# Patient Record
Sex: FEMALE | Race: WHITE | NOT HISPANIC OR LATINO | Employment: FULL TIME | URBAN - METROPOLITAN AREA
[De-identification: names, ages, dates, MRNs, and addresses within clinical notes are randomized per-mention and may not be internally consistent; named-entity substitution may affect disease eponyms.]

---

## 2017-01-04 DIAGNOSIS — Z79.899 ENCOUNTER FOR LONG-TERM CURRENT USE OF HIGH RISK MEDICATION: ICD-10-CM

## 2017-01-04 DIAGNOSIS — D50.9 IRON DEFICIENCY ANEMIA, UNSPECIFIED IRON DEFICIENCY ANEMIA TYPE: Primary | ICD-10-CM

## 2017-01-04 RX ORDER — DIPHENHYDRAMINE HCL 25 MG
25 CAPSULE ORAL ONCE
Status: CANCELLED | OUTPATIENT
Start: 2017-01-13

## 2017-01-04 RX ORDER — FAMOTIDINE 10 MG/ML
20 INJECTION, SOLUTION INTRAVENOUS ONCE
Status: CANCELLED | OUTPATIENT
Start: 2017-01-06

## 2017-01-04 RX ORDER — SODIUM CHLORIDE 9 MG/ML
250 INJECTION, SOLUTION INTRAVENOUS ONCE
Status: CANCELLED | OUTPATIENT
Start: 2017-01-06

## 2017-01-04 RX ORDER — DIPHENHYDRAMINE HCL 25 MG
25 CAPSULE ORAL ONCE
Status: CANCELLED | OUTPATIENT
Start: 2017-01-06

## 2017-01-04 RX ORDER — SODIUM CHLORIDE 9 MG/ML
250 INJECTION, SOLUTION INTRAVENOUS ONCE
Status: CANCELLED | OUTPATIENT
Start: 2017-01-13

## 2017-01-06 ENCOUNTER — INFUSION (OUTPATIENT)
Dept: ONCOLOGY | Facility: HOSPITAL | Age: 32
End: 2017-01-06

## 2017-01-06 VITALS
DIASTOLIC BLOOD PRESSURE: 82 MMHG | SYSTOLIC BLOOD PRESSURE: 110 MMHG | WEIGHT: 186.8 LBS | BODY MASS INDEX: 28.4 KG/M2 | TEMPERATURE: 98.2 F | HEART RATE: 58 BPM

## 2017-01-06 DIAGNOSIS — D50.9 IRON DEFICIENCY ANEMIA, UNSPECIFIED IRON DEFICIENCY ANEMIA TYPE: ICD-10-CM

## 2017-01-06 DIAGNOSIS — Z79.899 ENCOUNTER FOR LONG-TERM CURRENT USE OF HIGH RISK MEDICATION: Primary | ICD-10-CM

## 2017-01-06 PROCEDURE — 63710000001 DIPHENHYDRAMINE PER 50 MG: Performed by: INTERNAL MEDICINE

## 2017-01-06 PROCEDURE — 96374 THER/PROPH/DIAG INJ IV PUSH: CPT | Performed by: INTERNAL MEDICINE

## 2017-01-06 PROCEDURE — 25010000002 FERUMOXYTOL 510 MG/17ML SOLUTION 510 MG VIAL: Performed by: INTERNAL MEDICINE

## 2017-01-06 PROCEDURE — 96375 TX/PRO/DX INJ NEW DRUG ADDON: CPT | Performed by: INTERNAL MEDICINE

## 2017-01-06 RX ORDER — FAMOTIDINE 10 MG/ML
20 INJECTION, SOLUTION INTRAVENOUS ONCE
Status: COMPLETED | OUTPATIENT
Start: 2017-01-06 | End: 2017-01-06

## 2017-01-06 RX ORDER — DIPHENHYDRAMINE HCL 25 MG
25 CAPSULE ORAL ONCE
Status: COMPLETED | OUTPATIENT
Start: 2017-01-06 | End: 2017-01-06

## 2017-01-06 RX ORDER — SODIUM CHLORIDE 9 MG/ML
250 INJECTION, SOLUTION INTRAVENOUS ONCE
Status: COMPLETED | OUTPATIENT
Start: 2017-01-06 | End: 2017-01-06

## 2017-01-06 RX ADMIN — SODIUM CHLORIDE 250 ML: 900 INJECTION, SOLUTION INTRAVENOUS at 13:44

## 2017-01-06 RX ADMIN — FERUMOXYTOL 510 MG: 510 INJECTION INTRAVENOUS at 14:05

## 2017-01-06 RX ADMIN — DIPHENHYDRAMINE HYDROCHLORIDE 25 MG: 25 CAPSULE ORAL at 13:43

## 2017-01-06 RX ADMIN — FAMOTIDINE 20 MG: 10 INJECTION, SOLUTION INTRAVENOUS at 13:44

## 2017-01-09 ENCOUNTER — TELEPHONE (OUTPATIENT)
Dept: PAIN MEDICINE | Facility: CLINIC | Age: 32
End: 2017-01-09

## 2017-01-13 ENCOUNTER — APPOINTMENT (OUTPATIENT)
Dept: ONCOLOGY | Facility: HOSPITAL | Age: 32
End: 2017-01-13

## 2017-01-13 ENCOUNTER — TELEPHONE (OUTPATIENT)
Dept: ONCOLOGY | Facility: HOSPITAL | Age: 32
End: 2017-01-13

## 2017-01-13 NOTE — TELEPHONE ENCOUNTER
----- Message from Alysha Ovalle sent at 1/13/2017  9:15 AM EST -----   Pt cannot come in today for her Iron and needs to re-schedule maybe for this weekend at [facility]        [phone]

## 2017-01-17 ENCOUNTER — INFUSION (OUTPATIENT)
Dept: ONCOLOGY | Facility: HOSPITAL | Age: 32
End: 2017-01-17

## 2017-01-17 VITALS
HEART RATE: 67 BPM | WEIGHT: 186.4 LBS | DIASTOLIC BLOOD PRESSURE: 72 MMHG | TEMPERATURE: 98.2 F | SYSTOLIC BLOOD PRESSURE: 114 MMHG | BODY MASS INDEX: 28.34 KG/M2

## 2017-01-17 DIAGNOSIS — Z79.899 ENCOUNTER FOR LONG-TERM CURRENT USE OF HIGH RISK MEDICATION: Primary | ICD-10-CM

## 2017-01-17 DIAGNOSIS — D50.9 IRON DEFICIENCY ANEMIA, UNSPECIFIED IRON DEFICIENCY ANEMIA TYPE: ICD-10-CM

## 2017-01-17 PROCEDURE — 96374 THER/PROPH/DIAG INJ IV PUSH: CPT | Performed by: INTERNAL MEDICINE

## 2017-01-17 PROCEDURE — 25010000002 FERUMOXYTOL 510 MG/17ML SOLUTION 510 MG VIAL: Performed by: INTERNAL MEDICINE

## 2017-01-17 PROCEDURE — 63710000001 DIPHENHYDRAMINE PER 50 MG: Performed by: INTERNAL MEDICINE

## 2017-01-17 RX ORDER — SODIUM CHLORIDE 9 MG/ML
250 INJECTION, SOLUTION INTRAVENOUS ONCE
Status: COMPLETED | OUTPATIENT
Start: 2017-01-17 | End: 2017-01-17

## 2017-01-17 RX ORDER — DIPHENHYDRAMINE HCL 25 MG
25 CAPSULE ORAL ONCE
Status: COMPLETED | OUTPATIENT
Start: 2017-01-17 | End: 2017-01-17

## 2017-01-17 RX ADMIN — DIPHENHYDRAMINE HYDROCHLORIDE 25 MG: 25 CAPSULE ORAL at 11:06

## 2017-01-17 RX ADMIN — SODIUM CHLORIDE 250 ML: 900 INJECTION, SOLUTION INTRAVENOUS at 11:06

## 2017-01-17 RX ADMIN — FERUMOXYTOL 510 MG: 510 INJECTION INTRAVENOUS at 11:37

## 2017-02-01 ENCOUNTER — OFFICE VISIT (OUTPATIENT)
Dept: PAIN MEDICINE | Facility: CLINIC | Age: 32
End: 2017-02-01

## 2017-02-01 VITALS
HEIGHT: 68 IN | SYSTOLIC BLOOD PRESSURE: 106 MMHG | TEMPERATURE: 97.7 F | OXYGEN SATURATION: 100 % | DIASTOLIC BLOOD PRESSURE: 66 MMHG | BODY MASS INDEX: 28.92 KG/M2 | WEIGHT: 190.8 LBS | HEART RATE: 64 BPM | RESPIRATION RATE: 18 BRPM

## 2017-02-01 DIAGNOSIS — G62.0 CHEMOTHERAPY-INDUCED PERIPHERAL NEUROPATHY (HCC): ICD-10-CM

## 2017-02-01 DIAGNOSIS — Z79.899 ENCOUNTER FOR LONG-TERM CURRENT USE OF HIGH RISK MEDICATION: ICD-10-CM

## 2017-02-01 DIAGNOSIS — G89.29 OTHER CHRONIC PAIN: Primary | ICD-10-CM

## 2017-02-01 DIAGNOSIS — T45.1X5A CHEMOTHERAPY-INDUCED PERIPHERAL NEUROPATHY (HCC): ICD-10-CM

## 2017-02-01 DIAGNOSIS — G43.019 INTRACTABLE MIGRAINE WITHOUT AURA AND WITHOUT STATUS MIGRAINOSUS: ICD-10-CM

## 2017-02-01 PROCEDURE — 99213 OFFICE O/P EST LOW 20 MIN: CPT | Performed by: NURSE PRACTITIONER

## 2017-02-01 RX ORDER — HYDROCODONE BITARTRATE AND ACETAMINOPHEN 5; 325 MG/1; MG/1
1 TABLET ORAL DAILY PRN
Qty: 30 TABLET | Refills: 0 | Status: SHIPPED | OUTPATIENT
Start: 2017-02-01 | End: 2017-03-09 | Stop reason: SDUPTHER

## 2017-02-01 RX ORDER — GABAPENTIN 600 MG/1
600 TABLET ORAL 4 TIMES DAILY
Qty: 120 TABLET | Refills: 2 | Status: SHIPPED | OUTPATIENT
Start: 2017-02-01 | End: 2018-02-01 | Stop reason: HOSPADM

## 2017-02-01 NOTE — PROGRESS NOTES
CHIEF COMPLAINT  Follow-up for headaches and extremity pain.    Subjective   Hu Houston is a 31 y.o. female  who presents to the office for follow-up.She has a history of chronic migraine headaches as well as chemotherapy induced neuropathy.    She is reporting having issues with Topamax. Having trouble at work since starting the medication. She feels like she is moving slow and groggy.  She does not go back to Neurology until the 23rd of this month.  She reports that she was previously doing well when taking only 25 mg BID, headaches were stable and she was not experiencing side effects at this dose.  She has experienced the side effects since increasing the dose to 50 mg BID.  Recommend that she could try reducing the morning dose by 25 mg but ultimately she should follow up with Neurology.      She continues to report significant pain reduction with current regimen of Hydrocodone 5/325 0-1/day PRN as well as Neurontin 600 mg QID.  She denies side effects. ADL's by self. She works full time as a .      Headache    This is a chronic problem. The current episode started more than 1 year ago. The problem occurs daily. The problem has been unchanged. The pain is located in the occipital (entire head) region. The pain radiates to the face (behind both eyes). The pain quality is similar to prior headaches. The quality of the pain is described as aching and throbbing. The pain is at a severity of 0/10. Pertinent negatives include no back pain, coughing, dizziness, fever, hearing loss, nausea, neck pain, numbness, phonophobia, photophobia, vomiting or weakness. Nothing aggravates the symptoms. She has tried oral narcotics, triptans and acetaminophen ( Norco 5/325 1/day PRN, Gabapentin ) for the symptoms. The treatment provided moderate relief. Her past medical history is significant for cancer and migraine headaches.   Peripheral Neuropathy   This is a chronic problem. The current episode started more  "than 1 year ago. The problem occurs intermittently. The problem has been waxing and waning. Associated symptoms include fatigue. Pertinent negatives include no arthralgias, chest pain, chills, congestion, coughing, fever, headaches, joint swelling, myalgias, nausea, neck pain, numbness, vomiting or weakness. Exacerbated by: work - standing, use of arms. She has tried rest, oral narcotics and position changes (gabapentin) for the symptoms. The treatment provided moderate relief.      PEG Assessment   What number best describes your pain on average in the past week?4  What number best describes how, during the past week, pain has interfered with your enjoyment of life?4  What number best describes how, during the past week, pain has interfered with your general activity?  4    The following portions of the patient's history were reviewed and updated as appropriate: allergies, current medications, past family history, past medical history, past social history, past surgical history and problem list.    Review of Systems   Constitutional: Positive for fatigue. Negative for chills and fever.   HENT: Negative for congestion and hearing loss.    Eyes: Negative for photophobia and visual disturbance.   Respiratory: Negative for cough, shortness of breath and wheezing.    Cardiovascular: Negative.  Negative for chest pain.   Gastrointestinal: Negative for constipation, diarrhea, nausea and vomiting.   Genitourinary: Negative for difficulty urinating.   Musculoskeletal: Negative for arthralgias, back pain, joint swelling, myalgias and neck pain.   Neurological: Negative for dizziness, weakness, numbness and headaches.   Psychiatric/Behavioral: Negative for sleep disturbance and suicidal ideas. The patient is not nervous/anxious.        Vitals:    02/01/17 1149   BP: 106/66   Pulse: 64   Resp: 18   Temp: 97.7 °F (36.5 °C)   SpO2: 100%   Weight: 190 lb 12.8 oz (86.5 kg)   Height: 68\" (172.7 cm)   PainSc: 0-No pain "       Objective   Physical Exam   Constitutional: She is oriented to person, place, and time. She appears well-developed and well-nourished. She is cooperative.   HENT:   Head: Normocephalic and atraumatic.   Nose: Nose normal.   Eyes: Conjunctivae and lids are normal.   Neck: Trachea normal and normal range of motion. Neck supple.   Cardiovascular: Normal rate, regular rhythm and normal heart sounds.    Pulmonary/Chest: Effort normal and breath sounds normal.   Abdominal: Normal appearance.   Musculoskeletal:        Lumbar back: She exhibits no tenderness.   Neurological: She is alert and oriented to person, place, and time. She has normal strength. Gait normal.   Reflex Scores:       Patellar reflexes are 2+ on the right side and 2+ on the left side.  Skin: Skin is warm, dry and intact.   Psychiatric: She has a normal mood and affect. Her speech is normal and behavior is normal. Judgment and thought content normal. Cognition and memory are normal.   Nursing note and vitals reviewed.      Assessment/Plan   Hu was seen today for headache and extremity pain.    Diagnoses and all orders for this visit:    Other chronic pain    Chemotherapy-induced peripheral neuropathy    Intractable migraine without aura and without status migrainosus    Encounter for long-term current use of high risk medication    Other orders  -     HYDROcodone-acetaminophen (NORCO) 5-325 MG per tablet; Take 1 tablet by mouth Daily As Needed for severe pain (7-10).  -     gabapentin (NEURONTIN) 600 MG tablet; Take 1 tablet by mouth 4 (Four) Times a Day.       --- Refill Hydrocodone.  Patient appears stable with current regimen. No adverse effects. Regarding continuation of opioids, there is no evidence of aberrant behavior or any red flags.  The patient continues with appropriate response to opioid therapy. ADL's remain intact by self.   --- The urine drug screen confirmation from 8- has been reviewed and the result is appropriate based  on patient history and TERESITA report  --- Follow-up 2 months          TERESITA REPORT    As part of the patient's treatment plan, I am prescribing controlled substances. The patient has been made aware of appropriate use of such medications, including potential risk of somnolence, limited ability to drive and/or work safely, and the potential for dependence or overdose. It has also bee made clear that these medications are for use by this patient only, without concomitant use of alcohol or other substances unless prescribed.     Patient has completed prescribing agreement detailing terms of continued prescribing of controlled substances, including monitoring TERESITA reports, urine drug screening, and pill counts if necessary. The patient is aware that inappropriate use will results in cessation of prescribing such medications.    TERESITA report has been reviewed and scanned into the patient's chart.    Date of last TERESITA : 1-    History and physical exam exhibit continued safe and appropriate use of controlled substances.    EMR Dragon/Transcription disclaimer:   Much of this encounter note is an electronic transcription/translation of spoken language to printed text. The electronic translation of spoken language may permit erroneous, or at times, nonsensical words or phrases to be inadvertently transcribed; Although I have reviewed the note for such errors, some may still exist.

## 2017-03-09 RX ORDER — HYDROCODONE BITARTRATE AND ACETAMINOPHEN 5; 325 MG/1; MG/1
1 TABLET ORAL DAILY PRN
Qty: 30 TABLET | Refills: 0 | Status: SHIPPED | OUTPATIENT
Start: 2017-03-09 | End: 2017-08-03

## 2017-03-09 NOTE — TELEPHONE ENCOUNTER
Medication Refill Request    Date of phone call: 3/9/17    Medication being requested: Hydro-apap 5 si daily  Qty: 30    Date of last visit: 17    Date of last refill: 17    TERESITA up to date?: 17    Next Follow up?: 17    Any new pertinent information? (i.e, new medication allergies, new use of medications, change in patient's health or condition, non-compliance or inconsistency with prescribing agreement?): n/a

## 2017-03-14 ENCOUNTER — LAB (OUTPATIENT)
Dept: LAB | Facility: HOSPITAL | Age: 32
End: 2017-03-14

## 2017-03-14 DIAGNOSIS — C92.10 CML (CHRONIC MYELOID LEUKEMIA) (HCC): Primary | ICD-10-CM

## 2017-03-14 LAB
ALBUMIN SERPL-MCNC: 4.4 G/DL (ref 3.5–5.2)
ALBUMIN/GLOB SERPL: 1.4 G/DL (ref 1.1–2.4)
ALP SERPL-CCNC: 84 U/L (ref 38–116)
ALT SERPL W P-5'-P-CCNC: 14 U/L (ref 0–33)
ANION GAP SERPL CALCULATED.3IONS-SCNC: 12.3 MMOL/L
AST SERPL-CCNC: 16 U/L (ref 0–32)
BASOPHILS # BLD AUTO: 0.04 10*3/MM3 (ref 0–0.1)
BASOPHILS NFR BLD AUTO: 0.6 % (ref 0–1.1)
BILIRUB SERPL-MCNC: 0.2 MG/DL (ref 0.1–1.2)
BUN BLD-MCNC: 12 MG/DL (ref 6–20)
BUN/CREAT SERPL: 15.4 (ref 7.3–30)
CALCIUM SPEC-SCNC: 9.5 MG/DL (ref 8.5–10.2)
CHLORIDE SERPL-SCNC: 106 MMOL/L (ref 98–107)
CO2 SERPL-SCNC: 23.7 MMOL/L (ref 22–29)
CREAT BLD-MCNC: 0.78 MG/DL (ref 0.6–1.1)
DEPRECATED RDW RBC AUTO: 46.5 FL (ref 37–49)
EOSINOPHIL # BLD AUTO: 0.08 10*3/MM3 (ref 0–0.36)
EOSINOPHIL NFR BLD AUTO: 1.1 % (ref 1–5)
ERYTHROCYTE [DISTWIDTH] IN BLOOD BY AUTOMATED COUNT: 13.6 % (ref 11.7–14.5)
FERRITIN SERPL-MCNC: 103.7 NG/ML (ref 11–207)
GFR SERPL CREATININE-BSD FRML MDRD: 85 ML/MIN/1.73
GLOBULIN UR ELPH-MCNC: 3.2 GM/DL (ref 1.8–3.5)
GLUCOSE BLD-MCNC: 88 MG/DL (ref 74–124)
HCT VFR BLD AUTO: 40.7 % (ref 34–45)
HGB BLD-MCNC: 13.5 G/DL (ref 11.5–14.9)
HOLD SPECIMEN: NORMAL
IMM GRANULOCYTES # BLD: 0.05 10*3/MM3 (ref 0–0.03)
IMM GRANULOCYTES NFR BLD: 0.7 % (ref 0–0.5)
IRON 24H UR-MRATE: 79 MCG/DL (ref 37–145)
IRON SATN MFR SERPL: 24 % (ref 14–48)
LYMPHOCYTES # BLD AUTO: 1.76 10*3/MM3 (ref 1–3.5)
LYMPHOCYTES NFR BLD AUTO: 24.9 % (ref 20–49)
MCH RBC QN AUTO: 30.6 PG (ref 27–33)
MCHC RBC AUTO-ENTMCNC: 33.2 G/DL (ref 32–35)
MCV RBC AUTO: 92.3 FL (ref 83–97)
MONOCYTES # BLD AUTO: 0.37 10*3/MM3 (ref 0.25–0.8)
MONOCYTES NFR BLD AUTO: 5.2 % (ref 4–12)
NEUTROPHILS # BLD AUTO: 4.78 10*3/MM3 (ref 1.5–7)
NEUTROPHILS NFR BLD AUTO: 67.5 % (ref 39–75)
NRBC BLD MANUAL-RTO: 0 /100 WBC (ref 0–0)
PLATELET # BLD AUTO: 199 10*3/MM3 (ref 150–375)
PMV BLD AUTO: 10.1 FL (ref 8.9–12.1)
POTASSIUM BLD-SCNC: 4.2 MMOL/L (ref 3.5–4.7)
PROT SERPL-MCNC: 7.6 G/DL (ref 6.3–8)
RBC # BLD AUTO: 4.41 10*6/MM3 (ref 3.9–5)
SODIUM BLD-SCNC: 142 MMOL/L (ref 134–145)
TIBC SERPL-MCNC: 336 MCG/DL (ref 249–505)
TRANSFERRIN SERPL-MCNC: 240 MG/DL (ref 200–360)
WBC NRBC COR # BLD: 7.08 10*3/MM3 (ref 4–10)

## 2017-03-14 PROCEDURE — 80053 COMPREHEN METABOLIC PANEL: CPT | Performed by: INTERNAL MEDICINE

## 2017-03-14 PROCEDURE — 81206 BCR/ABL1 GENE MAJOR BP: CPT | Performed by: INTERNAL MEDICINE

## 2017-03-14 PROCEDURE — 84466 ASSAY OF TRANSFERRIN: CPT | Performed by: INTERNAL MEDICINE

## 2017-03-14 PROCEDURE — 85025 COMPLETE CBC W/AUTO DIFF WBC: CPT | Performed by: INTERNAL MEDICINE

## 2017-03-14 PROCEDURE — 83540 ASSAY OF IRON: CPT | Performed by: INTERNAL MEDICINE

## 2017-03-14 PROCEDURE — 82728 ASSAY OF FERRITIN: CPT | Performed by: INTERNAL MEDICINE

## 2017-03-14 PROCEDURE — 36415 COLL VENOUS BLD VENIPUNCTURE: CPT | Performed by: INTERNAL MEDICINE

## 2017-03-24 LAB — REF LAB TEST METHOD: NORMAL

## 2017-03-28 ENCOUNTER — APPOINTMENT (OUTPATIENT)
Dept: LAB | Facility: HOSPITAL | Age: 32
End: 2017-03-28

## 2017-03-28 ENCOUNTER — APPOINTMENT (OUTPATIENT)
Dept: ONCOLOGY | Facility: CLINIC | Age: 32
End: 2017-03-28

## 2017-03-29 ENCOUNTER — OFFICE VISIT (OUTPATIENT)
Dept: ONCOLOGY | Facility: CLINIC | Age: 32
End: 2017-03-29

## 2017-03-29 ENCOUNTER — APPOINTMENT (OUTPATIENT)
Dept: LAB | Facility: HOSPITAL | Age: 32
End: 2017-03-29

## 2017-03-29 VITALS
SYSTOLIC BLOOD PRESSURE: 110 MMHG | TEMPERATURE: 98 F | RESPIRATION RATE: 16 BRPM | HEART RATE: 72 BPM | HEIGHT: 68 IN | WEIGHT: 199.6 LBS | DIASTOLIC BLOOD PRESSURE: 80 MMHG | BODY MASS INDEX: 30.25 KG/M2

## 2017-03-29 DIAGNOSIS — C92.10 CML (CHRONIC MYELOID LEUKEMIA) (HCC): Primary | ICD-10-CM

## 2017-03-29 DIAGNOSIS — D50.9 IRON DEFICIENCY ANEMIA, UNSPECIFIED IRON DEFICIENCY ANEMIA TYPE: ICD-10-CM

## 2017-03-29 PROCEDURE — 99214 OFFICE O/P EST MOD 30 MIN: CPT | Performed by: INTERNAL MEDICINE

## 2017-03-29 PROCEDURE — G0463 HOSPITAL OUTPT CLINIC VISIT: HCPCS | Performed by: INTERNAL MEDICINE

## 2017-03-29 NOTE — PROGRESS NOTES
REASONS FOR FOLLOWUP:    1. Chronic myelogenous leukemia with splenomegaly, chronic phase, positive [place] chromosome, no mutation at kinase domain.    2. Patient was started on hydroxyurea temporarily on 10/23/2013 with significant drop of WBC counts.    3. Patient started on Sprycel on 12/02/2013. Peripheral blood tested positive with 3.4% of cells positive for BCR-ABL translocation, tested 02/17/2014.    4. The patient had CCyR 6 months into treatment as tested on 05/15/2014.    5. Complete molecular response as tested 08/08/14 with negative product of BCR/ABL.    6. There was interruption of her treatment due to insurance coverage and misunderstanding from patient's part, she had a relapse of disease with BCR/ABL product at 12.626% in March 2016, and she restarted back on Sprycel, with good response as tested on 08/31/2016 with BCR/ABL at 0.294%.  7. Recurrent iron deficiency anemia not responding to oral iron supplementation.  She also had a significant constipation associated with oral iron. Patient was given Feraheme treatment 2 doses in September 2016 and repeated in January 2017.       HISTORY OF PRESENT ILLNESS: The patient is a 31-year-old  female presenting today for 3-month followup.  Patient is accompanied by her friend.      She reports that she is having upper respiratory infection,as a matter of fact, she went to urgent care because of fever and a cough, on March 25, 2017, and test negative for flu. She is taking oral antibiotics, and is getting better with her cough.     Otherwise patient has no specific complaints.  Since last visit, she was given IV Feraheme treatment again in early January 2017, because of recurrent iron deficiency.     Cytogenetic study on March 14, 2017 reported a BCR-ABL products at 0.098%, showed further improved residual disease.        PAST MEDICAL HISTORY:    1. Asthma. Patient denies other chronic disease. No previous surgery.    2. Pyelonephritis, with  Klebsiella pneumoniae infection discovered on 11/02/2013, treated with antibiotics and resolved.    3.Patient seen for triage visit on 05/11/2015 following ophthalmology evaluation, and ER visit. The patient is initiated for treatment of shingles.    4. Left otitis externa with perichondritis and cellulitis in November 2015 required hospitalization.    5. Depression.       OB/GYN HISTORY: Menarche age 9. G5, P4, 1 miscarriage of the third pregnancy. First pregnancy at age 17.        HEMATOLOGIC/ONCOLOGIC HISTORY: History from previous dates can be found in the separate document.        Laboratory results on 09/23/2015 showed normalization of hemoglobin 12.2, but still has macrocytosis, MCV 73.3. She has normal platelets and WBC at 9400. Serum ferritin < 5 ng/ml, iron sats 6.3%, iron 29, TIBC 455 mcg/ml. Still has severe iron deficiency, needs to continue oral iron therapy. The patient restarted taking oral iron supplementation which was probably stopped in the end of November 2015.        Laboratory study on 03/22/2016 reported normal WBC 8450, neutrophils 6100, lymphs is 1400 and monocytes 550. Serum iron was 26, TIBC 469 on saturation 6% and ferritin less than 5 NG/ML. Chemistry lab reported normal renal function with a creatinine 0.69, normal liver function panel, total protein 7.5 and albumin 4.2, normal electrolytes. Patient was restarted back on oral on sedimentation twice a day with good tolerance.      Patient continues take Lortab as needed for left leg cramping. Urine drug test on 08/05/2016 was completely negative, and repeated study on August 26 was positive for opiate, negative for other recreational drugs.      Repeat laboratory study on 08/31/2016 reported iron 21, ferritin less than 5, iron saturation 5%, TIBC 462. Hemoglobin was 11.5 MCV 78.1 MCHC 30.4. Platelets 163,000. Total WBC 8870 including neutrophils 6400 and lymphocytes 1500. BCR/ABL was 0.294% by RT-PCR method.       Patient was given IV  Feraheme treatment in September 2016, with 2 doses. Repeated laboratory study on 10/06/2016 reported significantly improved and normalized ferritin 269, iron 80, TIBC 365 and iron saturation 22%. Her hemoglobin was 12.2, and MCV 80.8.      Patient reported she was seen by Dr. Fam in 10/2016 and was started on half tablets of Topamax. I reviewed Dr. Fam’s clinic note and telephone conversation record. The patient reports she has no recurrence of migraine headache. However, she is very tearful today, reporting that she has thoughts of not taking any medications. She did assure me that she has been taking the medication up to this point. 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 The patient denies suicide ideation. When she was initially diagnosed of CML back in 2014, she had depression and I started the patient on Prozac. The patient reported initially it helped her, however, she no longer feels the effect of Prozac. She feels depressed. The patient reports she has no personal hobby. She goes to work and goes home, taking care of her kids. She does not enjoy life as she used to.      Laboratory study on December 29, 2016 reported at ferritin 19.9, iron 33, TIBC 347 iron saturation 10%.  Hemoglobin was 13, normal WBC and platelets.  Unremarkable CMP.  Patient was given 2 doses of Feraheme treatment in early January 2017.     MEDICATIONS: The current medication list was reviewed with the patient and updated in the EMR this date per the medical assistant. Medication dosages and frequencies were confirmed to be accurate.        ALLERGIES: SULFA.        SOCIAL HISTORY: . Studying for her Master's degree. She smoked cigarettes previously, quit in January 2006 with 8 pack year history. Social drinker, maybe once a month. No illegal drug use. No risk for HIV except tattoos.        FAMILY HISTORY:  "Maternal grandmother had esophageal/stomach adenocarcinoma diagnosed at age of 72 and  of disease progress at age of 73. The patient' s mother has hypertension but otherwise no family history of malignancy, especially no leukemia.        REVIEW OF SYSTEMS:    PAIN: See VITAL SIGNS below.    GENERAL: No change in appetite or weight; no fevers, chills, sweats.  See history of present illness.    SKIN: No rashes or nonhealing lesions.    HEME/LYMPH: See HEMATOLOGIC-ONCOLOGIC HISTORY.    EYES: No vision changes or diplopia.    ENT: No tinnitus, hearing loss, gum bleeding, epistaxis, hoarseness or dysphagia.    RESPIRATORY: No cough, shortness of breath, hemoptysis or wheezing.    CVS: No chest pain, palpitations, orthopnea, dyspnea on exertion or PND.    GI: No abdominal pain, nausea, vomiting, constipation, diarrhea, melena or hematochezia.    : No dysuria or hematuria, abnormal vaginal bleeding or discharge.    MUSCULOSKELETAL: See HPI.    NEUROLOGICAL: See history of present illness.     PSYCHIATRIC: See history of present illness       VITAL SIGNS:   Vitals:    17 1428   BP: 110/80   Pulse: 72   Resp: 16   Temp: 98 °F (36.7 °C)   Weight: 199 lb 9.6 oz (90.5 kg)   Height: 68\" (172.7 cm)   PainSc: 0-No pain   ECOG 1           PHYSICAL EXAMINATION:    GENERAL: Well-developed, well-nourished  female in no acute distress.    SKIN: Warm, dry without rashes, purpura or petechiae.    HEAD: Normocephalic.    EYES: Pupils equal, round. EOMs intact. Conjunctivae normal.    EARS: Hearing intact.    NOSE: No excoriations or nasal discharge.    MOUTH: Tongue is well papillated; no stomatitis or ulcers. Lips normal.    THROAT: Oropharynx without lesions or exudates.    NECK: Supple with good range of motion; no thyromegaly or masses.     LYMPHATICS: No cervical, supraclavicular, axillary adenopathy.    CHEST: Lungs clear to auscultation.    CARDIAC: Regular rate and rhythm without murmurs, rubs or gallops. "    ABDOMEN: Soft, nontender with no organomegaly or masses. Bowel sounds present.    EXTREMITIES: No edema no cyanosis.    NEUROLOGICAL: No focal deficits.        LABORATORY DATA:        Lab Results   Component Value Date    WBC 7.08 03/14/2017    HGB 13.5 03/14/2017    HCT 40.7 03/14/2017    MCV 92.3 03/14/2017     03/14/2017     Lab Results   Component Value Date    NEUTROABS 4.78 03/14/2017     Lab Results   Component Value Date    GLUCOSE 88 03/14/2017    BUN 12 03/14/2017    CREATININE 0.78 03/14/2017    EGFRIFNONA 85 03/14/2017    BCR 15.4 03/14/2017    K 4.2 03/14/2017    CO2 23.7 03/14/2017    CALCIUM 9.5 03/14/2017    ALBUMIN 4.40 03/14/2017    LABIL2 1.4 03/14/2017    AST 16 03/14/2017    ALT 14 03/14/2017     Sodium   Date Value Ref Range Status   03/14/2017 142 134 - 145 mmol/L Final     Potassium   Date Value Ref Range Status   03/14/2017 4.2 3.5 - 4.7 mmol/L Final     Total Bilirubin   Date Value Ref Range Status   03/14/2017 0.2 0.1 - 1.2 mg/dL Final     Alkaline Phosphatase   Date Value Ref Range Status   03/14/2017 84 38 - 116 U/L Final   ]    BCR-ABL 0.098% by RT-PCR on 03/14/2017        ASSESSMENT:    1. Chronic myelogenous leukemia, chronic phase with excellent response to Sprycel and complete molecular response in August 2014. However she had interuption of treatment in early part of 2016, because of insurance issues and miscommunication, she stopped the medicine without telling us, and laboratory test on 03/22/2016 reported disease relapse with increased BCR/ABL product at 12.626%. subsequently she was restarted back on Sprycel, and has been doing well. Laboratory study on 08/31/2016 showed great response, with BCR/ABL at 0.294%.  Repeated test on March 14, 2017 showed residual disease with BCR/ABL product 0.098%.      She has been tolerating therapy, and we'll continue treatment for now.  Plan to repeat RT-PCR study every 3 months.     2. Recurrent Iron deficiency anemia.  She was given  IV Feraheme treatment again in January 2017.  Repeat labs weeks ago showed proper ferritin level and iron saturation.  Discussed with patient, I will check her iron panel every 3 months for monitoring.  Expecting this will coming down since she still has menses and was not able to tolerate oral iron supplementation.       3. Depression. Patient has been on Prozac for almost 2 years.      4.  Migraine headache. followed by neurologist Dr. López and associates.  She was started on gabapentin in early February 2017.   she also has been taking Topamax.             PLAN:      1. Continue Sprycel 100 mg daily, monitor labs cbc and CMP and BCR-ABL by RT-PCR every 3 months.    2. She will return in 3-month for NP visit and 6 month MD visit.               25 minutes, over half of that time was used for counseling.           BAO SLADE M.D., Ph.D.   03/29/2017          cc:  BACILIO BRODY M.D.     ISHAAN REGAN M.D.    TREVIN WELSH M.D.

## 2017-04-12 ENCOUNTER — TELEPHONE (OUTPATIENT)
Dept: ONCOLOGY | Facility: CLINIC | Age: 32
End: 2017-04-12

## 2017-06-20 ENCOUNTER — LAB (OUTPATIENT)
Dept: LAB | Facility: HOSPITAL | Age: 32
End: 2017-06-20

## 2017-06-20 ENCOUNTER — OFFICE VISIT (OUTPATIENT)
Dept: ONCOLOGY | Facility: CLINIC | Age: 32
End: 2017-06-20

## 2017-06-20 ENCOUNTER — TELEPHONE (OUTPATIENT)
Dept: ONCOLOGY | Facility: HOSPITAL | Age: 32
End: 2017-06-20

## 2017-06-20 VITALS
TEMPERATURE: 98.2 F | HEART RATE: 72 BPM | SYSTOLIC BLOOD PRESSURE: 110 MMHG | HEIGHT: 68 IN | BODY MASS INDEX: 30.71 KG/M2 | WEIGHT: 202.6 LBS | RESPIRATION RATE: 16 BRPM | DIASTOLIC BLOOD PRESSURE: 70 MMHG

## 2017-06-20 DIAGNOSIS — D50.9 IRON DEFICIENCY ANEMIA, UNSPECIFIED IRON DEFICIENCY ANEMIA TYPE: Primary | ICD-10-CM

## 2017-06-20 DIAGNOSIS — C92.10 CML (CHRONIC MYELOID LEUKEMIA) (HCC): ICD-10-CM

## 2017-06-20 DIAGNOSIS — K90.9 MALABSORPTION OF IRON: Primary | ICD-10-CM

## 2017-06-20 LAB
ALBUMIN SERPL-MCNC: 4.3 G/DL (ref 3.5–5.2)
ALBUMIN/GLOB SERPL: 1.5 G/DL (ref 1.1–2.4)
ALP SERPL-CCNC: 83 U/L (ref 38–116)
ALT SERPL W P-5'-P-CCNC: 15 U/L (ref 0–33)
ANION GAP SERPL CALCULATED.3IONS-SCNC: 12.3 MMOL/L
AST SERPL-CCNC: 14 U/L (ref 0–32)
BASOPHILS # BLD AUTO: 0.04 10*3/MM3 (ref 0–0.1)
BASOPHILS NFR BLD AUTO: 0.4 % (ref 0–1.1)
BILIRUB SERPL-MCNC: <0.2 MG/DL (ref 0.1–1.2)
BUN BLD-MCNC: 14 MG/DL (ref 6–20)
BUN/CREAT SERPL: 16.9 (ref 7.3–30)
CALCIUM SPEC-SCNC: 9.5 MG/DL (ref 8.5–10.2)
CHLORIDE SERPL-SCNC: 106 MMOL/L (ref 98–107)
CO2 SERPL-SCNC: 22.7 MMOL/L (ref 22–29)
CREAT BLD-MCNC: 0.83 MG/DL (ref 0.6–1.1)
DEPRECATED RDW RBC AUTO: 42.5 FL (ref 37–49)
EOSINOPHIL # BLD AUTO: 0.2 10*3/MM3 (ref 0–0.36)
EOSINOPHIL NFR BLD AUTO: 2.1 % (ref 1–5)
ERYTHROCYTE [DISTWIDTH] IN BLOOD BY AUTOMATED COUNT: 13 % (ref 11.7–14.5)
FERRITIN SERPL-MCNC: 45.2 NG/ML (ref 11–207)
GFR SERPL CREATININE-BSD FRML MDRD: 79 ML/MIN/1.73
GLOBULIN UR ELPH-MCNC: 2.8 GM/DL (ref 1.8–3.5)
GLUCOSE BLD-MCNC: 85 MG/DL (ref 74–124)
HCT VFR BLD AUTO: 40.6 % (ref 34–45)
HGB BLD-MCNC: 13.6 G/DL (ref 11.5–14.9)
IMM GRANULOCYTES # BLD: 0.04 10*3/MM3 (ref 0–0.03)
IMM GRANULOCYTES NFR BLD: 0.4 % (ref 0–0.5)
IRON 24H UR-MRATE: 35 MCG/DL (ref 37–145)
IRON SATN MFR SERPL: 11 % (ref 14–48)
LYMPHOCYTES # BLD AUTO: 2.16 10*3/MM3 (ref 1–3.5)
LYMPHOCYTES NFR BLD AUTO: 23 % (ref 20–49)
MCH RBC QN AUTO: 30 PG (ref 27–33)
MCHC RBC AUTO-ENTMCNC: 33.5 G/DL (ref 32–35)
MCV RBC AUTO: 89.6 FL (ref 83–97)
MONOCYTES # BLD AUTO: 0.72 10*3/MM3 (ref 0.25–0.8)
MONOCYTES NFR BLD AUTO: 7.7 % (ref 4–12)
NEUTROPHILS # BLD AUTO: 6.22 10*3/MM3 (ref 1.5–7)
NEUTROPHILS NFR BLD AUTO: 66.4 % (ref 39–75)
NRBC BLD MANUAL-RTO: 0 /100 WBC (ref 0–0)
PLATELET # BLD AUTO: 188 10*3/MM3 (ref 150–375)
PMV BLD AUTO: 10.1 FL (ref 8.9–12.1)
POTASSIUM BLD-SCNC: 4.6 MMOL/L (ref 3.5–4.7)
PROT SERPL-MCNC: 7.1 G/DL (ref 6.3–8)
RBC # BLD AUTO: 4.53 10*6/MM3 (ref 3.9–5)
SODIUM BLD-SCNC: 141 MMOL/L (ref 134–145)
TIBC SERPL-MCNC: 333 MCG/DL (ref 249–505)
TRANSFERRIN SERPL-MCNC: 238 MG/DL (ref 200–360)
WBC NRBC COR # BLD: 9.38 10*3/MM3 (ref 4–10)

## 2017-06-20 PROCEDURE — 84466 ASSAY OF TRANSFERRIN: CPT | Performed by: NURSE PRACTITIONER

## 2017-06-20 PROCEDURE — 36415 COLL VENOUS BLD VENIPUNCTURE: CPT | Performed by: NURSE PRACTITIONER

## 2017-06-20 PROCEDURE — 80053 COMPREHEN METABOLIC PANEL: CPT | Performed by: NURSE PRACTITIONER

## 2017-06-20 PROCEDURE — 99214 OFFICE O/P EST MOD 30 MIN: CPT | Performed by: NURSE PRACTITIONER

## 2017-06-20 PROCEDURE — 83540 ASSAY OF IRON: CPT | Performed by: NURSE PRACTITIONER

## 2017-06-20 PROCEDURE — 81206 BCR/ABL1 GENE MAJOR BP: CPT | Performed by: NURSE PRACTITIONER

## 2017-06-20 PROCEDURE — 82728 ASSAY OF FERRITIN: CPT | Performed by: NURSE PRACTITIONER

## 2017-06-20 PROCEDURE — 85025 COMPLETE CBC W/AUTO DIFF WBC: CPT | Performed by: NURSE PRACTITIONER

## 2017-06-20 PROCEDURE — 81207 BCR/ABL1 GENE MINOR BP: CPT | Performed by: NURSE PRACTITIONER

## 2017-06-20 NOTE — TELEPHONE ENCOUNTER
Spoke with patient.  V/U.     ----- Message from FELIPA Barnett sent at 6/20/2017  3:21 PM EDT -----  Regarding: iron results  Please call this patient and let her know her iron level is good right now.  This was reviewed with Dr. Castle today.  I attempted to call her twice but she has no VM.    Thanks!    ----- Message -----     From: Lab, Background User     Sent: 6/20/2017  11:47 AM       To: FELIPA Barnett

## 2017-06-20 NOTE — PROGRESS NOTES
REASONS FOR FOLLOWUP:    1. Chronic myelogenous leukemia with splenomegaly, chronic phase, positive [place] chromosome, no mutation at kinase domain.    2. Patient was started on hydroxyurea temporarily on 10/23/2013 with significant drop of WBC counts.    3. Patient started on Sprycel on 12/02/2013. Peripheral blood tested positive with 3.4% of cells positive for BCR-ABL translocation, tested 02/17/2014.    4. The patient had CCyR 6 months into treatment as tested on 05/15/2014.    5. Complete molecular response as tested 08/08/14 with negative product of BCR/ABL.    6. There was interruption of her treatment due to insurance coverage and misunderstanding from patient's part, she had a relapse of disease with BCR/ABL product at 12.626% in March 2016, and she restarted back on Sprycel, with good response as tested on 08/31/2016 with BCR/ABL at 0.294%.  7. Recurrent iron deficiency anemia not responding to oral iron supplementation.  She also had a significant constipation associated with oral iron. Patient was given Feraheme treatment 2 doses in September 2016 and repeated in January 2017.       HISTORY OF PRESENT ILLNESS: The patient is a 31-year-old  female presenting today for 3-month followup.  Patient is accompanied by her friend.      She reports that she is having upper respiratory infection,as a matter of fact, she went to urgent care because of fever and a cough, on March 25, 2017, and test negative for flu. She is taking oral antibiotics, and is getting better with her cough.     Otherwise patient has no specific complaints.  Since last visit, she was given IV Feraheme treatment again in early January 2017, because of recurrent iron deficiency.     Cytogenetic study on March 14, 2017 reported a BCR-ABL products at 0.098%, showed further improved residual disease.        PAST MEDICAL HISTORY:    1. Asthma. Patient denies other chronic disease. No previous surgery.    2. Pyelonephritis, with  Klebsiella pneumoniae infection discovered on 11/02/2013, treated with antibiotics and resolved.    3.Patient seen for triage visit on 05/11/2015 following ophthalmology evaluation, and ER visit. The patient is initiated for treatment of shingles.    4. Left otitis externa with perichondritis and cellulitis in November 2015 required hospitalization.    5. Depression.       OB/GYN HISTORY: Menarche age 9. G5, P4, 1 miscarriage of the third pregnancy. First pregnancy at age 17.        HEMATOLOGIC/ONCOLOGIC HISTORY: History from previous dates can be found in the separate document.        Laboratory results on 09/23/2015 showed normalization of hemoglobin 12.2, but still has macrocytosis, MCV 73.3. She has normal platelets and WBC at 9400. Serum ferritin < 5 ng/ml, iron sats 6.3%, iron 29, TIBC 455 mcg/ml. Still has severe iron deficiency, needs to continue oral iron therapy. The patient restarted taking oral iron supplementation which was probably stopped in the end of November 2015.        Laboratory study on 03/22/2016 reported normal WBC 8450, neutrophils 6100, lymphs is 1400 and monocytes 550. Serum iron was 26, TIBC 469 on saturation 6% and ferritin less than 5 NG/ML. Chemistry lab reported normal renal function with a creatinine 0.69, normal liver function panel, total protein 7.5 and albumin 4.2, normal electrolytes. Patient was restarted back on oral on sedimentation twice a day with good tolerance.      Patient continues take Lortab as needed for left leg cramping. Urine drug test on 08/05/2016 was completely negative, and repeated study on August 26 was positive for opiate, negative for other recreational drugs.      Repeat laboratory study on 08/31/2016 reported iron 21, ferritin less than 5, iron saturation 5%, TIBC 462. Hemoglobin was 11.5 MCV 78.1 MCHC 30.4. Platelets 163,000. Total WBC 8870 including neutrophils 6400 and lymphocytes 1500. BCR/ABL was 0.294% by RT-PCR method.       Patient was given IV  Feraheme treatment in September 2016, with 2 doses. Repeated laboratory study on 10/06/2016 reported significantly improved and normalized ferritin 269, iron 80, TIBC 365 and iron saturation 22%. Her hemoglobin was 12.2, and MCV 80.8.      Patient reported she was seen by Dr. Fam in 10/2016 and was started on half tablets of Topamax. I reviewed Dr. Fam’s clinic note and telephone conversation record. The patient reports she has no recurrence of migraine headache. However, she is very tearful today, reporting that she has thoughts of not taking any medications. She did assure me that she has been taking the medication up to this point. 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 The patient denies suicide ideation. When she was initially diagnosed of CML back in 2014, she had depression and I started the patient on Prozac. The patient reported initially it helped her, however, she no longer feels the effect of Prozac. She feels depressed. The patient reports she has no personal hobby. She goes to work and goes home, taking care of her kids. She does not enjoy life as she used to.      Laboratory study on December 29, 2016 reported at ferritin 19.9, iron 33, TIBC 347 iron saturation 10%.  Hemoglobin was 13, normal WBC and platelets.  Unremarkable CMP.  Patient was given 2 doses of Feraheme treatment in early January 2017.     MEDICATIONS: The current medication list was reviewed with the patient and updated in the EMR this date per the medical assistant. Medication dosages and frequencies were confirmed to be accurate.        ALLERGIES: SULFA.        SOCIAL HISTORY: . Studying for her Master's degree. She smoked cigarettes previously, quit in January 2006 with 8 pack year history. Social drinker, maybe once a month. No illegal drug use. No risk for HIV except tattoos.        FAMILY HISTORY:  "Maternal grandmother had esophageal/stomach adenocarcinoma diagnosed at age of 72 and  of disease progress at age of 73. The patient' s mother has hypertension but otherwise no family history of malignancy, especially no leukemia.        REVIEW OF SYSTEMS:    PAIN: See VITAL SIGNS below.    GENERAL: No change in appetite or weight; no fevers, chills, sweats.  See history of present illness.    SKIN: No rashes or nonhealing lesions.    HEME/LYMPH: See HEMATOLOGIC-ONCOLOGIC HISTORY.    EYES: No vision changes or diplopia.    ENT: No tinnitus, hearing loss, gum bleeding, epistaxis, hoarseness or dysphagia.    RESPIRATORY: No cough, shortness of breath, hemoptysis or wheezing.  CVS: No chest pain, palpitations, orthopnea, dyspnea on exertion or PND.    GI: No abdominal pain, nausea, vomiting, constipation, diarrhea, melena or hematochezia.    : No dysuria or hematuria, abnormal vaginal bleeding or discharge.    MUSCULOSKELETAL: See HPI.    NEUROLOGICAL: See history of present illness.     PSYCHIATRIC: See history of present illness       VITAL SIGNS:   Vitals:    17 1148   BP: 110/70   Pulse: 72   Resp: 16   Temp: 98.2 °F (36.8 °C)   Weight: 202 lb 9.6 oz (91.9 kg)   Height: 68\" (172.7 cm)   PainSc: 5  Comment: hips and legs   ECOG 1           PHYSICAL EXAMINATION:    GENERAL: Well-developed, well-nourished  female in no acute distress.    SKIN: Warm, dry without rashes, purpura or petechiae.    HEAD: Normocephalic.    EYES: Pupils equal, round. EOMs intact. Conjunctivae normal.    EARS: Hearing intact.    NOSE: No excoriations or nasal discharge.    MOUTH: Tongue is well papillated; no stomatitis or ulcers. Lips normal.    THROAT: Oropharynx without lesions or exudates.    NECK: Supple with good range of motion; no thyromegaly or masses.     LYMPHATICS: No cervical, supraclavicular, axillary adenopathy.    CHEST: Lungs clear to auscultation.    CARDIAC: Regular rate and rhythm without murmurs, rubs " or gallops.    ABDOMEN: Soft, nontender with no organomegaly or masses. Bowel sounds present.    EXTREMITIES: No edema no cyanosis.    NEUROLOGICAL: No focal deficits.        LABORATORY DATA:        Lab Results   Component Value Date    WBC 9.38 06/20/2017    HGB 13.6 06/20/2017    HCT 40.6 06/20/2017    MCV 89.6 06/20/2017     06/20/2017     Lab Results   Component Value Date    NEUTROABS 6.22 06/20/2017     Lab Results   Component Value Date    GLUCOSE 85 06/20/2017    BUN 14 06/20/2017    CREATININE 0.83 06/20/2017    EGFRIFNONA 79 06/20/2017    BCR 16.9 06/20/2017    K 4.6 06/20/2017    CO2 22.7 06/20/2017    CALCIUM 9.5 06/20/2017    ALBUMIN 4.30 06/20/2017    LABIL2 1.5 06/20/2017    AST 14 06/20/2017    ALT 15 06/20/2017     Sodium   Date Value Ref Range Status   06/20/2017 141 134 - 145 mmol/L Final     Potassium   Date Value Ref Range Status   06/20/2017 4.6 3.5 - 4.7 mmol/L Final     Total Bilirubin   Date Value Ref Range Status   06/20/2017 <0.2 0.1 - 1.2 mg/dL Final     Alkaline Phosphatase   Date Value Ref Range Status   06/20/2017 83 38 - 116 U/L Final   ]    BCR-ABL 0.098% by RT-PCR on 03/14/2017        ASSESSMENT:    1. Chronic myelogenous leukemia, chronic phase with excellent response to Sprycel and complete molecular response in August 2014. However she had interuption of treatment in early part of 2016, because of insurance issues and miscommunication, she stopped the medicine without telling us, and laboratory test on 03/22/2016 reported disease relapse with increased BCR/ABL product at 12.626%. subsequently she was restarted back on Sprycel, and has been doing well. Laboratory study on 08/31/2016 showed great response, with BCR/ABL at 0.294%.  Repeated test on March 14, 2017 showed residual disease with BCR/ABL product 0.098%.      She has been tolerating therapy, and we'll continue treatment for now.  Plan to repeat RT-PCR study every 3 months, pending from today.    2. Recurrent Iron  deficiency anemia.  She was given IV Feraheme treatment again in January 2017.  Repeat labs weeks ago showed proper ferritin level and iron saturation.  Plans made to check iron panel every 3 months for monitoring.  Expecting this will coming down since she still has menses and was not able to tolerate oral iron supplementation.  Ferritin satisfactory today, reviewed with Dr. Castle along with iron sat which was low.  Patient asymptomatic.  Will recheck in 3 months.      3. Depression. Patient has been on Prozac for almost 2 years.      4.  Migraine headache. followed by neurologist Dr. López and associates.  She was started on gabapentin in early February 2017.   she also has been taking Topamax.      PLAN:      1. Continue Sprycel 100 mg daily, monitor labs cbc and CMP and BCR-ABL by RT-PCR every 3 months.    2. She will return in 3-month MD visit.

## 2017-06-27 LAB — REF LAB TEST METHOD: NORMAL

## 2017-06-29 ENCOUNTER — TELEPHONE (OUTPATIENT)
Dept: ONCOLOGY | Facility: CLINIC | Age: 32
End: 2017-06-29

## 2017-06-29 NOTE — TELEPHONE ENCOUNTER
Patient calling to find out if there are any PPI's or acid reducers that can be taken with Sprycel.  Reviewed with Sophie MIRAMONTES, the only meds that are safe to take are fast acting meds such as tums or maalox and those must be taken at least 2 hours apart from sprycel.  Cannot take pepcid or zantac.  Patient v/u.

## 2017-06-30 RX ORDER — SODIUM CHLORIDE 9 MG/ML
250 INJECTION, SOLUTION INTRAVENOUS ONCE
Status: CANCELLED | OUTPATIENT
Start: 2017-07-07

## 2017-06-30 RX ORDER — DIPHENHYDRAMINE HCL 25 MG
25 CAPSULE ORAL ONCE
Status: CANCELLED | OUTPATIENT
Start: 2017-07-14

## 2017-06-30 RX ORDER — DIPHENHYDRAMINE HCL 25 MG
25 CAPSULE ORAL ONCE
Status: CANCELLED | OUTPATIENT
Start: 2017-07-07

## 2017-06-30 RX ORDER — FAMOTIDINE 10 MG/ML
20 INJECTION, SOLUTION INTRAVENOUS ONCE
Status: CANCELLED | OUTPATIENT
Start: 2017-07-07

## 2017-06-30 RX ORDER — SODIUM CHLORIDE 9 MG/ML
250 INJECTION, SOLUTION INTRAVENOUS ONCE
Status: CANCELLED | OUTPATIENT
Start: 2017-07-14

## 2017-07-25 ENCOUNTER — OFFICE VISIT (OUTPATIENT)
Dept: GASTROENTEROLOGY | Facility: CLINIC | Age: 32
End: 2017-07-25

## 2017-07-25 VITALS
DIASTOLIC BLOOD PRESSURE: 68 MMHG | SYSTOLIC BLOOD PRESSURE: 112 MMHG | WEIGHT: 202.8 LBS | HEIGHT: 68 IN | BODY MASS INDEX: 30.74 KG/M2

## 2017-07-25 DIAGNOSIS — R10.13 EPIGASTRIC PAIN: ICD-10-CM

## 2017-07-25 DIAGNOSIS — R11.0 NAUSEA: Primary | ICD-10-CM

## 2017-07-25 PROBLEM — R10.10 PAIN OF UPPER ABDOMEN: Status: ACTIVE | Noted: 2017-07-25

## 2017-07-25 PROCEDURE — 99203 OFFICE O/P NEW LOW 30 MIN: CPT | Performed by: INTERNAL MEDICINE

## 2017-07-25 NOTE — PROGRESS NOTES
"    PATIENT INFORMATION  Hu Houston       - 1985    CHIEF COMPLAINT  Chief Complaint   Patient presents with   • Abdominal Pain   • Nausea       HISTORY OF PRESENT ILLNESS  Abdominal Pain   Associated symptoms include nausea.   Nausea   Associated symptoms include abdominal pain, chest pain and nausea.     33 yo with 2 month history of severe, \"stabbing pain\" in the epigastric area and radiates up to the chest and shoulders. She  Was started on carafate about 1.5 months ago and this helps. If she does not take this medication, it will cause pain. She is on ibuprofen 800mg bid, for the past 2 months.   She has CML and is on chemo and was on a pain management but got dismissed. She was re established here recently but has not received any pain meds yet.    She cannot take ppi due to interaction with Sprycel-carmel chemo.  No melena or hematochezia.   Eating does make pain worse usually within 20 minutes.   Abd US done 2 weeks ago at Dr. Ugalde's office and reportedly normal gb. LFTS last month was normal.  REVIEW OF SYSTEMS  Review of Systems   Respiratory: Positive for shortness of breath.    Cardiovascular: Positive for chest pain.   Gastrointestinal: Positive for abdominal pain and nausea.   All other systems reviewed and are negative.        ACTIVE PROBLEMS  Patient Active Problem List    Diagnosis   • Depression [F32.9]   • Encounter for long-term current use of high risk medication [Z79.899]   • Chiari malformation type I [G93.5]   • Syncope [R55]   • Intractable migraine without aura and without status migrainosus [G43.019]   • Abnormal finding on MRI of brain [R90.89]   • Concussion with prolonged loss of consciousness and return to pre-existing conscious level [S06.0X9A]   • Malabsorption of iron [K90.9]   • Chemotherapy-induced peripheral neuropathy [G62.0, T45.1X5A]   • Chronic pain [G89.29]   • CML (chronic myeloid leukemia) [C92.10]   • Iron deficiency anemia [D50.9]         PAST MEDICAL " HISTORY  Past Medical History:   Diagnosis Date   • Anemia in neoplastic disease    • Arm pain    • Asthma    • Chiari I malformation    • Chronic myelogenous leukemia    • Cystitis    • Flank pain    • GERD (gastroesophageal reflux disease)    • H/O Iron deficiency anemia    • H/O Lower extremity pain     Resolved.   • History of ongoing treatment with high-risk medication    • History of pyelonephritis    • Migraine    • Myalgia    • Neuropathy of forearm    • Pulmonary hypertension    • Splenomegaly    • Vitamin D deficiency          SURGICAL HISTORY  Past Surgical History:   Procedure Laterality Date   • BONE MARROW BIOPSY  2013         FAMILY HISTORY  Family History   Problem Relation Age of Onset   • Hyperlipidemia Mother    • Hypertension Mother    • Stomach cancer Maternal Grandmother 72     Adenocarcinoma esophagus and stomach   • Stroke Paternal Grandmother          SOCIAL HISTORY  Social History     Occupational History   •  [employer]     Social History Main Topics   • Smoking status: Former Smoker     Packs/day: 1.00     Years: 8.00     Types: Cigarettes   • Smokeless tobacco: Former User   • Alcohol use Yes      Comment: Social, maybe once a month   • Drug use: No   • Sexual activity: Not on file         CURRENT MEDICATIONS    Current Outpatient Prescriptions:   •  cefdinir (OMNICEF) 300 MG capsule, Take 1 capsule by mouth 2 (Two) Times a Day., Disp: 20 capsule, Rfl: 0  •  dasatinib (SPRYCEL) 100 MG chemo tablet, Take 1 tablet by mouth daily., Disp: 30 tablet, Rfl: 6  •  dicyclomine (BENTYL) 20 MG tablet, Take 1 tablet by mouth every 6 (six) hours as needed., Disp: , Rfl:   •  Ferrous Sulfate  (45 FE) MG tablet controlled-release, Take 3 tablets by mouth daily., Disp: , Rfl:   •  FLUoxetine (PROzac) 20 MG capsule, TAKE ONE CAPSULE BY MOUTH DAILY, Disp: 30 capsule, Rfl: 0  •  gabapentin (NEURONTIN) 600 MG tablet, Take 1 tablet by mouth 4 (Four) Times a Day., Disp: 120  "tablet, Rfl: 2  •  HYDROcodone-acetaminophen (NORCO) 5-325 MG per tablet, Take 1 tablet by mouth Daily As Needed for severe pain (7-10)., Disp: 30 tablet, Rfl: 0  •  hydrocortisone 1 % ointment, , Disp: , Rfl:   •  ibuprofen (ADVIL,MOTRIN) 800 MG tablet, Take 800 mg by mouth As Needed for mild pain (1-3)., Disp: , Rfl:   •  sucralfate (CARAFATE) 1 G tablet, , Disp: , Rfl:   •  topiramate (TOPAMAX) 50 MG tablet, Take 50 mg by mouth 2 (Two) Times a Day., Disp: , Rfl:     ALLERGIES  Sulfa antibiotics    VITALS  Vitals:    07/25/17 1435   BP: 112/68   Weight: 202 lb 12.8 oz (92 kg)   Height: 68\" (172.7 cm)       LAST RESULTS   Lab on 06/20/2017   Component Date Value Ref Range Status   • Glucose 06/20/2017 85  74 - 124 mg/dL Final   • BUN 06/20/2017 14  6 - 20 mg/dL Final   • Creatinine 06/20/2017 0.83  0.60 - 1.10 mg/dL Final   • Sodium 06/20/2017 141  134 - 145 mmol/L Final   • Potassium 06/20/2017 4.6  3.5 - 4.7 mmol/L Final   • Chloride 06/20/2017 106  98 - 107 mmol/L Final   • CO2 06/20/2017 22.7  22.0 - 29.0 mmol/L Final   • Calcium 06/20/2017 9.5  8.5 - 10.2 mg/dL Final   • Total Protein 06/20/2017 7.1  6.3 - 8.0 g/dL Final   • Albumin 06/20/2017 4.30  3.50 - 5.20 g/dL Final   • ALT (SGPT) 06/20/2017 15  0 - 33 U/L Final   • AST (SGOT) 06/20/2017 14  0 - 32 U/L Final   • Alkaline Phosphatase 06/20/2017 83  38 - 116 U/L Final   • Total Bilirubin 06/20/2017 <0.2  0.1 - 1.2 mg/dL Final   • eGFR Non  Amer 06/20/2017 79  >60 mL/min/1.73 Final   • Globulin 06/20/2017 2.8  1.8 - 3.5 gm/dL Final   • A/G Ratio 06/20/2017 1.5  1.1 - 2.4 g/dL Final   • BUN/Creatinine Ratio 06/20/2017 16.9  7.3 - 30.0 Final   • Anion Gap 06/20/2017 12.3  mmol/L Final   • Ferritin 06/20/2017 45.20  11.00 - 207.00 ng/mL Final   • Iron 06/20/2017 35* 37 - 145 mcg/dL Final   • Iron Saturation 06/20/2017 11* 14 - 48 % Final   • Transferrin 06/20/2017 238  200 - 360 mg/dL Final   • TIBC 06/20/2017 333  249 - 505 mcg/dL Final   • Reference " Lab Report 06/20/2017 BCR-ABL1,CML/ALL,PCR   Final   • WBC 06/20/2017 9.38  4.00 - 10.00 10*3/mm3 Final   • RBC 06/20/2017 4.53  3.90 - 5.00 10*6/mm3 Final   • Hemoglobin 06/20/2017 13.6  11.5 - 14.9 g/dL Final   • Hematocrit 06/20/2017 40.6  34.0 - 45.0 % Final   • MCV 06/20/2017 89.6  83.0 - 97.0 fL Final   • MCH 06/20/2017 30.0  27.0 - 33.0 pg Final   • MCHC 06/20/2017 33.5  32.0 - 35.0 g/dL Final   • RDW 06/20/2017 13.0  11.7 - 14.5 % Final   • RDW-SD 06/20/2017 42.5  37.0 - 49.0 fl Final   • MPV 06/20/2017 10.1  8.9 - 12.1 fL Final   • Platelets 06/20/2017 188  150 - 375 10*3/mm3 Final   • Neutrophil % 06/20/2017 66.4  39.0 - 75.0 % Final   • Lymphocyte % 06/20/2017 23.0  20.0 - 49.0 % Final   • Monocyte % 06/20/2017 7.7  4.0 - 12.0 % Final   • Eosinophil % 06/20/2017 2.1  1.0 - 5.0 % Final   • Basophil % 06/20/2017 0.4  0.0 - 1.1 % Final   • Immature Grans % 06/20/2017 0.4  0.0 - 0.5 % Final   • Neutrophils, Absolute 06/20/2017 6.22  1.50 - 7.00 10*3/mm3 Final   • Lymphocytes, Absolute 06/20/2017 2.16  1.00 - 3.50 10*3/mm3 Final   • Monocytes, Absolute 06/20/2017 0.72  0.25 - 0.80 10*3/mm3 Final   • Eosinophils, Absolute 06/20/2017 0.20  0.00 - 0.36 10*3/mm3 Final   • Basophils, Absolute 06/20/2017 0.04  0.00 - 0.10 10*3/mm3 Final   • Immature Grans, Absolute 06/20/2017 0.04* 0.00 - 0.03 10*3/mm3 Final   • nRBC 06/20/2017 0.0  0.0 - 0.0 /100 WBC Final     No results found.    PHYSICAL EXAM  Physical Exam   Constitutional: She is oriented to person, place, and time. She appears well-developed and well-nourished. No distress.   HENT:   Head: Normocephalic and atraumatic.   Mouth/Throat: Oropharynx is clear and moist.   Eyes: EOM are normal. Pupils are equal, round, and reactive to light.   Neck: Normal range of motion. No tracheal deviation present.   Cardiovascular: Normal rate, regular rhythm, normal heart sounds and intact distal pulses.  Exam reveals no gallop and no friction rub.    No murmur  heard.  Pulmonary/Chest: Effort normal and breath sounds normal. No stridor. No respiratory distress. She has no wheezes. She has no rales. She exhibits no tenderness.   Abdominal: Soft. Bowel sounds are normal. She exhibits no distension. There is tenderness. There is no rebound and no guarding.   Epigastric and ruq pain   Musculoskeletal: She exhibits no edema.   Lymphadenopathy:     She has no cervical adenopathy.   Neurological: She is alert and oriented to person, place, and time.   Skin: Skin is warm. She is not diaphoretic.   Psychiatric: She has a normal mood and affect. Her behavior is normal. Judgment and thought content normal.   Nursing note and vitals reviewed.      ASSESSMENT  Diagnoses and all orders for this visit:    Nausea  -     Case Request; Standing  -     Case Request    Epigastric pain    Other orders  -     Implement Anesthesia orders day of procedure.; Standing  -     Obtain informed consent; Standing          PLAN  No Follow-up on file.    Risks, benefits and alternatives discussed including but not limited to the complications of bleeding, perforation and sedation related problems.    GET US results.

## 2017-08-03 ENCOUNTER — ANESTHESIA EVENT (OUTPATIENT)
Dept: PERIOP | Facility: HOSPITAL | Age: 32
End: 2017-08-03

## 2017-08-04 ENCOUNTER — DOCUMENTATION (OUTPATIENT)
Dept: ONCOLOGY | Facility: CLINIC | Age: 32
End: 2017-08-04

## 2017-08-04 ENCOUNTER — ANESTHESIA (OUTPATIENT)
Dept: PERIOP | Facility: HOSPITAL | Age: 32
End: 2017-08-04

## 2017-08-04 ENCOUNTER — HOSPITAL ENCOUNTER (OUTPATIENT)
Facility: HOSPITAL | Age: 32
Setting detail: HOSPITAL OUTPATIENT SURGERY
Discharge: HOME | End: 2017-08-04
Attending: INTERNAL MEDICINE | Admitting: INTERNAL MEDICINE

## 2017-08-04 VITALS
BODY MASS INDEX: 30.37 KG/M2 | TEMPERATURE: 98 F | DIASTOLIC BLOOD PRESSURE: 75 MMHG | WEIGHT: 200.38 LBS | RESPIRATION RATE: 15 BRPM | HEIGHT: 68 IN | HEART RATE: 56 BPM | SYSTOLIC BLOOD PRESSURE: 111 MMHG | OXYGEN SATURATION: 96 %

## 2017-08-04 DIAGNOSIS — R11.0 NAUSEA: ICD-10-CM

## 2017-08-04 DIAGNOSIS — R10.10 PAIN OF UPPER ABDOMEN: ICD-10-CM

## 2017-08-04 PROCEDURE — 43239 EGD BIOPSY SINGLE/MULTIPLE: CPT | Performed by: INTERNAL MEDICINE

## 2017-08-04 PROCEDURE — 93005 ELECTROCARDIOGRAM TRACING: CPT | Performed by: NURSE ANESTHETIST, CERTIFIED REGISTERED

## 2017-08-04 PROCEDURE — 25010000002 PROPOFOL 10 MG/ML EMULSION: Performed by: NURSE ANESTHETIST, CERTIFIED REGISTERED

## 2017-08-04 PROCEDURE — 93010 ELECTROCARDIOGRAM REPORT: CPT | Performed by: INTERNAL MEDICINE

## 2017-08-04 RX ORDER — PROPOFOL 10 MG/ML
VIAL (ML) INTRAVENOUS CONTINUOUS PRN
Status: DISCONTINUED | OUTPATIENT
Start: 2017-08-04 | End: 2017-08-04 | Stop reason: SURG

## 2017-08-04 RX ORDER — LIDOCAINE HYDROCHLORIDE 20 MG/ML
INJECTION, SOLUTION INFILTRATION; PERINEURAL AS NEEDED
Status: DISCONTINUED | OUTPATIENT
Start: 2017-08-04 | End: 2017-08-04 | Stop reason: SURG

## 2017-08-04 RX ORDER — SODIUM CHLORIDE, SODIUM LACTATE, POTASSIUM CHLORIDE, CALCIUM CHLORIDE 600; 310; 30; 20 MG/100ML; MG/100ML; MG/100ML; MG/100ML
9 INJECTION, SOLUTION INTRAVENOUS CONTINUOUS
Status: DISCONTINUED | OUTPATIENT
Start: 2017-08-04 | End: 2017-08-04 | Stop reason: HOSPADM

## 2017-08-04 RX ORDER — SODIUM CHLORIDE 0.9 % (FLUSH) 0.9 %
1-10 SYRINGE (ML) INJECTION AS NEEDED
Status: DISCONTINUED | OUTPATIENT
Start: 2017-08-04 | End: 2017-08-04 | Stop reason: HOSPADM

## 2017-08-04 RX ORDER — GLYCOPYRROLATE 0.2 MG/ML
INJECTION INTRAMUSCULAR; INTRAVENOUS AS NEEDED
Status: DISCONTINUED | OUTPATIENT
Start: 2017-08-04 | End: 2017-08-04 | Stop reason: SURG

## 2017-08-04 RX ORDER — LIDOCAINE HYDROCHLORIDE 10 MG/ML
0.5 INJECTION, SOLUTION EPIDURAL; INFILTRATION; INTRACAUDAL; PERINEURAL ONCE AS NEEDED
Status: COMPLETED | OUTPATIENT
Start: 2017-08-04 | End: 2017-08-04

## 2017-08-04 RX ORDER — LIDOCAINE HYDROCHLORIDE 10 MG/ML
INJECTION, SOLUTION EPIDURAL; INFILTRATION; INTRACAUDAL; PERINEURAL
Status: COMPLETED
Start: 2017-08-04 | End: 2017-08-04

## 2017-08-04 RX ORDER — PROPOFOL 10 MG/ML
VIAL (ML) INTRAVENOUS AS NEEDED
Status: DISCONTINUED | OUTPATIENT
Start: 2017-08-04 | End: 2017-08-04 | Stop reason: SURG

## 2017-08-04 RX ORDER — MAGNESIUM HYDROXIDE 1200 MG/15ML
LIQUID ORAL AS NEEDED
Status: DISCONTINUED | OUTPATIENT
Start: 2017-08-04 | End: 2017-08-04 | Stop reason: HOSPADM

## 2017-08-04 RX ADMIN — PROPOFOL 50 MG: 10 INJECTION, EMULSION INTRAVENOUS at 15:20

## 2017-08-04 RX ADMIN — GLYCOPYRROLATE 0.1 MG: 0.2 INJECTION INTRAMUSCULAR; INTRAVENOUS at 15:15

## 2017-08-04 RX ADMIN — LIDOCAINE HYDROCHLORIDE 0.5 ML: 10 INJECTION, SOLUTION EPIDURAL; INFILTRATION; INTRACAUDAL; PERINEURAL at 13:50

## 2017-08-04 RX ADMIN — PROPOFOL 50 MG: 10 INJECTION, EMULSION INTRAVENOUS at 15:25

## 2017-08-04 RX ADMIN — PROPOFOL 150 MCG/KG/MIN: 10 INJECTION, EMULSION INTRAVENOUS at 15:19

## 2017-08-04 RX ADMIN — PROPOFOL 50 MG: 10 INJECTION, EMULSION INTRAVENOUS at 15:22

## 2017-08-04 RX ADMIN — SODIUM CHLORIDE, POTASSIUM CHLORIDE, SODIUM LACTATE AND CALCIUM CHLORIDE 9 ML/HR: 600; 310; 30; 20 INJECTION, SOLUTION INTRAVENOUS at 13:50

## 2017-08-04 RX ADMIN — PROPOFOL 50 MG: 10 INJECTION, EMULSION INTRAVENOUS at 15:28

## 2017-08-04 RX ADMIN — PROPOFOL 50 MG: 10 INJECTION, EMULSION INTRAVENOUS at 15:19

## 2017-08-04 RX ADMIN — LIDOCAINE HYDROCHLORIDE 100 MG: 20 INJECTION, SOLUTION INFILTRATION; PERINEURAL at 15:19

## 2017-08-04 NOTE — H&P
" - 1985     CHIEF COMPLAINT      Chief Complaint   Patient presents with   • Abdominal Pain   • Nausea         HISTORY OF PRESENT ILLNESS  Abdominal Pain   Associated symptoms include nausea.   Nausea   Associated symptoms include abdominal pain, chest pain and nausea.      33 yo with 2 month history of severe, \"stabbing pain\" in the epigastric area and radiates up to the chest and shoulders. She  Was started on carafate about 1.5 months ago and this helps. If she does not take this medication, it will cause pain. She is on ibuprofen 800mg bid, for the past 2 months.   She has CML and is on chemo and was on a pain management but got dismissed. She was re established here recently but has not received any pain meds yet.    She cannot take ppi due to interaction with Sprycel-carmel chemo.  No melena or hematochezia.   Eating does make pain worse usually within 20 minutes.   Abd US done 2 weeks ago at Dr. Ugalde's office and reportedly normal gb. LFTS last month was normal.  REVIEW OF SYSTEMS  Review of Systems   Respiratory: Positive for shortness of breath.    Cardiovascular: Positive for chest pain.   Gastrointestinal: Positive for abdominal pain and nausea.   All other systems reviewed and are negative.           ACTIVE PROBLEMS      Patient Active Problem List     Diagnosis   • Depression [F32.9]   • Encounter for long-term current use of high risk medication [Z79.899]   • Chiari malformation type I [G93.5]   • Syncope [R55]   • Intractable migraine without aura and without status migrainosus [G43.019]   • Abnormal finding on MRI of brain [R90.89]   • Concussion with prolonged loss of consciousness and return to pre-existing conscious level [S06.0X9A]   • Malabsorption of iron [K90.9]   • Chemotherapy-induced peripheral neuropathy [G62.0, T45.1X5A]   • Chronic pain [G89.29]   • CML (chronic myeloid leukemia) [C92.10]   • Iron deficiency anemia [D50.9]            PAST MEDICAL HISTORY   Medical History       "   Past Medical History:   Diagnosis Date   • Anemia in neoplastic disease     • Arm pain     • Asthma     • Chiari I malformation     • Chronic myelogenous leukemia     • Cystitis     • Flank pain     • GERD (gastroesophageal reflux disease)     • H/O Iron deficiency anemia     • H/O Lower extremity pain       Resolved.   • History of ongoing treatment with high-risk medication     • History of pyelonephritis     • Migraine     • Myalgia     • Neuropathy of forearm     • Pulmonary hypertension     • Splenomegaly     • Vitamin D deficiency                 SURGICAL HISTORY   Surgical History          Past Surgical History:   Procedure Laterality Date   • BONE MARROW BIOPSY   2013               FAMILY HISTORY  Family History   Problem Relation Age of Onset   • Hyperlipidemia Mother     • Hypertension Mother     • Stomach cancer Maternal Grandmother 72       Adenocarcinoma esophagus and stomach   • Stroke Paternal Grandmother              SOCIAL HISTORY  Social History           Occupational History   •  [employer]              Social History Main Topics   • Smoking status: Former Smoker       Packs/day: 1.00       Years: 8.00       Types: Cigarettes   • Smokeless tobacco: Former User   • Alcohol use Yes          Comment: Social, maybe once a month   • Drug use: No   • Sexual activity: Not on file            CURRENT MEDICATIONS     Current Outpatient Prescriptions:   •  cefdinir (OMNICEF) 300 MG capsule, Take 1 capsule by mouth 2 (Two) Times a Day., Disp: 20 capsule, Rfl: 0  •  dasatinib (SPRYCEL) 100 MG chemo tablet, Take 1 tablet by mouth daily., Disp: 30 tablet, Rfl: 6  •  dicyclomine (BENTYL) 20 MG tablet, Take 1 tablet by mouth every 6 (six) hours as needed., Disp: , Rfl:   •  Ferrous Sulfate  (45 FE) MG tablet controlled-release, Take 3 tablets by mouth daily., Disp: , Rfl:   •  FLUoxetine (PROzac) 20 MG capsule, TAKE ONE CAPSULE BY MOUTH DAILY, Disp: 30 capsule, Rfl: 0  •  gabapentin  "(NEURONTIN) 600 MG tablet, Take 1 tablet by mouth 4 (Four) Times a Day., Disp: 120 tablet, Rfl: 2  •  HYDROcodone-acetaminophen (NORCO) 5-325 MG per tablet, Take 1 tablet by mouth Daily As Needed for severe pain (7-10)., Disp: 30 tablet, Rfl: 0  •  hydrocortisone 1 % ointment, , Disp: , Rfl:   •  ibuprofen (ADVIL,MOTRIN) 800 MG tablet, Take 800 mg by mouth As Needed for mild pain (1-3)., Disp: , Rfl:   •  sucralfate (CARAFATE) 1 G tablet, , Disp: , Rfl:   •  topiramate (TOPAMAX) 50 MG tablet, Take 50 mg by mouth 2 (Two) Times a Day., Disp: , Rfl:      ALLERGIES  Sulfa antibiotics     VITALS   Vitals        Vitals:     07/25/17 1435   BP: 112/68   Weight: 202 lb 12.8 oz (92 kg)   Height: 68\" (172.7 cm)            LAST RESULTS                             Lab on 06/20/2017   Component Date Value Ref Range Status   • Glucose 06/20/2017 85  74 - 124 mg/dL Final   • BUN 06/20/2017 14  6 - 20 mg/dL Final   • Creatinine 06/20/2017 0.83  0.60 - 1.10 mg/dL Final   • Sodium 06/20/2017 141  134 - 145 mmol/L Final   • Potassium 06/20/2017 4.6  3.5 - 4.7 mmol/L Final   • Chloride 06/20/2017 106  98 - 107 mmol/L Final   • CO2 06/20/2017 22.7  22.0 - 29.0 mmol/L Final   • Calcium 06/20/2017 9.5  8.5 - 10.2 mg/dL Final   • Total Protein 06/20/2017 7.1  6.3 - 8.0 g/dL Final   • Albumin 06/20/2017 4.30  3.50 - 5.20 g/dL Final   • ALT (SGPT) 06/20/2017 15  0 - 33 U/L Final   • AST (SGOT) 06/20/2017 14  0 - 32 U/L Final   • Alkaline Phosphatase 06/20/2017 83  38 - 116 U/L Final   • Total Bilirubin 06/20/2017 <0.2  0.1 - 1.2 mg/dL Final   • eGFR Non  Amer 06/20/2017 79  >60 mL/min/1.73 Final   • Globulin 06/20/2017 2.8  1.8 - 3.5 gm/dL Final   • A/G Ratio 06/20/2017 1.5  1.1 - 2.4 g/dL Final   • BUN/Creatinine Ratio 06/20/2017 16.9  7.3 - 30.0 Final   • Anion Gap 06/20/2017 12.3  mmol/L Final   • Ferritin 06/20/2017 45.20  11.00 - 207.00 ng/mL Final   • Iron 06/20/2017 35* 37 - 145 mcg/dL Final   • Iron Saturation 06/20/2017 11* " 14 - 48 % Final   • Transferrin 06/20/2017 238  200 - 360 mg/dL Final   • TIBC 06/20/2017 333  249 - 505 mcg/dL Final   • Reference Lab Report 06/20/2017 BCR-ABL1,CML/ALL,PCR    Final   • WBC 06/20/2017 9.38  4.00 - 10.00 10*3/mm3 Final   • RBC 06/20/2017 4.53  3.90 - 5.00 10*6/mm3 Final   • Hemoglobin 06/20/2017 13.6  11.5 - 14.9 g/dL Final   • Hematocrit 06/20/2017 40.6  34.0 - 45.0 % Final   • MCV 06/20/2017 89.6  83.0 - 97.0 fL Final   • MCH 06/20/2017 30.0  27.0 - 33.0 pg Final   • MCHC 06/20/2017 33.5  32.0 - 35.0 g/dL Final   • RDW 06/20/2017 13.0  11.7 - 14.5 % Final   • RDW-SD 06/20/2017 42.5  37.0 - 49.0 fl Final   • MPV 06/20/2017 10.1  8.9 - 12.1 fL Final   • Platelets 06/20/2017 188  150 - 375 10*3/mm3 Final   • Neutrophil % 06/20/2017 66.4  39.0 - 75.0 % Final   • Lymphocyte % 06/20/2017 23.0  20.0 - 49.0 % Final   • Monocyte % 06/20/2017 7.7  4.0 - 12.0 % Final   • Eosinophil % 06/20/2017 2.1  1.0 - 5.0 % Final   • Basophil % 06/20/2017 0.4  0.0 - 1.1 % Final   • Immature Grans % 06/20/2017 0.4  0.0 - 0.5 % Final   • Neutrophils, Absolute 06/20/2017 6.22  1.50 - 7.00 10*3/mm3 Final   • Lymphocytes, Absolute 06/20/2017 2.16  1.00 - 3.50 10*3/mm3 Final   • Monocytes, Absolute 06/20/2017 0.72  0.25 - 0.80 10*3/mm3 Final   • Eosinophils, Absolute 06/20/2017 0.20  0.00 - 0.36 10*3/mm3 Final   • Basophils, Absolute 06/20/2017 0.04  0.00 - 0.10 10*3/mm3 Final   • Immature Grans, Absolute 06/20/2017 0.04* 0.00 - 0.03 10*3/mm3 Final   • nRBC 06/20/2017 0.0  0.0 - 0.0 /100 WBC Final      No results found.     PHYSICAL EXAM  Physical Exam   Constitutional: She is oriented to person, place, and time. She appears well-developed and well-nourished. No distress.   HENT:   Head: Normocephalic and atraumatic.   Mouth/Throat: Oropharynx is clear and moist.   Eyes: EOM are normal. Pupils are equal, round, and reactive to light.   Neck: Normal range of motion. No tracheal deviation present.   Cardiovascular: Normal rate,  regular rhythm, normal heart sounds and intact distal pulses.  Exam reveals no gallop and no friction rub.    No murmur heard.  Pulmonary/Chest: Effort normal and breath sounds normal. No stridor. No respiratory distress. She has no wheezes. She has no rales. She exhibits no tenderness.   Abdominal: Soft. Bowel sounds are normal. She exhibits no distension. There is tenderness. There is no rebound and no guarding.   Epigastric and ruq pain   Musculoskeletal: She exhibits no edema.   Lymphadenopathy:     She has no cervical adenopathy.   Neurological: She is alert and oriented to person, place, and time.   Skin: Skin is warm. She is not diaphoretic.   Psychiatric: She has a normal mood and affect. Her behavior is normal. Judgment and thought content normal.   Nursing note and vitals reviewed.        ASSESSMENT  Diagnoses and all orders for this visit:     Nausea  -     Case Request; Standing  -     Case Request     Epigastric pain     Other orders  -     Implement Anesthesia orders day of procedure.; Standing  -     Obtain informed consent; Standing              PLAN  No Follow-up on file.     Risks, benefits and alternatives discussed including but not limited to the complications of bleeding, perforation and sedation related problems.     GET US results.

## 2017-08-04 NOTE — PLAN OF CARE
Problem: Patient Care Overview (Adult)  Goal: Plan of Care Review  Outcome: Outcome(s) achieved Date Met:  08/04/17 08/04/17 1411 08/04/17 1554   Coping/Psychosocial Response Interventions   Plan Of Care Reviewed With patient;friend --    Patient Care Overview   Progress --  improving   Outcome Evaluation   Outcome Summary/Follow up Plan --  vss, waiting for procedure

## 2017-08-04 NOTE — PLAN OF CARE
Problem: GI Endoscopy (Adult)  Goal: Signs and Symptoms of Listed Potential Problems Will be Absent or Manageable (GI Endoscopy)  Outcome: Outcome(s) achieved Date Met:  08/04/17 08/04/17 1558   GI Endoscopy   Problems Assessed (GI Endoscopy) all   Problems Present (GI Endoscopy) none

## 2017-08-04 NOTE — OP NOTE
EGD Procedure Note         Indication:  Abdominal pain    Consent: Procedure of EGD was explained to the patient in detail including but not limited to the complications of bleeding perforation and possible reactions to sedation.  She understood all this and was willing to proceed.    Anesthesia: Sedation was provided by anesthesia.    Procedure:  After excellent sedation a flexible endoscope was passed into the oropharynx into the distal esophagus.  Z line was irregular biopsies were obtained.  Mild esophagitis noted here.  Scope was easily traversed into the stomach although into the antrum.  The antrum had extensive amount of erosive gastritis and a single clean-based gastric ulcer noted.  This is about 3 mm in diameter.  Multiple biopsies of the area obtained.  The scope was retroflexed here straightened and passed into the duodenal bulb no ulcers are noted here the scope was easily traversed the second portion of the duodenum with ease.  Small bowel biopsies are obtained.  The scope was slowly withdrawn out of the patient with no immediate complications and she tolerated the procedure well.        Impression/Plan:  Esophagitis  Erosive gastritis  Clean-based gastric ulcer  We will await biopsy results.  She is limited in regards to PPI therapy due to her current chemotherapy medication.  She'll continue on Carafate and avoid all NSAIDs.

## 2017-08-04 NOTE — ANESTHESIA PREPROCEDURE EVALUATION
Anesthesia Evaluation     Nursing notes reviewed   no history of anesthetic complications:  NPO Solid Status: > 8 hours  NPO Liquid Status: > 8 hours     Airway   Mallampati: II  TM distance: >3 FB  Neck ROM: full  no difficulty expected  Dental - normal exam     Pulmonary - normal exam    breath sounds clear to auscultation  (+) a smoker (quit 2006) Former,   (-) asthma  Cardiovascular   Exercise tolerance: good (4-7 METS)    ECG reviewed  Rhythm: regular  Rate: normal    (+) hypertension well controlled,     ROS comment: Pulmonary hypertension, pt unaware of diagnosis and does not follow up with anyone regarding it.    Neuro/Psych  (+) seizures (last at age 9) well controlled, headaches, numbness (Chemotherapy-induced peripheral neuropathy), psychiatric history Depression,    (-) syncope    ROS Comment: Chiari malformation type I  GI/Hepatic/Renal/Endo    (+) obesity,  GERD poorly controlled,     Musculoskeletal     (+) myalgias,   Abdominal   (+) obese,    Substance History   (+) alcohol use, drug use (cocaine)     OB/GYN negative ob/gyn ROS         Other      history of cancer (Chronic myelogenous leukemia) active      Other Comment: CML (chronic myeloid leukemia)    Iron deficiency anemia                                 Anesthesia Plan    ASA 2     MAC     Anesthetic plan and risks discussed with patient.  Use of blood products discussed with patient  Consented to blood products.

## 2017-08-04 NOTE — PROGRESS NOTES
Pt is out of her refills on Sprycel. Per last office note from Hortencia ZAMORANO, NP-Pt will continue Sprycel 100 mg daily. I have escribed a new rx to [facility].

## 2017-08-04 NOTE — PLAN OF CARE
Problem: Patient Care Overview (Adult)  Goal: Adult Individualization and Mutuality  Outcome: Outcome(s) achieved Date Met:  08/04/17 08/04/17 1411   Individualization   Patient Specific Preferences goes by buster

## 2017-08-04 NOTE — ANESTHESIA POSTPROCEDURE EVALUATION
Patient: Hu Houston    Procedure Summary     Date Anesthesia Start Anesthesia Stop Room / Location    08/04/17 1513 1534 [facility] / [facility]       Procedure Diagnosis Surgeon Provider    ESOPHAGOGASTRODUODENOSCOPY with biopsies (N/A Esophagus) Nausea; Pain of upper abdomen  (Nausea [R11.0]; Pain of upper abdomen [R10.10]) MD Lucila Napier CRNA          Anesthesia Type: MAC  Last vitals  BP        Temp        Pulse       Resp        SpO2          Post Anesthesia Care and Evaluation    Patient location during evaluation: PHASE II  Patient participation: complete - patient participated  Level of consciousness: awake and alert  Pain management: adequate  Airway patency: patent  Anesthetic complications: No anesthetic complications  PONV Status: none  Cardiovascular status: acceptable and hemodynamically stable  Respiratory status: acceptable and room air  Hydration status: acceptable    Comments:

## 2017-08-09 ENCOUNTER — DOCUMENTATION (OUTPATIENT)
Dept: ONCOLOGY | Facility: CLINIC | Age: 32
End: 2017-08-09

## 2017-08-09 LAB
LAB AP CASE REPORT: NORMAL
Lab: NORMAL
PATH REPORT.FINAL DX SPEC: NORMAL

## 2017-08-09 NOTE — PROGRESS NOTES
Rec fax stating [facility] has started a PA for pts Sprycel through [email or web address].  I attempted to compete the request and rec a message stating [facility] does not manage the PA for pt.  I contacted [employer] [phone] and followed the prompts for a PA. I spoke to April who was able to do the PA with me verbally. Clinical questions answered and submitted for review. Our office will rec a response within 24 business hours.

## 2017-08-18 PROBLEM — K21.00 GASTROESOPHAGEAL REFLUX DISEASE WITH ESOPHAGITIS: Status: ACTIVE | Noted: 2017-08-18

## 2017-08-21 ENCOUNTER — DOCUMENTATION (OUTPATIENT)
Dept: ONCOLOGY | Facility: CLINIC | Age: 32
End: 2017-08-21

## 2017-09-07 ENCOUNTER — PROCEDURE VISIT (OUTPATIENT)
Dept: OBSTETRICS AND GYNECOLOGY | Facility: CLINIC | Age: 32
End: 2017-09-07

## 2017-09-07 VITALS
BODY MASS INDEX: 30.81 KG/M2 | DIASTOLIC BLOOD PRESSURE: 76 MMHG | HEIGHT: 68 IN | WEIGHT: 203.3 LBS | SYSTOLIC BLOOD PRESSURE: 112 MMHG

## 2017-09-07 DIAGNOSIS — Z13.9 SCREENING: Primary | ICD-10-CM

## 2017-09-07 DIAGNOSIS — Z97.5 ATTEMPTED IUD REMOVAL, UNSUCCESSFUL: ICD-10-CM

## 2017-09-07 DIAGNOSIS — Z01.419 WELL WOMAN EXAM WITH ROUTINE GYNECOLOGICAL EXAM: ICD-10-CM

## 2017-09-07 DIAGNOSIS — Z53.8 ATTEMPTED IUD REMOVAL, UNSUCCESSFUL: ICD-10-CM

## 2017-09-07 DIAGNOSIS — Z30.9 ENCOUNTER FOR CONTRACEPTIVE MANAGEMENT, UNSPECIFIED CONTRACEPTIVE ENCOUNTER TYPE: ICD-10-CM

## 2017-09-07 LAB
B-HCG UR QL: NEGATIVE
BILIRUB BLD-MCNC: NEGATIVE MG/DL
CLARITY, POC: CLEAR
COLOR UR: YELLOW
GLUCOSE UR STRIP-MCNC: NEGATIVE MG/DL
INTERNAL NEGATIVE CONTROL: NEGATIVE
INTERNAL POSITIVE CONTROL: POSITIVE
KETONES UR QL: NEGATIVE
LEUKOCYTE EST, POC: NEGATIVE
Lab: NORMAL
NITRITE UR-MCNC: NEGATIVE MG/ML
PH UR: 5 [PH] (ref 5–8)
PROT UR STRIP-MCNC: NEGATIVE MG/DL
RBC # UR STRIP: NEGATIVE /UL
SP GR UR: 1 (ref 1–1.03)
UROBILINOGEN UR QL: NORMAL

## 2017-09-07 PROCEDURE — 81002 URINALYSIS NONAUTO W/O SCOPE: CPT | Performed by: NURSE PRACTITIONER

## 2017-09-07 PROCEDURE — 99395 PREV VISIT EST AGE 18-39: CPT | Performed by: NURSE PRACTITIONER

## 2017-09-07 PROCEDURE — 58301 REMOVE INTRAUTERINE DEVICE: CPT | Performed by: NURSE PRACTITIONER

## 2017-09-07 PROCEDURE — 81025 URINE PREGNANCY TEST: CPT | Performed by: NURSE PRACTITIONER

## 2017-09-07 NOTE — PROGRESS NOTES
IUD Removal Procedure Note    Type of IUD:  Mirena  Date of insertion:  Unknown, approx 5 years ago  Reason for removal:  Device expiration  Other relevant history/information:  none    Procedure Time Out Documentation      Procedure Details  IUD strings visible:  yes  Local anesthesia:  None  Tenaculum used:  None  Removal:  The IUD was not removed.     All appropriate instructions regarding removal were reviewed.    Tolerated well. Pt very uncomfortable with her IUD removal attempt     Post procedure diagnosis :Failed IUD removal     Plans for contraception:  no method, pt is not sexually active     Other follow-up needed:  none    The patient was advised to call for any fever or for prolonged or severe pain or bleeding. She was advised to use NSAID as needed for mild to moderate pain.   Pt scheduled for IUD removal in the OR tomorrow with Dr. Chantal Hayward, FELIPA  9/7/2017  5:02 PM

## 2017-09-07 NOTE — PROGRESS NOTES
GYN Annual Exam     No chief complaint on file.      Hu Houston is a 32 y.o. female who presents for annual well woman exam. Periods are irregular, lasting varies d/t Mirena.  days. Dysmenorrhea:none. Cyclic symptoms include none. No intermenstrual bleeding, spotting, or discharge. Diagnosed with leukemia in October 2013. Is anemic. Followed by Dr. Castle every 3 months.  Is not sexually active. Has had the Mirena for 5 years, requesting removal today.      HPI    OB History     No data available          Current contraception: IUD  History of abnormal Pap smear: no  Family history of uterine, colon or ovarian cancer: no  History of abnormal mammogram: no  Family history of breast cancer: no  Last Pap : Approx 4 years ago   Gardasil Vaccine: uncertain     Past Medical History:   Diagnosis Date   • Anemia in neoplastic disease    • Arm pain    • Asthma     childhood   • Chiari I malformation    • Chronic myelogenous leukemia    • CML (chronic myelocytic leukemia)    • Cystitis    • Depression    • Flank pain    • GERD (gastroesophageal reflux disease)    • H/O Iron deficiency anemia    • H/O Lower extremity pain     Resolved.   • History of ongoing treatment with high-risk medication    • History of pyelonephritis    • Migraine    • Myalgia    • Neuropathy of forearm    • Pulmonary hypertension    • Seizures     as child   • Splenomegaly    • Vitamin D deficiency        Past Surgical History:   Procedure Laterality Date   • BONE MARROW BIOPSY  2013   • ENDOSCOPY N/A 8/4/2017    Procedure: ESOPHAGOGASTRODUODENOSCOPY with biopsies;  Surgeon: Sophie Trejo MD;  Location: [facility];  Service:          Current Outpatient Prescriptions:   •  dasatinib (SPRYCEL) 100 MG chemo tablet, Take 1 tablet by mouth Daily., Disp: 30 tablet, Rfl: 6  •  gabapentin (NEURONTIN) 600 MG tablet, Take 1 tablet by mouth 4 (Four) Times a Day., Disp: 120 tablet, Rfl: 2  •  ibuprofen (ADVIL,MOTRIN) 800 MG tablet, Take 800 mg by mouth As  "Needed for mild pain (1-3)., Disp: , Rfl:   •  iron dextran complex in sodium chloride 0.9 % 250 mL IVPB, Infuse  into a venous catheter Take As Directed., Disp: , Rfl:   •  sucralfate (CARAFATE) 1 G tablet, Take 1 g by mouth 4 (Four) Times a Day., Disp: , Rfl:   •  topiramate (TOPAMAX) 50 MG tablet, Take 50 mg by mouth 2 (Two) Times a Day., Disp: , Rfl:     Allergies   Allergen Reactions   • Sulfa Antibiotics        Social History   Substance Use Topics   • Smoking status: Former Smoker     Packs/day: 1.00     Years: 8.00     Types: Cigarettes     Quit date:    • Smokeless tobacco: Never Used      Comment: 2006   • Alcohol use Yes      Comment: Social, maybe once every 6 months       Family History   Problem Relation Age of Onset   • Hyperlipidemia Mother    • Hypertension Mother    • Stomach cancer Maternal Grandmother 72     Adenocarcinoma esophagus and stomach   • Stroke Paternal Grandmother        Review of Systems   Constitutional: Negative.    Respiratory: Negative.    Cardiovascular: Negative.    Gastrointestinal: Negative.    Endocrine: Negative.    Genitourinary: Positive for menstrual problem (feels like her cycles are irregular, has Mirena IUD. Needs removed, is  ).   Musculoskeletal: Negative.    Skin: Negative.    Neurological: Negative.    Psychiatric/Behavioral: Negative.        /76  Ht 68\" (172.7 cm)  Wt 203 lb 4.8 oz (92.2 kg)  BMI 30.91 kg/m2    Physical Exam   Constitutional: She is oriented to person, place, and time. She appears well-developed and well-nourished.   Neck: Normal range of motion. Neck supple. No thyromegaly present.   Cardiovascular: Normal rate and regular rhythm.    Pulmonary/Chest: Effort normal and breath sounds normal. Right breast exhibits no inverted nipple, no mass, no nipple discharge, no skin change and no tenderness. Left breast exhibits no inverted nipple, no mass, no nipple discharge, no skin change and no tenderness.   Abdominal: Soft. Bowel " sounds are normal.   Genitourinary: Rectum normal. Pelvic exam was performed with patient supine. There is no rash, tenderness, lesion or injury on the right labia. There is no rash, tenderness, lesion or injury on the left labia. Uterus is not deviated, not enlarged, not fixed and not tender. Cervix exhibits no motion tenderness, no discharge and no friability. Right adnexum displays no mass, no tenderness and no fullness. Left adnexum displays no mass, no tenderness and no fullness. No erythema, tenderness or bleeding in the vagina. No foreign body in the vagina. No signs of injury around the vagina. No vaginal discharge found.   Neurological: She is alert and oriented to person, place, and time.   Skin: Skin is warm and dry.   Psychiatric: She has a normal mood and affect. Her behavior is normal.   Vitals reviewed.           Assessment     1) GYN annual well woman exam.   2)  Unsuccessful attempt to remove IUD  3) Contraception counseling   4) Leukemia- in remission currently      Plan     1) Breast Health - Clinical breast exam yearly, Self breast awareness monthly. Instructed on SBE.   2) Pap - and HPV collected today  3) Unsuccessful attempt to remove IUD- IUD noted in the cervical os. Unable to completely remove IUD. Pt intolerable to continued removal attempts. Will schedule removal in the OR.   4) Contraception- Declines   5) STD- Enc condom use. Decline STD panel.   6) Smoking status- non smoker  7) Follow up prn and one year.     Humera Hayward, APRN  9/7/2017  4:45 PM

## 2017-09-08 ENCOUNTER — ANESTHESIA (OUTPATIENT)
Dept: PERIOP | Facility: HOSPITAL | Age: 32
End: 2017-09-08

## 2017-09-08 ENCOUNTER — HOSPITAL ENCOUNTER (OUTPATIENT)
Facility: HOSPITAL | Age: 32
Setting detail: HOSPITAL OUTPATIENT SURGERY
Discharge: HOME | End: 2017-09-08
Attending: OBSTETRICS & GYNECOLOGY | Admitting: OBSTETRICS & GYNECOLOGY

## 2017-09-08 ENCOUNTER — PREP FOR SURGERY (OUTPATIENT)
Dept: OTHER | Facility: HOSPITAL | Age: 32
End: 2017-09-08

## 2017-09-08 ENCOUNTER — ANESTHESIA EVENT (OUTPATIENT)
Dept: PERIOP | Facility: HOSPITAL | Age: 32
End: 2017-09-08

## 2017-09-08 VITALS
OXYGEN SATURATION: 99 % | SYSTOLIC BLOOD PRESSURE: 110 MMHG | DIASTOLIC BLOOD PRESSURE: 67 MMHG | TEMPERATURE: 97.7 F | HEART RATE: 66 BPM | BODY MASS INDEX: 30.1 KG/M2 | HEIGHT: 69 IN | WEIGHT: 203.2 LBS | RESPIRATION RATE: 16 BRPM

## 2017-09-08 DIAGNOSIS — T83.9XXD COMPLICATION OF INTRAUTERINE DEVICE (IUD), UNSPECIFIED COMPLICATION, SUBSEQUENT ENCOUNTER: ICD-10-CM

## 2017-09-08 DIAGNOSIS — T83.9XXD COMPLICATION OF INTRAUTERINE DEVICE (IUD), UNSPECIFIED COMPLICATION, SUBSEQUENT ENCOUNTER: Primary | ICD-10-CM

## 2017-09-08 PROBLEM — T83.9XXA COMPLICATION OF INTRAUTERINE DEVICE (IUD): Status: ACTIVE | Noted: 2017-09-08

## 2017-09-08 LAB — HCG SERPL QL: NEGATIVE

## 2017-09-08 PROCEDURE — 25010000002 ONDANSETRON PER 1 MG: Performed by: NURSE ANESTHETIST, CERTIFIED REGISTERED

## 2017-09-08 PROCEDURE — 25010000002 HYDROMORPHONE PER 4 MG: Performed by: NURSE ANESTHETIST, CERTIFIED REGISTERED

## 2017-09-08 PROCEDURE — 58301 REMOVE INTRAUTERINE DEVICE: CPT | Performed by: OBSTETRICS & GYNECOLOGY

## 2017-09-08 PROCEDURE — 25010000002 MIDAZOLAM PER 1 MG: Performed by: NURSE ANESTHETIST, CERTIFIED REGISTERED

## 2017-09-08 PROCEDURE — 84703 CHORIONIC GONADOTROPIN ASSAY: CPT | Performed by: OBSTETRICS & GYNECOLOGY

## 2017-09-08 PROCEDURE — 25010000002 PROPOFOL 10 MG/ML EMULSION: Performed by: NURSE ANESTHETIST, CERTIFIED REGISTERED

## 2017-09-08 RX ORDER — GLYCOPYRROLATE 0.2 MG/ML
0.2 INJECTION INTRAMUSCULAR; INTRAVENOUS
Status: DISCONTINUED | OUTPATIENT
Start: 2017-09-08 | End: 2017-09-08 | Stop reason: HOSPADM

## 2017-09-08 RX ORDER — MEPERIDINE HYDROCHLORIDE 25 MG/ML
12.5 INJECTION INTRAMUSCULAR; INTRAVENOUS; SUBCUTANEOUS
Status: DISCONTINUED | OUTPATIENT
Start: 2017-09-08 | End: 2017-09-08 | Stop reason: HOSPADM

## 2017-09-08 RX ORDER — SODIUM CHLORIDE, SODIUM LACTATE, POTASSIUM CHLORIDE, CALCIUM CHLORIDE 600; 310; 30; 20 MG/100ML; MG/100ML; MG/100ML; MG/100ML
9 INJECTION, SOLUTION INTRAVENOUS CONTINUOUS PRN
Status: DISCONTINUED | OUTPATIENT
Start: 2017-09-08 | End: 2017-09-08 | Stop reason: HOSPADM

## 2017-09-08 RX ORDER — HYDROMORPHONE HCL 110MG/55ML
0.25 PATIENT CONTROLLED ANALGESIA SYRINGE INTRAVENOUS
Status: DISCONTINUED | OUTPATIENT
Start: 2017-09-08 | End: 2017-09-08 | Stop reason: HOSPADM

## 2017-09-08 RX ORDER — MIDAZOLAM HYDROCHLORIDE 1 MG/ML
2 INJECTION INTRAMUSCULAR; INTRAVENOUS
Status: DISCONTINUED | OUTPATIENT
Start: 2017-09-08 | End: 2017-09-08 | Stop reason: HOSPADM

## 2017-09-08 RX ORDER — ONDANSETRON 2 MG/ML
4 INJECTION INTRAMUSCULAR; INTRAVENOUS ONCE AS NEEDED
Status: DISCONTINUED | OUTPATIENT
Start: 2017-09-08 | End: 2017-09-08 | Stop reason: HOSPADM

## 2017-09-08 RX ORDER — LIDOCAINE HYDROCHLORIDE 20 MG/ML
INJECTION, SOLUTION INFILTRATION; PERINEURAL AS NEEDED
Status: DISCONTINUED | OUTPATIENT
Start: 2017-09-08 | End: 2017-09-08 | Stop reason: SURG

## 2017-09-08 RX ORDER — SODIUM CHLORIDE 0.9 % (FLUSH) 0.9 %
1-10 SYRINGE (ML) INJECTION AS NEEDED
Status: DISCONTINUED | OUTPATIENT
Start: 2017-09-08 | End: 2017-09-08 | Stop reason: HOSPADM

## 2017-09-08 RX ORDER — MIDAZOLAM HYDROCHLORIDE 1 MG/ML
1 INJECTION INTRAMUSCULAR; INTRAVENOUS
Status: DISCONTINUED | OUTPATIENT
Start: 2017-09-08 | End: 2017-09-08 | Stop reason: HOSPADM

## 2017-09-08 RX ORDER — OXYCODONE HYDROCHLORIDE AND ACETAMINOPHEN 5; 325 MG/1; MG/1
1 TABLET ORAL ONCE AS NEEDED
Status: DISCONTINUED | OUTPATIENT
Start: 2017-09-08 | End: 2017-09-08 | Stop reason: HOSPADM

## 2017-09-08 RX ORDER — SODIUM CHLORIDE 0.9 % (FLUSH) 0.9 %
1-10 SYRINGE (ML) INJECTION AS NEEDED
Status: CANCELLED | OUTPATIENT
Start: 2017-09-08

## 2017-09-08 RX ORDER — ONDANSETRON 2 MG/ML
4 INJECTION INTRAMUSCULAR; INTRAVENOUS ONCE AS NEEDED
Status: COMPLETED | OUTPATIENT
Start: 2017-09-08 | End: 2017-09-08

## 2017-09-08 RX ORDER — PROPOFOL 10 MG/ML
VIAL (ML) INTRAVENOUS AS NEEDED
Status: DISCONTINUED | OUTPATIENT
Start: 2017-09-08 | End: 2017-09-08 | Stop reason: SURG

## 2017-09-08 RX ORDER — FAMOTIDINE 10 MG/ML
20 INJECTION, SOLUTION INTRAVENOUS
Status: DISCONTINUED | OUTPATIENT
Start: 2017-09-08 | End: 2017-09-08 | Stop reason: HOSPADM

## 2017-09-08 RX ORDER — LIDOCAINE HYDROCHLORIDE 10 MG/ML
0.5 INJECTION, SOLUTION EPIDURAL; INFILTRATION; INTRACAUDAL; PERINEURAL ONCE AS NEEDED
Status: DISCONTINUED | OUTPATIENT
Start: 2017-09-08 | End: 2017-09-08 | Stop reason: HOSPADM

## 2017-09-08 RX ADMIN — PROPOFOL 30 MG: 10 INJECTION, EMULSION INTRAVENOUS at 12:00

## 2017-09-08 RX ADMIN — GLYCOPYRROLATE 0.2 MG: 0.2 INJECTION INTRAMUSCULAR; INTRAVENOUS at 11:52

## 2017-09-08 RX ADMIN — SODIUM CHLORIDE, POTASSIUM CHLORIDE, SODIUM LACTATE AND CALCIUM CHLORIDE: 600; 310; 30; 20 INJECTION, SOLUTION INTRAVENOUS at 11:58

## 2017-09-08 RX ADMIN — MIDAZOLAM HYDROCHLORIDE 2 MG: 1 INJECTION, SOLUTION INTRAMUSCULAR; INTRAVENOUS at 11:53

## 2017-09-08 RX ADMIN — PROPOFOL 120 MG: 10 INJECTION, EMULSION INTRAVENOUS at 12:07

## 2017-09-08 RX ADMIN — SODIUM CHLORIDE, POTASSIUM CHLORIDE, SODIUM LACTATE AND CALCIUM CHLORIDE: 600; 310; 30; 20 INJECTION, SOLUTION INTRAVENOUS at 11:04

## 2017-09-08 RX ADMIN — HYDROMORPHONE HYDROCHLORIDE 0.25 MG: 2 INJECTION, SOLUTION INTRAMUSCULAR; INTRAVENOUS; SUBCUTANEOUS at 13:06

## 2017-09-08 RX ADMIN — LIDOCAINE HYDROCHLORIDE 100 MG: 20 INJECTION, SOLUTION INFILTRATION; PERINEURAL at 12:05

## 2017-09-08 RX ADMIN — ONDANSETRON 4 MG: 2 INJECTION, SOLUTION INTRAMUSCULAR; INTRAVENOUS at 11:52

## 2017-09-08 RX ADMIN — FAMOTIDINE 20 MG: 10 INJECTION, SOLUTION INTRAVENOUS at 11:52

## 2017-09-08 NOTE — PLAN OF CARE
Problem: Patient Care Overview (Adult)  Goal: Adult Individualization and Mutuality  Outcome: Outcome(s) achieved Date Met:  09/08/17 09/08/17 1103   Individualization   Patient Specific Preferences goes by buster

## 2017-09-08 NOTE — PLAN OF CARE
Problem: Perioperative Period (Adult)  Goal: Signs and Symptoms of Listed Potential Problems Will be Absent or Manageable (Perioperative Period)  Outcome: Outcome(s) achieved Date Met:  09/08/17 09/08/17 1232   Perioperative Period   Problems Assessed (Perioperative Period) pain;all   Problems Present (Perioperative Period) none

## 2017-09-08 NOTE — ANESTHESIA PREPROCEDURE EVALUATION
Anesthesia Evaluation     Patient summary reviewed and Nursing notes reviewed   no history of anesthetic complications:  NPO Solid Status: > 8 hours  NPO Liquid Status: > 8 hours     Airway   Mallampati: II  TM distance: >3 FB  Neck ROM: full  no difficulty expected  Dental - normal exam     Pulmonary - normal exam    breath sounds clear to auscultation  (+) a smoker (quit 11 yrs ago) Current, asthma,   Cardiovascular - normal exam  Exercise tolerance: excellent (>7 METS)    ECG reviewed  Rhythm: regular  Rate: normal      ROS comment: Pulm HTN, well controlled    Neuro/Psych  (+) seizures (as child), headaches, syncope, numbness (  Chemotherapy-induced peripheral neuropathy ), psychiatric history Depression,      ROS Comment:   Chiari malformation type I   GI/Hepatic/Renal/Endo    (+) obesity,  GERD well controlled,     Musculoskeletal     (+) chronic pain,   Abdominal  - normal exam   Substance History - negative use     OB/GYN          Other   (+) blood dyscrasia   history of cancer      Other Comment:   CML (chronic myeloid leukemia   Iron deficiency anemia                                  Anesthesia Plan    ASA 3     general and MAC     intravenous induction   Anesthetic plan and risks discussed with patient.  Use of blood products discussed with patient  Consented to blood products.

## 2017-09-08 NOTE — PLAN OF CARE
Problem: Perioperative Period (Adult)  Goal: Signs and Symptoms of Listed Potential Problems Will be Absent or Manageable (Perioperative Period)  Outcome: Ongoing (interventions implemented as appropriate)    09/08/17 1103   Perioperative Period   Problems Assessed (Perioperative Period) all   Problems Present (Perioperative Period) none

## 2017-09-08 NOTE — ANESTHESIA POSTPROCEDURE EVALUATION
Patient: Hu Houston    Procedure Summary     Date Anesthesia Start Anesthesia Stop Room / Location    09/08/17 1158 1222 [facility] / [facility]       Procedure Diagnosis Surgeon Provider    INTRAUTERINE DEVICE REMOVAL (N/A ) Complication of intrauterine device (IUD), unspecified complication, subsequent encounter  (Complication of intrauterine device (IUD), unspecified complication, subsequent encounter [T83.9XXD]) MD José Antonio Armas CRNA          Anesthesia Type: general, MAC  Last vitals  BP   110/67 (09/08/17 1338)    Temp   97.7 °F (36.5 °C) (09/08/17 1338)    Pulse   66 (09/08/17 1338)   Resp   16 (09/08/17 1338)    SpO2   99 % (09/08/17 1338)      Post Anesthesia Care and Evaluation    Patient location during evaluation: bedside  Patient participation: complete - patient participated  Level of consciousness: awake and alert  Pain score: 0  Pain management: adequate  Airway patency: patent  Anesthetic complications: No anesthetic complications  PONV Status: none  Cardiovascular status: acceptable  Respiratory status: acceptable  Hydration status: acceptable

## 2017-09-08 NOTE — INTERVAL H&P NOTE
H&P reviewed. The patient was examined and there are no changes to the H&P. Plan removal, declines birth control.

## 2017-09-08 NOTE — PLAN OF CARE
Problem: Patient Care Overview (Adult)  Goal: Plan of Care Review  Outcome: Outcome(s) achieved Date Met:  09/08/17 09/08/17 1232 09/08/17 1356   Coping/Psychosocial Response Interventions   Plan Of Care Reviewed With patient --    Patient Care Overview   Progress no change --    Outcome Evaluation   Outcome Summary/Follow up Plan --  vss, waiting to go home

## 2017-09-08 NOTE — PLAN OF CARE
Problem: Patient Care Overview (Adult)  Goal: Plan of Care Review  Outcome: Ongoing (interventions implemented as appropriate)    09/08/17 1232   Coping/Psychosocial Response Interventions   Plan Of Care Reviewed With patient   Patient Care Overview   Progress no change   Outcome Evaluation   Outcome Summary/Follow up Plan vss, resting without complaint       Goal: Adult Individualization and Mutuality  Outcome: Ongoing (interventions implemented as appropriate)    Problem: Perioperative Period (Adult)  Goal: Signs and Symptoms of Listed Potential Problems Will be Absent or Manageable (Perioperative Period)  Outcome: Ongoing (interventions implemented as appropriate)

## 2017-09-08 NOTE — PLAN OF CARE
Problem: Patient Care Overview (Adult)  Goal: Plan of Care Review  Outcome: Ongoing (interventions implemented as appropriate)    09/08/17 1103   Coping/Psychosocial Response Interventions   Plan Of Care Reviewed With patient   Patient Care Overview   Progress improving   Outcome Evaluation   Outcome Summary/Follow up Plan vss, ready for procedure

## 2017-09-08 NOTE — PLAN OF CARE
Problem: Patient Care Overview (Adult)  Goal: Adult Individualization and Mutuality  Outcome: Ongoing (interventions implemented as appropriate)    09/08/17 1105   Individualization   Patient Specific Preferences goes by buster

## 2017-09-08 NOTE — OP NOTE
Operative Note    Date of Service:  09/08/17  Time of Service:  12:17 PM    Surgical Staff: Surgeon(s) and Role:     * Beck Cornejo MD - Primary   Additional Staff: none   Pre-operative diagnosis(es): Pre-Op Diagnosis Codes:     * Complication of intrauterine device (IUD), unspecified complication, subsequent encounter [T83.9XXD]     Post-operative diagnosis(es): Post-Op Diagnosis Codes:     * Complication of intrauterine device (IUD), unspecified complication, subsequent encounter [T83.9XXD]   Procedure(s): Procedure(s):  INTRAUTERINE DEVICE REMOVAL     Antibiotics: None ordered on call to OR     Anesthesia: Type: General  ASA:  III         Operative findings: The IUD strings and the lower one fourth portion of the shaft of the IUD were seen protruding from the cervix.  With traction the IUD would not move.  The right lateral portion of the T was freed and then finally with twisting the whole IUD was removed intact   Specimens removed: * No specimens in log *   Fluid Intake: 100 mL   Output: Documented Output  Est. Blood Loss 20 mL  Urine Output 0 mL  NG/OG Output 0 mL    I/O this shift:  In: 100 [I.V.:100]  Out: -      Blood products used: No   Drains:        Implant Information: Nothing was implanted during the procedure   Complications: none   Intraoperative consult(s):    Condition: stable   Disposition: to PACU and then admit to Home          Assessment/Plan     This is a 32-year-old female who needs her Mirena IUD removed.  It has been over 5 years.  She is having regular cycles and she is on her cycle now.  Attempted removal in the office yesterday it was lodged and painful.  She is taken the operating room and placed in [facility].  Monitored anesthesia care and conscious sedation was then administered.  Grasping the IUD I was unable to put enough traction on it to remove it.  I did get the IUD extractor and freed up the right lateral portion of the T.  Then with twisting the left  lateral portion freed and the IUD was removed entirely.  It was all intact even with a small amount of straining.  The vagina was swabbed and there is good hemostasis.  Most of the blood loss was menstrual loss.  There is no active bleeding upon removal of the IUD.  Patient tolerated procedure well sponge and lap counts are correct.    Beck Cornejo MD  9/8/2017  12:20 PM

## 2017-09-15 LAB
CYTOLOGIST CVX/VAG CYTO: NORMAL
CYTOLOGY CVX/VAG DOC THIN PREP: NORMAL
DX ICD CODE: NORMAL
HIV 1 & 2 AB SER-IMP: NORMAL
HPV I/H RISK 1 DNA CVX QL PROBE+SIG AMP: NEGATIVE
Lab: NORMAL
OTHER STN SPEC: NORMAL
PATH REPORT.FINAL DX SPEC: NORMAL
STAT OF ADQ CVX/VAG CYTO-IMP: NORMAL

## 2017-09-20 ENCOUNTER — OFFICE VISIT (OUTPATIENT)
Dept: ONCOLOGY | Facility: CLINIC | Age: 32
End: 2017-09-20

## 2017-09-20 ENCOUNTER — LAB (OUTPATIENT)
Dept: LAB | Facility: HOSPITAL | Age: 32
End: 2017-09-20

## 2017-09-20 VITALS
DIASTOLIC BLOOD PRESSURE: 78 MMHG | TEMPERATURE: 97.9 F | OXYGEN SATURATION: 100 % | RESPIRATION RATE: 16 BRPM | HEIGHT: 68 IN | WEIGHT: 203.6 LBS | BODY MASS INDEX: 30.86 KG/M2 | HEART RATE: 65 BPM | SYSTOLIC BLOOD PRESSURE: 118 MMHG

## 2017-09-20 DIAGNOSIS — C92.10 CML (CHRONIC MYELOID LEUKEMIA) (HCC): Primary | ICD-10-CM

## 2017-09-20 DIAGNOSIS — D50.8 OTHER IRON DEFICIENCY ANEMIA: Primary | ICD-10-CM

## 2017-09-20 DIAGNOSIS — D50.9 IRON DEFICIENCY ANEMIA, UNSPECIFIED IRON DEFICIENCY ANEMIA TYPE: ICD-10-CM

## 2017-09-20 LAB
ALBUMIN SERPL-MCNC: 4.4 G/DL (ref 3.5–5.2)
ALBUMIN/GLOB SERPL: 1.5 G/DL (ref 1.1–2.4)
ALP SERPL-CCNC: 82 U/L (ref 38–116)
ALT SERPL W P-5'-P-CCNC: 16 U/L (ref 0–33)
ANION GAP SERPL CALCULATED.3IONS-SCNC: 11.3 MMOL/L
AST SERPL-CCNC: 18 U/L (ref 0–32)
BASOPHILS # BLD AUTO: 0.03 10*3/MM3 (ref 0–0.1)
BASOPHILS NFR BLD AUTO: 0.4 % (ref 0–1.1)
BILIRUB SERPL-MCNC: 0.2 MG/DL (ref 0.1–1.2)
BUN BLD-MCNC: 9 MG/DL (ref 6–20)
BUN/CREAT SERPL: 11.8 (ref 7.3–30)
CALCIUM SPEC-SCNC: 9.5 MG/DL (ref 8.5–10.2)
CHLORIDE SERPL-SCNC: 106 MMOL/L (ref 98–107)
CO2 SERPL-SCNC: 23.7 MMOL/L (ref 22–29)
CREAT BLD-MCNC: 0.76 MG/DL (ref 0.6–1.1)
DEPRECATED RDW RBC AUTO: 42.3 FL (ref 37–49)
EOSINOPHIL # BLD AUTO: 0.11 10*3/MM3 (ref 0–0.36)
EOSINOPHIL NFR BLD AUTO: 1.6 % (ref 1–5)
ERYTHROCYTE [DISTWIDTH] IN BLOOD BY AUTOMATED COUNT: 13.4 % (ref 11.7–14.5)
FERRITIN SERPL-MCNC: 9.8 NG/ML (ref 11–207)
GFR SERPL CREATININE-BSD FRML MDRD: 88 ML/MIN/1.73
GLOBULIN UR ELPH-MCNC: 2.9 GM/DL (ref 1.8–3.5)
GLUCOSE BLD-MCNC: 84 MG/DL (ref 74–124)
HCT VFR BLD AUTO: 39.8 % (ref 34–45)
HGB BLD-MCNC: 12.9 G/DL (ref 11.5–14.9)
IMM GRANULOCYTES # BLD: 0.02 10*3/MM3 (ref 0–0.03)
IMM GRANULOCYTES NFR BLD: 0.3 % (ref 0–0.5)
IRON 24H UR-MRATE: 39 MCG/DL (ref 37–145)
IRON SATN MFR SERPL: 11 % (ref 14–48)
LYMPHOCYTES # BLD AUTO: 1.9 10*3/MM3 (ref 1–3.5)
LYMPHOCYTES NFR BLD AUTO: 27.7 % (ref 20–49)
MCH RBC QN AUTO: 28.4 PG (ref 27–33)
MCHC RBC AUTO-ENTMCNC: 32.4 G/DL (ref 32–35)
MCV RBC AUTO: 87.5 FL (ref 83–97)
MONOCYTES # BLD AUTO: 0.39 10*3/MM3 (ref 0.25–0.8)
MONOCYTES NFR BLD AUTO: 5.7 % (ref 4–12)
NEUTROPHILS # BLD AUTO: 4.42 10*3/MM3 (ref 1.5–7)
NEUTROPHILS NFR BLD AUTO: 64.3 % (ref 39–75)
NRBC BLD MANUAL-RTO: 0 /100 WBC (ref 0–0)
PLATELET # BLD AUTO: 204 10*3/MM3 (ref 150–375)
PMV BLD AUTO: 9.8 FL (ref 8.9–12.1)
POTASSIUM BLD-SCNC: 4.1 MMOL/L (ref 3.5–4.7)
PROT SERPL-MCNC: 7.3 G/DL (ref 6.3–8)
RBC # BLD AUTO: 4.55 10*6/MM3 (ref 3.9–5)
SODIUM BLD-SCNC: 141 MMOL/L (ref 134–145)
TIBC SERPL-MCNC: 371 MCG/DL (ref 249–505)
TRANSFERRIN SERPL-MCNC: 265 MG/DL (ref 200–360)
WBC NRBC COR # BLD: 6.87 10*3/MM3 (ref 4–10)

## 2017-09-20 PROCEDURE — 80053 COMPREHEN METABOLIC PANEL: CPT | Performed by: INTERNAL MEDICINE

## 2017-09-20 PROCEDURE — 85025 COMPLETE CBC W/AUTO DIFF WBC: CPT | Performed by: INTERNAL MEDICINE

## 2017-09-20 PROCEDURE — 36415 COLL VENOUS BLD VENIPUNCTURE: CPT | Performed by: INTERNAL MEDICINE

## 2017-09-20 PROCEDURE — 82728 ASSAY OF FERRITIN: CPT | Performed by: INTERNAL MEDICINE

## 2017-09-20 PROCEDURE — 84466 ASSAY OF TRANSFERRIN: CPT | Performed by: INTERNAL MEDICINE

## 2017-09-20 PROCEDURE — 83540 ASSAY OF IRON: CPT | Performed by: INTERNAL MEDICINE

## 2017-09-20 PROCEDURE — 99214 OFFICE O/P EST MOD 30 MIN: CPT | Performed by: INTERNAL MEDICINE

## 2017-09-20 NOTE — PROGRESS NOTES
REASONS FOR FOLLOWUP:    1. Chronic myelogenous leukemia with splenomegaly, chronic phase, positive [place] chromosome, no mutation at kinase domain.    2. Patient was started on hydroxyurea temporarily on 10/23/2013 with significant drop of WBC counts.    3. Patient started on Sprycel on 12/02/2013. Peripheral blood tested positive with 3.4% of cells positive for BCR-ABL translocation, tested 02/17/2014.    4. The patient had CCyR 6 months into treatment as tested on 05/15/2014.    5. Complete molecular response as tested 08/08/14 with negative product of BCR/ABL.    6. There was interruption of her treatment due to insurance coverage and misunderstanding from patient's part, she had a relapse of disease with BCR/ABL product at 12.626% in March 2016, and she restarted back on Sprycel, with good response as tested on 08/31/2016 with BCR/ABL at 0.294%.  7. Recurrent iron deficiency anemia not responding to oral iron supplementation.  She also had significant constipation associated with oral iron. Patient was given Feraheme treatment 2 doses in September 2016 and repeated in January 2017.       HISTORY OF PRESENT ILLNESS: The patient is a 32-year-old  female presenting today for 3-month followup.  Patient is accompanied by her friend.      Patient had an EGD examination by Dr. Nhi Sage on August 4, 2017.  I reviewed the procedure note, and it did describe gastritis and small gastric ulcer 3 mm in diameter.  Biopsy was taken.  I also reviewed pathology report from [facility] which reported mild chronic gastritis, no intestinal metaplasia no H. pylori.  Esophagus biopsy reported a chronic inflammation of the gastric mucosa, no interstitial metaplasia, there was mild esophagitis consistent with reflex.  The small bowel biopsy was benign.  Patient was started on Carafate.  Patient reports she feels improved acid reflex.    Patient also had a surgical procedure to remove her IUD on September 8,  2017.  She had IUD for about 5 years.    Patient there is compliant with medication.  Her most recent study was negative for BCR-ABL product by RT-PCR method in June 2017.  She has mild fatigue.  She denies melena hematochezia.  No recurrence of menses.        PAST MEDICAL HISTORY:    1. Asthma. Patient denies other chronic disease. No previous surgery.    2. Pyelonephritis, with Klebsiella pneumoniae infection discovered on 11/02/2013, treated with antibiotics and resolved.    3.Patient seen for triage visit on 05/11/2015 following ophthalmology evaluation, and ER visit. The patient is initiated for treatment of shingles.    4. Left otitis externa with perichondritis and cellulitis in November 2015 required hospitalization.    5. Depression.       OB/GYN HISTORY: Menarche age 9. G5, P4, 1 miscarriage of the third pregnancy. First pregnancy at age 17.        HEMATOLOGIC/ONCOLOGIC HISTORY: History from previous dates can be found in the separate document.        Laboratory results on 09/23/2015 showed normalization of hemoglobin 12.2, but still has macrocytosis, MCV 73.3. She has normal platelets and WBC at 9400. Serum ferritin < 5 ng/ml, iron sats 6.3%, iron 29, TIBC 455 mcg/ml. Still has severe iron deficiency, needs to continue oral iron therapy. The patient restarted taking oral iron supplementation which was probably stopped in the end of November 2015.        Laboratory study on 03/22/2016 reported normal WBC 8450, neutrophils 6100, lymphs is 1400 and monocytes 550. Serum iron was 26, TIBC 469 on saturation 6% and ferritin less than 5 NG/ML. Chemistry lab reported normal renal function with a creatinine 0.69, normal liver function panel, total protein 7.5 and albumin 4.2, normal electrolytes. Patient was restarted back on oral on sedimentation twice a day with good tolerance.      Patient continues take Lortab as needed for left leg cramping. Urine drug test on 08/05/2016 was completely negative, and repeated  study on August 26 was positive for opiate, negative for other recreational drugs.      Repeat laboratory study on 08/31/2016 reported iron 21, ferritin less than 5, iron saturation 5%, TIBC 462. Hemoglobin was 11.5 MCV 78.1 MCHC 30.4. Platelets 163,000. Total WBC 8870 including neutrophils 6400 and lymphocytes 1500. BCR/ABL was 0.294% by RT-PCR method.       Patient was given IV Feraheme treatment in September 2016, with 2 doses. Repeated laboratory study on 10/06/2016 reported significantly improved and normalized ferritin 269, iron 80, TIBC 365 and iron saturation 22%. Her hemoglobin was 12.2, and MCV 80.8.      Patient reported she was seen by Dr. Fam in 10/2016 and was started on half tablets of Topamax. I reviewed Dr. Fam’s clinic note and telephone conversation record. The patient reports she has no recurrence of migraine headache. However, she is very tearful today, reporting that she has thoughts of not taking any medications. She did assure me that she has been taking the medication up to this point. 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 The patient denies suicide ideation. When she was initially diagnosed of CML back in 2014, she had depression and I started the patient on Prozac. The patient reported initially it helped her, however, she no longer feels the effect of Prozac. She feels depressed. The patient reports she has no personal hobby. She goes to work and goes home, taking care of her kids. She does not enjoy life as she used to.      Laboratory study on December 29, 2016 reported at ferritin 19.9, iron 33, TIBC 347 iron saturation 10%.  Hemoglobin was 13, normal WBC and platelets.  Unremarkable CMP.  Patient was given 2 doses of Feraheme treatment in early January 2017.      Cytogenetic study on March 14, 2017 reported a BCR-ABL products at 0.098%, showed further improved  residual disease.  There is also reported a ferritin 103.7, iron 79, TIBC 336 and iron saturation 24%.  Hemoglobin was 13.5, MCV 92.3, platelets 199,000 WBC 7000.  She had a completely normal CMP.       Repeated laboratory study on 2017 reported at nondetectable BCR-ABL product.  Ferritin was 45, iron 35 TIBC 333 and iron saturation 11%.  Had a normal CBC and CMP.      MEDICATIONS: The current medication list was reviewed with the patient and updated in the EMR this date per the medical assistant. Medication dosages and frequencies were confirmed to be accurate.        ALLERGIES: SULFA.        SOCIAL HISTORY: . She smoked cigarettes previously, quit in 2006 with 8 pack year history. Social drinker, maybe once a month. No illegal drug use. No risk for HIV except tattoos.  Hairstylist.       FAMILY HISTORY: Maternal grandmother had esophageal/stomach adenocarcinoma diagnosed at age of 72 and  of disease progress at age of 73. The patient' s mother has hypertension but otherwise no family history of malignancy, especially no leukemia.        REVIEW OF SYSTEMS:    PAIN: See VITAL SIGNS below.    GENERAL: No change in appetite or weight; no fevers, chills, sweats.  See history of present illness.    SKIN: No rashes or nonhealing lesions.    HEME/LYMPH: See HEMATOLOGIC-ONCOLOGIC HISTORY.    EYES: No vision changes or diplopia.    ENT: No tinnitus, hearing loss, gum bleeding, epistaxis, hoarseness or dysphagia.    RESPIRATORY: No cough, shortness of breath, hemoptysis or wheezing.    CVS: No chest pain, palpitations, orthopnea, dyspnea on exertion or PND.    GI: No abdominal pain, nausea, vomiting, constipation, diarrhea, melena or hematochezia.    : No dysuria or hematuria, abnormal vaginal bleeding or discharge.    MUSCULOSKELETAL: See HPI.    NEUROLOGICAL: See history of present illness.     PSYCHIATRIC: See history of present illness       VITAL SIGNS:   Vitals:    17 1138   BP:  "118/78   Pulse: 65   Resp: 16   Temp: 97.9 °F (36.6 °C)   SpO2: 100%   Weight: 203 lb 9.6 oz (92.4 kg)   Height: 68\" (172.7 cm)   PainSc: 0-No pain   ECOG 1           PHYSICAL EXAMINATION:    GENERAL: Well-developed, well-nourished  female in no acute distress.    SKIN: Warm, dry without rashes, purpura or petechiae.    HEAD: Normocephalic.    EYES: Pupils equal, round. EOMs intact. Conjunctivae normal.    EARS: Hearing intact.    NOSE: No nasal discharge.    MOUTH: Tongue is well papillated; no stomatitis or ulcers. Lips normal.    THROAT: Oropharynx without lesions or exudates.    NECK: Supple with good range of motion; no thyromegaly or masses.     LYMPHATICS: No cervical, supraclavicular, axillary adenopathy.    CHEST: Lungs clear to auscultation.    CARDIAC: Regular rate and rhythm without murmurs, rubs or gallops.    ABDOMEN: Soft, nontender with no organomegaly or masses. Bowel sounds present.    EXTREMITIES: No edema no cyanosis.    NEUROLOGICAL: No focal deficits.        LABORATORY DATA:        Lab Results   Component Value Date    WBC 6.87 09/20/2017    HGB 12.9 09/20/2017    HCT 39.8 09/20/2017    MCV 87.5 09/20/2017     09/20/2017     Lab Results   Component Value Date    NEUTROABS 4.42 09/20/2017     Lab Results   Component Value Date    GLUCOSE 84 09/20/2017    BUN 9 09/20/2017    CREATININE 0.76 09/20/2017    EGFRIFNONA 88 09/20/2017    BCR 11.8 09/20/2017    K 4.1 09/20/2017    CO2 23.7 09/20/2017    CALCIUM 9.5 09/20/2017    ALBUMIN 4.40 09/20/2017    LABIL2 1.5 09/20/2017    AST 18 09/20/2017    ALT 16 09/20/2017     Sodium   Date Value Ref Range Status   09/20/2017 141 134 - 145 mmol/L Final     Potassium   Date Value Ref Range Status   09/20/2017 4.1 3.5 - 4.7 mmol/L Final     Total Bilirubin   Date Value Ref Range Status   09/20/2017 0.2 0.1 - 1.2 mg/dL Final     Alkaline Phosphatase   Date Value Ref Range Status   09/20/2017 82 38 - 116 U/L Final     BCR-ABL 0% by RT-PCR on " 6/20/2017        ASSESSMENT:    1. Chronic myelogenous leukemia, chronic phase with excellent response to Sprycel and complete molecular response in August 2014. However she had interuption of treatment in early part of 2016, because of insurance issues and miscommunication, she stopped the medicine without telling us, and laboratory test on 03/22/2016 reported disease relapse with increased BCR/ABL product at 12.626%. subsequently she was restarted back on Sprycel, and has been doing well. Laboratory study on 08/31/2016 showed great response, with BCR/ABL at 0.294%.  Repeated test on March 14, 2017 showed residual disease with BCR/ABL product 0.098%.  Most recent labs on 6/20/2017 showed a nondetectable BCR/ABL product.  She tolerates therapy very well, with completed normal CBC and CMP.    We'll continue her treatment for now.  Plan to repeat RT-PCR study every 3 months.     2. Recurrent Iron deficiency anemia.  She was given IV Feraheme treatment again in January 2017.  Repeat labs showed that gradually worsening iron saturation and ferritin level.  Today's lab is still pending.  However she has slightly decreasing hemoglobin and the patient reports more fatigue.  We'll call her when the results come back.     3. Depression. Patient has been on Prozac for 2 years.      4.  Migraine headache. followed by neurologist Dr. López and associates.  She was started on gabapentin in early February 2017.   she also has been taking Topamax.             PLAN:      1. Continue Sprycel 100 mg daily, monitor labs cbc and CMP and BCR-ABL by RT-PCR every 3 months.    2.  Waiting for iron labs today.  3. She will return in 3-month for NP visit and 6 month MD visit.             BAO SLADE M.D., Ph.D.   9/20/2017       Addendum:      Lab Results   Component Value Date    IRON 39 09/20/2017    TIBC 371 09/20/2017    FERRITIN 9.80 (L) 09/20/2017   Iron saturation 11%     Laboratory results returned after patient already left  the clinic.  I called and spoke to her today, she has recurrent iron deficiency again.  We'll arrange her to receive Feraheme treatment weekly for 2 doses.        More than 25 minutes, over half of that time was used for counseling.       BAO SLADE M.D., Ph.D.   9/20/2017  At 16:40 PM           cc:  BACILIO BRODY M.D.     ISHAAN REGAN M.D.    TREVIN WELSH M.D.

## 2017-09-21 ENCOUNTER — TELEPHONE (OUTPATIENT)
Dept: ONCOLOGY | Facility: CLINIC | Age: 32
End: 2017-09-21

## 2017-09-21 RX ORDER — DIPHENHYDRAMINE HCL 25 MG
25 CAPSULE ORAL ONCE
Status: CANCELLED | OUTPATIENT
Start: 2017-10-06

## 2017-09-21 RX ORDER — FAMOTIDINE 10 MG/ML
20 INJECTION, SOLUTION INTRAVENOUS ONCE
Status: CANCELLED | OUTPATIENT
Start: 2017-09-25

## 2017-09-21 RX ORDER — DIPHENHYDRAMINE HCL 25 MG
25 CAPSULE ORAL ONCE
Status: CANCELLED | OUTPATIENT
Start: 2017-09-25

## 2017-09-21 RX ORDER — SODIUM CHLORIDE 9 MG/ML
250 INJECTION, SOLUTION INTRAVENOUS ONCE
Status: CANCELLED | OUTPATIENT
Start: 2017-09-25

## 2017-09-21 RX ORDER — SODIUM CHLORIDE 9 MG/ML
250 INJECTION, SOLUTION INTRAVENOUS ONCE
Status: CANCELLED | OUTPATIENT
Start: 2017-10-06

## 2017-09-21 NOTE — TELEPHONE ENCOUNTER
Pt. States she just got off the phone with the appt desk and she told them she can come for her iron on 9/25/17 for the first iron, but can't get the second one until 10/4/17 due to her work sched.  States there is no way around it.  Will send the appt desk a message to call pt and change the second iron date.  V/u.

## 2017-09-21 NOTE — TELEPHONE ENCOUNTER
----- Message from Diana Hanna RN sent at 2017 11:40 AM EDT -----  Regarding: change in appt  Contact: [phone]  -84--pt. Can keep the 17 appt for iron, but the second one needs to be sched. For 10/4/17 due to her sched. At work.  Please resched. Her for 10/4/17. Thanks, diana

## 2017-09-25 ENCOUNTER — INFUSION (OUTPATIENT)
Dept: ONCOLOGY | Facility: HOSPITAL | Age: 32
End: 2017-09-25

## 2017-09-25 VITALS
DIASTOLIC BLOOD PRESSURE: 83 MMHG | BODY MASS INDEX: 30.41 KG/M2 | TEMPERATURE: 98.3 F | WEIGHT: 200 LBS | SYSTOLIC BLOOD PRESSURE: 130 MMHG | HEART RATE: 80 BPM

## 2017-09-25 DIAGNOSIS — D50.9 IRON DEFICIENCY ANEMIA, UNSPECIFIED IRON DEFICIENCY ANEMIA TYPE: ICD-10-CM

## 2017-09-25 DIAGNOSIS — K90.9 MALABSORPTION OF IRON: Primary | ICD-10-CM

## 2017-09-25 RX ORDER — DIPHENHYDRAMINE HCL 25 MG
25 CAPSULE ORAL ONCE
Status: CANCELLED | OUTPATIENT
Start: 2017-10-02

## 2017-09-25 RX ORDER — SODIUM CHLORIDE 9 MG/ML
250 INJECTION, SOLUTION INTRAVENOUS ONCE
Status: DISCONTINUED | OUTPATIENT
Start: 2017-09-25 | End: 2017-09-25 | Stop reason: HOSPADM

## 2017-09-25 RX ORDER — FAMOTIDINE 10 MG/ML
20 INJECTION, SOLUTION INTRAVENOUS ONCE
Status: CANCELLED | OUTPATIENT
Start: 2017-10-02

## 2017-09-25 RX ORDER — FAMOTIDINE 10 MG/ML
20 INJECTION, SOLUTION INTRAVENOUS ONCE
Status: DISCONTINUED | OUTPATIENT
Start: 2017-09-25 | End: 2017-09-25 | Stop reason: HOSPADM

## 2017-09-25 RX ORDER — DIPHENHYDRAMINE HCL 25 MG
25 CAPSULE ORAL ONCE
Status: DISCONTINUED | OUTPATIENT
Start: 2017-09-25 | End: 2017-09-25 | Stop reason: HOSPADM

## 2017-09-25 RX ORDER — SODIUM CHLORIDE 9 MG/ML
250 INJECTION, SOLUTION INTRAVENOUS ONCE
Status: CANCELLED | OUTPATIENT
Start: 2017-10-02

## 2017-09-25 NOTE — PROGRESS NOTES
Pt at office for Ellen.  Pt unable to stay for appt due to needing to  kids from school.  Pt has appt already sched for next Wed 10\4   Pt is going to keep that appt and then add another appt to the week after 10/11  Pt stated she would call tomorrow to resched her second dose  Apologized to pt for the wait today.  Understanding noted

## 2017-09-26 LAB — REF LAB TEST METHOD: NORMAL

## 2017-10-02 ENCOUNTER — APPOINTMENT (OUTPATIENT)
Dept: ONCOLOGY | Facility: HOSPITAL | Age: 32
End: 2017-10-02

## 2017-10-02 ENCOUNTER — INFUSION (OUTPATIENT)
Dept: ONCOLOGY | Facility: HOSPITAL | Age: 32
End: 2017-10-02

## 2017-10-02 VITALS
TEMPERATURE: 98.4 F | WEIGHT: 199.6 LBS | HEART RATE: 67 BPM | RESPIRATION RATE: 18 BRPM | BODY MASS INDEX: 30.35 KG/M2 | SYSTOLIC BLOOD PRESSURE: 99 MMHG | DIASTOLIC BLOOD PRESSURE: 68 MMHG

## 2017-10-02 DIAGNOSIS — K90.9 MALABSORPTION OF IRON: Primary | ICD-10-CM

## 2017-10-02 DIAGNOSIS — D50.9 IRON DEFICIENCY ANEMIA, UNSPECIFIED IRON DEFICIENCY ANEMIA TYPE: ICD-10-CM

## 2017-10-02 PROCEDURE — 25010000002 FERUMOXYTOL 510 MG/17ML SOLUTION 510 MG VIAL: Performed by: INTERNAL MEDICINE

## 2017-10-02 PROCEDURE — 96374 THER/PROPH/DIAG INJ IV PUSH: CPT

## 2017-10-02 PROCEDURE — 96375 TX/PRO/DX INJ NEW DRUG ADDON: CPT

## 2017-10-02 PROCEDURE — 63710000001 DIPHENHYDRAMINE PER 50 MG: Performed by: INTERNAL MEDICINE

## 2017-10-02 RX ORDER — FAMOTIDINE 10 MG/ML
20 INJECTION, SOLUTION INTRAVENOUS ONCE
Status: COMPLETED | OUTPATIENT
Start: 2017-10-02 | End: 2017-10-02

## 2017-10-02 RX ORDER — SODIUM CHLORIDE 9 MG/ML
250 INJECTION, SOLUTION INTRAVENOUS ONCE
Status: COMPLETED | OUTPATIENT
Start: 2017-10-02 | End: 2017-10-02

## 2017-10-02 RX ORDER — DIPHENHYDRAMINE HCL 25 MG
25 CAPSULE ORAL ONCE
Status: COMPLETED | OUTPATIENT
Start: 2017-10-02 | End: 2017-10-02

## 2017-10-02 RX ADMIN — FAMOTIDINE 20 MG: 10 INJECTION, SOLUTION INTRAVENOUS at 15:11

## 2017-10-02 RX ADMIN — DIPHENHYDRAMINE HYDROCHLORIDE 25 MG: 25 CAPSULE ORAL at 15:08

## 2017-10-02 RX ADMIN — FERUMOXYTOL 510 MG: 510 INJECTION INTRAVENOUS at 15:35

## 2017-10-02 RX ADMIN — SODIUM CHLORIDE 250 ML: 900 INJECTION, SOLUTION INTRAVENOUS at 15:08

## 2017-10-04 ENCOUNTER — APPOINTMENT (OUTPATIENT)
Dept: ONCOLOGY | Facility: HOSPITAL | Age: 32
End: 2017-10-04

## 2017-10-06 ENCOUNTER — INFUSION (OUTPATIENT)
Dept: ONCOLOGY | Facility: HOSPITAL | Age: 32
End: 2017-10-06

## 2017-10-06 VITALS
TEMPERATURE: 98 F | DIASTOLIC BLOOD PRESSURE: 63 MMHG | WEIGHT: 200 LBS | BODY MASS INDEX: 30.41 KG/M2 | SYSTOLIC BLOOD PRESSURE: 99 MMHG | HEART RATE: 78 BPM

## 2017-10-06 DIAGNOSIS — K90.9 MALABSORPTION OF IRON: Primary | ICD-10-CM

## 2017-10-06 DIAGNOSIS — D50.9 IRON DEFICIENCY ANEMIA, UNSPECIFIED IRON DEFICIENCY ANEMIA TYPE: ICD-10-CM

## 2017-10-06 PROCEDURE — 96374 THER/PROPH/DIAG INJ IV PUSH: CPT

## 2017-10-06 PROCEDURE — 25010000002 FERUMOXYTOL 510 MG/17ML SOLUTION 510 MG VIAL: Performed by: INTERNAL MEDICINE

## 2017-10-06 PROCEDURE — 63710000001 DIPHENHYDRAMINE PER 50 MG: Performed by: INTERNAL MEDICINE

## 2017-10-06 RX ORDER — DIPHENHYDRAMINE HCL 25 MG
25 CAPSULE ORAL ONCE
Status: COMPLETED | OUTPATIENT
Start: 2017-10-06 | End: 2017-10-06

## 2017-10-06 RX ORDER — SODIUM CHLORIDE 9 MG/ML
250 INJECTION, SOLUTION INTRAVENOUS ONCE
Status: COMPLETED | OUTPATIENT
Start: 2017-10-06 | End: 2017-10-06

## 2017-10-06 RX ADMIN — SODIUM CHLORIDE 250 ML: 900 INJECTION, SOLUTION INTRAVENOUS at 15:21

## 2017-10-06 RX ADMIN — FERUMOXYTOL 510 MG: 510 INJECTION INTRAVENOUS at 15:45

## 2017-10-06 RX ADMIN — DIPHENHYDRAMINE HYDROCHLORIDE 25 MG: 25 CAPSULE ORAL at 15:21

## 2017-12-13 ENCOUNTER — OFFICE VISIT (OUTPATIENT)
Dept: ONCOLOGY | Facility: CLINIC | Age: 32
End: 2017-12-13

## 2017-12-13 ENCOUNTER — LAB (OUTPATIENT)
Dept: LAB | Facility: HOSPITAL | Age: 32
End: 2017-12-13

## 2017-12-13 VITALS
SYSTOLIC BLOOD PRESSURE: 110 MMHG | HEART RATE: 76 BPM | BODY MASS INDEX: 30.71 KG/M2 | RESPIRATION RATE: 16 BRPM | DIASTOLIC BLOOD PRESSURE: 60 MMHG | TEMPERATURE: 98.4 F | WEIGHT: 202.6 LBS | HEIGHT: 68 IN

## 2017-12-13 DIAGNOSIS — C92.10 CML (CHRONIC MYELOID LEUKEMIA) (HCC): ICD-10-CM

## 2017-12-13 DIAGNOSIS — D50.9 IRON DEFICIENCY ANEMIA, UNSPECIFIED IRON DEFICIENCY ANEMIA TYPE: ICD-10-CM

## 2017-12-13 DIAGNOSIS — C92.10 CML (CHRONIC MYELOID LEUKEMIA) (HCC): Primary | ICD-10-CM

## 2017-12-13 LAB
ALBUMIN SERPL-MCNC: 4.4 G/DL (ref 3.5–5.2)
ALBUMIN/GLOB SERPL: 1.6 G/DL (ref 1.1–2.4)
ALP SERPL-CCNC: 76 U/L (ref 38–116)
ALT SERPL W P-5'-P-CCNC: 13 U/L (ref 0–33)
ANION GAP SERPL CALCULATED.3IONS-SCNC: 11.6 MMOL/L
AST SERPL-CCNC: 13 U/L (ref 0–32)
BASOPHILS # BLD AUTO: 0.04 10*3/MM3 (ref 0–0.1)
BASOPHILS NFR BLD AUTO: 0.6 % (ref 0–1.1)
BILIRUB SERPL-MCNC: 0.2 MG/DL (ref 0.1–1.2)
BUN BLD-MCNC: 17 MG/DL (ref 6–20)
BUN/CREAT SERPL: 23.9 (ref 7.3–30)
CALCIUM SPEC-SCNC: 9.6 MG/DL (ref 8.5–10.2)
CHLORIDE SERPL-SCNC: 103 MMOL/L (ref 98–107)
CO2 SERPL-SCNC: 25.4 MMOL/L (ref 22–29)
CREAT BLD-MCNC: 0.71 MG/DL (ref 0.6–1.1)
DEPRECATED RDW RBC AUTO: 49 FL (ref 37–49)
EOSINOPHIL # BLD AUTO: 0.11 10*3/MM3 (ref 0–0.36)
EOSINOPHIL NFR BLD AUTO: 1.8 % (ref 1–5)
ERYTHROCYTE [DISTWIDTH] IN BLOOD BY AUTOMATED COUNT: 14.6 % (ref 11.7–14.5)
FERRITIN SERPL-MCNC: 56.2 NG/ML (ref 11–207)
GFR SERPL CREATININE-BSD FRML MDRD: 95 ML/MIN/1.73
GLOBULIN UR ELPH-MCNC: 2.7 GM/DL (ref 1.8–3.5)
GLUCOSE BLD-MCNC: 75 MG/DL (ref 74–124)
HCT VFR BLD AUTO: 37.6 % (ref 34–45)
HGB BLD-MCNC: 12.6 G/DL (ref 11.5–14.9)
IMM GRANULOCYTES # BLD: 0.05 10*3/MM3 (ref 0–0.03)
IMM GRANULOCYTES NFR BLD: 0.8 % (ref 0–0.5)
IRON 24H UR-MRATE: 46 MCG/DL (ref 37–145)
IRON SATN MFR SERPL: 16 % (ref 14–48)
LYMPHOCYTES # BLD AUTO: 1.23 10*3/MM3 (ref 1–3.5)
LYMPHOCYTES NFR BLD AUTO: 19.9 % (ref 20–49)
MCH RBC QN AUTO: 30.5 PG (ref 27–33)
MCHC RBC AUTO-ENTMCNC: 33.5 G/DL (ref 32–35)
MCV RBC AUTO: 91 FL (ref 83–97)
MONOCYTES # BLD AUTO: 0.6 10*3/MM3 (ref 0.25–0.8)
MONOCYTES NFR BLD AUTO: 9.7 % (ref 4–12)
NEUTROPHILS # BLD AUTO: 4.14 10*3/MM3 (ref 1.5–7)
NEUTROPHILS NFR BLD AUTO: 67.2 % (ref 39–75)
NRBC BLD MANUAL-RTO: 0 /100 WBC (ref 0–0)
PLATELET # BLD AUTO: 181 10*3/MM3 (ref 150–375)
PMV BLD AUTO: 10 FL (ref 8.9–12.1)
POTASSIUM BLD-SCNC: 3.8 MMOL/L (ref 3.5–4.7)
PROT SERPL-MCNC: 7.1 G/DL (ref 6.3–8)
RBC # BLD AUTO: 4.13 10*6/MM3 (ref 3.9–5)
SODIUM BLD-SCNC: 140 MMOL/L (ref 134–145)
TIBC SERPL-MCNC: 288 MCG/DL (ref 249–505)
TRANSFERRIN SERPL-MCNC: 206 MG/DL (ref 200–360)
WBC NRBC COR # BLD: 6.17 10*3/MM3 (ref 4–10)

## 2017-12-13 PROCEDURE — 80053 COMPREHEN METABOLIC PANEL: CPT | Performed by: INTERNAL MEDICINE

## 2017-12-13 PROCEDURE — 99213 OFFICE O/P EST LOW 20 MIN: CPT | Performed by: NURSE PRACTITIONER

## 2017-12-13 PROCEDURE — 84466 ASSAY OF TRANSFERRIN: CPT | Performed by: INTERNAL MEDICINE

## 2017-12-13 PROCEDURE — 85025 COMPLETE CBC W/AUTO DIFF WBC: CPT | Performed by: INTERNAL MEDICINE

## 2017-12-13 PROCEDURE — 82728 ASSAY OF FERRITIN: CPT | Performed by: INTERNAL MEDICINE

## 2017-12-13 PROCEDURE — 36415 COLL VENOUS BLD VENIPUNCTURE: CPT | Performed by: INTERNAL MEDICINE

## 2017-12-13 PROCEDURE — 83540 ASSAY OF IRON: CPT | Performed by: INTERNAL MEDICINE

## 2017-12-13 RX ORDER — ONDANSETRON 4 MG/1
4 TABLET, ORALLY DISINTEGRATING ORAL EVERY 8 HOURS PRN
Qty: 30 TABLET | Refills: 1 | Status: SHIPPED | OUTPATIENT
Start: 2017-12-13 | End: 2018-02-27

## 2017-12-13 NOTE — PROGRESS NOTES
REASONS FOR FOLLOWUP:     1. 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     2. Patient was started on hydroxyurea temporarily on 10/23/2013 with significant drop of WBC counts.     3. Patient started on Sprycel on 12/02/2013. Peripheral blood tested positive with 3.4% of cells positive for BCR-ABL translocation, tested 02/17/2014.     4. The patient had CCyR 6 months into treatment as tested on 05/15/2014.     5. Complete molecular response as tested 08/08/14 with negative product of BCR/ABL.     6. There was interruption of her treatment due to insurance coverage and misunderstanding from patient's part, she had a relapse of disease with BCR/ABL product at 12.626% in March 2016, and she restarted back on Sprycel, with good response as tested on 08/31/2016 with BCR/ABL at 0.294%.   7. Recurrent iron deficiency anemia not responding to oral iron supplementation.  She also had significant constipation associated with oral iron. Patient was given Feraheme treatment 2 doses in September 2016 and repeated in January 2017.       HISTORY OF PRESENT ILLNESS: The patient is a 32 y.o.   female presenting today for 3-month follow-up and lab review,  patient is accompanied by her friend.  She continues on Sprycel 100 mg daily.  She is tolerating this well.  She denies any shortness of breath, lower external swelling.  She did require 2 doses of Feraheme October 2017, which she tolerated well.  Unfortunately, she did not note significant improvement in her energy following her infusions.  She denies signs or symptoms of bleeding.   She did have her IUD removed September 2017.  Since the removal, her cycles have been inconsistent.  She did have a heavy cycle immediately following the removal.  She hasn't had intermittent light cycles, occurring more frequently than monthly.  She is also having trouble with hormonal fluctuations, and  symptoms including migraines, fatigue, and emotional instability.  She is not currently on contraceptive.  She states there is no chance she is pregnant.  She did have a light cycle just 2 weeks ago.    Past Medical History:   Diagnosis Date   • Anemia in neoplastic disease    • Arm pain    • Asthma     childhood   • Chiari I malformation    • Chronic myelogenous leukemia    • CML (chronic myelocytic leukemia)    • Cystitis    • Depression    • Flank pain    • GERD (gastroesophageal reflux disease)    • H/O Iron deficiency anemia    • H/O Lower extremity pain     Resolved.   • History of ongoing treatment with high-risk medication    • History of pyelonephritis    • Migraine    • Myalgia    • Neuropathy of forearm    • Pulmonary hypertension    • Seizures     as child   • Splenomegaly    • Vitamin D deficiency        OB/GYN HISTORY: Menarche age 9. G5, P4, 1 miscarriage of the third pregnancy. First pregnancy at age 17.        HEMATOLOGIC/ONCOLOGIC HISTORY: History from previous dates can be found in the separate document.        Laboratory results on 09/23/2015 showed normalization of hemoglobin 12.2, but still has macrocytosis, MCV 73.3. She has normal platelets and WBC at 9400. Serum ferritin < 5 ng/ml, iron sats 6.3%, iron 29, TIBC 455 mcg/ml. Still has severe iron deficiency, needs to continue oral iron therapy. The patient restarted taking oral iron supplementation which was probably stopped in the end of November 2015.        Laboratory study on 03/22/2016 reported normal WBC 8450, neutrophils 6100, lymphs is 1400 and monocytes 550. Serum iron was 26, TIBC 469 on saturation 6% and ferritin less than 5 NG/ML. Chemistry lab reported normal renal function with a creatinine 0.69, normal liver function panel, total protein 7.5 and albumin 4.2, normal electrolytes. Patient was restarted back on oral on sedimentation twice a day with good tolerance.      Patient continues take Lortab as needed for left leg  cramping. Urine drug test on 08/05/2016 was completely negative, and repeated study on August 26 was positive for opiate, negative for other recreational drugs.      Repeat laboratory study on 08/31/2016 reported iron 21, ferritin less than 5, iron saturation 5%, TIBC 462. Hemoglobin was 11.5 MCV 78.1 MCHC 30.4. Platelets 163,000. Total WBC 8870 including neutrophils 6400 and lymphocytes 1500. BCR/ABL was 0.294% by RT-PCR method.       Patient was given IV Feraheme treatment in September 2016, with 2 doses. Repeated laboratory study on 10/06/2016 reported significantly improved and normalized ferritin 269, iron 80, TIBC 365 and iron saturation 22%. Her hemoglobin was 12.2, and MCV 80.8.      Patient reported she was seen by Dr. Fam in 10/2016 and was started on half tablets of Topamax. I reviewed Dr. Fam’s clinic note and telephone conversation record. The patient reports she has no recurrence of migraine headache. However, she is very tearful today, reporting that she has thoughts of not taking any medications. She did assure me that she has been taking the medication up to this point. 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 The patient denies suicide ideation. When she was initially diagnosed of CML back in 2014, she had depression and I started the patient on Prozac. The patient reported initially it helped her, however, she no longer feels the effect of Prozac. She feels depressed. The patient reports she has no personal hobby. She goes to work and goes home, taking care of her kids. She does not enjoy life as she used to.      Laboratory study on December 29, 2016 reported at ferritin 19.9, iron 33, TIBC 347 iron saturation 10%.  Hemoglobin was 13, normal WBC and platelets.  Unremarkable CMP.  Patient was given 2 doses of Feraheme treatment in early January 2017.      Cytogenetic study on  2017 reported a BCR-ABL products at 0.098%, showed further improved residual disease.  There is also reported a ferritin 103.7, iron 79, TIBC 336 and iron saturation 24%.  Hemoglobin was 13.5, MCV 92.3, platelets 199,000 WBC 7000.  She had a completely normal CMP.       Repeated laboratory study on 2017 reported at nondetectable BCR-ABL product.  Ferritin was 45, iron 35 TIBC 333 and iron saturation 11%.  Had a normal CBC and CMP.      MEDICATIONS: The current medication list was reviewed with the patient and updated in the EMR this date per the medical assistant. Medication dosages and frequencies were confirmed to be accurate.        Allergies   Allergen Reactions   • Sulfa Antibiotics      SOCIAL HISTORY: . She smoked cigarettes previously, quit in 2006 with 8 pack year history. Social drinker, maybe once a month. No illegal drug use. No risk for HIV except tattoos.  Hairstylist.       FAMILY HISTORY: Maternal grandmother had esophageal/stomach adenocarcinoma diagnosed at age of 72 and  of disease progress at age of 73. The patient' s mother has hypertension but otherwise no family history of malignancy, especially no leukemia.        I have reviewed the patient's medical history in detail and updated the computerized patient record.    REVIEW OF SYSTEMS:    PAIN: See VITAL SIGNS below.    GENERAL: No change in appetite or weight; no fevers, chills, sweats.  See history of present illness.    SKIN: No rashes or nonhealing lesions.    HEME/LYMPH: See HEMATOLOGIC-ONCOLOGIC HISTORY.    EYES: No vision changes or diplopia.    ENT: No tinnitus, hearing loss, gum bleeding, epistaxis, hoarseness or dysphagia.    RESPIRATORY: No cough, shortness of breath, hemoptysis or wheezing.    CVS: No chest pain, palpitations, orthopnea, dyspnea on exertion or PND.    GI: No abdominal pain, nausea, vomiting, constipation, diarrhea, melena or hematochezia.    : No dysuria or hematuria,  "abnormal vaginal bleeding or discharge.    MUSCULOSKELETAL: See HPI.    NEUROLOGICAL: See history of present illness.     PSYCHIATRIC: See history of present illness       VITAL SIGNS:   Vitals:    12/13/17 1209   BP: 110/60   Pulse: 76   Resp: 16   Temp: 98.4 °F (36.9 °C)   Weight: 91.9 kg (202 lb 9.6 oz)   Height: 172.7 cm (67.99\")   PainSc: 0-No pain   ECOG 0          PHYSICAL EXAMINATION:    GENERAL: Well-developed, well-nourished  female in no acute distress.    SKIN: Warm, dry without rashes, purpura or petechiae.    HEAD: Normocephalic.    EYES: Pupils equal, round. EOMs intact. Conjunctivae normal.    EARS: Hearing intact.    NOSE: No nasal discharge.    MOUTH: Tongue is well papillated; no stomatitis or ulcers. Lips normal.    CHEST: Lungs clear to auscultation.    CARDIAC: Regular rate and rhythm without murmurs, rubs or gallops.    ABDOMEN: Soft, nontender with no organomegaly or masses. Bowel sounds present.    EXTREMITIES: No edema no cyanosis.    NEUROLOGICAL: No focal deficits.        LABORATORY DATA:        Lab Results   Component Value Date    WBC 6.17 12/13/2017    HGB 12.6 12/13/2017    HCT 37.6 12/13/2017    MCV 91.0 12/13/2017     12/13/2017     Lab Results   Component Value Date    NEUTROABS 4.14 12/13/2017     Lab Results   Component Value Date    GLUCOSE 75 12/13/2017    BUN 17 12/13/2017    CREATININE 0.71 12/13/2017    EGFRIFNONA 95 12/13/2017    BCR 23.9 12/13/2017    K 3.8 12/13/2017    CO2 25.4 12/13/2017    CALCIUM 9.6 12/13/2017    ALBUMIN 4.40 12/13/2017    LABIL2 1.6 12/13/2017    AST 13 12/13/2017    ALT 13 12/13/2017     Sodium   Date Value Ref Range Status   12/13/2017 140 134 - 145 mmol/L Final     Potassium   Date Value Ref Range Status   12/13/2017 3.8 3.5 - 4.7 mmol/L Final     Total Bilirubin   Date Value Ref Range Status   12/13/2017 0.2 0.1 - 1.2 mg/dL Final     Alkaline Phosphatase   Date Value Ref Range Status   12/13/2017 76 38 - 116 U/L Final     Lab " Results   Component Value Date    IRON 46 12/13/2017    TIBC 288 12/13/2017    FERRITIN 56.20 12/13/2017     BCR-ABL 0% by RT-PCR on 9/20/2017 , pending today.      ASSESSMENT:    1. Chronic myelogenous leukemia, chronic phase with excellent response to Sprycel and complete molecular response in August 2014. However she had interuption of treatment in early part of 2016, because of insurance issues and miscommunication, she stopped the medicine without telling us, and laboratory test on 03/22/2016 reported disease relapse with increased BCR/ABL product at 12.626%. subsequently she was restarted back on Sprycel, and has been doing well. Laboratory study on 08/31/2016 showed great response, with BCR/ABL at 0.294%.  Repeated test on March 14, 2017 showed residual disease with BCR/ABL product 0.098%.  Most recent labs on 6/20/2017 showed a nondetectable BCR/ABL product.  Again not detectable 9/20/2017.    Continue Sprycel 100mg.      2. Recurrent Iron deficiency anemia.  He was recently treated with Feraheme October 2017.  improved today.  Iron deficiency thought to be related to ulcer identified on EGD, being treated with Carafate. Ferritin normal at 56 today.     3. Depression. Patient has been on Prozac for 2 years.      4.  Migraine headache. Followed by neurologist Dr. López and associates.  She was started on gabapentin in early February 2017.  She also has been taking Topamax.          PLAN:    1. Continue Sprycel 100 mg daily, monitor labs cbc and CMP and BCR-ABL by RT-PCR every 3 months.  BCR-ABL pending today.  2. Consider follow up with GYN regarding cycle and hormonal changes since IUD was removed.  3. MD follow up in 3 months.         Kellygabo Kingsley, APRN  12/13/2017        cc:  BACILIO BRODY M.D.     ISHAAN REGAN M.D.    TREVIN WELSH M.D.

## 2017-12-26 LAB — REF LAB TEST METHOD: NORMAL

## 2018-02-01 ENCOUNTER — OFFICE VISIT (OUTPATIENT)
Dept: OBSTETRICS AND GYNECOLOGY | Facility: CLINIC | Age: 33
End: 2018-02-01

## 2018-02-01 VITALS — DIASTOLIC BLOOD PRESSURE: 74 MMHG | WEIGHT: 193.9 LBS | BODY MASS INDEX: 29.49 KG/M2 | SYSTOLIC BLOOD PRESSURE: 120 MMHG

## 2018-02-01 DIAGNOSIS — N92.0 MENORRHAGIA WITH REGULAR CYCLE: Primary | ICD-10-CM

## 2018-02-01 LAB
BASOPHILS # BLD AUTO: 0.05 10*3/MM3 (ref 0–0.2)
BASOPHILS NFR BLD AUTO: 0.8 % (ref 0–2)
EOSINOPHIL # BLD AUTO: 0.16 10*3/MM3 (ref 0.1–0.3)
EOSINOPHIL NFR BLD AUTO: 2.4 % (ref 0–4)
ERYTHROCYTE [DISTWIDTH] IN BLOOD BY AUTOMATED COUNT: 13.1 % (ref 11.5–14.5)
HCT VFR BLD AUTO: 36.7 % (ref 37–47)
HGB BLD-MCNC: 12.5 G/DL (ref 12–16)
IMM GRANULOCYTES # BLD: 0.03 10*3/MM3 (ref 0–0.03)
IMM GRANULOCYTES NFR BLD: 0.5 % (ref 0–0.5)
IRON SATN MFR SERPL: 13 %
IRON SERPL-MCNC: 40 MCG/DL (ref 37–145)
LYMPHOCYTES # BLD AUTO: 1.35 10*3/MM3 (ref 0.6–4.8)
LYMPHOCYTES NFR BLD AUTO: 20.5 % (ref 20–45)
MCH RBC QN AUTO: 30.3 PG (ref 27–31)
MCHC RBC AUTO-ENTMCNC: 34.1 G/DL (ref 31–37)
MCV RBC AUTO: 89.1 FL (ref 81–99)
MONOCYTES # BLD AUTO: 0.58 10*3/MM3 (ref 0–1)
MONOCYTES NFR BLD AUTO: 8.8 % (ref 3–8)
NEUTROPHILS # BLD AUTO: 4.4 10*3/MM3 (ref 1.5–8.3)
NEUTROPHILS NFR BLD AUTO: 67 % (ref 45–70)
NRBC BLD AUTO-RTO: 0 /100 WBC (ref 0–0)
PLATELET # BLD AUTO: 211 10*3/MM3 (ref 140–500)
RBC # BLD AUTO: 4.12 10*6/MM3 (ref 4.2–5.4)
TIBC SERPL-MCNC: 300 MCG/DL (ref 261–478)
TSH SERPL DL<=0.005 MIU/L-ACNC: 1.93 MIU/ML (ref 0.27–4.2)
UIBC SERPL-MCNC: 260 MCG/DL (ref 112–346)
WBC # BLD AUTO: 6.57 10*3/MM3 (ref 4.8–10.8)

## 2018-02-01 PROCEDURE — 99213 OFFICE O/P EST LOW 20 MIN: CPT | Performed by: NURSE PRACTITIONER

## 2018-02-01 NOTE — PROGRESS NOTES
Subjective     Chief Complaint   Patient presents with   • Abdominal Pain   • Menstrual Problem       Hu Houston is a 32 y.o.  whose LMP is Patient's last menstrual period was 2018 (exact date).. She presents with complaints of abnormal periods. She had her Mirena removed in . Since then her periods have been abnormal. She reports random sharp pain throughout the month and her bleeding is very heavy. She is passing large clots. She is soaking through tampons onto pads. She uses super plus tampon and an overnight pain during the course of a night. She is in remission of CML (1st stage) x 1 year. She desires management of her cycles. She has no desire for more children. She has a history of anemia but usually her Hgb is normal but her iron saturation is low.     HPI    HPI       The following portions of the patient's history were reviewed and updated as appropriate:vital signs, allergies, current medications, past medical history, past social history, past surgical history and problem list      Review of Systems     Review of Systems   Constitutional: Negative.    Respiratory: Negative.    Cardiovascular: Negative.    Gastrointestinal: Negative.    Endocrine: Negative.    Genitourinary: Positive for menstrual problem and pelvic pain.   Musculoskeletal: Negative.    Skin: Negative.    Neurological: Negative.    Psychiatric/Behavioral: Negative.        Objective      /74  Wt 88 kg (193 lb 14.4 oz)  LMP 2018 (Exact Date)  Breastfeeding? No  BMI 29.49 kg/m2    Physical Exam    Physical Exam   Constitutional: She is oriented to person, place, and time. She appears well-developed and well-nourished.   Neck: Normal range of motion. Neck supple. No thyromegaly present.   Pulmonary/Chest: Right breast exhibits no inverted nipple, no mass, no nipple discharge, no skin change and no tenderness. Left breast exhibits no inverted nipple, no mass, no nipple discharge, no skin change and no  tenderness.   Abdominal: Soft. Bowel sounds are normal. There is no tenderness.   Genitourinary: Rectum normal. Pelvic exam was performed with patient supine. There is no rash, tenderness, lesion or injury on the right labia. There is no rash, tenderness, lesion or injury on the left labia. Uterus is not deviated, not enlarged, not fixed and not tender. Cervix exhibits no motion tenderness, no discharge and no friability. Right adnexum displays no mass, no tenderness and no fullness. Left adnexum displays no mass, no tenderness and no fullness. No erythema, tenderness or bleeding in the vagina. No foreign body in the vagina. No signs of injury around the vagina. No vaginal discharge found.   Musculoskeletal: Normal range of motion.   Neurological: She is alert and oriented to person, place, and time.   Skin: Skin is warm and dry.   Psychiatric: She has a normal mood and affect. Her behavior is normal.   Vitals reviewed.      Lab Review   Labs: Urine pregnancy test     Imaging   No data reviewed    Assessment  Hu was seen today for abdominal pain and menstrual problem.    Diagnoses and all orders for this visit:    Menorrhagia with regular cycle  -     CBC & Differential  -     Iron Profile  -     TSH Rfx On Abnormal To Free T4        Additional Assessment:   1) DUB       Plan     1. DUB- Check TVUS, CBC and TSH. Disc treatment options for menorrhagia including but not limited to IUD and endometrial ablation. Pt is interested in endometrial ablation with tubal ligation. Info provided on options.     2. Scheduled for: STD Labs - N/A , Ultrasound of the -  PELVIC , Mammography - N/A , Bone Density Test - N/A , Additional Labs - N/A    3. Pap:  9/17    4. Contraception: None     5. STD:  Enc condom use.     6. Smoking status: non smoker     7. Annual Exam scheduled for: 9/18        FELIPA Bradford  2/9/2018

## 2018-02-06 ENCOUNTER — PROCEDURE VISIT (OUTPATIENT)
Dept: OBSTETRICS AND GYNECOLOGY | Facility: CLINIC | Age: 33
End: 2018-02-06

## 2018-02-06 DIAGNOSIS — N92.0 MENORRHAGIA WITH REGULAR CYCLE: Primary | ICD-10-CM

## 2018-02-06 PROCEDURE — 76830 TRANSVAGINAL US NON-OB: CPT | Performed by: NURSE PRACTITIONER

## 2018-02-09 PROBLEM — N92.0 MENORRHAGIA WITH REGULAR CYCLE: Status: ACTIVE | Noted: 2018-02-09

## 2018-02-21 ENCOUNTER — PROCEDURE VISIT (OUTPATIENT)
Dept: OBSTETRICS AND GYNECOLOGY | Facility: CLINIC | Age: 33
End: 2018-02-21

## 2018-02-21 VITALS
WEIGHT: 190 LBS | DIASTOLIC BLOOD PRESSURE: 82 MMHG | BODY MASS INDEX: 29.82 KG/M2 | HEIGHT: 67 IN | SYSTOLIC BLOOD PRESSURE: 124 MMHG

## 2018-02-21 DIAGNOSIS — Z13.9 SCREENING FOR CONDITION: ICD-10-CM

## 2018-02-21 DIAGNOSIS — N92.0 MENORRHAGIA WITH REGULAR CYCLE: Primary | ICD-10-CM

## 2018-02-21 LAB
B-HCG UR QL: NEGATIVE
INTERNAL NEGATIVE CONTROL: NEGATIVE
INTERNAL POSITIVE CONTROL: POSITIVE
Lab: NORMAL

## 2018-02-21 PROCEDURE — 58100 BIOPSY OF UTERUS LINING: CPT | Performed by: NURSE PRACTITIONER

## 2018-02-21 PROCEDURE — 81025 URINE PREGNANCY TEST: CPT | Performed by: NURSE PRACTITIONER

## 2018-02-21 NOTE — PROGRESS NOTES
Chief Complaint   Patient presents with   • Procedure     endo bx       PROCEDURE NOTE    Procedures      Diagnosis  menorrhagia and thickened endometrial lining         Patient's last menstrual period was 02/13/2018.         UCG  negative    Menopausal No   Patient takes BCP  Not currently -  Reason for not using: desries ablation , Plan B Discussed    HRT  negative On menses now.   Applying Universal Precautions I properly identified the patient and sought her signature with informed consent.  The reason for the biopsy was discussed.  Using a betadine prep on the cervix the cervix was grasped with a tenaculum and the uterus sounded to 7 cm.  A disposable Pipelle was used to retrieve the specimen by passing it 5 times into the cavity hoping to sample more than one area.    Additional procedures necessary were not necessary  The specimen was labelled and placed in a formalin container.    Instructions were given on vaginal rest, OTC tylenol, Motrin or Aleve, and expected future bleeding.  Resulting and follow up were discussed.    FELIPA Bradford   2:09 PM  02/21/18

## 2018-02-23 LAB
DX ICD CODE: NORMAL
DX ICD CODE: NORMAL
PATH REPORT.FINAL DX SPEC: NORMAL
PATH REPORT.GROSS SPEC: NORMAL
PATH REPORT.SITE OF ORIGIN SPEC: NORMAL
PATHOLOGIST NAME: NORMAL
PAYMENT PROCEDURE: NORMAL

## 2018-02-27 ENCOUNTER — TELEPHONE (OUTPATIENT)
Dept: OBSTETRICS AND GYNECOLOGY | Facility: CLINIC | Age: 33
End: 2018-02-27

## 2018-02-27 ENCOUNTER — OFFICE VISIT (OUTPATIENT)
Dept: OBSTETRICS AND GYNECOLOGY | Facility: CLINIC | Age: 33
End: 2018-02-27

## 2018-02-27 VITALS
HEIGHT: 67 IN | DIASTOLIC BLOOD PRESSURE: 80 MMHG | BODY MASS INDEX: 30.13 KG/M2 | SYSTOLIC BLOOD PRESSURE: 122 MMHG | WEIGHT: 192 LBS

## 2018-02-27 DIAGNOSIS — N92.0 MENORRHAGIA WITH REGULAR CYCLE: Primary | ICD-10-CM

## 2018-02-27 DIAGNOSIS — D50.0 IRON DEFICIENCY ANEMIA DUE TO CHRONIC BLOOD LOSS: ICD-10-CM

## 2018-02-27 PROCEDURE — 99213 OFFICE O/P EST LOW 20 MIN: CPT | Performed by: OBSTETRICS & GYNECOLOGY

## 2018-02-27 RX ORDER — ONDANSETRON HYDROCHLORIDE 8 MG/1
8 TABLET, FILM COATED ORAL EVERY 8 HOURS PRN
COMMUNITY
Start: 2018-02-23 | End: 2018-12-12

## 2018-02-27 NOTE — PROGRESS NOTES
"      Hu Houston is a 33 y.o. patient who presents for follow up of   Chief Complaint   Patient presents with   • Pre-op Exam     Tubal       HPI 33-year-old  5 para 4014 who presents today to discuss endometrial ablation and tubal ligation.  She had a Mirena removed last year and has since had worsening menorrhagia with iron deficiency anemia.  She has a history of leukemia and is on chemotherapy as well.  She is maintained on iron supplements.  She does desire permanent sterilization as well.  She reports her periods as heavy and crampy.  Lasting 5-7 days.  She uses both pads and tampons.    Patient's past medical, surgical and social history reviewed were reviewed with the patient and up-to-date in Epic.    The following portions of the patient's history were reviewed and updated as appropriate: allergies, current medications and problem list.    Review of Systems   Constitutional: Negative for appetite change, fever and unexpected weight change.   Respiratory: Negative for cough and shortness of breath.    Cardiovascular: Negative for chest pain and palpitations.   Gastrointestinal: Negative for abdominal distention, abdominal pain, constipation, diarrhea, nausea and vomiting.   Endocrine: Negative.    Genitourinary: Positive for menstrual problem, pelvic pain and vaginal bleeding. Negative for dyspareunia and vaginal discharge.   Skin: Negative.    Neurological: Negative for syncope and light-headedness.   Hematological: Negative.    Psychiatric/Behavioral: Negative for dysphoric mood and sleep disturbance. The patient is not nervous/anxious.        /80  Ht 170.2 cm (67\")  Wt 87.1 kg (192 lb)  LMP 2018  BMI 30.07 kg/m2    Physical Exam   Constitutional: She is oriented to person, place, and time. She appears well-developed and well-nourished.   HENT:   Head: Normocephalic and atraumatic.   Pulmonary/Chest: Effort normal.   Abdominal: Soft. Bowel sounds are normal. She exhibits no " distension and no mass. There is no tenderness. There is no rebound and no guarding.   Musculoskeletal: Normal range of motion.   Neurological: She is alert and oriented to person, place, and time.   Skin: Skin is warm and dry.   Psychiatric: She has a normal mood and affect. Her behavior is normal. Judgment and thought content normal.   Nursing note and vitals reviewed.    I personally reviewed the ultrasound done earlier this month.  There is no evidence of fibroids.  Endometrial thickness was 1.60 cm.  Both ovaries appeared normal.  There are no free fluid or masses in the adnexa.    Endometrial biopsy has arty been performed and showed no hyperplasia or carcinoma.    Assessment/Plan    Hu was seen today for pre-op exam.    Diagnoses and all orders for this visit:    Menorrhagia with regular cycle    Iron deficiency anemia due to chronic blood loss    Patient is an excellent candidate for NovaSure ablation and laparoscopic tubal ligation.  The surgery was reviewed in detail.  She had previously been handed literature to read.  Her questions were answered.  The procedure as well as the recovery and expectations were discussed.  Her limitations were discussed.  She voiced understanding and desires to schedule.    No Follow-up on file.      Beck Cornejo MD  2/27/2018  1:49 PM

## 2018-03-07 ENCOUNTER — APPOINTMENT (OUTPATIENT)
Dept: ONCOLOGY | Facility: CLINIC | Age: 33
End: 2018-03-07

## 2018-03-07 ENCOUNTER — APPOINTMENT (OUTPATIENT)
Dept: LAB | Facility: HOSPITAL | Age: 33
End: 2018-03-07

## 2018-03-13 ENCOUNTER — APPOINTMENT (OUTPATIENT)
Dept: LAB | Facility: HOSPITAL | Age: 33
End: 2018-03-13

## 2018-03-15 ENCOUNTER — LAB (OUTPATIENT)
Dept: LAB | Facility: HOSPITAL | Age: 33
End: 2018-03-15

## 2018-03-15 ENCOUNTER — OFFICE VISIT (OUTPATIENT)
Dept: ONCOLOGY | Facility: CLINIC | Age: 33
End: 2018-03-15

## 2018-03-15 VITALS
HEART RATE: 63 BPM | TEMPERATURE: 99.1 F | HEIGHT: 68 IN | WEIGHT: 190.2 LBS | DIASTOLIC BLOOD PRESSURE: 76 MMHG | RESPIRATION RATE: 12 BRPM | BODY MASS INDEX: 28.82 KG/M2 | OXYGEN SATURATION: 100 % | SYSTOLIC BLOOD PRESSURE: 122 MMHG

## 2018-03-15 DIAGNOSIS — C92.10 CML (CHRONIC MYELOID LEUKEMIA) (HCC): ICD-10-CM

## 2018-03-15 DIAGNOSIS — D50.9 IRON DEFICIENCY ANEMIA, UNSPECIFIED IRON DEFICIENCY ANEMIA TYPE: Primary | ICD-10-CM

## 2018-03-15 DIAGNOSIS — D50.9 IRON DEFICIENCY ANEMIA, UNSPECIFIED IRON DEFICIENCY ANEMIA TYPE: ICD-10-CM

## 2018-03-15 DIAGNOSIS — D50.0 IRON DEFICIENCY ANEMIA DUE TO CHRONIC BLOOD LOSS: ICD-10-CM

## 2018-03-15 DIAGNOSIS — C92.10 CML (CHRONIC MYELOID LEUKEMIA) (HCC): Primary | ICD-10-CM

## 2018-03-15 DIAGNOSIS — N92.0 MENORRHAGIA WITH REGULAR CYCLE: ICD-10-CM

## 2018-03-15 LAB
ALBUMIN SERPL-MCNC: 4.4 G/DL (ref 3.5–5.2)
ALBUMIN/GLOB SERPL: 1.5 G/DL (ref 1.1–2.4)
ALP SERPL-CCNC: 67 U/L (ref 38–116)
ALT SERPL W P-5'-P-CCNC: 10 U/L (ref 0–33)
ANION GAP SERPL CALCULATED.3IONS-SCNC: 10.4 MMOL/L
AST SERPL-CCNC: 12 U/L (ref 0–32)
BASOPHILS # BLD AUTO: 0.03 10*3/MM3 (ref 0–0.1)
BASOPHILS NFR BLD AUTO: 0.5 % (ref 0–1.1)
BILIRUB SERPL-MCNC: 0.2 MG/DL (ref 0.1–1.2)
BUN BLD-MCNC: 12 MG/DL (ref 6–20)
BUN/CREAT SERPL: 16.7 (ref 7.3–30)
CALCIUM SPEC-SCNC: 9.5 MG/DL (ref 8.5–10.2)
CHLORIDE SERPL-SCNC: 103 MMOL/L (ref 98–107)
CO2 SERPL-SCNC: 27.6 MMOL/L (ref 22–29)
CREAT BLD-MCNC: 0.72 MG/DL (ref 0.6–1.1)
DEPRECATED RDW RBC AUTO: 42.3 FL (ref 37–49)
EOSINOPHIL # BLD AUTO: 0.16 10*3/MM3 (ref 0–0.36)
EOSINOPHIL NFR BLD AUTO: 2.4 % (ref 1–5)
ERYTHROCYTE [DISTWIDTH] IN BLOOD BY AUTOMATED COUNT: 13 % (ref 11.7–14.5)
FERRITIN SERPL-MCNC: 15.3 NG/ML (ref 11–207)
GFR SERPL CREATININE-BSD FRML MDRD: 93 ML/MIN/1.73
GLOBULIN UR ELPH-MCNC: 3 GM/DL (ref 1.8–3.5)
GLUCOSE BLD-MCNC: 89 MG/DL (ref 74–124)
HCT VFR BLD AUTO: 37.6 % (ref 34–45)
HGB BLD-MCNC: 12.3 G/DL (ref 11.5–14.9)
IMM GRANULOCYTES # BLD: 0.04 10*3/MM3 (ref 0–0.03)
IMM GRANULOCYTES NFR BLD: 0.6 % (ref 0–0.5)
IRON 24H UR-MRATE: 33 MCG/DL (ref 37–145)
IRON SATN MFR SERPL: 9 % (ref 14–48)
LYMPHOCYTES # BLD AUTO: 1.34 10*3/MM3 (ref 1–3.5)
LYMPHOCYTES NFR BLD AUTO: 20.4 % (ref 20–49)
MCH RBC QN AUTO: 29.3 PG (ref 27–33)
MCHC RBC AUTO-ENTMCNC: 32.7 G/DL (ref 32–35)
MCV RBC AUTO: 89.5 FL (ref 83–97)
MONOCYTES # BLD AUTO: 0.59 10*3/MM3 (ref 0.25–0.8)
MONOCYTES NFR BLD AUTO: 9 % (ref 4–12)
NEUTROPHILS # BLD AUTO: 4.41 10*3/MM3 (ref 1.5–7)
NEUTROPHILS NFR BLD AUTO: 67.1 % (ref 39–75)
NRBC BLD MANUAL-RTO: 0 /100 WBC (ref 0–0)
PLATELET # BLD AUTO: 216 10*3/MM3 (ref 150–375)
PMV BLD AUTO: 9.8 FL (ref 8.9–12.1)
POTASSIUM BLD-SCNC: 4.4 MMOL/L (ref 3.5–4.7)
PROT SERPL-MCNC: 7.4 G/DL (ref 6.3–8)
RBC # BLD AUTO: 4.2 10*6/MM3 (ref 3.9–5)
SODIUM BLD-SCNC: 141 MMOL/L (ref 134–145)
TIBC SERPL-MCNC: 360 MCG/DL (ref 249–505)
TRANSFERRIN SERPL-MCNC: 257 MG/DL (ref 200–360)
WBC NRBC COR # BLD: 6.57 10*3/MM3 (ref 4–10)

## 2018-03-15 PROCEDURE — 99214 OFFICE O/P EST MOD 30 MIN: CPT | Performed by: INTERNAL MEDICINE

## 2018-03-15 PROCEDURE — 82728 ASSAY OF FERRITIN: CPT | Performed by: INTERNAL MEDICINE

## 2018-03-15 PROCEDURE — 83540 ASSAY OF IRON: CPT | Performed by: INTERNAL MEDICINE

## 2018-03-15 PROCEDURE — 80053 COMPREHEN METABOLIC PANEL: CPT | Performed by: INTERNAL MEDICINE

## 2018-03-15 PROCEDURE — 85025 COMPLETE CBC W/AUTO DIFF WBC: CPT | Performed by: INTERNAL MEDICINE

## 2018-03-15 PROCEDURE — 36415 COLL VENOUS BLD VENIPUNCTURE: CPT | Performed by: INTERNAL MEDICINE

## 2018-03-15 PROCEDURE — 84466 ASSAY OF TRANSFERRIN: CPT | Performed by: INTERNAL MEDICINE

## 2018-03-15 RX ORDER — DIPHENHYDRAMINE HCL 25 MG
25 CAPSULE ORAL ONCE
Status: CANCELLED | OUTPATIENT
Start: 2018-03-21

## 2018-03-15 RX ORDER — FAMOTIDINE 10 MG/ML
20 INJECTION, SOLUTION INTRAVENOUS ONCE
Status: CANCELLED | OUTPATIENT
Start: 2018-03-21

## 2018-03-15 RX ORDER — SODIUM CHLORIDE 9 MG/ML
250 INJECTION, SOLUTION INTRAVENOUS ONCE
Status: CANCELLED | OUTPATIENT
Start: 2018-03-21

## 2018-03-15 NOTE — PROGRESS NOTES
REASONS FOR FOLLOWUP:     1. Chronic myelogenous leukemia with splenomegaly, chronic phase, positive [place] chromosome, no mutation at kinase domain.     2. Patient was started on hydroxyurea temporarily on 10/23/2013 with significant drop of WBC counts.     3. Patient started on Sprycel on 12/02/2013. Peripheral blood tested positive with 3.4% of cells positive for BCR-ABL translocation, tested 02/17/2014.     4. The patient had CCyR 6 months into treatment as tested on 05/15/2014.     5. Complete molecular response as tested 08/08/14 with negative product of BCR/ABL.     6. There was interruption of her treatment due to insurance coverage and misunderstanding from patient's part, she had a relapse of disease with BCR/ABL product at 12.626% in March 2016, and she restarted back on Sprycel, with good response as tested on 08/31/2016 with BCR/ABL at 0.294%.   7. Recurrent iron deficiency anemia not responding to oral iron supplementation.  She also had significant constipation associated with oral iron. Patient was given Feraheme treatment 2 doses in September 2016 and repeated in January 2017.     8.  Since June 2017, patient has been persistently tested negative for BCR/ABL by RT-PCR every 3 month period      HISTORY OF PRESENT ILLNESS: The patient is a 33 y.o.   female presenting today for 3-month follow-up and lab review.     She continues on Sprycel 100 mg daily.  She is tolerating well.  She denies any shortness of breath, lower external swelling.     Patient reports that she had OB/GYN evaluation, and had a biopsy which reported endometrial polyps.  Patient reports she is not pregnant.  Her most recent the beta-hCG was negative on 2/21/2018.  She is scheduled to have endometrial ablation and tubal ligation on 4/92018.  She continues to have some heavy menses.  She also reports feeling more and more fatigue, as she had iron deficiency.     Patient also reports she has been complaining off  Neurontin.  She uses ibuprofen as needed for muscle achiness.  She had another recurrence of seizure like activity.  Patient told me she received a letter from her neurologist informing her no longer in practice.  Patient's primary care physician also switched to different the practice moderate, the patient was not able to afford upfront fees.  She not has no primary care physician neither.    Patient has normal CBC today.  Other laboratory studies are pending.    Past Medical History:   Diagnosis Date   • Anemia in neoplastic disease    • Arm pain    • Asthma     childhood   • Chiari I malformation    • Chronic myelogenous leukemia    • CML (chronic myelocytic leukemia)    • Cystitis    • Depression    • Flank pain    • GERD (gastroesophageal reflux disease)    • H/O Iron deficiency anemia    • H/O Lower extremity pain     Resolved.   • History of ongoing treatment with high-risk medication    • History of pyelonephritis    • Migraine    • Myalgia    • Neuropathy of forearm    • Pulmonary hypertension    • Seizures     as child   • Splenomegaly    • Vitamin D deficiency        OB/GYN HISTORY: Menarche age 9. G5, P4, 1 miscarriage of the third pregnancy. First pregnancy at age 17.        HEMATOLOGIC/ONCOLOGIC HISTORY: History from previous dates can be found in the separate document.        Laboratory results on 09/23/2015 showed normalization of hemoglobin 12.2, but still has macrocytosis, MCV 73.3. She has normal platelets and WBC at 9400. Serum ferritin < 5 ng/ml, iron sats 6.3%, iron 29, TIBC 455 mcg/ml. Still has severe iron deficiency, needs to continue oral iron therapy. The patient restarted taking oral iron supplementation which was probably stopped in the end of November 2015.        Laboratory study on 03/22/2016 reported normal WBC 8450, neutrophils 6100, lymphs is 1400 and monocytes 550. Serum iron was 26, TIBC 469 on saturation 6% and ferritin less than 5 NG/ML. Chemistry lab reported normal renal  function with a creatinine 0.69, normal liver function panel, total protein 7.5 and albumin 4.2, normal electrolytes. Patient was restarted back on oral on sedimentation twice a day with good tolerance.      Patient continues take Lortab as needed for left leg cramping. Urine drug test on 08/05/2016 was completely negative, and repeated study on August 26 was positive for opiate, negative for other recreational drugs.      Repeat laboratory study on 08/31/2016 reported iron 21, ferritin less than 5, iron saturation 5%, TIBC 462. Hemoglobin was 11.5 MCV 78.1 MCHC 30.4. Platelets 163,000. Total WBC 8870 including neutrophils 6400 and lymphocytes 1500. BCR/ABL was 0.294% by RT-PCR method.       Patient was given IV Feraheme treatment in September 2016, with 2 doses. Repeated laboratory study on 10/06/2016 reported significantly improved and normalized ferritin 269, iron 80, TIBC 365 and iron saturation 22%. Her hemoglobin was 12.2, and MCV 80.8.      Patient reported she was seen by Dr. Fam in 10/2016 and was started on half tablets of Topamax. I reviewed Dr. Fam’s clinic note and telephone conversation record. The patient reports she has no recurrence of migraine headache. However, she is very tearful today, reporting that she has thoughts of not taking any medications. She did assure me that she has been taking the medication up to this point. 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 The patient denies suicide ideation. When she was initially diagnosed of CML back in 2014, she had depression and I started the patient on Prozac. The patient reported initially it helped her, however, she no longer feels the effect of Prozac. She feels depressed. The patient reports she has no personal hobby. She goes to work and goes home, taking care of her kids. She does not enjoy life as she used to.      Laboratory  study on 2016 reported at ferritin 19.9, iron 33, TIBC 347 iron saturation 10%.  Hemoglobin was 13, normal WBC and platelets.  Unremarkable CMP.  Patient was given 2 doses of Feraheme treatment in early 2017.      Cytogenetic study on 2017 reported a BCR-ABL products at 0.098%, showed further improved residual disease.  There is also reported a ferritin 103.7, iron 79, TIBC 336 and iron saturation 24%.  Hemoglobin was 13.5, MCV 92.3, platelets 199,000 WBC 7000.  She had a completely normal CMP.       Repeated laboratory study on 2017 reported at nondetectable BCR-ABL product.  Ferritin was 45, iron 35 TIBC 333 and iron saturation 11%.  Had a normal CBC and CMP.      MEDICATIONS: The current medication list was reviewed with the patient and updated in the EMR this date per the medical assistant. Medication dosages and frequencies were confirmed to be accurate.        Allergies   Allergen Reactions   • Sulfa Antibiotics      SOCIAL HISTORY: . She smoked cigarettes previously, quit in 2006 with 8 pack year history. Social drinker, maybe once a month. No illegal drug use. No risk for HIV except tattoos.  Hairstylist.       FAMILY HISTORY: Maternal grandmother had esophageal/stomach adenocarcinoma diagnosed at age of 72 and  of disease progress at age of 73. The patient' s mother has hypertension but otherwise no family history of malignancy, especially no leukemia.        I have reviewed the patient's medical history in detail and updated the computerized patient record.    REVIEW OF SYSTEMS:     PAIN: See VITAL SIGNS below.    GENERAL: No change in appetite or weight; no fevers, chills, sweats.    SKIN: No rashes or nonhealing lesions.    HEME/LYMPH:No anemia, no lymphadenopathy.  No easy bruising.  She does have increased in menses but otherwise no easy bleeding.    EYES: No vision changes or diplopia.    ENT: No tinnitus, hearing loss, gum bleeding,  "epistaxis, hoarseness or dysphagia.    RESPIRATORY: No cough, shortness of breath, hemoptysis or wheezing.    CVS: No chest pain, palpitations, orthopnea, dyspnea on exertion or PND.    GI: No abdominal pain, nausea, vomiting, constipation, diarrhea, melena or hematochezia.    : No dysuria or hematuria, has increased vaginal bleeding.    MUSCULOSKELETAL: Mild muscle achiness.  No joint swelling.   NEUROLOGICAL: Intermittent headache.  Fainting or syncope.  No seizure-like activity.    PSYCHIATRIC: Mood swings related to hormone.       VITAL SIGNS:   Vitals:    03/15/18 1228   BP: 122/76   Pulse: 63   Resp: 12   Temp: 99.1 °F (37.3 °C)   TempSrc: Oral   SpO2: 100%   Weight: 86.3 kg (190 lb 3.2 oz)   Height: 172.7 cm (67.99\")   PainSc: 3  Comment: legs and arms pain   ECOG 0          PHYSICAL EXAMINATION:    GENERAL: Well-developed, well-nourished  female in no acute distress.    SKIN: Warm, dry without rashes, purpura or petechiae. HEAD: Normocephalic.    EYES: Pupils equal, round. Conjunctivae normal.    EARS: Hearing intact.    NOSE: No nasal discharge.    MOUTH: Tongue is well papillated;Oral mucosa moist, no stomatitis or ulcers. Lips normal.    CHEST: Lungs clear to auscultation.  No wheezing no crackles.   CARDIAC: Regular rate and rhythm without murmurs, rubs or gallops.    ABDOMEN: Soft, nontender with no organomegaly or masses. Bowel sounds present.    EXTREMITIES: No edema no cyanosis.    NEUROLOGICAL: No focal deficits.        LABORATORY DATA:        Lab Results   Component Value Date    WBC 6.57 03/15/2018    HGB 12.3 03/15/2018    HCT 37.6 03/15/2018    MCV 89.5 03/15/2018     03/15/2018     Lab Results   Component Value Date    NEUTROABS 4.41 03/15/2018     Chemistry lab is pending.  Iron panel is pending.    BCR-ABL 0% by RT-PCR on 9/20/2017 and a 12/13/2017, result is pending today.      ASSESSMENT:    1. Chronic myelogenous leukemia, chronic phase with excellent response to Sprycel " and complete molecular response in August 2014. However she had interuption of treatment in early part of 2016, because of insurance issues and miscommunication, she stopped the medicine without telling us, and laboratory test on 03/22/2016 reported disease relapse with increased BCR/ABL product at 12.626%. subsequently she was restarted back on Sprycel, and has been doing well.  Since June 2017, she has completed molecular response as tested every 3 months.  She has been compliant with treatment.     She will continue Sprycel 100 mg daily, and every 3 months laboratory monitoring.       2. Recurrent Iron deficiency anemia.  She was treated again with Feraheme October 2017.   Iron deficiency thought to be related to ulcer identified on EGD, being treated with Carafate. Ferritin normal at 56 on 12/13/2017 was marginal iron saturation 16%.  However patient has increased bleeding from her menses since IUD was removed.  She will have endometrial ablation and tubal ligation next month on 4/9/2018.  Repeated iron studies pending.  If she has recurrent on deficient, we will arrange intravenous iron therapy.     3. Depression. Patient has been on Prozac for 2 years.      4.  Migraine headache. Was followed by neurologist Dr. López and associates.  She was started on gabapentin in early February 2017 but discontinued by herself..  She also has been taking Topamax.          PLAN:    1. Continue Sprycel 100 mg daily, monitor labs cbc and CMP and BCR-ABL by RT-PCR every 3 months.  BCR-ABL pending today.   2.  Iron study is pending.  We'll call patient with results.  3. Follow up with GYN for endometrial ablation and tubal ligation.    4.  Patient will return in 3 months for NP visit and six-month M.D. visit.  Laboratory study will be monitored every 3 months for CBC CMP, iron, ferritin, and BCR/ABL by PCR..          Isabel Castle MD PhD  3/15/2018          cc:   ISHAAN REGAN M.D.

## 2018-03-16 ENCOUNTER — TELEPHONE (OUTPATIENT)
Dept: ONCOLOGY | Facility: HOSPITAL | Age: 33
End: 2018-03-16

## 2018-03-16 ENCOUNTER — TELEPHONE (OUTPATIENT)
Dept: ONCOLOGY | Facility: CLINIC | Age: 33
End: 2018-03-16

## 2018-03-16 RX ORDER — DIPHENHYDRAMINE HCL 25 MG
25 CAPSULE ORAL ONCE
Status: CANCELLED | OUTPATIENT
Start: 2018-03-29

## 2018-03-16 RX ORDER — DIPHENHYDRAMINE HCL 25 MG
25 CAPSULE ORAL ONCE
Status: CANCELLED | OUTPATIENT
Start: 2018-03-22

## 2018-03-16 RX ORDER — FAMOTIDINE 10 MG/ML
20 INJECTION, SOLUTION INTRAVENOUS ONCE
Status: CANCELLED | OUTPATIENT
Start: 2018-03-22

## 2018-03-16 RX ORDER — SODIUM CHLORIDE 9 MG/ML
250 INJECTION, SOLUTION INTRAVENOUS ONCE
Status: CANCELLED | OUTPATIENT
Start: 2018-03-22

## 2018-03-16 RX ORDER — SODIUM CHLORIDE 9 MG/ML
250 INJECTION, SOLUTION INTRAVENOUS ONCE
Status: CANCELLED | OUTPATIENT
Start: 2018-03-29

## 2018-03-16 NOTE — TELEPHONE ENCOUNTER
ATTEMPTED TO CALL PT TO MAKE HER AWARE THAT FERAHEME IS NEEDED. L/M THAT APPT DESK TO SET UP. MESSAGES SENT TO ANGELICA RN, JOSELIN RN AND APPT DESK.

## 2018-03-16 NOTE — TELEPHONE ENCOUNTER
----- Message from Phyllis Farley RN sent at 3/16/2018  7:43 AM EDT -----  Regarding: FW: IV IRON       ----- Message -----  From: Isabel Castle MD PhD  Sent: 3/15/2018  11:46 PM  To: [facility] Clinical Pool  Subject: IV IRON                                          Please call patient reporting iron level is worse.  needs repeated IV iron therapy.  Arrange Feraheme weekly for 2 doses.      Thank you very much!     Dr. Castle

## 2018-03-16 NOTE — TELEPHONE ENCOUNTER
----- Message from Isabel Castle MD PhD sent at 3/15/2018 11:46 PM EDT -----  Regarding: IV IRON   Please call patient reporting iron level is worse.  needs repeated IV iron therapy.  Arrange Feraheme weekly for 2 doses.      Thank you very much!     Dr. Castle

## 2018-03-21 LAB — REF LAB TEST METHOD: NORMAL

## 2018-03-22 ENCOUNTER — INFUSION (OUTPATIENT)
Dept: ONCOLOGY | Facility: HOSPITAL | Age: 33
End: 2018-03-22

## 2018-03-22 VITALS
SYSTOLIC BLOOD PRESSURE: 112 MMHG | WEIGHT: 190.4 LBS | BODY MASS INDEX: 28.96 KG/M2 | HEART RATE: 56 BPM | TEMPERATURE: 98.2 F | DIASTOLIC BLOOD PRESSURE: 65 MMHG

## 2018-03-22 DIAGNOSIS — K90.9 MALABSORPTION OF IRON: Primary | ICD-10-CM

## 2018-03-22 DIAGNOSIS — D50.9 IRON DEFICIENCY ANEMIA, UNSPECIFIED IRON DEFICIENCY ANEMIA TYPE: ICD-10-CM

## 2018-03-22 PROCEDURE — 25010000002 FERUMOXYTOL 510 MG/17ML SOLUTION 510 MG VIAL: Performed by: INTERNAL MEDICINE

## 2018-03-22 PROCEDURE — 96375 TX/PRO/DX INJ NEW DRUG ADDON: CPT

## 2018-03-22 PROCEDURE — 96374 THER/PROPH/DIAG INJ IV PUSH: CPT

## 2018-03-22 PROCEDURE — 63710000001 DIPHENHYDRAMINE PER 50 MG: Performed by: INTERNAL MEDICINE

## 2018-03-22 RX ORDER — DIPHENHYDRAMINE HCL 25 MG
25 CAPSULE ORAL ONCE
Status: COMPLETED | OUTPATIENT
Start: 2018-03-22 | End: 2018-03-22

## 2018-03-22 RX ORDER — SODIUM CHLORIDE 9 MG/ML
250 INJECTION, SOLUTION INTRAVENOUS ONCE
Status: COMPLETED | OUTPATIENT
Start: 2018-03-22 | End: 2018-03-22

## 2018-03-22 RX ORDER — FAMOTIDINE 10 MG/ML
20 INJECTION, SOLUTION INTRAVENOUS ONCE
Status: COMPLETED | OUTPATIENT
Start: 2018-03-22 | End: 2018-03-22

## 2018-03-22 RX ADMIN — FERUMOXYTOL 510 MG: 510 INJECTION INTRAVENOUS at 10:11

## 2018-03-22 RX ADMIN — SODIUM CHLORIDE 250 ML: 9 INJECTION, SOLUTION INTRAVENOUS at 09:36

## 2018-03-22 RX ADMIN — FAMOTIDINE 20 MG: 10 INJECTION, SOLUTION INTRAVENOUS at 09:37

## 2018-03-22 RX ADMIN — DIPHENHYDRAMINE HYDROCHLORIDE 25 MG: 25 CAPSULE ORAL at 09:36

## 2018-03-23 ENCOUNTER — TELEPHONE (OUTPATIENT)
Dept: ONCOLOGY | Facility: HOSPITAL | Age: 33
End: 2018-03-23

## 2018-03-23 NOTE — TELEPHONE ENCOUNTER
Patient called asking if ok to use CBD oil with the medications she is on. Spoke with  and Jake MIRAMONTES, will not interfere with the Sprycel. Notified pt.

## 2018-03-27 ENCOUNTER — DOCUMENTATION (OUTPATIENT)
Dept: ONCOLOGY | Facility: CLINIC | Age: 33
End: 2018-03-27

## 2018-03-29 ENCOUNTER — PREP FOR SURGERY (OUTPATIENT)
Dept: OTHER | Facility: HOSPITAL | Age: 33
End: 2018-03-29

## 2018-03-29 ENCOUNTER — INFUSION (OUTPATIENT)
Dept: ONCOLOGY | Facility: HOSPITAL | Age: 33
End: 2018-03-29

## 2018-03-29 VITALS
BODY MASS INDEX: 29.23 KG/M2 | HEART RATE: 86 BPM | SYSTOLIC BLOOD PRESSURE: 117 MMHG | WEIGHT: 192.2 LBS | TEMPERATURE: 98 F | DIASTOLIC BLOOD PRESSURE: 71 MMHG

## 2018-03-29 DIAGNOSIS — D50.9 IRON DEFICIENCY ANEMIA, UNSPECIFIED IRON DEFICIENCY ANEMIA TYPE: ICD-10-CM

## 2018-03-29 DIAGNOSIS — K90.9 MALABSORPTION OF IRON: Primary | ICD-10-CM

## 2018-03-29 DIAGNOSIS — N92.0 MENORRHAGIA WITH REGULAR CYCLE: Primary | ICD-10-CM

## 2018-03-29 PROCEDURE — 63710000001 DIPHENHYDRAMINE PER 50 MG: Performed by: INTERNAL MEDICINE

## 2018-03-29 PROCEDURE — 96374 THER/PROPH/DIAG INJ IV PUSH: CPT

## 2018-03-29 PROCEDURE — 25010000002 FERUMOXYTOL 510 MG/17ML SOLUTION 510 MG VIAL: Performed by: INTERNAL MEDICINE

## 2018-03-29 RX ORDER — SODIUM CHLORIDE 9 MG/ML
250 INJECTION, SOLUTION INTRAVENOUS ONCE
Status: COMPLETED | OUTPATIENT
Start: 2018-03-29 | End: 2018-03-29

## 2018-03-29 RX ORDER — SODIUM CHLORIDE 9 MG/ML
40 INJECTION, SOLUTION INTRAVENOUS AS NEEDED
Status: CANCELLED | OUTPATIENT
Start: 2018-03-29

## 2018-03-29 RX ORDER — DIPHENHYDRAMINE HCL 25 MG
25 CAPSULE ORAL ONCE
Status: COMPLETED | OUTPATIENT
Start: 2018-03-29 | End: 2018-03-29

## 2018-03-29 RX ORDER — SODIUM CHLORIDE 0.9 % (FLUSH) 0.9 %
1-10 SYRINGE (ML) INJECTION AS NEEDED
Status: CANCELLED | OUTPATIENT
Start: 2018-03-29

## 2018-03-29 RX ADMIN — SODIUM CHLORIDE 250 ML: 9 INJECTION, SOLUTION INTRAVENOUS at 09:36

## 2018-03-29 RX ADMIN — DIPHENHYDRAMINE HYDROCHLORIDE 25 MG: 25 CAPSULE ORAL at 09:36

## 2018-03-29 RX ADMIN — FERUMOXYTOL 510 MG: 510 INJECTION INTRAVENOUS at 10:04

## 2018-03-30 ENCOUNTER — APPOINTMENT (OUTPATIENT)
Dept: PREADMISSION TESTING | Facility: HOSPITAL | Age: 33
End: 2018-03-30

## 2018-03-30 VITALS
RESPIRATION RATE: 16 BRPM | OXYGEN SATURATION: 99 % | WEIGHT: 190 LBS | SYSTOLIC BLOOD PRESSURE: 102 MMHG | DIASTOLIC BLOOD PRESSURE: 62 MMHG | HEART RATE: 65 BPM | BODY MASS INDEX: 28.14 KG/M2 | HEIGHT: 69 IN

## 2018-03-30 DIAGNOSIS — N92.0 MENORRHAGIA WITH REGULAR CYCLE: ICD-10-CM

## 2018-03-30 PROCEDURE — 93010 ELECTROCARDIOGRAM REPORT: CPT | Performed by: INTERNAL MEDICINE

## 2018-03-30 PROCEDURE — 93005 ELECTROCARDIOGRAM TRACING: CPT

## 2018-04-06 ENCOUNTER — ANESTHESIA EVENT (OUTPATIENT)
Dept: PERIOP | Facility: HOSPITAL | Age: 33
End: 2018-04-06

## 2018-04-09 ENCOUNTER — ANESTHESIA (OUTPATIENT)
Dept: PERIOP | Facility: HOSPITAL | Age: 33
End: 2018-04-09

## 2018-04-09 ENCOUNTER — HOSPITAL ENCOUNTER (OUTPATIENT)
Facility: HOSPITAL | Age: 33
Setting detail: HOSPITAL OUTPATIENT SURGERY
Discharge: HOME | End: 2018-04-09
Attending: OBSTETRICS & GYNECOLOGY | Admitting: OBSTETRICS & GYNECOLOGY

## 2018-04-09 VITALS
TEMPERATURE: 98.7 F | RESPIRATION RATE: 16 BRPM | SYSTOLIC BLOOD PRESSURE: 109 MMHG | OXYGEN SATURATION: 94 % | WEIGHT: 190.6 LBS | BODY MASS INDEX: 28.56 KG/M2 | HEART RATE: 70 BPM | DIASTOLIC BLOOD PRESSURE: 59 MMHG

## 2018-04-09 DIAGNOSIS — N92.0 MENORRHAGIA WITH REGULAR CYCLE: ICD-10-CM

## 2018-04-09 LAB — HCG SERPL QL: NEGATIVE

## 2018-04-09 PROCEDURE — 25010000002 MIDAZOLAM PER 1 MG: Performed by: NURSE ANESTHETIST, CERTIFIED REGISTERED

## 2018-04-09 PROCEDURE — 58563 HYSTEROSCOPY ABLATION: CPT | Performed by: OBSTETRICS & GYNECOLOGY

## 2018-04-09 PROCEDURE — 25010000002 KETOROLAC TROMETHAMINE PER 15 MG: Performed by: NURSE ANESTHETIST, CERTIFIED REGISTERED

## 2018-04-09 PROCEDURE — S0260 H&P FOR SURGERY: HCPCS | Performed by: OBSTETRICS & GYNECOLOGY

## 2018-04-09 PROCEDURE — 25010000002 FENTANYL CITRATE (PF) 100 MCG/2ML SOLUTION: Performed by: NURSE ANESTHETIST, CERTIFIED REGISTERED

## 2018-04-09 PROCEDURE — 25010000002 DEXAMETHASONE PER 1 MG: Performed by: NURSE ANESTHETIST, CERTIFIED REGISTERED

## 2018-04-09 PROCEDURE — 25010000002 PROPOFOL 10 MG/ML EMULSION: Performed by: NURSE ANESTHETIST, CERTIFIED REGISTERED

## 2018-04-09 PROCEDURE — 25010000002 HYDROMORPHONE PER 4 MG: Performed by: NURSE ANESTHETIST, CERTIFIED REGISTERED

## 2018-04-09 PROCEDURE — 84703 CHORIONIC GONADOTROPIN ASSAY: CPT | Performed by: OBSTETRICS & GYNECOLOGY

## 2018-04-09 PROCEDURE — 58670 LAPAROSCOPY TUBAL CAUTERY: CPT | Performed by: OBSTETRICS & GYNECOLOGY

## 2018-04-09 PROCEDURE — 25010000002 ONDANSETRON PER 1 MG: Performed by: NURSE ANESTHETIST, CERTIFIED REGISTERED

## 2018-04-09 RX ORDER — LIDOCAINE HYDROCHLORIDE 10 MG/ML
0.5 INJECTION, SOLUTION EPIDURAL; INFILTRATION; INTRACAUDAL; PERINEURAL ONCE AS NEEDED
Status: COMPLETED | OUTPATIENT
Start: 2018-04-09 | End: 2018-04-09

## 2018-04-09 RX ORDER — SODIUM CHLORIDE 9 MG/ML
40 INJECTION, SOLUTION INTRAVENOUS AS NEEDED
Status: DISCONTINUED | OUTPATIENT
Start: 2018-04-09 | End: 2018-04-09 | Stop reason: HOSPADM

## 2018-04-09 RX ORDER — SODIUM CHLORIDE 0.9 % (FLUSH) 0.9 %
1-10 SYRINGE (ML) INJECTION AS NEEDED
Status: DISCONTINUED | OUTPATIENT
Start: 2018-04-09 | End: 2018-04-09 | Stop reason: HOSPADM

## 2018-04-09 RX ORDER — LIDOCAINE HYDROCHLORIDE 20 MG/ML
INJECTION, SOLUTION INFILTRATION; PERINEURAL AS NEEDED
Status: DISCONTINUED | OUTPATIENT
Start: 2018-04-09 | End: 2018-04-09 | Stop reason: SURG

## 2018-04-09 RX ORDER — IPRATROPIUM BROMIDE AND ALBUTEROL SULFATE 2.5; .5 MG/3ML; MG/3ML
3 SOLUTION RESPIRATORY (INHALATION) ONCE AS NEEDED
Status: DISCONTINUED | OUTPATIENT
Start: 2018-04-09 | End: 2018-04-09 | Stop reason: HOSPADM

## 2018-04-09 RX ORDER — FAMOTIDINE 10 MG/ML
20 INJECTION, SOLUTION INTRAVENOUS
Status: DISCONTINUED | OUTPATIENT
Start: 2018-04-09 | End: 2018-04-09 | Stop reason: HOSPADM

## 2018-04-09 RX ORDER — ONDANSETRON 2 MG/ML
4 INJECTION INTRAMUSCULAR; INTRAVENOUS ONCE AS NEEDED
Status: COMPLETED | OUTPATIENT
Start: 2018-04-09 | End: 2018-04-09

## 2018-04-09 RX ORDER — MEPERIDINE HYDROCHLORIDE 25 MG/ML
12.5 INJECTION INTRAMUSCULAR; INTRAVENOUS; SUBCUTANEOUS
Status: DISCONTINUED | OUTPATIENT
Start: 2018-04-09 | End: 2018-04-09 | Stop reason: HOSPADM

## 2018-04-09 RX ORDER — SODIUM CHLORIDE, SODIUM LACTATE, POTASSIUM CHLORIDE, CALCIUM CHLORIDE 600; 310; 30; 20 MG/100ML; MG/100ML; MG/100ML; MG/100ML
100 INJECTION, SOLUTION INTRAVENOUS CONTINUOUS
Status: DISCONTINUED | OUTPATIENT
Start: 2018-04-09 | End: 2018-04-09 | Stop reason: HOSPADM

## 2018-04-09 RX ORDER — OXYCODONE HYDROCHLORIDE AND ACETAMINOPHEN 5; 325 MG/1; MG/1
1 TABLET ORAL ONCE AS NEEDED
Status: COMPLETED | OUTPATIENT
Start: 2018-04-09 | End: 2018-04-09

## 2018-04-09 RX ORDER — MIDAZOLAM HYDROCHLORIDE 1 MG/ML
1 INJECTION INTRAMUSCULAR; INTRAVENOUS
Status: DISCONTINUED | OUTPATIENT
Start: 2018-04-09 | End: 2018-04-09 | Stop reason: HOSPADM

## 2018-04-09 RX ORDER — KETOROLAC TROMETHAMINE 30 MG/ML
INJECTION, SOLUTION INTRAMUSCULAR; INTRAVENOUS AS NEEDED
Status: DISCONTINUED | OUTPATIENT
Start: 2018-04-09 | End: 2018-04-09 | Stop reason: SURG

## 2018-04-09 RX ORDER — GLYCOPYRROLATE 0.2 MG/ML
0.1 INJECTION INTRAMUSCULAR; INTRAVENOUS
Status: DISCONTINUED | OUTPATIENT
Start: 2018-04-09 | End: 2018-04-09 | Stop reason: HOSPADM

## 2018-04-09 RX ORDER — ROCURONIUM BROMIDE 10 MG/ML
INJECTION, SOLUTION INTRAVENOUS AS NEEDED
Status: DISCONTINUED | OUTPATIENT
Start: 2018-04-09 | End: 2018-04-09 | Stop reason: SURG

## 2018-04-09 RX ORDER — SODIUM CHLORIDE, SODIUM LACTATE, POTASSIUM CHLORIDE, CALCIUM CHLORIDE 600; 310; 30; 20 MG/100ML; MG/100ML; MG/100ML; MG/100ML
9 INJECTION, SOLUTION INTRAVENOUS CONTINUOUS
Status: DISCONTINUED | OUTPATIENT
Start: 2018-04-09 | End: 2018-04-09 | Stop reason: HOSPADM

## 2018-04-09 RX ORDER — DIPHENHYDRAMINE HYDROCHLORIDE 50 MG/ML
12.5 INJECTION INTRAMUSCULAR; INTRAVENOUS
Status: DISCONTINUED | OUTPATIENT
Start: 2018-04-09 | End: 2018-04-09 | Stop reason: HOSPADM

## 2018-04-09 RX ORDER — NALOXONE HCL 0.4 MG/ML
0.4 VIAL (ML) INJECTION AS NEEDED
Status: DISCONTINUED | OUTPATIENT
Start: 2018-04-09 | End: 2018-04-09 | Stop reason: HOSPADM

## 2018-04-09 RX ORDER — SODIUM CHLORIDE 9 MG/ML
INJECTION, SOLUTION INTRAVENOUS AS NEEDED
Status: DISCONTINUED | OUTPATIENT
Start: 2018-04-09 | End: 2018-04-09 | Stop reason: HOSPADM

## 2018-04-09 RX ORDER — OXYCODONE HYDROCHLORIDE AND ACETAMINOPHEN 5; 325 MG/1; MG/1
2 TABLET ORAL EVERY 4 HOURS PRN
Qty: 30 TABLET | Refills: 0 | Status: SHIPPED | OUTPATIENT
Start: 2018-04-09 | End: 2018-08-18

## 2018-04-09 RX ORDER — MIDAZOLAM HYDROCHLORIDE 1 MG/ML
2 INJECTION INTRAMUSCULAR; INTRAVENOUS
Status: DISCONTINUED | OUTPATIENT
Start: 2018-04-09 | End: 2018-04-09 | Stop reason: HOSPADM

## 2018-04-09 RX ORDER — PROPOFOL 10 MG/ML
VIAL (ML) INTRAVENOUS AS NEEDED
Status: DISCONTINUED | OUTPATIENT
Start: 2018-04-09 | End: 2018-04-09 | Stop reason: SURG

## 2018-04-09 RX ORDER — FENTANYL CITRATE 50 UG/ML
INJECTION, SOLUTION INTRAMUSCULAR; INTRAVENOUS AS NEEDED
Status: DISCONTINUED | OUTPATIENT
Start: 2018-04-09 | End: 2018-04-09 | Stop reason: SURG

## 2018-04-09 RX ORDER — FENTANYL CITRATE 50 UG/ML
50 INJECTION, SOLUTION INTRAMUSCULAR; INTRAVENOUS
Status: DISCONTINUED | OUTPATIENT
Start: 2018-04-09 | End: 2018-04-09 | Stop reason: HOSPADM

## 2018-04-09 RX ORDER — ONDANSETRON 2 MG/ML
4 INJECTION INTRAMUSCULAR; INTRAVENOUS ONCE AS NEEDED
Status: DISCONTINUED | OUTPATIENT
Start: 2018-04-09 | End: 2018-04-09 | Stop reason: HOSPADM

## 2018-04-09 RX ORDER — DEXAMETHASONE SODIUM PHOSPHATE 4 MG/ML
8 INJECTION, SOLUTION INTRA-ARTICULAR; INTRALESIONAL; INTRAMUSCULAR; INTRAVENOUS; SOFT TISSUE ONCE AS NEEDED
Status: COMPLETED | OUTPATIENT
Start: 2018-04-09 | End: 2018-04-09

## 2018-04-09 RX ADMIN — HYDROMORPHONE HYDROCHLORIDE 0.5 MG: 1 INJECTION, SOLUTION INTRAMUSCULAR; INTRAVENOUS; SUBCUTANEOUS at 12:08

## 2018-04-09 RX ADMIN — FAMOTIDINE 20 MG: 10 INJECTION, SOLUTION INTRAVENOUS at 08:43

## 2018-04-09 RX ADMIN — HYDROMORPHONE HYDROCHLORIDE 0.5 MG: 1 INJECTION, SOLUTION INTRAMUSCULAR; INTRAVENOUS; SUBCUTANEOUS at 11:06

## 2018-04-09 RX ADMIN — ROCURONIUM BROMIDE 35 MG: 10 INJECTION INTRAVENOUS at 08:59

## 2018-04-09 RX ADMIN — LIDOCAINE HYDROCHLORIDE 0.12 ML: 10 INJECTION, SOLUTION EPIDURAL; INFILTRATION; INTRACAUDAL; PERINEURAL at 08:10

## 2018-04-09 RX ADMIN — SODIUM CHLORIDE, POTASSIUM CHLORIDE, SODIUM LACTATE AND CALCIUM CHLORIDE: 600; 310; 30; 20 INJECTION, SOLUTION INTRAVENOUS at 08:54

## 2018-04-09 RX ADMIN — FENTANYL CITRATE 50 MCG: 50 INJECTION, SOLUTION INTRAMUSCULAR; INTRAVENOUS at 09:10

## 2018-04-09 RX ADMIN — MIDAZOLAM HYDROCHLORIDE 1 MG: 1 INJECTION, SOLUTION INTRAMUSCULAR; INTRAVENOUS at 08:47

## 2018-04-09 RX ADMIN — FENTANYL CITRATE 50 MCG: 50 INJECTION, SOLUTION INTRAMUSCULAR; INTRAVENOUS at 09:52

## 2018-04-09 RX ADMIN — PROPOFOL 50 MG: 10 INJECTION, EMULSION INTRAVENOUS at 08:59

## 2018-04-09 RX ADMIN — SODIUM CHLORIDE, POTASSIUM CHLORIDE, SODIUM LACTATE AND CALCIUM CHLORIDE 9 ML/HR: 600; 310; 30; 20 INJECTION, SOLUTION INTRAVENOUS at 08:10

## 2018-04-09 RX ADMIN — ONDANSETRON 4 MG: 2 INJECTION INTRAMUSCULAR; INTRAVENOUS at 08:43

## 2018-04-09 RX ADMIN — DEXAMETHASONE SODIUM PHOSPHATE 8 MG: 4 INJECTION, SOLUTION INTRAMUSCULAR; INTRAVENOUS at 08:43

## 2018-04-09 RX ADMIN — KETOROLAC TROMETHAMINE 30 MG: 30 INJECTION INTRAMUSCULAR; INTRAVENOUS at 09:37

## 2018-04-09 RX ADMIN — GLYCOPYRROLATE 0.1 MG: 0.2 INJECTION INTRAMUSCULAR; INTRAVENOUS at 08:44

## 2018-04-09 RX ADMIN — FENTANYL CITRATE 50 MCG: 50 INJECTION, SOLUTION INTRAMUSCULAR; INTRAVENOUS at 10:10

## 2018-04-09 RX ADMIN — FENTANYL CITRATE 100 MCG: 50 INJECTION, SOLUTION INTRAMUSCULAR; INTRAVENOUS at 08:56

## 2018-04-09 RX ADMIN — LIDOCAINE HYDROCHLORIDE 80 MG: 20 INJECTION, SOLUTION INFILTRATION; PERINEURAL at 08:58

## 2018-04-09 RX ADMIN — FENTANYL CITRATE 50 MCG: 50 INJECTION, SOLUTION INTRAMUSCULAR; INTRAVENOUS at 10:21

## 2018-04-09 RX ADMIN — PROPOFOL 150 MG: 10 INJECTION, EMULSION INTRAVENOUS at 08:58

## 2018-04-09 RX ADMIN — OXYCODONE HYDROCHLORIDE AND ACETAMINOPHEN 1 TABLET: 5; 325 TABLET ORAL at 10:46

## 2018-04-09 RX ADMIN — FENTANYL CITRATE 50 MCG: 50 INJECTION, SOLUTION INTRAMUSCULAR; INTRAVENOUS at 09:50

## 2018-04-09 NOTE — ANESTHESIA PREPROCEDURE EVALUATION
Anesthesia Evaluation     Patient summary reviewed and Nursing notes reviewed   no history of anesthetic complications:  NPO Solid Status: > 8 hours  NPO Liquid Status: > 8 hours           Airway   Mallampati: II  TM distance: >3 FB  Neck ROM: full  No difficulty expected  Dental - normal exam     Pulmonary - normal exam    breath sounds clear to auscultation  (+) a smoker (quit 2006) Former, asthma,   Cardiovascular - negative cardio ROS and normal exam    ECG reviewed  Rhythm: regular  Rate: normal      ROS comment: SINUS RHYTHM  BORDERLINE T ABNORMALITIES, INFERIOR LEADS  NO SIGNIFICANT CHANGE FROM PREVIOUS ECG    Neuro/Psych  (+) seizures (last age 8) well controlled, headaches (Intractable migraine without aura and without status migrainosus), numbness (Chemotherapy-induced peripheral neuropathy), psychiatric history Depression,     (-) syncope    ROS Comment: Chiari malformation type I, no Rx  Abnormal finding on MRI of brain  GI/Hepatic/Renal/Endo    (+)  GERD well controlled, PUD,      ROS Comment: Chronic nausea    Musculoskeletal     (+) chronic pain,   Abdominal  - normal exam   Substance History   (+) alcohol use (occ),      OB/GYN          Other   (+) blood dyscrasia (Iron deficiency anemia)   history of cancer (CML (chronic myeloid leukemia, on chronic chemo for this) remission                  Anesthesia Plan    ASA 3     general     intravenous induction   Anesthetic plan and risks discussed with patient.  Use of blood products discussed with patient  Consented to blood products.

## 2018-04-09 NOTE — ANESTHESIA POSTPROCEDURE EVALUATION
Patient: Hu Houston    Procedure Summary     Date:  04/09/18 Room / Location:   [facility] /  [facility]    Anesthesia Start:  0854 Anesthesia Stop:  0954    Procedures:       TUBAL COAGULATION LAPAROSCOPIC (Bilateral Abdomen)      Hysteroscopy NovaSure ablation (N/A Vagina) Diagnosis:       Menorrhagia with regular cycle      (Menorrhagia with regular cycle [N92.0])    Surgeon:  Beck Cornejo MD Provider:  Dagoberto Kirby CRNA    Anesthesia Type:  general ASA Status:  3          Anesthesia Type: general  Last vitals  BP   109/59 (04/09/18 1110)   Temp   98.7 °F (37.1 °C) (04/09/18 0800)   Pulse   70 (04/09/18 1110)   Resp   16 (04/09/18 1110)     SpO2   94 % (04/09/18 1110)     Post Anesthesia Care and Evaluation    Patient location during evaluation: PHASE II  Patient participation: complete - patient participated  Level of consciousness: awake and alert  Pain score: 2  Airway patency: patent  Anesthetic complications: No anesthetic complications  PONV Status: none  Cardiovascular status: acceptable  Hydration status: acceptable

## 2018-04-09 NOTE — OP NOTE
Operative Note    Date of Service:  04/09/18  Time of Service:  9:41 AM    Surgical Staff: Surgeon(s) and Role:     * Beck Cornejo MD - Primary   Additional Staff: [employer] student   Pre-operative diagnosis(es): Pre-Op Diagnosis Codes:     * Menorrhagia with regular cycle [N92.0]     Post-operative diagnosis(es): Post-Op Diagnosis Codes:     * Menorrhagia with regular cycle [N92.0]   Procedure(s): Procedure(s):  TUBAL COAGULATION LAPAROSCOPIC  Hysteroscopy NovaSure ablation     Antibiotics: None ordered on call to OR     Anesthesia: Type: General  ASA:  III         Operative findings: The pelvis appeared normal.  The uterus is normal globally.  The anterior and posterior cul-de-sacs were negative.  Both ovaries appeared normal.  There is no evidence of hydrosalpinx.  The upper abdomen was normal.  With regards to the hysteroscopy, there were no abnormal findings in the uterine cavity.  The length was 5.5 cm, width 4.3 cm.  Total treatment time was 1 minute 25 seconds.     Specimens removed: None   Fluid Intake: 900 mL   Output: Documented Output  Est. Blood Loss 5 mL  Urine Output 20 mL  NG/OG Output 0 mL    No intake/output data recorded.     Blood products used: No   Drains:     Implant Information: Nothing was implanted during the procedure   Complications: none   Intraoperative consult(s):    Condition: stable   Disposition: to PACU and then admit to Home          Assessment/Plan     This is a 33-year-old female with menorrhagia and iron deficiency anemia after her Mirena was taken out one year ago.  She desires permanent sterilization and NovaSure ablation.  She was taken operating room placed under general anesthesia.  Her legs were placed in Naresh stirrups and she was prepped and draped in sterile fashion.  The bladder was drained.  A Veress needle was inserted a 45° angled the drop test performed successfully.  Initial insufflation.  Pressures were 15 mmHg so I did direct insertion.  The  abdomen was insufflated.  There is no evidence of perforation.  The patient was placed in Trendelenburg position.  A working scope was then placed.  A 2 cm proximal portion of each tube was cauterized with bipolar electrocautery.  The scope was removed.  A single 0 Vicryl sutures placed the umbilical fascia.  4-0 Monocryl was used to close the skin incision.  Attention was then turned to the vagina where the Hulka tenaculum was removed.  The cervix was dilated with Felipe dilators.  Hysteroscopy was performed with findings above.  The uterus was measured.  The device was placed and the cavity integrity test was passed.  Total treatment time was 1 minute 25 seconds.  The NovaSure system was removed.  There is good hemostasis following procedure.  Sponge and lap counts are correct ×2.  Patient be discharged home on oral Percocet.    Bcek Cornejo MD  4/9/2018  9:44 AM

## 2018-04-09 NOTE — ADDENDUM NOTE
Addendum  created 04/09/18 1312 by Dagoberto Mancuso Breeding, CRNA    Anesthesia Intra [employer] edited

## 2018-04-09 NOTE — H&P
Patient Care Team:  No Known Provider as PCP - General    Chief complaint menorrhagia       HPI: Patient is here for NovaSure endometrial ablation and laparoscopic bilateral tubal coagulation. She is a 33-year-old  5 para 4014 who presents today to discuss endometrial ablation and tubal ligation.  She had a Mirena removed last year and has since had worsening menorrhagia with iron deficiency anemia.  She has a history of leukemia and is on chemotherapy as well.  She is maintained on iron supplements.  She does desire permanent sterilization as well.  She reports her periods as heavy and crampy.  Lasting 5-7 days.  She uses both pads and tampons.    ROS:   Constitutional: Negative for appetite change, fever and unexpected weight change.   Respiratory: Negative for cough and shortness of breath.    Cardiovascular: Negative for chest pain and palpitations.   Gastrointestinal: Negative for abdominal distention, abdominal pain, constipation, diarrhea, nausea and vomiting.   Endocrine: Negative.    Genitourinary: Negative for dyspareunia, pelvic pain and vaginal discharge.   Skin: Negative.    Neurological: Negative for dizziness and headaches.   Hematological: Negative.    Psychiatric/Behavioral: Negative for dysphoric mood and sleep disturbance. The patient is not nervous/anxious.        PMH:   Past Medical History:   Diagnosis Date   • Anemia in neoplastic disease    • Arm pain    • Asthma     childhood   • Chiari I malformation     eval by Dr Moore, NS 2016   • Chronic myelogenous leukemia     remission, Dr Castle follows   • Chronic pain    • CML (chronic myelocytic leukemia)    • Cystitis    • Depression    • Flank pain    • GERD (gastroesophageal reflux disease)    • H/O Iron deficiency anemia    • H/O Lower extremity pain     Resolved.   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   •  Pulmonary hypertension    • Seizures     as child after head injry   • Splenomegaly    • Vitamin D deficiency          PSH:   Past Surgical History:   Procedure Laterality Date   • BONE MARROW BIOPSY     • ENDOSCOPY N/A 2017    Procedure: ESOPHAGOGASTRODUODENOSCOPY with biopsies;  Surgeon: Sophie Trejo MD;  Location: [facility];  Service:    • GYNECOLOGY EXAM UNDER ANESTHESIA      IUD removal       SoHx:   Social History     Social History   • Marital status:      Spouse name: N/A   • Number of children: N/A   • Years of education: N/A     Occupational History   •  [employer]     Social History Main Topics   • Smoking status: Former Smoker     Packs/day: 1.00     Years: 8.00     Types: Cigarettes     Quit date:    • Smokeless tobacco: Never Used      Comment: 2006   • Alcohol use Yes      Comment: Social, maybe once every 6 months   • Drug use:      Types: Cocaine      Comment: prior to    • Sexual activity: Defer     Other Topics Concern   • Not on file     Social History Narrative    Has tattoos. Studying for her Master's degree.       FHx:   Family History   Problem Relation Age of Onset   • Hyperlipidemia Mother    • Hypertension Mother    • Stomach cancer Maternal Grandmother 72     Adenocarcinoma esophagus and stomach   • Stroke Paternal Grandmother    • Malig Hyperthermia Neg Hx        PGyn Hx: No abnormal paps    POBHx:     Allergies: Sulfa antibiotics    Medications:   No current facility-administered medications on file prior to encounter.      Current Outpatient Prescriptions on File Prior to Encounter   Medication Sig Dispense Refill   • dasatinib (SPRYCEL) 100 MG chemo tablet Take 1 tablet by mouth Daily. 90 tablet 2   • ibuprofen (ADVIL,MOTRIN) 800 MG tablet Take 800 mg by mouth As Needed for mild pain (1-3).     • iron dextran complex in sodium chloride 0.9 % 250 mL IVPB Infuse  into a venous catheter Take As Directed.     • ondansetron  (ZOFRAN) 8 MG tablet Take 8 mg by mouth Every 8 (Eight) Hours As Needed for Nausea or Vomiting.     • sucralfate (CARAFATE) 1 G tablet Take 1 g by mouth 4 (Four) Times a Day As Needed (reflux).               Vital Signs  Temp:  [98.7 °F (37.1 °C)] 98.7 °F (37.1 °C)  Heart Rate:  [73] 73  Resp:  [20] 20  BP: (112)/(67) 112/67    Physical Exam:  Constitutional: She is oriented to person, place, and time. She appears well-developed and well-nourished.   HENT:   Head: Normocephalic and atraumatic.   Cardiovascular: Normal rate and regular rhythm.    Pulmonary/Chest: Effort normal. No respiratory distress.   Abdominal: Soft. Bowel sounds are normal. She exhibits no distension and no mass. There is no tenderness. There is no rebound and no guarding.   Musculoskeletal: Normal range of motion.   Neurological: She is alert and oriented to person, place, and time.   Skin: Skin is warm and dry.   Psychiatric: She has a normal mood and affect. Her behavior is normal. Judgment and thought content normal.   Nursing note and vitals reviewed.      Labs: Current hemoglobin on treatment is 12.3.  HCG is pending.      Assessment/Plan: Menorrhagia, iron deficiency anemia    Plan NovaSure ablation after Mirena IUD was removed.  Because she needs birth control she has requested a laparoscopic tubal coagulation.  We discussed risks benefits alternatives of both.  Her questions were answered.  Risk of ectopic and complications were understood.    I discussed the patients findings and my recommendations with patient.     Beck Cornejo MD  04/09/18  8:07 AM

## 2018-04-09 NOTE — ANESTHESIA PROCEDURE NOTES
Airway  Urgency: elective    Airway not difficult    General Information and Staff    Patient location during procedure: OR  CRNA: ANSHUL KIRK    Indications and Patient Condition  Indications for airway management: airway protection    Preoxygenated: yes  MILS not maintained throughout  Mask difficulty assessment: 1 - vent by mask    Final Airway Details  Final airway type: endotracheal airway      Successful airway: ETT  Cuffed: yes   Successful intubation technique: direct laryngoscopy  Facilitating devices/methods: intubating stylet  Endotracheal tube insertion site: oral  Blade: Dodge  Blade size: #2  ETT size: 7.5 mm  Cormack-Lehane Classification: grade I - full view of glottis  Placement verified by: chest auscultation and capnometry   Cuff volume (mL): 6  Measured from: lips  ETT to lips (cm): 21  Number of attempts at approach: 1    Additional Comments  Atraumatic

## 2018-04-24 ENCOUNTER — OFFICE VISIT (OUTPATIENT)
Dept: OBSTETRICS AND GYNECOLOGY | Facility: CLINIC | Age: 33
End: 2018-04-24

## 2018-04-24 VITALS — DIASTOLIC BLOOD PRESSURE: 64 MMHG | WEIGHT: 189 LBS | BODY MASS INDEX: 28.32 KG/M2 | SYSTOLIC BLOOD PRESSURE: 110 MMHG

## 2018-04-24 DIAGNOSIS — Z13.9 SCREENING FOR CONDITION: Primary | ICD-10-CM

## 2018-04-24 DIAGNOSIS — N92.0 MENORRHAGIA WITH REGULAR CYCLE: ICD-10-CM

## 2018-04-24 LAB
BILIRUB BLD-MCNC: NEGATIVE MG/DL
CLARITY, POC: CLEAR
COLOR UR: YELLOW
GLUCOSE UR STRIP-MCNC: NEGATIVE MG/DL
KETONES UR QL: NEGATIVE
LEUKOCYTE EST, POC: NEGATIVE
NITRITE UR-MCNC: NEGATIVE MG/ML
PH UR: 6.5 [PH] (ref 5–8)
PROT UR STRIP-MCNC: NEGATIVE MG/DL
RBC # UR STRIP: NEGATIVE /UL
SP GR UR: 1.01 (ref 1–1.03)
UROBILINOGEN UR QL: NORMAL

## 2018-04-24 PROCEDURE — 99024 POSTOP FOLLOW-UP VISIT: CPT | Performed by: OBSTETRICS & GYNECOLOGY

## 2018-04-24 NOTE — PROGRESS NOTES
Hu Houston is a 33 y.o. patient who presents for follow up of   Chief Complaint   Patient presents with   • Post-op       HPI 2 weeks status post left scopic tubal ligation and NovaSure ablation.  Patient reports pain is improving.  She has a watery discharge with pink tinge to it.  She denies any fevers.  The pain is much better.    The following portions of the patient's history were reviewed and updated as appropriate: allergies, current medications and problem list.    Review of Systems   Constitutional: Negative for chills and fever.   Gastrointestinal: Negative for diarrhea, nausea and vomiting.       /64   Wt 85.7 kg (189 lb)   LMP 04/06/2018   BMI 28.32 kg/m²     Physical Exam   Constitutional: She appears well-developed and well-nourished.   HENT:   Head: Normocephalic and atraumatic.   Pulmonary/Chest: Effort normal. No respiratory distress.   Abdominal: Soft. She exhibits no distension (incision well healed). There is no tenderness.   Psychiatric: She has a normal mood and affect. Her behavior is normal. Judgment and thought content normal.         Assessment/Plan    Hu was seen today for post-op.    Diagnoses and all orders for this visit:    Screening for condition  -     POC Urinalysis Dipstick    Menorrhagia with regular cycle    Patient doing well postoperatively.  Now she has birth control and treatment for menorrhagia.  We'll see back at the three-month marker to assess effectiveness and satisfaction.    Return in about 3 months (around 7/24/2018) for Follow up with me.      Beck Cornejo MD  4/24/2018  9:40 AM

## 2018-06-07 ENCOUNTER — LAB (OUTPATIENT)
Dept: LAB | Facility: HOSPITAL | Age: 33
End: 2018-06-07

## 2018-06-07 ENCOUNTER — OFFICE VISIT (OUTPATIENT)
Dept: ONCOLOGY | Facility: CLINIC | Age: 33
End: 2018-06-07

## 2018-06-07 VITALS
RESPIRATION RATE: 16 BRPM | TEMPERATURE: 98.2 F | DIASTOLIC BLOOD PRESSURE: 70 MMHG | BODY MASS INDEX: 26.7 KG/M2 | SYSTOLIC BLOOD PRESSURE: 110 MMHG | HEART RATE: 66 BPM | OXYGEN SATURATION: 100 % | HEIGHT: 68 IN | WEIGHT: 176.2 LBS

## 2018-06-07 DIAGNOSIS — D50.9 IRON DEFICIENCY ANEMIA, UNSPECIFIED IRON DEFICIENCY ANEMIA TYPE: ICD-10-CM

## 2018-06-07 DIAGNOSIS — C92.10 CML (CHRONIC MYELOID LEUKEMIA) (HCC): Primary | ICD-10-CM

## 2018-06-07 DIAGNOSIS — C92.10 CML (CHRONIC MYELOID LEUKEMIA) (HCC): ICD-10-CM

## 2018-06-07 DIAGNOSIS — D50.0 IRON DEFICIENCY ANEMIA DUE TO CHRONIC BLOOD LOSS: ICD-10-CM

## 2018-06-07 DIAGNOSIS — R25.2 MUSCLE CRAMPING: ICD-10-CM

## 2018-06-07 LAB
ALBUMIN SERPL-MCNC: 4.5 G/DL (ref 3.5–5.2)
ALBUMIN/GLOB SERPL: 1.4 G/DL (ref 1.1–2.4)
ALP SERPL-CCNC: 78 U/L (ref 38–116)
ALT SERPL W P-5'-P-CCNC: 10 U/L (ref 0–33)
ANION GAP SERPL CALCULATED.3IONS-SCNC: 14.5 MMOL/L
AST SERPL-CCNC: 14 U/L (ref 0–32)
BASOPHILS # BLD AUTO: 0.02 10*3/MM3 (ref 0–0.1)
BASOPHILS NFR BLD AUTO: 0.4 % (ref 0–1.1)
BILIRUB SERPL-MCNC: 0.2 MG/DL (ref 0.1–1.2)
BUN BLD-MCNC: 11 MG/DL (ref 6–20)
BUN/CREAT SERPL: 14.5 (ref 7.3–30)
CALCIUM SPEC-SCNC: 9.7 MG/DL (ref 8.5–10.2)
CHLORIDE SERPL-SCNC: 102 MMOL/L (ref 98–107)
CO2 SERPL-SCNC: 26.5 MMOL/L (ref 22–29)
CREAT BLD-MCNC: 0.76 MG/DL (ref 0.6–1.1)
DEPRECATED RDW RBC AUTO: 43.9 FL (ref 37–49)
EOSINOPHIL # BLD AUTO: 0.04 10*3/MM3 (ref 0–0.36)
EOSINOPHIL NFR BLD AUTO: 0.7 % (ref 1–5)
ERYTHROCYTE [DISTWIDTH] IN BLOOD BY AUTOMATED COUNT: 13.6 % (ref 11.7–14.5)
FERRITIN SERPL-MCNC: 200 NG/ML (ref 11–207)
GFR SERPL CREATININE-BSD FRML MDRD: 87 ML/MIN/1.73
GLOBULIN UR ELPH-MCNC: 3.2 GM/DL (ref 1.8–3.5)
GLUCOSE BLD-MCNC: 99 MG/DL (ref 74–124)
HCT VFR BLD AUTO: 40.3 % (ref 34–45)
HGB BLD-MCNC: 13.4 G/DL (ref 11.5–14.9)
IMM GRANULOCYTES # BLD: 0.02 10*3/MM3 (ref 0–0.03)
IMM GRANULOCYTES NFR BLD: 0.4 % (ref 0–0.5)
IRON 24H UR-MRATE: 59 MCG/DL (ref 37–145)
IRON SATN MFR SERPL: 22 % (ref 14–48)
LYMPHOCYTES # BLD AUTO: 1.17 10*3/MM3 (ref 1–3.5)
LYMPHOCYTES NFR BLD AUTO: 20.5 % (ref 20–49)
MAGNESIUM SERPL-MCNC: 2.2 MG/DL (ref 1.8–2.5)
MCH RBC QN AUTO: 29.3 PG (ref 27–33)
MCHC RBC AUTO-ENTMCNC: 33.3 G/DL (ref 32–35)
MCV RBC AUTO: 88.2 FL (ref 83–97)
MONOCYTES # BLD AUTO: 0.36 10*3/MM3 (ref 0.25–0.8)
MONOCYTES NFR BLD AUTO: 6.3 % (ref 4–12)
NEUTROPHILS # BLD AUTO: 4.1 10*3/MM3 (ref 1.5–7)
NEUTROPHILS NFR BLD AUTO: 71.7 % (ref 39–75)
NRBC BLD MANUAL-RTO: 0 /100 WBC (ref 0–0)
PLATELET # BLD AUTO: 193 10*3/MM3 (ref 150–375)
PMV BLD AUTO: 9.9 FL (ref 8.9–12.1)
POTASSIUM BLD-SCNC: 3.5 MMOL/L (ref 3.5–4.7)
PROT SERPL-MCNC: 7.7 G/DL (ref 6.3–8)
RBC # BLD AUTO: 4.57 10*6/MM3 (ref 3.9–5)
SODIUM BLD-SCNC: 143 MMOL/L (ref 134–145)
TIBC SERPL-MCNC: 267 MCG/DL (ref 249–505)
TRANSFERRIN SERPL-MCNC: 191 MG/DL (ref 200–360)
WBC NRBC COR # BLD: 5.71 10*3/MM3 (ref 4–10)

## 2018-06-07 PROCEDURE — 99213 OFFICE O/P EST LOW 20 MIN: CPT | Performed by: NURSE PRACTITIONER

## 2018-06-07 PROCEDURE — 80053 COMPREHEN METABOLIC PANEL: CPT | Performed by: INTERNAL MEDICINE

## 2018-06-07 PROCEDURE — 36415 COLL VENOUS BLD VENIPUNCTURE: CPT | Performed by: INTERNAL MEDICINE

## 2018-06-07 PROCEDURE — 84466 ASSAY OF TRANSFERRIN: CPT | Performed by: INTERNAL MEDICINE

## 2018-06-07 PROCEDURE — 83540 ASSAY OF IRON: CPT | Performed by: INTERNAL MEDICINE

## 2018-06-07 PROCEDURE — 83735 ASSAY OF MAGNESIUM: CPT | Performed by: INTERNAL MEDICINE

## 2018-06-07 PROCEDURE — 82728 ASSAY OF FERRITIN: CPT | Performed by: INTERNAL MEDICINE

## 2018-06-07 PROCEDURE — 85025 COMPLETE CBC W/AUTO DIFF WBC: CPT | Performed by: INTERNAL MEDICINE

## 2018-06-07 NOTE — PROGRESS NOTES
REASONS FOR FOLLOWUP:     1. Chronic myelogenous leukemia with splenomegaly, chronic phase, positive [place] chromosome, no mutation at kinase domain.     2. Patient was started on hydroxyurea temporarily on 10/23/2013 with significant drop of WBC counts.     3. Patient started on Sprycel on 12/02/2013. Peripheral blood tested positive with 3.4% of cells positive for BCR-ABL translocation, tested 02/17/2014.     4. The patient had CCyR 6 months into treatment as tested on 05/15/2014.     5. Complete molecular response as tested 08/08/14 with negative product of BCR/ABL.     6. There was interruption of her treatment due to insurance coverage and misunderstanding from patient's part, she had a relapse of disease with BCR/ABL product at 12.626% in March 2016, and she restarted back on Sprycel, with good response as tested on 08/31/2016 with BCR/ABL at 0.294%.   7. Recurrent iron deficiency anemia not responding to oral iron supplementation.  She also had significant constipation associated with oral iron. Patient was given Feraheme treatment 2 doses in September 2016 and repeated in January 2017.     8.  Since June 2017, patient has been persistently tested negative for BCR/ABL by RT-PCR every 3 month period      HISTORY OF PRESENT ILLNESS: The patient is a 33 y.o.   female presenting today for 3-month follow-up and lab review.  She continues on Sprycel 100 mg twice a day which she tolerates well.  She denies lower extremity swelling, shortness of breath, changes in appetite or intake.  She denies nausea or vomiting, changes in her bowel or bladder habits.  She denies signs or symptoms of bleeding.  She does report progressive fatigue which is an ongoing issue for the patient.  She denies fevers or chills, signs or symptoms of infection.   She did undergo uterine ablation 4/9/2018.  She did have a heavy menstrual cycle immediately following ablation. She has continued to have a small amount of bleeding  intermittently.  Lastly, the past week, she does report intermittent cramping of her hands which she describes as Gonzalo horse in her hands.  These do spontaneously occur, and spontaneously resolve.  She reports she is drinking fluids adequately.    Past Medical History:   Diagnosis Date   • Anemia in neoplastic disease    • Arm pain    • Asthma     childhood   • Chiari I malformation     eval by Dr Moore, NS 2016   • Chronic myelogenous leukemia     remission, Dr Castle follows   • Chronic pain    • CML (chronic myelocytic leukemia)    • Cystitis    • Depression    • Flank pain    • GERD (gastroesophageal reflux disease)    • H/O Iron deficiency anemia    • H/O Lower extremity pain     Resolved.   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 G5, P4, 1 miscarriage of the third pregnancy. First pregnancy at age 17.        HEMATOLOGIC/ONCOLOGIC HISTORY: History from previous dates can be found in the separate document.        Laboratory results on 09/23/2015 showed normalization of hemoglobin 12.2, but still has macrocytosis, MCV 73.3. She has normal platelets and WBC at 9400. Serum ferritin < 5 ng/ml, iron sats 6.3%, iron 29, TIBC 455 mcg/ml. Still has severe iron deficiency, needs to continue oral iron therapy. The patient restarted taking oral iron supplementation which was probably stopped in the end of November 2015.        Laboratory study on 03/22/2016 reported normal WBC 8450, neutrophils 6100, lymphs is 1400 and monocytes 550. Serum iron was 26, TIBC 469 on saturation 6% and ferritin less than 5 NG/ML. Chemistry lab reported normal renal function with a creatinine 0.69, normal liver function panel, total protein 7.5 and albumin 4.2, normal  electrolytes. Patient was restarted back on oral on sedimentation twice a day with good tolerance.      Patient continues take Lortab as needed for left leg cramping. Urine drug test on 08/05/2016 was completely negative, and repeated study on August 26 was positive for opiate, negative for other recreational drugs.      Repeat laboratory study on 08/31/2016 reported iron 21, ferritin less than 5, iron saturation 5%, TIBC 462. Hemoglobin was 11.5 MCV 78.1 MCHC 30.4. Platelets 163,000. Total WBC 8870 including neutrophils 6400 and lymphocytes 1500. BCR/ABL was 0.294% by RT-PCR method.       Patient was given IV Feraheme treatment in September 2016, with 2 doses. Repeated laboratory study on 10/06/2016 reported significantly improved and normalized ferritin 269, iron 80, TIBC 365 and iron saturation 22%. Her hemoglobin was 12.2, and MCV 80.8.      Patient reported she was seen by Dr. Fam in 10/2016 and was started on half tablets of Topamax. I reviewed Dr. Fam’s clinic note and telephone conversation record. The patient reports she has no recurrence of migraine headache. However, she is very tearful today, reporting that she has thoughts of not taking any medications. She did assure me that she has been taking the medication up to this point. 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 The patient denies suicide ideation. When she was initially diagnosed of CML back in 2014, she had depression and I started the patient on Prozac. The patient reported initially it helped her, however, she no longer feels the effect of Prozac. She feels depressed. The patient reports she has no personal hobby. She goes to work and goes home, taking care of her kids. She does not enjoy life as she used to.      Laboratory study on December 29, 2016 reported at ferritin 19.9, iron 33, TIBC 347 iron saturation 10%.   Hemoglobin was 13, normal WBC and platelets.  Unremarkable CMP.  Patient was given 2 doses of Feraheme treatment in early 2017.      Cytogenetic study on 2017 reported a BCR-ABL products at 0.098%, showed further improved residual disease.  There is also reported a ferritin 103.7, iron 79, TIBC 336 and iron saturation 24%.  Hemoglobin was 13.5, MCV 92.3, platelets 199,000 WBC 7000.  She had a completely normal CMP.       Repeated laboratory study on 2017 reported at nondetectable BCR-ABL product.  Ferritin was 45, iron 35 TIBC 333 and iron saturation 11%.  Had a normal CBC and CMP.      MEDICATIONS: The current medication list was reviewed with the patient and updated in the EMR this date per the medical assistant. Medication dosages and frequencies were confirmed to be accurate.        Allergies   Allergen Reactions   • Sulfa Antibiotics Unknown (See Comments)     Childhood reaction     SOCIAL HISTORY: . She smoked cigarettes previously, quit in 2006 with 8 pack year history. Social drinker, maybe once a month. No illegal drug use. No risk for HIV except tattoos.  Hairstylist.       FAMILY HISTORY: Maternal grandmother had esophageal/stomach adenocarcinoma diagnosed at age of 72 and  of disease progress at age of 73. The patient' s mother has hypertension but otherwise no family history of malignancy, especially no leukemia.        I have reviewed the patient's medical history in detail and updated the computerized patient record.    Review of Systems   Constitutional: Positive for fatigue. Negative for appetite change, chills and fever.   HENT:   Negative for trouble swallowing.    Respiratory: Negative for cough and shortness of breath.    Cardiovascular: Negative for chest pain and leg swelling.   Gastrointestinal: Negative for abdominal distention, blood in stool, constipation, diarrhea and nausea.   Musculoskeletal: Negative for arthralgias and myalgias.         "Cramping in hands   Skin: Negative for rash.   Neurological: Negative for dizziness and extremity weakness.   Hematological: Does not bruise/bleed easily.     VITAL SIGNS:   Vitals:    06/07/18 1150   BP: 110/70   Pulse: 66   Resp: 16   Temp: 98.2 °F (36.8 °C)   SpO2: 100%  Comment: at rest   Weight: 79.9 kg (176 lb 3.2 oz)   Height: 172.7 cm (67.99\")   ECOG 0        PHYSICAL EXAMINATION:    GENERAL: Well-developed, well-nourished  female in no acute distress.    SKIN: Warm, dry without rashes, purpura or petechiae. HEAD: Normocephalic.    EYES: Pupils equal, round. Conjunctivae normal.    EARS: Hearing intact.    NOSE: No nasal discharge.    MOUTH: Tongue is well papillated; Oral mucosa moist, no stomatitis or ulcers. Lips normal.    CHEST: Lungs clear to auscultation.  No wheezing no crackles.   CARDIAC: Regular rate and rhythm without murmurs, rubs or gallops.    ABDOMEN: Soft, nontender. Bowel sounds present.    EXTREMITIES: No edema no cyanosis.    NEUROLOGICAL: No focal deficits.    Physical exam unchanged from previous except as updated.    LABORATORY DATA:      Results from last 7 days  Lab Units 06/07/18  1142   WBC 10*3/mm3 5.71   NEUTROS ABS 10*3/mm3 4.10   HEMOGLOBIN g/dL 13.4   HEMATOCRIT % 40.3   PLATELETS 10*3/mm3 193       Results from last 7 days  Lab Units 06/07/18  1142   SODIUM mmol/L 143   POTASSIUM mmol/L 3.5   CHLORIDE mmol/L 102   CO2 mmol/L 26.5   BUN mg/dL 11   CREATININE mg/dL 0.76   CALCIUM mg/dL 9.7   ALBUMIN g/dL 4.50   BILIRUBIN mg/dL 0.2   ALK PHOS U/L 78   ALT (SGPT) U/L 10   AST (SGOT) U/L 14   GLUCOSE mg/dL 99   FERRITIN ng/mL 200.00   IRON mcg/dL 59   TIBC mcg/dL 267         BCR-ABL 0% by RT-PCR on 9/20/2017 and a 12/13/2017, 3/15/2018, pending today.      ASSESSMENT:    1. Chronic myelogenous leukemia, chronic phase with excellent response to Sprycel and complete molecular response in August 2014. However she had interuption of treatment in early part of 2016, because of " insurance issues and miscommunication, she stopped the medicine without telling us, and laboratory test on 03/22/2016 reported disease relapse with increased BCR/ABL product at 12.626%. subsequently she was restarted back on Sprycel, and has been doing well.  Since June 2017, she has completed molecular response as tested every 3 months.  She has been compliant with treatment.     She will continue Sprycel 100 mg daily, and every 3 months laboratory monitoring.       2. Recurrent Iron deficiency anemia.  She was treated again with Feraheme October 2017.   Iron deficiency thought to be related to ulcer identified on EGD, being treated with Carafate. Ferritin normal at 56 on 12/13/2017 was marginal iron saturation 16%.  However patient had increased bleeding from her menses after IUD was removed.  She underwent endometrial ablation and tubal ligation 4/9/2018. Labs today do not indicate iron deficiency.     3. Depression. Patient has been on Prozac for 2 years.      4.  Migraine headache. Was followed by neurologist Dr. López and associates.  She was started on gabapentin in early February 2017 but discontinued by herself.  She also has been taking Topamax.      5. Muscle cramping. Not related to dehydration or iron deficiency. Magnesium pending.      PLAN:    1. Continue Sprycel 100mg daily.  2. Magnesium pending  3. BCR-ABL pending today. Continue to repeat every 3 months.  4. MD follow up with Dr. Castle in 3 months with CBC, CMP, Iron ferritin and BCR/ABL by PCR.     Kelly Kingsley, APRN  06/07/2018       cc:   ISHAAN REGAN M.D.

## 2018-06-13 ENCOUNTER — TELEPHONE (OUTPATIENT)
Dept: GENERAL RADIOLOGY | Facility: HOSPITAL | Age: 33
End: 2018-06-13

## 2018-06-13 LAB — REF LAB TEST METHOD: NORMAL

## 2018-06-13 NOTE — TELEPHONE ENCOUNTER
----- Message from FELIPA Alcaraz sent at 6/13/2018  4:56 PM EDT -----  Please schedule for CBC, Ferritin, Iron in 1 month with RN review.    Thanks  Kelly

## 2018-06-17 ENCOUNTER — TELEPHONE (OUTPATIENT)
Dept: ONCOLOGY | Facility: CLINIC | Age: 33
End: 2018-06-17

## 2018-06-17 NOTE — TELEPHONE ENCOUNTER
I called and left a message for her, she had a negative BCR-ABL study on 6/7/2018.  Iron study was also very good with no evidence of iron deficiency.     BAO SLADE M.D., Ph.D.

## 2018-07-12 ENCOUNTER — LAB (OUTPATIENT)
Dept: LAB | Facility: HOSPITAL | Age: 33
End: 2018-07-12

## 2018-07-12 ENCOUNTER — CLINICAL SUPPORT (OUTPATIENT)
Dept: ONCOLOGY | Facility: HOSPITAL | Age: 33
End: 2018-07-12

## 2018-07-12 DIAGNOSIS — K90.9 MALABSORPTION OF IRON: ICD-10-CM

## 2018-07-12 DIAGNOSIS — C92.10 CML (CHRONIC MYELOID LEUKEMIA) (HCC): Primary | ICD-10-CM

## 2018-07-12 DIAGNOSIS — D50.9 IRON DEFICIENCY ANEMIA, UNSPECIFIED IRON DEFICIENCY ANEMIA TYPE: Primary | ICD-10-CM

## 2018-07-12 LAB
BASOPHILS # BLD AUTO: 0.01 10*3/MM3 (ref 0–0.1)
BASOPHILS NFR BLD AUTO: 0.1 % (ref 0–1.1)
DEPRECATED RDW RBC AUTO: 48.3 FL (ref 37–49)
EOSINOPHIL # BLD AUTO: 0.04 10*3/MM3 (ref 0–0.36)
EOSINOPHIL NFR BLD AUTO: 0.6 % (ref 1–5)
ERYTHROCYTE [DISTWIDTH] IN BLOOD BY AUTOMATED COUNT: 14.4 % (ref 11.7–14.5)
FERRITIN SERPL-MCNC: 93.5 NG/ML (ref 11–207)
HCT VFR BLD AUTO: 37.7 % (ref 34–45)
HGB BLD-MCNC: 12.6 G/DL (ref 11.5–14.9)
IMM GRANULOCYTES # BLD: 0.04 10*3/MM3 (ref 0–0.03)
IMM GRANULOCYTES NFR BLD: 0.6 % (ref 0–0.5)
IRON 24H UR-MRATE: 42 MCG/DL (ref 37–145)
LYMPHOCYTES # BLD AUTO: 0.8 10*3/MM3 (ref 1–3.5)
LYMPHOCYTES NFR BLD AUTO: 11.4 % (ref 20–49)
MCH RBC QN AUTO: 30.8 PG (ref 27–33)
MCHC RBC AUTO-ENTMCNC: 33.4 G/DL (ref 32–35)
MCV RBC AUTO: 92.2 FL (ref 83–97)
MONOCYTES # BLD AUTO: 0.58 10*3/MM3 (ref 0.25–0.8)
MONOCYTES NFR BLD AUTO: 8.3 % (ref 4–12)
NEUTROPHILS # BLD AUTO: 5.55 10*3/MM3 (ref 1.5–7)
NEUTROPHILS NFR BLD AUTO: 79 % (ref 39–75)
NRBC BLD MANUAL-RTO: 0 /100 WBC (ref 0–0)
PLATELET # BLD AUTO: 178 10*3/MM3 (ref 150–375)
PMV BLD AUTO: 9.7 FL (ref 8.9–12.1)
RBC # BLD AUTO: 4.09 10*6/MM3 (ref 3.9–5)
WBC NRBC COR # BLD: 7.02 10*3/MM3 (ref 4–10)

## 2018-07-12 PROCEDURE — 84466 ASSAY OF TRANSFERRIN: CPT | Performed by: INTERNAL MEDICINE

## 2018-07-12 PROCEDURE — 82728 ASSAY OF FERRITIN: CPT

## 2018-07-12 PROCEDURE — 83540 ASSAY OF IRON: CPT | Performed by: INTERNAL MEDICINE

## 2018-07-12 PROCEDURE — 83540 ASSAY OF IRON: CPT

## 2018-07-12 PROCEDURE — 85025 COMPLETE CBC W/AUTO DIFF WBC: CPT

## 2018-07-12 PROCEDURE — 36415 COLL VENOUS BLD VENIPUNCTURE: CPT | Performed by: INTERNAL MEDICINE

## 2018-07-12 NOTE — PROGRESS NOTES
Pt presents for RN review. She states she feels fatigues and like she needs iron. CBC is satisfactory for this pt at this time. She Continue to take Sprycel 100 mg daily as prescribed. She will call tomorrow morning for her Iron and ferritin results. Copy of labs given and pt V/U.   Lab Results   Component Value Date    WBC 7.02 07/12/2018    HGB 12.6 07/12/2018    HCT 37.7 07/12/2018    MCV 92.2 07/12/2018     07/12/2018

## 2018-07-13 ENCOUNTER — TELEPHONE (OUTPATIENT)
Dept: ONCOLOGY | Facility: CLINIC | Age: 33
End: 2018-07-13

## 2018-07-13 LAB
IRON 24H UR-MRATE: 45 MCG/DL (ref 37–145)
IRON SATN MFR SERPL: 14 % (ref 14–48)
TIBC SERPL-MCNC: 318 MCG/DL (ref 249–505)
TRANSFERRIN SERPL-MCNC: 227 MG/DL (ref 200–360)

## 2018-07-13 NOTE — TELEPHONE ENCOUNTER
Reviewed Iron Panel and Ferritin with Dr. Castle. Per Dr. Castle Pt is to receive 3 doses of IV Venofer. Pt was informed and she had no Questions or concerns. Scheduling will call pt to set up appt.

## 2018-07-13 NOTE — PROGRESS NOTES
Iron profile was not placed yesterday. Order placed today and lab is using blood sample from yesterday.

## 2018-07-13 NOTE — TELEPHONE ENCOUNTER
----- Message from Phyllis Connor RN sent at 7/13/2018 11:33 AM EDT -----        Please schedule pt for 3 doses of venofer Per Dr. Tin Avelar Is she approved?    Thanks

## 2018-07-13 NOTE — TELEPHONE ENCOUNTER
----- Message from Francine Mcfarlane RN sent at 7/13/2018 12:11 PM EDT -----  This patient is approved for requested Venofer. Thank You

## 2018-07-16 PROBLEM — T45.4X5A IRON AND ITS COMPOUNDS CAUSING ADVERSE EFFECT IN THERAPEUTIC USE: Status: ACTIVE | Noted: 2018-07-16

## 2018-07-16 RX ORDER — DIPHENHYDRAMINE HCL 25 MG
25 CAPSULE ORAL ONCE
Status: CANCELLED | OUTPATIENT
Start: 2018-07-17

## 2018-07-16 RX ORDER — FAMOTIDINE 10 MG/ML
20 INJECTION, SOLUTION INTRAVENOUS ONCE
Status: CANCELLED | OUTPATIENT
Start: 2018-07-24

## 2018-07-16 RX ORDER — DIPHENHYDRAMINE HCL 25 MG
25 CAPSULE ORAL ONCE
Status: CANCELLED | OUTPATIENT
Start: 2018-07-31

## 2018-07-16 RX ORDER — FAMOTIDINE 10 MG/ML
20 INJECTION, SOLUTION INTRAVENOUS ONCE
Status: CANCELLED | OUTPATIENT
Start: 2018-07-31

## 2018-07-16 RX ORDER — SODIUM CHLORIDE 9 MG/ML
250 INJECTION, SOLUTION INTRAVENOUS ONCE
Status: CANCELLED | OUTPATIENT
Start: 2018-07-17

## 2018-07-16 RX ORDER — SODIUM CHLORIDE 9 MG/ML
250 INJECTION, SOLUTION INTRAVENOUS ONCE
Status: CANCELLED | OUTPATIENT
Start: 2018-07-24

## 2018-07-16 RX ORDER — SODIUM CHLORIDE 9 MG/ML
250 INJECTION, SOLUTION INTRAVENOUS ONCE
Status: CANCELLED | OUTPATIENT
Start: 2018-07-31

## 2018-07-16 RX ORDER — DIPHENHYDRAMINE HCL 25 MG
25 CAPSULE ORAL ONCE
Status: CANCELLED | OUTPATIENT
Start: 2018-07-24

## 2018-07-16 RX ORDER — FAMOTIDINE 10 MG/ML
20 INJECTION, SOLUTION INTRAVENOUS ONCE
Status: CANCELLED | OUTPATIENT
Start: 2018-07-17

## 2018-07-17 ENCOUNTER — INFUSION (OUTPATIENT)
Dept: ONCOLOGY | Facility: HOSPITAL | Age: 33
End: 2018-07-17

## 2018-07-17 VITALS
SYSTOLIC BLOOD PRESSURE: 104 MMHG | DIASTOLIC BLOOD PRESSURE: 65 MMHG | WEIGHT: 175.6 LBS | HEART RATE: 73 BPM | BODY MASS INDEX: 26.71 KG/M2

## 2018-07-17 DIAGNOSIS — D50.9 IRON DEFICIENCY ANEMIA, UNSPECIFIED IRON DEFICIENCY ANEMIA TYPE: ICD-10-CM

## 2018-07-17 DIAGNOSIS — IMO0001 IRON AND ITS COMPOUNDS CAUSING ADVERSE EFFECT IN THERAPEUTIC USE, SUBSEQUENT ENCOUNTER: Primary | ICD-10-CM

## 2018-07-17 DIAGNOSIS — K90.9 MALABSORPTION OF IRON: ICD-10-CM

## 2018-07-17 PROCEDURE — 25010000002 IRON SUCROSE PER 1 MG: Performed by: INTERNAL MEDICINE

## 2018-07-17 PROCEDURE — 96375 TX/PRO/DX INJ NEW DRUG ADDON: CPT | Performed by: INTERNAL MEDICINE

## 2018-07-17 PROCEDURE — 63710000001 DIPHENHYDRAMINE PER 50 MG: Performed by: INTERNAL MEDICINE

## 2018-07-17 PROCEDURE — 96366 THER/PROPH/DIAG IV INF ADDON: CPT | Performed by: INTERNAL MEDICINE

## 2018-07-17 PROCEDURE — 96365 THER/PROPH/DIAG IV INF INIT: CPT | Performed by: INTERNAL MEDICINE

## 2018-07-17 RX ORDER — DIPHENHYDRAMINE HCL 25 MG
25 CAPSULE ORAL ONCE
Status: COMPLETED | OUTPATIENT
Start: 2018-07-17 | End: 2018-07-17

## 2018-07-17 RX ORDER — FAMOTIDINE 10 MG/ML
20 INJECTION, SOLUTION INTRAVENOUS ONCE
Status: COMPLETED | OUTPATIENT
Start: 2018-07-17 | End: 2018-07-17

## 2018-07-17 RX ORDER — SODIUM CHLORIDE 9 MG/ML
250 INJECTION, SOLUTION INTRAVENOUS ONCE
Status: COMPLETED | OUTPATIENT
Start: 2018-07-17 | End: 2018-07-17

## 2018-07-17 RX ADMIN — DIPHENHYDRAMINE HYDROCHLORIDE 25 MG: 25 CAPSULE ORAL at 13:56

## 2018-07-17 RX ADMIN — SODIUM CHLORIDE 250 ML: 9 INJECTION, SOLUTION INTRAVENOUS at 13:56

## 2018-07-17 RX ADMIN — IRON SUCROSE 300 MG: 20 INJECTION, SOLUTION INTRAVENOUS at 14:33

## 2018-07-17 RX ADMIN — FAMOTIDINE 20 MG: 10 INJECTION, SOLUTION INTRAVENOUS at 13:56

## 2018-07-18 ENCOUNTER — HOSPITAL ENCOUNTER (EMERGENCY)
Facility: HOSPITAL | Age: 33
Discharge: HOME | End: 2018-07-18

## 2018-07-18 ENCOUNTER — HOSPITAL ENCOUNTER (EMERGENCY)
Facility: HOSPITAL | Age: 33
Discharge: HOME | End: 2018-07-18
Admitting: EMERGENCY MEDICINE

## 2018-07-18 ENCOUNTER — APPOINTMENT (OUTPATIENT)
Dept: CT IMAGING | Facility: HOSPITAL | Age: 33
End: 2018-07-18

## 2018-07-18 DIAGNOSIS — N39.0 ACUTE UTI: Primary | ICD-10-CM

## 2018-07-18 LAB
ALBUMIN SERPL-MCNC: 4.3 G/DL (ref 3.5–5.2)
ALBUMIN/GLOB SERPL: 1.5 G/DL
ALP SERPL-CCNC: 77 U/L (ref 40–129)
ALT SERPL W P-5'-P-CCNC: 7 U/L (ref 5–33)
ANION GAP SERPL CALCULATED.3IONS-SCNC: 13.2 MMOL/L
AST SERPL-CCNC: 10 U/L (ref 5–32)
B-HCG UR QL: NEGATIVE
BACTERIA UR QL AUTO: ABNORMAL /HPF
BASOPHILS # BLD AUTO: 0.04 10*3/MM3 (ref 0–0.2)
BASOPHILS NFR BLD AUTO: 0.5 % (ref 0–2)
BILIRUB SERPL-MCNC: 0.2 MG/DL (ref 0.2–1.2)
BILIRUB UR QL STRIP: NEGATIVE
BUN BLD-MCNC: 11 MG/DL (ref 6–20)
BUN/CREAT SERPL: 17.7 (ref 7–25)
CALCIUM SPEC-SCNC: 9.4 MG/DL (ref 8.6–10.5)
CHLORIDE SERPL-SCNC: 105 MMOL/L (ref 98–107)
CLARITY UR: CLEAR
CO2 SERPL-SCNC: 24.8 MMOL/L (ref 22–29)
COLOR UR: YELLOW
CREAT BLD-MCNC: 0.62 MG/DL (ref 0.57–1)
DEPRECATED RDW RBC AUTO: 48.3 FL (ref 37–54)
EOSINOPHIL # BLD AUTO: 0.1 10*3/MM3 (ref 0.1–0.3)
EOSINOPHIL NFR BLD AUTO: 1.1 % (ref 0–4)
ERYTHROCYTE [DISTWIDTH] IN BLOOD BY AUTOMATED COUNT: 14.1 % (ref 11.5–14.5)
GFR SERPL CREATININE-BSD FRML MDRD: 110 ML/MIN/1.73
GLOBULIN UR ELPH-MCNC: 2.8 GM/DL
GLUCOSE BLD-MCNC: 99 MG/DL (ref 65–99)
GLUCOSE UR STRIP-MCNC: NEGATIVE MG/DL
HCT VFR BLD AUTO: 40.5 % (ref 37–47)
HGB BLD-MCNC: 13.6 G/DL (ref 12–16)
HGB UR QL STRIP.AUTO: ABNORMAL
HYALINE CASTS UR QL AUTO: ABNORMAL /LPF
IMM GRANULOCYTES # BLD: 0.06 10*3/MM3 (ref 0–0.03)
IMM GRANULOCYTES NFR BLD: 0.7 % (ref 0–0.5)
KETONES UR QL STRIP: ABNORMAL
LEUKOCYTE ESTERASE UR QL STRIP.AUTO: NEGATIVE
LYMPHOCYTES # BLD AUTO: 1.29 10*3/MM3 (ref 0.6–4.8)
LYMPHOCYTES NFR BLD AUTO: 14.6 % (ref 20–45)
MCH RBC QN AUTO: 31.3 PG (ref 27–31)
MCHC RBC AUTO-ENTMCNC: 33.6 G/DL (ref 31–37)
MCV RBC AUTO: 93.3 FL (ref 81–99)
MONOCYTES # BLD AUTO: 0.78 10*3/MM3 (ref 0–1)
MONOCYTES NFR BLD AUTO: 8.8 % (ref 3–8)
MUCOUS THREADS URNS QL MICRO: ABNORMAL /HPF
NEUTROPHILS # BLD AUTO: 6.55 10*3/MM3 (ref 1.5–8.3)
NEUTROPHILS NFR BLD AUTO: 74.3 % (ref 45–70)
NITRITE UR QL STRIP: POSITIVE
NRBC BLD MANUAL-RTO: 0 /100 WBC (ref 0–0)
PH UR STRIP.AUTO: 7 [PH] (ref 4.5–8)
PLATELET # BLD AUTO: 191 10*3/MM3 (ref 140–500)
PMV BLD AUTO: 9.8 FL (ref 7.4–10.4)
POTASSIUM BLD-SCNC: 3.9 MMOL/L (ref 3.5–5.2)
PROT SERPL-MCNC: 7.1 G/DL (ref 6–8.5)
PROT UR QL STRIP: NEGATIVE
RBC # BLD AUTO: 4.34 10*6/MM3 (ref 4.2–5.4)
RBC # UR: ABNORMAL /HPF
REF LAB TEST METHOD: ABNORMAL
SODIUM BLD-SCNC: 143 MMOL/L (ref 136–145)
SP GR UR STRIP: 1.02 (ref 1–1.03)
SQUAMOUS #/AREA URNS HPF: ABNORMAL /HPF
UROBILINOGEN UR QL STRIP: ABNORMAL
WBC NRBC COR # BLD: 8.82 10*3/MM3 (ref 4.8–10.8)
WBC UR QL AUTO: ABNORMAL /HPF

## 2018-07-18 PROCEDURE — 74176 CT ABD & PELVIS W/O CONTRAST: CPT

## 2018-07-18 PROCEDURE — 85025 COMPLETE CBC W/AUTO DIFF WBC: CPT | Performed by: EMERGENCY MEDICINE

## 2018-07-18 PROCEDURE — 99282 EMERGENCY DEPT VISIT SF MDM: CPT | Performed by: EMERGENCY MEDICINE

## 2018-07-18 PROCEDURE — 99283 EMERGENCY DEPT VISIT LOW MDM: CPT

## 2018-07-18 PROCEDURE — 87086 URINE CULTURE/COLONY COUNT: CPT | Performed by: EMERGENCY MEDICINE

## 2018-07-18 PROCEDURE — 81025 URINE PREGNANCY TEST: CPT | Performed by: EMERGENCY MEDICINE

## 2018-07-18 PROCEDURE — 80053 COMPREHEN METABOLIC PANEL: CPT | Performed by: EMERGENCY MEDICINE

## 2018-07-18 PROCEDURE — 81001 URINALYSIS AUTO W/SCOPE: CPT | Performed by: EMERGENCY MEDICINE

## 2018-07-19 LAB — BACTERIA SPEC AEROBE CULT: NO GROWTH

## 2018-07-24 ENCOUNTER — INFUSION (OUTPATIENT)
Dept: ONCOLOGY | Facility: HOSPITAL | Age: 33
End: 2018-07-24

## 2018-07-24 VITALS
WEIGHT: 171.6 LBS | TEMPERATURE: 98.2 F | BODY MASS INDEX: 26.1 KG/M2 | DIASTOLIC BLOOD PRESSURE: 64 MMHG | SYSTOLIC BLOOD PRESSURE: 92 MMHG | HEART RATE: 71 BPM

## 2018-07-24 DIAGNOSIS — IMO0001 IRON AND ITS COMPOUNDS CAUSING ADVERSE EFFECT IN THERAPEUTIC USE, SUBSEQUENT ENCOUNTER: Primary | ICD-10-CM

## 2018-07-24 DIAGNOSIS — D50.9 IRON DEFICIENCY ANEMIA, UNSPECIFIED IRON DEFICIENCY ANEMIA TYPE: ICD-10-CM

## 2018-07-24 DIAGNOSIS — K90.9 MALABSORPTION OF IRON: ICD-10-CM

## 2018-07-24 PROCEDURE — 96375 TX/PRO/DX INJ NEW DRUG ADDON: CPT | Performed by: INTERNAL MEDICINE

## 2018-07-24 PROCEDURE — 96365 THER/PROPH/DIAG IV INF INIT: CPT | Performed by: INTERNAL MEDICINE

## 2018-07-24 PROCEDURE — 63710000001 DIPHENHYDRAMINE PER 50 MG: Performed by: INTERNAL MEDICINE

## 2018-07-24 PROCEDURE — 25010000002 IRON SUCROSE PER 1 MG: Performed by: INTERNAL MEDICINE

## 2018-07-24 PROCEDURE — 96366 THER/PROPH/DIAG IV INF ADDON: CPT | Performed by: INTERNAL MEDICINE

## 2018-07-24 RX ORDER — DIPHENHYDRAMINE HCL 25 MG
25 CAPSULE ORAL ONCE
Status: COMPLETED | OUTPATIENT
Start: 2018-07-24 | End: 2018-07-24

## 2018-07-24 RX ORDER — SODIUM CHLORIDE 9 MG/ML
250 INJECTION, SOLUTION INTRAVENOUS ONCE
Status: COMPLETED | OUTPATIENT
Start: 2018-07-24 | End: 2018-07-24

## 2018-07-24 RX ORDER — FAMOTIDINE 10 MG/ML
20 INJECTION, SOLUTION INTRAVENOUS ONCE
Status: COMPLETED | OUTPATIENT
Start: 2018-07-24 | End: 2018-07-24

## 2018-07-24 RX ADMIN — SODIUM CHLORIDE 250 ML: 900 INJECTION, SOLUTION INTRAVENOUS at 11:00

## 2018-07-24 RX ADMIN — IRON SUCROSE 300 MG: 20 INJECTION, SOLUTION INTRAVENOUS at 11:23

## 2018-07-24 RX ADMIN — FAMOTIDINE 20 MG: 10 INJECTION, SOLUTION INTRAVENOUS at 11:00

## 2018-07-24 RX ADMIN — DIPHENHYDRAMINE HYDROCHLORIDE 25 MG: 25 CAPSULE ORAL at 11:00

## 2018-07-31 ENCOUNTER — INFUSION (OUTPATIENT)
Dept: ONCOLOGY | Facility: HOSPITAL | Age: 33
End: 2018-07-31

## 2018-07-31 ENCOUNTER — DOCUMENTATION (OUTPATIENT)
Dept: ONCOLOGY | Facility: CLINIC | Age: 33
End: 2018-07-31

## 2018-07-31 VITALS
BODY MASS INDEX: 26.16 KG/M2 | HEART RATE: 76 BPM | SYSTOLIC BLOOD PRESSURE: 109 MMHG | DIASTOLIC BLOOD PRESSURE: 75 MMHG | WEIGHT: 172 LBS

## 2018-07-31 DIAGNOSIS — C92.10 CML (CHRONIC MYELOID LEUKEMIA) (HCC): Primary | ICD-10-CM

## 2018-07-31 DIAGNOSIS — IMO0001 IRON AND ITS COMPOUNDS CAUSING ADVERSE EFFECT IN THERAPEUTIC USE, SUBSEQUENT ENCOUNTER: Primary | ICD-10-CM

## 2018-07-31 DIAGNOSIS — D50.9 IRON DEFICIENCY ANEMIA, UNSPECIFIED IRON DEFICIENCY ANEMIA TYPE: ICD-10-CM

## 2018-07-31 DIAGNOSIS — K90.9 MALABSORPTION OF IRON: ICD-10-CM

## 2018-07-31 LAB
BACTERIA UR QL AUTO: NEGATIVE /HPF
BASOPHILS # BLD AUTO: 0.04 10*3/MM3 (ref 0–0.1)
BASOPHILS NFR BLD AUTO: 0.6 % (ref 0–1.1)
BILIRUB UR QL STRIP: ABNORMAL
CLARITY UR: ABNORMAL
COLOR UR: ABNORMAL
DEPRECATED RDW RBC AUTO: 42.7 FL (ref 37–49)
EOSINOPHIL # BLD AUTO: 0.08 10*3/MM3 (ref 0–0.36)
EOSINOPHIL NFR BLD AUTO: 1.2 % (ref 1–5)
ERYTHROCYTE [DISTWIDTH] IN BLOOD BY AUTOMATED COUNT: 13.2 % (ref 11.7–14.5)
GLUCOSE UR STRIP-MCNC: NEGATIVE MG/DL
HCT VFR BLD AUTO: 40.9 % (ref 34–45)
HGB BLD-MCNC: 14.3 G/DL (ref 11.5–14.9)
HGB UR QL STRIP.AUTO: ABNORMAL
IMM GRANULOCYTES # BLD: 0.06 10*3/MM3 (ref 0–0.03)
IMM GRANULOCYTES NFR BLD: 0.9 % (ref 0–0.5)
KETONES UR QL STRIP: NEGATIVE
LEUKOCYTE ESTERASE UR QL STRIP.AUTO: NEGATIVE
LYMPHOCYTES # BLD AUTO: 1.17 10*3/MM3 (ref 1–3.5)
LYMPHOCYTES NFR BLD AUTO: 16.9 % (ref 20–49)
MCH RBC QN AUTO: 31.2 PG (ref 27–33)
MCHC RBC AUTO-ENTMCNC: 35 G/DL (ref 32–35)
MCV RBC AUTO: 89.1 FL (ref 83–97)
MONOCYTES # BLD AUTO: 0.49 10*3/MM3 (ref 0.25–0.8)
MONOCYTES NFR BLD AUTO: 7.1 % (ref 4–12)
NEUTROPHILS # BLD AUTO: 5.09 10*3/MM3 (ref 1.5–7)
NEUTROPHILS NFR BLD AUTO: 73.3 % (ref 39–75)
NITRITE UR QL STRIP: NEGATIVE
NRBC BLD MANUAL-RTO: 0 /100 WBC (ref 0–0)
PH UR STRIP.AUTO: 5.5 [PH] (ref 4.5–8)
PLATELET # BLD AUTO: 155 10*3/MM3 (ref 150–375)
PMV BLD AUTO: 10.1 FL (ref 8.9–12.1)
PROT UR QL STRIP: ABNORMAL
RBC # BLD AUTO: 4.59 10*6/MM3 (ref 3.9–5)
RBC # UR: ABNORMAL /HPF
REF LAB TEST METHOD: ABNORMAL
SP GR UR STRIP: >=1.03 (ref 1–1.03)
SQUAMOUS #/AREA URNS HPF: ABNORMAL /HPF
UROBILINOGEN UR QL STRIP: ABNORMAL
WBC NRBC COR # BLD: 6.93 10*3/MM3 (ref 4–10)
WBC UR QL AUTO: ABNORMAL /HPF

## 2018-07-31 PROCEDURE — 63710000001 DIPHENHYDRAMINE PER 50 MG: Performed by: INTERNAL MEDICINE

## 2018-07-31 PROCEDURE — 85025 COMPLETE CBC W/AUTO DIFF WBC: CPT | Performed by: INTERNAL MEDICINE

## 2018-07-31 PROCEDURE — 25010000002 IRON SUCROSE PER 1 MG: Performed by: INTERNAL MEDICINE

## 2018-07-31 PROCEDURE — 96365 THER/PROPH/DIAG IV INF INIT: CPT | Performed by: INTERNAL MEDICINE

## 2018-07-31 PROCEDURE — 81001 URINALYSIS AUTO W/SCOPE: CPT | Performed by: INTERNAL MEDICINE

## 2018-07-31 PROCEDURE — 96375 TX/PRO/DX INJ NEW DRUG ADDON: CPT | Performed by: INTERNAL MEDICINE

## 2018-07-31 PROCEDURE — 36416 COLLJ CAPILLARY BLOOD SPEC: CPT | Performed by: INTERNAL MEDICINE

## 2018-07-31 RX ORDER — LEVOFLOXACIN 500 MG/1
500 TABLET, FILM COATED ORAL DAILY
Qty: 7 TABLET | Refills: 0 | Status: SHIPPED | OUTPATIENT
Start: 2018-07-31 | End: 2018-08-07

## 2018-07-31 RX ORDER — PHENAZOPYRIDINE HYDROCHLORIDE 200 MG/1
200 TABLET, FILM COATED ORAL 3 TIMES DAILY PRN
COMMUNITY
End: 2018-09-28

## 2018-07-31 RX ORDER — DIPHENHYDRAMINE HCL 25 MG
25 CAPSULE ORAL ONCE
Status: COMPLETED | OUTPATIENT
Start: 2018-07-31 | End: 2018-07-31

## 2018-07-31 RX ORDER — FAMOTIDINE 10 MG/ML
20 INJECTION, SOLUTION INTRAVENOUS ONCE
Status: COMPLETED | OUTPATIENT
Start: 2018-07-31 | End: 2018-07-31

## 2018-07-31 RX ORDER — CEFUROXIME AXETIL 500 MG/1
TABLET ORAL 2 TIMES DAILY
COMMUNITY
End: 2018-08-18

## 2018-07-31 RX ORDER — SODIUM CHLORIDE 9 MG/ML
250 INJECTION, SOLUTION INTRAVENOUS ONCE
Status: COMPLETED | OUTPATIENT
Start: 2018-07-31 | End: 2018-07-31

## 2018-07-31 RX ADMIN — DIPHENHYDRAMINE HYDROCHLORIDE 25 MG: 25 CAPSULE ORAL at 10:55

## 2018-07-31 RX ADMIN — IRON SUCROSE 300 MG: 20 INJECTION, SOLUTION INTRAVENOUS at 11:23

## 2018-07-31 RX ADMIN — SODIUM CHLORIDE 500 ML: 900 INJECTION, SOLUTION INTRAVENOUS at 10:55

## 2018-07-31 RX ADMIN — FAMOTIDINE 20 MG: 10 INJECTION, SOLUTION INTRAVENOUS at 10:56

## 2018-07-31 NOTE — PROGRESS NOTES
Call rec from Dr Smith's Sprycel will be reduced to 50 mg daily. I have escribed a new rx to [facility].

## 2018-07-31 NOTE — PROGRESS NOTES
Patient reports significant leg cramping, and getting worse recently and has been taking 6 tablets of Advil with no significant improvement.  Suspect may be caused by Sprycel for her CML.  Discussed with patient, which are half dose at 50 mg daily.  I spoke to Mrs. Rios in office today to process the new order.     Patient also had a recent urinary tract infection and the but had a visit at ER.  She finished cefdinir.  Urinalysis today still showed a positive protein and small quantity of WBC but no bacteria.  Patient reports no fever but a she still has lower back pain.  I will treated patient with Levaquin 500 mg daily for 7 days.  This patient is immunosuppressed.      BAO SLADE M.D., Ph.D.    7/31/2018

## 2018-07-31 NOTE — PROGRESS NOTES
Patient here for IV Venofer.  Pt requesting to see Dr. Castle regarding pain at hip and legs.  Recently in ER for UTI.  Discussed with Dr. Castle.  CBC and UA ordered.  Urine sent to lab.  Results noted and given to Dr. Castle.  Patient completed course of Ceftin.  Dr Castle aware pt completed course of antibiotics.  Levaquin sent to patient's pharmacy.  Discussed changing syrycel dosage or changing medications.  Dr. Castle was going to discuss with Preeti.  Dr. Castle to call patient.

## 2018-08-18 ENCOUNTER — HOSPITAL ENCOUNTER (EMERGENCY)
Facility: HOSPITAL | Age: 33
Discharge: HOME | End: 2018-08-18
Attending: EMERGENCY MEDICINE | Admitting: EMERGENCY MEDICINE

## 2018-08-18 ENCOUNTER — APPOINTMENT (OUTPATIENT)
Dept: CT IMAGING | Facility: HOSPITAL | Age: 33
End: 2018-08-18

## 2018-08-18 ENCOUNTER — APPOINTMENT (OUTPATIENT)
Dept: ULTRASOUND IMAGING | Facility: HOSPITAL | Age: 33
End: 2018-08-18

## 2018-08-18 VITALS
SYSTOLIC BLOOD PRESSURE: 107 MMHG | HEIGHT: 68 IN | DIASTOLIC BLOOD PRESSURE: 64 MMHG | OXYGEN SATURATION: 100 % | RESPIRATION RATE: 14 BRPM | BODY MASS INDEX: 25.76 KG/M2 | WEIGHT: 170 LBS | TEMPERATURE: 100.4 F | HEART RATE: 98 BPM

## 2018-08-18 VITALS
RESPIRATION RATE: 18 BRPM | TEMPERATURE: 98.5 F | SYSTOLIC BLOOD PRESSURE: 103 MMHG | DIASTOLIC BLOOD PRESSURE: 63 MMHG | HEIGHT: 68 IN | BODY MASS INDEX: 25.8 KG/M2 | WEIGHT: 170.25 LBS | HEART RATE: 89 BPM | OXYGEN SATURATION: 99 %

## 2018-08-18 DIAGNOSIS — C92.10 CML (CHRONIC MYELOCYTIC LEUKEMIA) (HCC): ICD-10-CM

## 2018-08-18 DIAGNOSIS — D72.829 LEUKOCYTOSIS, UNSPECIFIED TYPE: ICD-10-CM

## 2018-08-18 DIAGNOSIS — N73.0 PID (ACUTE PELVIC INFLAMMATORY DISEASE): Primary | ICD-10-CM

## 2018-08-18 DIAGNOSIS — R10.9 ACUTE LEFT FLANK PAIN: Primary | ICD-10-CM

## 2018-08-18 LAB
ALBUMIN SERPL-MCNC: 4.7 G/DL (ref 3.5–5.2)
ALBUMIN SERPL-MCNC: 4.8 G/DL (ref 3.5–5.2)
ALBUMIN/GLOB SERPL: 1.5 G/DL
ALBUMIN/GLOB SERPL: 1.6 G/DL
ALP SERPL-CCNC: 71 U/L (ref 39–117)
ALP SERPL-CCNC: 81 U/L (ref 40–129)
ALT SERPL W P-5'-P-CCNC: 13 U/L (ref 1–33)
ALT SERPL W P-5'-P-CCNC: 9 U/L (ref 5–33)
ANION GAP SERPL CALCULATED.3IONS-SCNC: 12.1 MMOL/L
ANION GAP SERPL CALCULATED.3IONS-SCNC: 14.4 MMOL/L
AST SERPL-CCNC: 11 U/L (ref 5–32)
AST SERPL-CCNC: 13 U/L (ref 1–32)
B-HCG UR QL: NEGATIVE
B-HCG UR QL: NEGATIVE
BACTERIA UR QL AUTO: ABNORMAL /HPF
BACTERIA UR QL AUTO: ABNORMAL /HPF
BASOPHILS # BLD AUTO: 0.02 10*3/MM3 (ref 0–0.2)
BASOPHILS # BLD AUTO: 0.04 10*3/MM3 (ref 0–0.2)
BASOPHILS NFR BLD AUTO: 0.1 % (ref 0–1.5)
BASOPHILS NFR BLD AUTO: 0.2 % (ref 0–2)
BILIRUB SERPL-MCNC: 0.2 MG/DL (ref 0.2–1.2)
BILIRUB SERPL-MCNC: 0.5 MG/DL (ref 0.1–1.2)
BILIRUB UR QL STRIP: NEGATIVE
BILIRUB UR QL STRIP: NEGATIVE
BUN BLD-MCNC: 14 MG/DL (ref 6–20)
BUN BLD-MCNC: 15 MG/DL (ref 6–20)
BUN/CREAT SERPL: 18.8 (ref 7–25)
BUN/CREAT SERPL: 19.7 (ref 7–25)
CALCIUM SPEC-SCNC: 9.2 MG/DL (ref 8.6–10.5)
CALCIUM SPEC-SCNC: 9.8 MG/DL (ref 8.6–10.5)
CHLORIDE SERPL-SCNC: 101 MMOL/L (ref 98–107)
CHLORIDE SERPL-SCNC: 102 MMOL/L (ref 98–107)
CLARITY UR: CLEAR
CLARITY UR: CLEAR
CLUE CELLS SPEC QL WET PREP: ABNORMAL
CO2 SERPL-SCNC: 23.9 MMOL/L (ref 22–29)
CO2 SERPL-SCNC: 25.6 MMOL/L (ref 22–29)
COLOR UR: YELLOW
COLOR UR: YELLOW
CREAT BLD-MCNC: 0.71 MG/DL (ref 0.57–1)
CREAT BLD-MCNC: 0.8 MG/DL (ref 0.57–1)
DEPRECATED RDW RBC AUTO: 47.8 FL (ref 37–54)
DEPRECATED RDW RBC AUTO: 47.9 FL (ref 37–54)
EOSINOPHIL # BLD AUTO: 0.01 10*3/MM3 (ref 0–0.7)
EOSINOPHIL # BLD AUTO: 0.08 10*3/MM3 (ref 0.1–0.3)
EOSINOPHIL NFR BLD AUTO: 0 % (ref 0.3–6.2)
EOSINOPHIL NFR BLD AUTO: 0.4 % (ref 0–4)
ERYTHROCYTE [DISTWIDTH] IN BLOOD BY AUTOMATED COUNT: 13.6 % (ref 11.5–14.5)
ERYTHROCYTE [DISTWIDTH] IN BLOOD BY AUTOMATED COUNT: 13.8 % (ref 11.7–13)
GFR SERPL CREATININE-BSD FRML MDRD: 82 ML/MIN/1.73
GFR SERPL CREATININE-BSD FRML MDRD: 94 ML/MIN/1.73
GLOBULIN UR ELPH-MCNC: 3 GM/DL
GLOBULIN UR ELPH-MCNC: 3.2 GM/DL
GLUCOSE BLD-MCNC: 112 MG/DL (ref 65–99)
GLUCOSE BLD-MCNC: 93 MG/DL (ref 65–99)
GLUCOSE UR STRIP-MCNC: NEGATIVE MG/DL
GLUCOSE UR STRIP-MCNC: NEGATIVE MG/DL
HCT VFR BLD AUTO: 45.9 % (ref 35.6–45.5)
HCT VFR BLD AUTO: 46 % (ref 37–47)
HGB BLD-MCNC: 15.5 G/DL (ref 12–16)
HGB BLD-MCNC: 15.8 G/DL (ref 11.9–15.5)
HGB UR QL STRIP.AUTO: ABNORMAL
HGB UR QL STRIP.AUTO: ABNORMAL
HYALINE CASTS UR QL AUTO: ABNORMAL /LPF
HYALINE CASTS UR QL AUTO: ABNORMAL /LPF
HYDATID CYST SPEC WET PREP: ABNORMAL
IMM GRANULOCYTES # BLD: 0.07 10*3/MM3 (ref 0–0.03)
IMM GRANULOCYTES # BLD: 0.12 10*3/MM3 (ref 0–0.03)
IMM GRANULOCYTES NFR BLD: 0.4 % (ref 0–0.5)
IMM GRANULOCYTES NFR BLD: 0.5 % (ref 0–0.5)
KETONES UR QL STRIP: ABNORMAL
KETONES UR QL STRIP: NEGATIVE
KOH PREP NAIL: NORMAL
LEUKOCYTE ESTERASE UR QL STRIP.AUTO: ABNORMAL
LEUKOCYTE ESTERASE UR QL STRIP.AUTO: NEGATIVE
LYMPHOCYTES # BLD AUTO: 0.86 10*3/MM3 (ref 0.6–4.8)
LYMPHOCYTES # BLD AUTO: 0.86 10*3/MM3 (ref 0.9–4.8)
LYMPHOCYTES NFR BLD AUTO: 3.3 % (ref 19.6–45.3)
LYMPHOCYTES NFR BLD AUTO: 4.5 % (ref 20–45)
MAGNESIUM SERPL-MCNC: 2.1 MG/DL (ref 1.7–2.5)
MCH RBC QN AUTO: 32 PG (ref 27–31)
MCH RBC QN AUTO: 32.7 PG (ref 26.9–32)
MCHC RBC AUTO-ENTMCNC: 33.7 G/DL (ref 31–37)
MCHC RBC AUTO-ENTMCNC: 34.4 G/DL (ref 32.4–36.3)
MCV RBC AUTO: 95 FL (ref 80.5–98.2)
MCV RBC AUTO: 95 FL (ref 81–99)
MONOCYTES # BLD AUTO: 0.55 10*3/MM3 (ref 0–1)
MONOCYTES # BLD AUTO: 0.6 10*3/MM3 (ref 0.2–1.2)
MONOCYTES NFR BLD AUTO: 2.3 % (ref 5–12)
MONOCYTES NFR BLD AUTO: 2.9 % (ref 3–8)
NEUTROPHILS # BLD AUTO: 17.43 10*3/MM3 (ref 1.5–8.3)
NEUTROPHILS # BLD AUTO: 24.43 10*3/MM3 (ref 1.9–8.1)
NEUTROPHILS NFR BLD AUTO: 91.6 % (ref 45–70)
NEUTROPHILS NFR BLD AUTO: 94.3 % (ref 42.7–76)
NITRITE UR QL STRIP: NEGATIVE
NITRITE UR QL STRIP: NEGATIVE
NRBC BLD MANUAL-RTO: 0 /100 WBC (ref 0–0)
PH UR STRIP.AUTO: 5.5 [PH] (ref 4.5–8)
PH UR STRIP.AUTO: 7 [PH] (ref 5–8)
PLATELET # BLD AUTO: 146 10*3/MM3 (ref 140–500)
PLATELET # BLD AUTO: 168 10*3/MM3 (ref 140–500)
PMV BLD AUTO: 10 FL (ref 7.4–10.4)
PMV BLD AUTO: 10.5 FL (ref 6–12)
POTASSIUM BLD-SCNC: 3.5 MMOL/L (ref 3.5–5.2)
POTASSIUM BLD-SCNC: 4 MMOL/L (ref 3.5–5.2)
PROT SERPL-MCNC: 7.7 G/DL (ref 6–8.5)
PROT SERPL-MCNC: 8 G/DL (ref 6–8.5)
PROT UR QL STRIP: ABNORMAL
PROT UR QL STRIP: ABNORMAL
RBC # BLD AUTO: 4.83 10*6/MM3 (ref 3.9–5.2)
RBC # BLD AUTO: 4.84 10*6/MM3 (ref 4.2–5.4)
RBC # UR: ABNORMAL /HPF
RBC # UR: ABNORMAL /HPF
REF LAB TEST METHOD: ABNORMAL
REF LAB TEST METHOD: ABNORMAL
SODIUM BLD-SCNC: 138 MMOL/L (ref 136–145)
SODIUM BLD-SCNC: 141 MMOL/L (ref 136–145)
SP GR UR STRIP: 1.03 (ref 1–1.03)
SP GR UR STRIP: >=1.03 (ref 1–1.03)
SQUAMOUS #/AREA URNS HPF: ABNORMAL /HPF
SQUAMOUS #/AREA URNS HPF: ABNORMAL /HPF
T VAGINALIS SPEC QL WET PREP: ABNORMAL
UROBILINOGEN UR QL STRIP: ABNORMAL
UROBILINOGEN UR QL STRIP: ABNORMAL
WBC NRBC COR # BLD: 19.03 10*3/MM3 (ref 4.8–10.8)
WBC NRBC COR # BLD: 25.92 10*3/MM3 (ref 4.5–10.7)
WBC SPEC QL WET PREP: ABNORMAL
WBC UR QL AUTO: ABNORMAL /HPF
WBC UR QL AUTO: ABNORMAL /HPF
YEAST GENITAL QL WET PREP: ABNORMAL

## 2018-08-18 PROCEDURE — 85025 COMPLETE CBC W/AUTO DIFF WBC: CPT | Performed by: NURSE PRACTITIONER

## 2018-08-18 PROCEDURE — 87591 N.GONORRHOEAE DNA AMP PROB: CPT | Performed by: EMERGENCY MEDICINE

## 2018-08-18 PROCEDURE — 96374 THER/PROPH/DIAG INJ IV PUSH: CPT

## 2018-08-18 PROCEDURE — 25010000002 CEFTRIAXONE PER 250 MG: Performed by: EMERGENCY MEDICINE

## 2018-08-18 PROCEDURE — 25010000002 MORPHINE PER 10 MG: Performed by: EMERGENCY MEDICINE

## 2018-08-18 PROCEDURE — 81001 URINALYSIS AUTO W/SCOPE: CPT | Performed by: NURSE PRACTITIONER

## 2018-08-18 PROCEDURE — 96375 TX/PRO/DX INJ NEW DRUG ADDON: CPT

## 2018-08-18 PROCEDURE — 87086 URINE CULTURE/COLONY COUNT: CPT | Performed by: EMERGENCY MEDICINE

## 2018-08-18 PROCEDURE — 87491 CHLMYD TRACH DNA AMP PROBE: CPT | Performed by: EMERGENCY MEDICINE

## 2018-08-18 PROCEDURE — 99282 EMERGENCY DEPT VISIT SF MDM: CPT | Performed by: EMERGENCY MEDICINE

## 2018-08-18 PROCEDURE — 0 IOPAMIDOL PER 1 ML: Performed by: EMERGENCY MEDICINE

## 2018-08-18 PROCEDURE — 76830 TRANSVAGINAL US NON-OB: CPT

## 2018-08-18 PROCEDURE — 81001 URINALYSIS AUTO W/SCOPE: CPT | Performed by: EMERGENCY MEDICINE

## 2018-08-18 PROCEDURE — 80053 COMPREHEN METABOLIC PANEL: CPT | Performed by: EMERGENCY MEDICINE

## 2018-08-18 PROCEDURE — 74177 CT ABD & PELVIS W/CONTRAST: CPT

## 2018-08-18 PROCEDURE — 76705 ECHO EXAM OF ABDOMEN: CPT

## 2018-08-18 PROCEDURE — 80053 COMPREHEN METABOLIC PANEL: CPT | Performed by: NURSE PRACTITIONER

## 2018-08-18 PROCEDURE — 25010000002 HYDROMORPHONE PER 4 MG: Performed by: EMERGENCY MEDICINE

## 2018-08-18 PROCEDURE — 81025 URINE PREGNANCY TEST: CPT | Performed by: NURSE PRACTITIONER

## 2018-08-18 PROCEDURE — 96365 THER/PROPH/DIAG IV INF INIT: CPT

## 2018-08-18 PROCEDURE — 83735 ASSAY OF MAGNESIUM: CPT | Performed by: EMERGENCY MEDICINE

## 2018-08-18 PROCEDURE — 76856 US EXAM PELVIC COMPLETE: CPT

## 2018-08-18 PROCEDURE — 85025 COMPLETE CBC W/AUTO DIFF WBC: CPT | Performed by: EMERGENCY MEDICINE

## 2018-08-18 PROCEDURE — 99283 EMERGENCY DEPT VISIT LOW MDM: CPT

## 2018-08-18 PROCEDURE — 81025 URINE PREGNANCY TEST: CPT | Performed by: EMERGENCY MEDICINE

## 2018-08-18 PROCEDURE — 87210 SMEAR WET MOUNT SALINE/INK: CPT | Performed by: EMERGENCY MEDICINE

## 2018-08-18 PROCEDURE — 93976 VASCULAR STUDY: CPT

## 2018-08-18 PROCEDURE — 87255 GENET VIRUS ISOLATE HSV: CPT | Performed by: EMERGENCY MEDICINE

## 2018-08-18 PROCEDURE — 25010000002 KETOROLAC TROMETHAMINE PER 15 MG: Performed by: EMERGENCY MEDICINE

## 2018-08-18 PROCEDURE — 25010000002 ONDANSETRON PER 1 MG: Performed by: EMERGENCY MEDICINE

## 2018-08-18 PROCEDURE — 87220 TISSUE EXAM FOR FUNGI: CPT | Performed by: EMERGENCY MEDICINE

## 2018-08-18 PROCEDURE — 96376 TX/PRO/DX INJ SAME DRUG ADON: CPT

## 2018-08-18 PROCEDURE — 99284 EMERGENCY DEPT VISIT MOD MDM: CPT

## 2018-08-18 RX ORDER — MORPHINE SULFATE 10 MG/ML
6 INJECTION INTRAMUSCULAR; INTRAVENOUS; SUBCUTANEOUS ONCE
Status: COMPLETED | OUTPATIENT
Start: 2018-08-18 | End: 2018-08-18

## 2018-08-18 RX ORDER — HYDROCODONE BITARTRATE AND ACETAMINOPHEN 5; 325 MG/1; MG/1
1 TABLET ORAL EVERY 6 HOURS PRN
Qty: 12 TABLET | Refills: 0 | Status: SHIPPED | OUTPATIENT
Start: 2018-08-18 | End: 2018-08-21

## 2018-08-18 RX ORDER — SODIUM CHLORIDE 0.9 % (FLUSH) 0.9 %
10 SYRINGE (ML) INJECTION AS NEEDED
Status: DISCONTINUED | OUTPATIENT
Start: 2018-08-18 | End: 2018-08-18 | Stop reason: HOSPADM

## 2018-08-18 RX ORDER — KETOROLAC TROMETHAMINE 15 MG/ML
15 INJECTION, SOLUTION INTRAMUSCULAR; INTRAVENOUS ONCE
Status: COMPLETED | OUTPATIENT
Start: 2018-08-18 | End: 2018-08-18

## 2018-08-18 RX ORDER — AZITHROMYCIN 250 MG/1
1000 TABLET, FILM COATED ORAL ONCE
Status: COMPLETED | OUTPATIENT
Start: 2018-08-18 | End: 2018-08-18

## 2018-08-18 RX ORDER — DOXYCYCLINE 100 MG/1
100 CAPSULE ORAL 2 TIMES DAILY
Qty: 28 CAPSULE | Refills: 0 | Status: SHIPPED | OUTPATIENT
Start: 2018-08-18 | End: 2018-09-01

## 2018-08-18 RX ORDER — ACETAMINOPHEN 500 MG
1000 TABLET ORAL ONCE
Status: COMPLETED | OUTPATIENT
Start: 2018-08-18 | End: 2018-08-18

## 2018-08-18 RX ORDER — METRONIDAZOLE 500 MG/1
500 TABLET ORAL ONCE
Status: COMPLETED | OUTPATIENT
Start: 2018-08-18 | End: 2018-08-18

## 2018-08-18 RX ORDER — HYDROMORPHONE HCL 110MG/55ML
0.5 PATIENT CONTROLLED ANALGESIA SYRINGE INTRAVENOUS ONCE
Status: COMPLETED | OUTPATIENT
Start: 2018-08-18 | End: 2018-08-18

## 2018-08-18 RX ORDER — ONDANSETRON 2 MG/ML
4 INJECTION INTRAMUSCULAR; INTRAVENOUS ONCE
Status: COMPLETED | OUTPATIENT
Start: 2018-08-18 | End: 2018-08-18

## 2018-08-18 RX ORDER — METRONIDAZOLE 500 MG/1
500 TABLET ORAL 2 TIMES DAILY
Qty: 28 TABLET | Refills: 0 | Status: SHIPPED | OUTPATIENT
Start: 2018-08-18 | End: 2018-09-01

## 2018-08-18 RX ORDER — HYDROCODONE BITARTRATE AND ACETAMINOPHEN 5; 325 MG/1; MG/1
1 TABLET ORAL EVERY 6 HOURS PRN
Qty: 10 TABLET | Refills: 0 | Status: SHIPPED | OUTPATIENT
Start: 2018-08-18 | End: 2018-09-28

## 2018-08-18 RX ORDER — CEFTRIAXONE SODIUM 1 G/50ML
1 INJECTION, SOLUTION INTRAVENOUS ONCE
Status: COMPLETED | OUTPATIENT
Start: 2018-08-18 | End: 2018-08-18

## 2018-08-18 RX ADMIN — HYDROMORPHONE HYDROCHLORIDE 0.5 MG: 2 INJECTION INTRAMUSCULAR; INTRAVENOUS; SUBCUTANEOUS at 11:32

## 2018-08-18 RX ADMIN — HYDROMORPHONE HYDROCHLORIDE 0.5 MG: 2 INJECTION INTRAMUSCULAR; INTRAVENOUS; SUBCUTANEOUS at 09:54

## 2018-08-18 RX ADMIN — ONDANSETRON 4 MG: 2 INJECTION INTRAMUSCULAR; INTRAVENOUS at 09:51

## 2018-08-18 RX ADMIN — KETOROLAC TROMETHAMINE 15 MG: 15 INJECTION, SOLUTION INTRAMUSCULAR; INTRAVENOUS at 20:10

## 2018-08-18 RX ADMIN — CEFTRIAXONE SODIUM 1 G: 1 INJECTION, SOLUTION INTRAVENOUS at 20:11

## 2018-08-18 RX ADMIN — AZITHROMYCIN 1000 MG: 250 TABLET, FILM COATED ORAL at 20:10

## 2018-08-18 RX ADMIN — IOPAMIDOL 100 ML: 755 INJECTION, SOLUTION INTRAVENOUS at 12:19

## 2018-08-18 RX ADMIN — ONDANSETRON 4 MG: 2 INJECTION INTRAMUSCULAR; INTRAVENOUS at 20:10

## 2018-08-18 RX ADMIN — METRONIDAZOLE 500 MG: 500 TABLET, FILM COATED ORAL at 20:10

## 2018-08-18 RX ADMIN — ACETAMINOPHEN 1000 MG: 500 TABLET, FILM COATED ORAL at 16:29

## 2018-08-18 RX ADMIN — MORPHINE SULFATE 6 MG: 10 INJECTION INTRAVENOUS at 19:53

## 2018-08-18 NOTE — ED PROVIDER NOTES
" EMERGENCY DEPARTMENT ENCOUNTER    Room Number:  14/14  Date seen:  8/18/2018  Time seen: 6:17 PM  PCP: Provider, No Known  Historian: Patient       HPI:  Chief Complaint: Abdominal Pain  Context: Hu Houston is a 33 y.o. female who presents to the ED with hx of CML on chemo pills c/o \"sharp\" lower abd pain radiating to RUQ for the past several days. Pt states pain is exacerbated by movement. Pt confirms fever measured at 100.7, vomiting, and abd distention. Pt denies dysuria, chest pain, vaginal pain, vaginal discharge, or any other symptoms. Pt states she was seen at [facility] today and had WBC of 19,000, discharged on abx due to negative CT. Pt called her oncologist and was referred to ED. Pt states she has hx of nephrolithiasis and severe anemia.    Pain Location: generalized lower  Radiation: to RUQ  Quality: pain  Intensity/Severity: moderate  Duration: several days  Onset quality: gradual  Timing: constant  Progression: worsening  Aggravating Factors: movement  Alleviating Factors: none  Previous Episodes: none  Treatment before arrival: Pt was seen at [place], had negative CT performed and WBC of 19,000 - discharged on abx.   Associated Symptoms: fever, vomiting, and abd distention    PAST MEDICAL HISTORY  Active Ambulatory Problems     Diagnosis Date Noted   • CML (chronic myeloid leukemia) (CMS/HCC) 03/29/2016   • Iron deficiency anemia 03/29/2016   • Chemotherapy-induced peripheral neuropathy (CMS/HCC) 04/28/2016   • Chronic pain 04/28/2016   • Concussion with prolonged loss of consciousness and return to pre-existing conscious level 09/14/2016   • Malabsorption of iron 09/14/2016   • Abnormal finding on MRI of brain 10/06/2016   • Chiari malformation type I (CMS/HCC) 10/19/2016   • Syncope 10/19/2016   • Intractable migraine without aura and without status migrainosus 10/19/2016   • Encounter for long-term current use of high risk medication 11/30/2016   • Depression 12/29/2016   • Nausea 07/25/2017 "   • Pain of upper abdomen 07/25/2017   • Gastroesophageal reflux disease with esophagitis 08/18/2017   • Attempted IUD removal, unsuccessful 09/07/2017   • Well woman exam with routine gynecological exam 09/07/2017   • Contraception management 09/07/2017   • Complication of intrauterine device (IUD) (CMS/HCC) 09/08/2017   • Menorrhagia with regular cycle 02/09/2018   • Iron and its compounds causing adverse effect in therapeutic use 07/16/2018     Resolved Ambulatory Problems     Diagnosis Date Noted   • No Resolved Ambulatory Problems     Past Medical History:   Diagnosis Date   • Anemia in neoplastic disease    • Arm pain    • Asthma    • Chiari I malformation (CMS/HCC)    • Chronic myelogenous leukemia (CMS/HCC)    • Chronic pain    • CML (chronic myelocytic leukemia) (CMS/HCC)    • Cystitis    • Depression    • Flank pain    • GERD (gastroesophageal reflux disease)    • H/O Iron deficiency anemia    • H/O Lower extremity pain    • History of ongoing treatment with high-risk medication    • History of pyelonephritis    • Migraine    • Myalgia    • Neuropathy of forearm    • Pulmonary hypertension    • Seizures (CMS/HCC)    • Splenomegaly    • Vitamin D deficiency          PAST SURGICAL HISTORY  Past Surgical History:   Procedure Laterality Date   • BONE MARROW BIOPSY  2013   • D&C HYSTEROSCOPY ENDOMETRIAL ABLATION N/A 4/9/2018    Procedure: Hysteroscopy NovaSure ablation;  Surgeon: Beck Cornejo MD;  Location: [facility];  Service: Obstetrics/Gynecology   • ENDOSCOPY N/A 8/4/2017    Procedure: ESOPHAGOGASTRODUODENOSCOPY with biopsies;  Surgeon: Sophie Trejo MD;  Location: [facility] OR;  Service:    • GYNECOLOGY EXAM UNDER ANESTHESIA      IUD removal   • TUBAL COAGULATION LAPAROSCOPIC Bilateral 4/9/2018    Procedure: TUBAL COAGULATION LAPAROSCOPIC;  Surgeon: Beck Cornejo MD;  Location: [facility] OR;  Service: Obstetrics/Gynecology         FAMILY HISTORY  Family History   Problem Relation Age of  Onset   • Hyperlipidemia Mother    • Hypertension Mother    • Stomach cancer Maternal Grandmother 72        Adenocarcinoma esophagus and stomach   • Stroke Paternal Grandmother    • Malig Hyperthermia Neg Hx          SOCIAL HISTORY  Social History     Social History   • Marital status:      Spouse name: N/A   • Number of children: N/A   • Years of education: N/A     Occupational History   •  [employer]     Social History Main Topics   • Smoking status: Former Smoker     Packs/day: 1.00     Years: 8.00     Types: Cigarettes     Quit date: 2006   • Smokeless tobacco: Never Used      Comment: 7/4/2006   • Alcohol use Yes      Comment: Social, maybe once every 6 months   • Drug use: Yes     Types: Cocaine      Comment: prior to 2006   • Sexual activity: Defer     Other Topics Concern   • Not on file     Social History Narrative    Has tattoos. Studying for her Master's degree.         ALLERGIES  Sulfa antibiotics        REVIEW OF SYSTEMS  Review of Systems   Constitutional: Positive for fever (100.7).   HENT: Negative for sore throat.    Respiratory: Negative for shortness of breath.    Cardiovascular: Negative for chest pain.   Gastrointestinal: Positive for abdominal distention, abdominal pain (generalized lower) and vomiting.   Endocrine: Negative for polyuria.   Genitourinary: Negative for dysuria.   Musculoskeletal: Negative for neck pain.   Skin: Negative for rash.   Neurological: Negative for headaches.   All other systems reviewed and are negative.           PHYSICAL EXAM  ED Triage Vitals   Temp Heart Rate Resp BP SpO2   08/18/18 1540 08/18/18 1539 08/18/18 1539 08/18/18 1810 08/18/18 1539   (!) 100.7 °F (38.2 °C) 114 20 110/65 100 %      Temp src Heart Rate Source Patient Position BP Location FiO2 (%)   08/18/18 1540 08/18/18 1539 08/18/18 1810 08/18/18 1810 --   Tympanic Monitor Lying Left arm          GENERAL: not distressed  HENT: nares patent  EYES: no scleral icterus  CV:  regular rhythm, regular rate  RESPIRATORY: normal effort, CTAB, no CVA tenderness  ABDOMEN: soft, RUQ and RLQ tenderness without rebound or guarding, mild LLQ tenderness, better than R side  MUSCULOSKELETAL: no deformity  NEURO: alert, KING, FC  SKIN: warm, dry    Vital signs and nursing notes reviewed.          LAB RESULTS  Recent Results (from the past 24 hour(s))   Pregnancy, Urine - Urine, Clean Catch    Collection Time: 08/18/18  9:28 AM   Result Value Ref Range    HCG, Urine QL Negative Negative   Urinalysis With Culture If Indicated - Urine, Clean Catch    Collection Time: 08/18/18  9:28 AM   Result Value Ref Range    Color, UA Yellow Yellow, Straw    Appearance, UA Clear Clear    pH, UA 5.5 4.5 - 8.0    Specific Gravity, UA 1.033 (H) 1.003 - 1.030    Glucose, UA Negative Negative    Ketones, UA Trace (A) Negative, 80 mg/dL (3+), >=160 mg/dL (4+)    Bilirubin, UA Negative Negative    Blood, UA Small (1+) (A) Negative    Protein, UA Trace (A) Negative    Leuk Esterase, UA Negative Negative    Nitrite, UA Negative Negative    Urobilinogen, UA 0.2 E.U./dL 0.2 - 1.0 E.U./dL   Urinalysis, Microscopic Only - Urine, Clean Catch    Collection Time: 08/18/18  9:28 AM   Result Value Ref Range    RBC, UA 3-5 (A) None Seen /HPF    WBC, UA 0-2 (A) None Seen /HPF    Bacteria, UA Trace (A) None Seen /HPF    Squamous Epithelial Cells, UA 3-6 (A) None Seen, 0-2 /HPF    Hyaline Casts, UA None Seen None Seen /LPF    Methodology Manual Light Microscopy    Comprehensive Metabolic Panel    Collection Time: 08/18/18 10:07 AM   Result Value Ref Range    Glucose 93 65 - 99 mg/dL    BUN 14 6 - 20 mg/dL    Creatinine 0.71 0.57 - 1.00 mg/dL    Sodium 141 136 - 145 mmol/L    Potassium 3.5 3.5 - 5.2 mmol/L    Chloride 101 98 - 107 mmol/L    CO2 25.6 22.0 - 29.0 mmol/L    Calcium 9.2 8.6 - 10.5 mg/dL    Total Protein 7.7 6.0 - 8.5 g/dL    Albumin 4.70 3.50 - 5.20 g/dL    ALT (SGPT) 9 5 - 33 U/L    AST (SGOT) 11 5 - 32 U/L    Alkaline  Phosphatase 81 40 - 129 U/L    Total Bilirubin 0.2 0.2 - 1.2 mg/dL    eGFR Non African Amer 94 >60 mL/min/1.73    Globulin 3.0 gm/dL    A/G Ratio 1.6 g/dL    BUN/Creatinine Ratio 19.7 7.0 - 25.0    Anion Gap 14.4 mmol/L   Magnesium    Collection Time: 08/18/18 10:07 AM   Result Value Ref Range    Magnesium 2.1 1.7 - 2.5 mg/dL   CBC Auto Differential    Collection Time: 08/18/18 10:07 AM   Result Value Ref Range    WBC 19.03 (H) 4.80 - 10.80 10*3/mm3    RBC 4.84 4.20 - 5.40 10*6/mm3    Hemoglobin 15.5 12.0 - 16.0 g/dL    Hematocrit 46.0 37.0 - 47.0 %    MCV 95.0 81.0 - 99.0 fL    MCH 32.0 (H) 27.0 - 31.0 pg    MCHC 33.7 31.0 - 37.0 g/dL    RDW 13.6 11.5 - 14.5 %    RDW-SD 47.8 37.0 - 54.0 fl    MPV 10.0 7.4 - 10.4 fL    Platelets 168 140 - 500 10*3/mm3    Neutrophil % 91.6 (H) 45.0 - 70.0 %    Lymphocyte % 4.5 (L) 20.0 - 45.0 %    Monocyte % 2.9 (L) 3.0 - 8.0 %    Eosinophil % 0.4 0.0 - 4.0 %    Basophil % 0.2 0.0 - 2.0 %    Immature Grans % 0.4 0.0 - 0.5 %    Neutrophils, Absolute 17.43 (H) 1.50 - 8.30 10*3/mm3    Lymphocytes, Absolute 0.86 0.60 - 4.80 10*3/mm3    Monocytes, Absolute 0.55 0.00 - 1.00 10*3/mm3    Eosinophils, Absolute 0.08 (L) 0.10 - 0.30 10*3/mm3    Basophils, Absolute 0.04 0.00 - 0.20 10*3/mm3    Immature Grans, Absolute 0.07 (H) 0.00 - 0.03 10*3/mm3    nRBC 0.0 0.0 - 0.0 /100 WBC   Comprehensive Metabolic Panel    Collection Time: 08/18/18  4:38 PM   Result Value Ref Range    Glucose 112 (H) 65 - 99 mg/dL    BUN 15 6 - 20 mg/dL    Creatinine 0.80 0.57 - 1.00 mg/dL    Sodium 138 136 - 145 mmol/L    Potassium 4.0 3.5 - 5.2 mmol/L    Chloride 102 98 - 107 mmol/L    CO2 23.9 22.0 - 29.0 mmol/L    Calcium 9.8 8.6 - 10.5 mg/dL    Total Protein 8.0 6.0 - 8.5 g/dL    Albumin 4.80 3.50 - 5.20 g/dL    ALT (SGPT) 13 1 - 33 U/L    AST (SGOT) 13 1 - 32 U/L    Alkaline Phosphatase 71 39 - 117 U/L    Total Bilirubin 0.5 0.1 - 1.2 mg/dL    eGFR Non African Amer 82 >60 mL/min/1.73    Globulin 3.2 gm/dL    A/G  Ratio 1.5 g/dL    BUN/Creatinine Ratio 18.8 7.0 - 25.0    Anion Gap 12.1 mmol/L   CBC Auto Differential    Collection Time: 08/18/18  4:38 PM   Result Value Ref Range    WBC 25.92 (H) 4.50 - 10.70 10*3/mm3    RBC 4.83 3.90 - 5.20 10*6/mm3    Hemoglobin 15.8 (H) 11.9 - 15.5 g/dL    Hematocrit 45.9 (H) 35.6 - 45.5 %    MCV 95.0 80.5 - 98.2 fL    MCH 32.7 (H) 26.9 - 32.0 pg    MCHC 34.4 32.4 - 36.3 g/dL    RDW 13.8 (H) 11.7 - 13.0 %    RDW-SD 47.9 37.0 - 54.0 fl    MPV 10.5 6.0 - 12.0 fL    Platelets 146 140 - 500 10*3/mm3    Neutrophil % 94.3 (H) 42.7 - 76.0 %    Lymphocyte % 3.3 (L) 19.6 - 45.3 %    Monocyte % 2.3 (L) 5.0 - 12.0 %    Eosinophil % 0.0 (L) 0.3 - 6.2 %    Basophil % 0.1 0.0 - 1.5 %    Immature Grans % 0.5 0.0 - 0.5 %    Neutrophils, Absolute 24.43 (H) 1.90 - 8.10 10*3/mm3    Lymphocytes, Absolute 0.86 (L) 0.90 - 4.80 10*3/mm3    Monocytes, Absolute 0.60 0.20 - 1.20 10*3/mm3    Eosinophils, Absolute 0.01 0.00 - 0.70 10*3/mm3    Basophils, Absolute 0.02 0.00 - 0.20 10*3/mm3    Immature Grans, Absolute 0.12 (H) 0.00 - 0.03 10*3/mm3   Urinalysis With Microscopic If Indicated (No Culture) - Urine, Clean Catch    Collection Time: 08/18/18  4:46 PM   Result Value Ref Range    Color, UA Yellow Yellow, Straw    Appearance, UA Clear Clear    pH, UA 7.0 5.0 - 8.0    Specific Gravity, UA >=1.030 1.005 - 1.030    Glucose, UA Negative Negative    Ketones, UA Negative Negative    Bilirubin, UA Negative Negative    Blood, UA Trace (A) Negative    Protein, UA 30 mg/dL (1+) (A) Negative    Leuk Esterase, UA Trace (A) Negative    Nitrite, UA Negative Negative    Urobilinogen, UA 1.0 E.U./dL 0.2 - 1.0 E.U./dL   Pregnancy, Urine - Urine, Clean Catch    Collection Time: 08/18/18  4:46 PM   Result Value Ref Range    HCG, Urine QL Negative Negative   Urinalysis, Microscopic Only - Urine, Clean Catch    Collection Time: 08/18/18  4:46 PM   Result Value Ref Range    RBC, UA 0-2 None Seen, 0-2 /HPF    WBC, UA 3-5 (A) None Seen,  0-2 /HPF    Bacteria, UA None Seen None Seen /HPF    Squamous Epithelial Cells, UA 7-12 (A) None Seen, 0-2 /HPF    Hyaline Casts, UA 3-6 None Seen /LPF    Methodology Manual Light Microscopy    KOH Prep - Swab, Cervix    Collection Time: 08/18/18  7:59 PM   Result Value Ref Range    KOH Prep No yeast or hyphal elements seen No yeast or hyphal elements seen   Wet Prep, Genital - Swab, Vagina    Collection Time: 08/18/18  7:59 PM   Result Value Ref Range    YEAST No yeast seen No yeast seen    HYPHAL ELEMENTS No Hyphal elements seen No Hyphal elements seen    WBC'S 1+ WBC's seen (A) No WBC's seen    Clue Cells, Wet Prep No Clue cells seen No Clue cells seen    Trichomonas, Wet Prep No Trichomonas seen No Trichomonas seen       Ordered the above labs and reviewed the results.        RADIOLOGY  US Pelvis Complete   Final Result   Unremarkable pelvic ultrasound       This report was finalized on 8/18/2018 7:18 PM by Bran Hall M.D.          US Gallbladder   Final Result   1. Unremarkable gallbladder ultrasound.       This report was finalized on 8/18/2018 7:17 PM by Bran Hall M.D.          US Non-ob Transvaginal    (Results Pending)   US Testicular or Ovarian Vascular Limited    (Results Pending)          Ordered the above noted radiological studies. Reviewed by me in PACS.      Procedures        MEDICATIONS GIVEN IN ER  Medications   cefTRIAXone (ROCEPHIN) IVPB 1 g (1 g Intravenous New Bag 8/18/18 2011)   acetaminophen (TYLENOL) tablet 1,000 mg (1,000 mg Oral Given 8/18/18 1629)   Morphine injection 6 mg (6 mg Intravenous Given 8/18/18 1953)   ondansetron (ZOFRAN) injection 4 mg (4 mg Intravenous Given 8/18/18 2010)   ketorolac (TORADOL) injection 15 mg (15 mg Intravenous Given 8/18/18 2010)   azithromycin (ZITHROMAX) tablet 1,000 mg (1,000 mg Oral Given 8/18/18 2010)   metroNIDAZOLE (FLAGYL) tablet 500 mg (500 mg Oral Given 8/18/18 2010)           PROGRESS AND CONSULTS  ED Course as of Aug 18 2031   Sat Aug  18, 2018   1607 Patient left [facility] and came here.  Lab work and CAT scan in computer.  Complaint of flank and abdominal pain  [KG]      ED Course User Index  [KG] Mirtha Thurston, APRN     1817: Upon pt exam, discussed plan to US pelvis and perform pelvic exam on pt. Pt agreed to have pelvic performed.     1824: Labs and US Gallbladder and Pelvis ordered.     1951: Pt rechecked and complaining of pain. Morphine and zofran ordered for pain management.     1954: Discussed with pt the negative results of US and plan to perform pelvic exam. RN chaperoned pelvic exam performed. Pt has cervical tenderness with white discharge. Swabs of cervix performed. Discussed with pt the negative results of labs and that symptoms may be due to sexually transmitted disease and cervical infection, whose cultures would take several days to get back. Informed pt of plan to discuss pt's case with oncologist, with possible plan for discharge on abx. Pt understands and agrees with plan, all questions addressed.    2001: Toradol and Rocephin ordered for pain and abx treatment. Call placed to Oncology.     2016: Discussed pt's case with Dr. Win (Oncology) who states pt-s symptoms are not likely related to cancer and agreed with plan for discharge on abx.     2023: Pt rechecked and resting comfortably, pain well controlled. Discussed plan to discharge with abx for cervical infection as well as pain medication. Pt directed to return to ED for worsening symptoms. Pt requested doctor's note at this time, which I agreed to comply with. Pt understands and agrees with plan, all questions addressed.       MEDICAL DECISION MAKING      MDM  Number of Diagnoses or Management Options  CML (chronic myelocytic leukemia) (CMS/HCC):   PID (acute pelvic inflammatory disease):   Diagnosis management comments: DDX includes splenic rupture, pyelonephritis, ureteral stone, PID, UTI, appendicitis, hepatobiliary pathology, TOA, ovarian torsion. She  had CT earlier in the day that was negative. Now, she has RUQ pain and lower abdominal pain. US negative. Pelvic exam c/f PID. I wonder if RUQ pain is Armando Bryant Mix. Pain controlled. I gave her empiric abx for PID. I believe that she is safe for d/c home but gave good return precautions: increased pain, intractable vomiting, confusion, other concerning sx.        Amount and/or Complexity of Data Reviewed  Clinical lab tests: reviewed and ordered (WBC - 25.92)  Tests in the radiology section of CPT®: reviewed and ordered (US Pelvis - unremarkable  US gallbladder - unremarkable)  Review and summarize past medical records: yes (Reviewed CT results from this morning's CT exam. )  Discuss the patient with other providers: yes (Dr. Win (oncology))               DIAGNOSIS  Final diagnoses:   PID (acute pelvic inflammatory disease)   CML (chronic myelocytic leukemia) (CMS/[facility])         DISPOSITION  DISCHARGE    Patient discharged in stable condition.    Reviewed implications of results, diagnosis, meds, responsibility to follow up, warning signs and symptoms of possible worsening, potential complications and reasons to return to ER.    Patient/Family voiced understanding of above instructions.    Discussed plan for discharge, as there is no emergent indication for admission. Patient referred to primary care provider for BP management due to today's BP. Pt/family is agreeable and understands need for follow up and repeat testing.  Pt is aware that discharge does not mean that nothing is wrong but it indicates no emergency is present that requires admission and they must continue care with follow-up as given below or physician of their choice.     FOLLOW-UP  Provider, No Known  [facility] [id number]    Schedule an appointment as soon as possible for a visit   As needed         Medication List      New Prescriptions    doxycycline 100 MG capsule  Commonly known as:  MONODOX  Take 1 capsule by mouth 2  (Two) Times a Day for 14 days.     metroNIDAZOLE 500 MG tablet  Commonly known as:  FLAGYL  Take 1 tablet by mouth 2 (Two) Times a Day for 14 days.                      Latest Documented Vital Signs:  As of 8:31 PM  BP- 110/65 HR- 114 Temp- (!) 100.7 °F (38.2 °C) (Tympanic) O2 sat- 100%        --  Documentation assistance provided by miah Heredia for Dr. Darrell MD.  Information recorded by the scribe was done at my direction and has been verified and validated by me.                        Jaimie Heredia  08/18/18 2031       Miah Ramírez II, MD  08/19/18 2210

## 2018-08-18 NOTE — ED PROVIDER NOTES
Subjective   History of Present Illness  History of Present Illness    Chief complaint: Flank pain    Location: Left lower flank    Quality/Severity:  Severe at first and now moderate    Timing/Duration: Symptoms started suddenly proximately 2 hours ago    Modifying Factors: None    Associated Symptoms: Nausea    Narrative: The patient is a 33-year-old white female who presents as noted above.  The patient relates that shortly after getting out of bed she had the sudden onset of left flank pain.  This was associated with some nausea.  The patient was recently treated for a urinary tract infection and 3 different antibiotics.    Review of Systems   Constitutional: Negative for activity change, appetite change, fatigue and fever.   HENT: Negative for congestion.    Respiratory: Negative for cough, shortness of breath and wheezing.    Cardiovascular: Negative for chest pain, palpitations and leg swelling.   Gastrointestinal: Negative for abdominal pain, diarrhea, nausea and vomiting.   Endocrine: Negative for polydipsia.   Genitourinary: Positive for flank pain. Negative for difficulty urinating, dysuria, frequency and urgency.   Musculoskeletal: Negative for back pain.        Chronic pain   Skin: Negative for rash.   Neurological: Negative for dizziness, weakness and headaches.   Psychiatric/Behavioral: Negative for confusion.       Past Medical History:   Diagnosis Date   • Anemia in neoplastic disease    • Arm pain    • Asthma     childhood   • Chiari I malformation (CMS/HCC)     eval by Dr Moore, NS 2016   • Chronic myelogenous leukemia (CMS/HCC)     remission, Dr Castle follows   • Chronic pain    • CML (chronic myelocytic leukemia) (CMS/HCC)    • Cystitis    • Depression    • Flank pain    • GERD (gastroesophageal reflux disease)    • H/O Iron deficiency anemia    • H/O Lower extremity pain     Resolved.   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   • Pulmonary hypertension    • Seizures (CMS/HCC)     as child after head injry   • Splenomegaly    • Vitamin D deficiency        Allergies   Allergen Reactions   • Sulfa Antibiotics Unknown (See Comments)     Childhood reaction       Past Surgical History:   Procedure Laterality Date   • BONE MARROW BIOPSY  2013   • D&C HYSTEROSCOPY ENDOMETRIAL ABLATION N/A 4/9/2018    Procedure: Hysteroscopy NovaSure ablation;  Surgeon: Beck Cornejo MD;  Location: [facility] OR;  Service: Obstetrics/Gynecology   • ENDOSCOPY N/A 8/4/2017    Procedure: ESOPHAGOGASTRODUODENOSCOPY with biopsies;  Surgeon: Sophie Trejo MD;  Location: [facility] OR;  Service:    • GYNECOLOGY EXAM UNDER ANESTHESIA      IUD removal   • TUBAL COAGULATION LAPAROSCOPIC Bilateral 4/9/2018    Procedure: TUBAL COAGULATION LAPAROSCOPIC;  Surgeon: Beck Cornejo MD;  Location: [facility] OR;  Service: Obstetrics/Gynecology       Family History   Problem Relation Age of Onset   • Hyperlipidemia Mother    • Hypertension Mother    • Stomach cancer Maternal Grandmother 72        Adenocarcinoma esophagus and stomach   • Stroke Paternal Grandmother    • Malig Hyperthermia Neg Hx        Social History     Social History   • Marital status:      Occupational History   •  [employer]     Social History Main Topics   • Smoking status: Former Smoker     Packs/day: 1.00     Years: 8.00     Types: Cigarettes     Quit date: 2006   • Smokeless tobacco: Never Used      Comment: 7/4/2006   • Alcohol use Yes      Comment: Social, maybe once every 6 months   • Drug use: Yes     Types: Cocaine      Comment: prior to 2006   • Sexual activity: Defer     Other Topics Concern   • Not on file     Social History Narrative    Has tattoos. Studying for her Master's degree.           Objective   Physical Exam   Constitutional: She is oriented to person, place, and time. She appears  well-developed and well-nourished.   HENT:   Head: Normocephalic and atraumatic.   Eyes: Conjunctivae and EOM are normal.   Neck: Normal range of motion. Neck supple.   Cardiovascular: Normal rate, regular rhythm and normal heart sounds.    No murmur heard.  Pulmonary/Chest: Effort normal and breath sounds normal. No respiratory distress. She has no wheezes. She has no rales.   Abdominal: Soft. Bowel sounds are normal. She exhibits no distension. There is tenderness.   Musculoskeletal: Normal range of motion. She exhibits tenderness (mild tenderness in the left lower flank area.). She exhibits no edema.   Neurological: She is alert and oriented to person, place, and time.   Skin: Skin is warm and dry. No rash noted.   Psychiatric: She has a normal mood and affect. Her behavior is normal.   Nursing note and vitals reviewed.      Procedures           ED Course  ED Course as of Aug 18 1658   Sat Aug 18, 2018   1107 White blood cell count now 19,000 in comparison to normal values recently.  This may be related to the patient's recent reduction in the dose of her Sprycel, but it is also possible that the patient could have an infectious source such as diverticulitis/colitis.  Previous CT reviewed and no kidney stones present then, however a scan will be obtained today with IV contrast.  [ML]   1236 Radiology wet read on the CT abdomen/pelvis showed only a small amount of physiologic fluid.  All findings discussed with patient as were the treatment plan, expectations and warnings.  Patient does have a follow-up appointment with cardiology in approximately 10 days.  She was strongly encouraged to keep this appointment.  [ML]      ED Course User Index  [ML] Pablito Gutiérrez MD                  MDM  Number of Diagnoses or Management Options  Acute left flank pain: new and requires workup     Amount and/or Complexity of Data Reviewed  Clinical lab tests: ordered and reviewed  Tests in the radiology section of CPT®:  ordered and reviewed  Independent visualization of images, tracings, or specimens: yes    Risk of Complications, Morbidity, and/or Mortality  Presenting problems: high  Diagnostic procedures: high  Management options: high    Patient Progress  Patient progress: stable  My differential diagnosis for abdominal pain includes but is not limited to:  Gastritis, gastroenteritis, peptic ulcer disease, GERD, esophageal perforation, acute appendicitis, mesenteric adenitis, Meckel’s diverticulum, epiploic appendagitis, diverticulitis, colon cancer, ulcerative colitis, Crohn’s disease, intussusception, small bowel obstruction, adhesions, ischemic bowel, perforated viscus, ileus, obstipation, biliary colic, cholecystitis, cholelithiasis, Armando-Bryant Dominguez, hepatitis, pancreatitis, common bile duct obstruction, cholangitis, bile leak, splenic trauma, splenic rupture, splenic infarction, splenic abscess, abdominal abscess, ascites, spontaneous bacterial peritonitis, hernia, UTI, cystitis, prostatitis, ureterolithiasis, urinary obstruction, ovarian cyst, torsion, pregnancy, ectopic pregnancy, PID, pelvic abscess, mittelschmerz, endometriosis, AAA, myocardial infarction, pneumonia, cancer, porphyria, DKA, medications, sickle cell, viral syndrome, zoster    Labs this visit  Lab Results (last 24 hours)     Procedure Component Value Units Date/Time    Pregnancy, Urine - Urine, Clean Catch [[id number]]  (Normal) Collected:  08/18/18 0928    Specimen:  Urine from Urine, Clean Catch Updated:  08/18/18 0944     HCG, Urine QL Negative    Urinalysis With Culture If Indicated - Urine, Clean Catch [[id number]]  (Abnormal) Collected:  08/18/18 0928    Specimen:  Urine from Urine, Clean Catch Updated:  08/18/18 0944     Color, UA Yellow     Appearance, UA Clear     pH, UA 5.5     Specific Gravity, UA 1.033 (H)     Comment: Result obtained by Refractometer        Glucose, UA Negative     Ketones, UA Trace (A)     Bilirubin, UA Negative     Blood,  UA Small (1+) (A)     Protein, UA Trace (A)     Leuk Esterase, UA Negative     Nitrite, UA Negative     Urobilinogen, UA 0.2 E.U./dL    Urinalysis, Microscopic Only - Urine, Clean Catch [[id number]]  (Abnormal) Collected:  08/18/18 0928    Specimen:  Urine from Urine, Clean Catch Updated:  08/18/18 0958     RBC, UA 3-5 (A) /HPF      WBC, UA 0-2 (A) /HPF      Bacteria, UA Trace (A) /HPF      Squamous Epithelial Cells, UA 3-6 (A) /HPF      Hyaline Casts, UA None Seen /LPF      Methodology Manual Light Microscopy    CBC & Differential [[id number]] Collected:  08/18/18 1007    Specimen:  Blood Updated:  08/18/18 1031    Narrative:       The following orders were created for panel order CBC & Differential.  Procedure                               Abnormality         Status                     ---------                               -----------         ------                     CBC Auto Differential[[id number]]        Abnormal            Final result                 Please view results for these tests on the individual orders.    Comprehensive Metabolic Panel [[id number]] Collected:  08/18/18 1007    Specimen:  Blood Updated:  08/18/18 1037     Glucose 93 mg/dL      BUN 14 mg/dL      Creatinine 0.71 mg/dL      Sodium 141 mmol/L      Potassium 3.5 mmol/L      Chloride 101 mmol/L      CO2 25.6 mmol/L      Calcium 9.2 mg/dL      Total Protein 7.7 g/dL      Albumin 4.70 g/dL      ALT (SGPT) 9 U/L      AST (SGOT) 11 U/L      Alkaline Phosphatase 81 U/L      Total Bilirubin 0.2 mg/dL      eGFR Non African Amer 94 mL/min/1.73      Globulin 3.0 gm/dL      A/G Ratio 1.6 g/dL      BUN/Creatinine Ratio 19.7     Anion Gap 14.4 mmol/L     Magnesium [[id number]]  (Normal) Collected:  08/18/18 1007    Specimen:  Blood Updated:  08/18/18 1037     Magnesium 2.1 mg/dL     CBC Auto Differential [[id number]]  (Abnormal) Collected:  08/18/18 1007    Specimen:  Blood Updated:  08/18/18 1031     WBC 19.03 (H) 10*3/mm3      RBC 4.84 10*6/mm3       Hemoglobin 15.5 g/dL      Hematocrit 46.0 %      MCV 95.0 fL      MCH 32.0 (H) pg      MCHC 33.7 g/dL      RDW 13.6 %      RDW-SD 47.8 fl      MPV 10.0 fL      Platelets 168 10*3/mm3      Neutrophil % 91.6 (H) %      Lymphocyte % 4.5 (L) %      Monocyte % 2.9 (L) %      Eosinophil % 0.4 %      Basophil % 0.2 %      Immature Grans % 0.4 %      Neutrophils, Absolute 17.43 (H) 10*3/mm3      Lymphocytes, Absolute 0.86 10*3/mm3      Monocytes, Absolute 0.55 10*3/mm3      Eosinophils, Absolute 0.08 (L) 10*3/mm3      Basophils, Absolute 0.04 10*3/mm3      Immature Grans, Absolute 0.07 (H) 10*3/mm3      nRBC 0.0 /100 WBC     Comprehensive Metabolic Panel [[id number]] Collected:  08/18/18 1638    Specimen:  Blood Updated:  08/18/18 1642    CBC & Differential [[id number]] Collected:  08/18/18 1638    Specimen:  Blood Updated:  08/18/18 1654    Narrative:       The following orders were created for panel order CBC & Differential.  Procedure                               Abnormality         Status                     ---------                               -----------         ------                     CBC Auto Differential[[id number]]        Abnormal            Final result                 Please view results for these tests on the individual orders.    CBC Auto Differential [[id number]]  (Abnormal) Collected:  08/18/18 1638    Specimen:  Blood Updated:  08/18/18 1654     WBC 25.92 (H) 10*3/mm3      RBC 4.83 10*6/mm3      Hemoglobin 15.8 (H) g/dL      Hematocrit 45.9 (H) %      MCV 95.0 fL      MCH 32.7 (H) pg      MCHC 34.4 g/dL      RDW 13.8 (H) %      RDW-SD 47.9 fl      MPV 10.5 fL      Platelets 146 10*3/mm3      Neutrophil % 94.3 (H) %      Lymphocyte % 3.3 (L) %      Monocyte % 2.3 (L) %      Eosinophil % 0.0 (L) %      Basophil % 0.1 %      Immature Grans % 0.5 %      Neutrophils, Absolute 24.43 (H) 10*3/mm3      Lymphocytes, Absolute 0.86 (L) 10*3/mm3      Monocytes, Absolute 0.60 10*3/mm3      Eosinophils, Absolute 0.01  10*3/mm3      Basophils, Absolute 0.02 10*3/mm3      Immature Grans, Absolute 0.12 (H) 10*3/mm3     Urinalysis With Microscopic If Indicated (No Culture) - Urine, Clean Catch [[id number]] Collected:  08/18/18 1646    Specimen:  Urine from Urine, Clean Catch Updated:  08/18/18 1646    Pregnancy, Urine - Urine, Clean Catch [[id number]]  (Normal) Collected:  08/18/18 1646    Specimen:  Urine from Urine, Clean Catch Updated:  08/18/18 1653     HCG, Urine QL Negative    Urinalysis, Microscopic Only - Urine, Clean Catch [[id number]] Collected:  08/18/18 1646    Specimen:  Urine from Urine, Clean Catch Updated:  08/18/18 1652        Prescribed on discharge             Medication List      New Prescriptions    HYDROcodone-acetaminophen 5-325 MG per tablet  Commonly known as:  NORCO  Take 1 tablet by mouth Every 6 (Six) Hours As Needed for Moderate Pain    for up to 10 doses.        Stop    cefuroxime 500 MG tablet  Commonly known as:  CEFTIN     LEVAQUIN PO          All lab results, imaging results and other tests were reviewed by Pablito Gutiérrez MD and unless otherwise specified were found to be unremarkable.        Final diagnoses:   Acute left flank pain   Leukocytosis, unspecified type            Pablito Gutiérrez MD  08/18/18 7319

## 2018-08-18 NOTE — ED TRIAGE NOTES
Left flank pain and abd. Pain states started today wit hx of kidney stones. [facility] WBC was 19 and neg CT. Pt called MD and told to come here. ,

## 2018-08-20 LAB — BACTERIA SPEC AEROBE CULT: NORMAL

## 2018-08-21 LAB
C TRACH RRNA SPEC DONR QL NAA+PROBE: NEGATIVE
HSV SPEC CULT: NEGATIVE
N GONORRHOEA DNA SPEC QL NAA+PROBE: NEGATIVE

## 2018-08-31 ENCOUNTER — LAB (OUTPATIENT)
Dept: LAB | Facility: HOSPITAL | Age: 33
End: 2018-08-31

## 2018-08-31 ENCOUNTER — OFFICE VISIT (OUTPATIENT)
Dept: ONCOLOGY | Facility: CLINIC | Age: 33
End: 2018-08-31

## 2018-08-31 VITALS
HEIGHT: 68 IN | SYSTOLIC BLOOD PRESSURE: 110 MMHG | OXYGEN SATURATION: 100 % | RESPIRATION RATE: 12 BRPM | WEIGHT: 168.8 LBS | DIASTOLIC BLOOD PRESSURE: 70 MMHG | BODY MASS INDEX: 25.58 KG/M2 | HEART RATE: 82 BPM | TEMPERATURE: 98.5 F

## 2018-08-31 DIAGNOSIS — C92.10 CML (CHRONIC MYELOID LEUKEMIA) (HCC): ICD-10-CM

## 2018-08-31 DIAGNOSIS — D50.9 IRON DEFICIENCY ANEMIA, UNSPECIFIED IRON DEFICIENCY ANEMIA TYPE: ICD-10-CM

## 2018-08-31 DIAGNOSIS — D50.0 IRON DEFICIENCY ANEMIA DUE TO CHRONIC BLOOD LOSS: ICD-10-CM

## 2018-08-31 DIAGNOSIS — IMO0001 IRON AND ITS COMPOUNDS CAUSING ADVERSE EFFECT IN THERAPEUTIC USE, SUBSEQUENT ENCOUNTER: ICD-10-CM

## 2018-08-31 DIAGNOSIS — T45.1X5S ADVERSE EFFECT OF ANTINEOPLASTIC DRUG, SEQUELA: ICD-10-CM

## 2018-08-31 DIAGNOSIS — C92.10 CML (CHRONIC MYELOID LEUKEMIA) (HCC): Primary | ICD-10-CM

## 2018-08-31 DIAGNOSIS — Z79.899 ENCOUNTER FOR LONG-TERM CURRENT USE OF HIGH RISK MEDICATION: ICD-10-CM

## 2018-08-31 LAB
ALBUMIN SERPL-MCNC: 4.6 G/DL (ref 3.5–5.2)
ALBUMIN/GLOB SERPL: 1.5 G/DL (ref 1.1–2.4)
ALP SERPL-CCNC: 77 U/L (ref 38–116)
ALT SERPL W P-5'-P-CCNC: 14 U/L (ref 0–33)
ANION GAP SERPL CALCULATED.3IONS-SCNC: 11.3 MMOL/L
AST SERPL-CCNC: 13 U/L (ref 0–32)
BASOPHILS # BLD AUTO: 0.05 10*3/MM3 (ref 0–0.1)
BASOPHILS NFR BLD AUTO: 0.4 % (ref 0–1.1)
BILIRUB SERPL-MCNC: 0.3 MG/DL (ref 0.1–1.2)
BUN BLD-MCNC: 6 MG/DL (ref 6–20)
BUN/CREAT SERPL: 8.6 (ref 7.3–30)
CALCIUM SPEC-SCNC: 9.8 MG/DL (ref 8.5–10.2)
CHLORIDE SERPL-SCNC: 100 MMOL/L (ref 98–107)
CO2 SERPL-SCNC: 28.7 MMOL/L (ref 22–29)
CREAT BLD-MCNC: 0.7 MG/DL (ref 0.6–1.1)
DEPRECATED RDW RBC AUTO: 45.1 FL (ref 37–49)
EOSINOPHIL # BLD AUTO: 0.21 10*3/MM3 (ref 0–0.36)
EOSINOPHIL NFR BLD AUTO: 1.6 % (ref 1–5)
ERYTHROCYTE [DISTWIDTH] IN BLOOD BY AUTOMATED COUNT: 13.1 % (ref 11.7–14.5)
FERRITIN SERPL-MCNC: 248.4 NG/ML (ref 11–207)
GFR SERPL CREATININE-BSD FRML MDRD: 96 ML/MIN/1.73
GLOBULIN UR ELPH-MCNC: 3.1 GM/DL (ref 1.8–3.5)
GLUCOSE BLD-MCNC: 88 MG/DL (ref 74–124)
HCT VFR BLD AUTO: 43.3 % (ref 34–45)
HGB BLD-MCNC: 14.6 G/DL (ref 11.5–14.9)
IMM GRANULOCYTES # BLD: 0.07 10*3/MM3 (ref 0–0.03)
IMM GRANULOCYTES NFR BLD: 0.5 % (ref 0–0.5)
IRON 24H UR-MRATE: 48 MCG/DL (ref 37–145)
IRON SATN MFR SERPL: 18 % (ref 14–48)
LYMPHOCYTES # BLD AUTO: 1.2 10*3/MM3 (ref 1–3.5)
LYMPHOCYTES NFR BLD AUTO: 9 % (ref 20–49)
MCH RBC QN AUTO: 31.7 PG (ref 27–33)
MCHC RBC AUTO-ENTMCNC: 33.7 G/DL (ref 32–35)
MCV RBC AUTO: 93.9 FL (ref 83–97)
MONOCYTES # BLD AUTO: 0.62 10*3/MM3 (ref 0.25–0.8)
MONOCYTES NFR BLD AUTO: 4.7 % (ref 4–12)
NEUTROPHILS # BLD AUTO: 11.15 10*3/MM3 (ref 1.5–7)
NEUTROPHILS NFR BLD AUTO: 83.8 % (ref 39–75)
NRBC BLD MANUAL-RTO: 0 /100 WBC (ref 0–0)
PLATELET # BLD AUTO: 229 10*3/MM3 (ref 150–375)
PMV BLD AUTO: 9.8 FL (ref 8.9–12.1)
POTASSIUM BLD-SCNC: 3.8 MMOL/L (ref 3.5–4.7)
PROT SERPL-MCNC: 7.7 G/DL (ref 6.3–8)
RBC # BLD AUTO: 4.61 10*6/MM3 (ref 3.9–5)
SODIUM BLD-SCNC: 140 MMOL/L (ref 134–145)
TIBC SERPL-MCNC: 266 MCG/DL (ref 249–505)
TRANSFERRIN SERPL-MCNC: 190 MG/DL (ref 200–360)
WBC NRBC COR # BLD: 13.3 10*3/MM3 (ref 4–10)

## 2018-08-31 PROCEDURE — 84466 ASSAY OF TRANSFERRIN: CPT | Performed by: INTERNAL MEDICINE

## 2018-08-31 PROCEDURE — 82728 ASSAY OF FERRITIN: CPT | Performed by: INTERNAL MEDICINE

## 2018-08-31 PROCEDURE — 83540 ASSAY OF IRON: CPT | Performed by: INTERNAL MEDICINE

## 2018-08-31 PROCEDURE — 85025 COMPLETE CBC W/AUTO DIFF WBC: CPT | Performed by: INTERNAL MEDICINE

## 2018-08-31 PROCEDURE — 80053 COMPREHEN METABOLIC PANEL: CPT | Performed by: INTERNAL MEDICINE

## 2018-08-31 PROCEDURE — 36415 COLL VENOUS BLD VENIPUNCTURE: CPT | Performed by: INTERNAL MEDICINE

## 2018-08-31 PROCEDURE — 99214 OFFICE O/P EST MOD 30 MIN: CPT | Performed by: INTERNAL MEDICINE

## 2018-09-03 PROBLEM — T45.1X5A ADVERSE EFFECT OF ANTINEOPLASTIC DRUG: Status: ACTIVE | Noted: 2018-09-03

## 2018-09-03 NOTE — PROGRESS NOTES
REASONS FOR FOLLOWUP:     1. Chronic myelogenous leukemia with splenomegaly, chronic phase, positive [place] chromosome, no mutation at kinase domain.     2. Patient was started on hydroxyurea temporarily on 10/23/2013 with significant drop of WBC counts.     3. Patient started on Sprycel on 12/02/2013. Peripheral blood tested positive with 3.4% of cells positive for BCR-ABL translocation, tested 02/17/2014.     4. The patient had CCyR 6 months into treatment as tested on 05/15/2014.     5. Complete molecular response as tested 08/08/14 with negative product of BCR/ABL.     6. There was interruption of her treatment due to insurance coverage and misunderstanding from patient's part, she had a relapse of disease with BCR/ABL product at 12.626% in March 2016, and she restarted back on Sprycel, with good response as tested on 08/31/2016 with BCR/ABL at 0.294%.   7. Recurrent iron deficiency anemia not responding to oral iron supplementation.  She also had significant constipation associated with oral iron. Patient was given Feraheme treatment 2 doses in September 2016 and repeated in January 2017.     8.  Since June 2017, patient has been persistently tested negative for BCR/ABL by RT-PCR every 3 month period.     9.  Patient reported worsening leg cramping in end of July 2018.  Sprycel was decreased to 50 mg daily.      HISTORY OF PRESENT ILLNESS: The patient is a 33 y.o.   female presenting today for 3-month follow-up and lab review.  Patient is accompanied by her  today.    In the end of July 2018, patient called reporting worsening significant leg cramping, and getting worse recently and has been taking 6 tablets of Advil with no significant improvement.  We suspect it might be caused by Sprycel for her CML.  We discussed with patient, and decreased to half dose at 50 mg daily.  Patient reports that she is improved leg cramping since dose reduction.    Per medical records this patient had  emergency room visit again on 8/18/2018.  Patient reports she had significant abdomen/pelvic pain at a time associate with nausea.  Laboratory study showed significantly elevated WBC at a 25,900 including neutrophils 24,400, with elevated hemoglobin 15.8 and platelets 146,000.     She had a thorough investigation, including CT scan for abdomen pelvis which was unremarkable.  She then had ultrasound for the pelvis and it was also unremarkable.  She had ultrasound for the gallbladder examination on the same day and was unremarkable.  She had a normal CMP except of marginally elevated glucose at 112.  Her urinalysis showed only marginally increased WBC 3-5/HPF.  She was negative for yeast infection, negative for Chlamydia trichomonas and Neisseria gonorrhea.  Patient was prescribed Flagyl and doxycycline and discharged to home.      Patient reports her abdomen pain has improved, however still has problem.  She is scheduled to see her GYN physician next week.  She denies fever sweating or chills.    Laboratory study today showed a much improved but still elevated WBC at 13,300 including neutrophils 11,150, lymphocytes 1200 monocytes 620.  Her hemoglobin is 14.6 and platelets 229,000.       Past Medical History:   Diagnosis Date   • Anemia in neoplastic disease    • Arm pain    • Asthma     childhood   • Chiari I malformation (CMS/HCC)     eval by Dr Moore, NS 2016   • Chronic myelogenous leukemia (CMS/HCC)     remission, Dr Castle follows   • Chronic pain    • CML (chronic myelocytic leukemia) (CMS/HCC)    • Cystitis    • Depression    • Flank pain    • GERD (gastroesophageal reflux disease)    • H/O Iron deficiency anemia    • H/O Lower extremity pain     Resolved.   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      OB/GYN HISTORY: Menarche age 9. G5, P4, 1 miscarriage of the third pregnancy. First pregnancy at age 17.        HEMATOLOGIC/ONCOLOGIC HISTORY: History from previous dates can be found in the separate document.        Laboratory results on 09/23/2015 showed normalization of hemoglobin 12.2, but still has macrocytosis, MCV 73.3. She has normal platelets and WBC at 9400. Serum ferritin < 5 ng/ml, iron sats 6.3%, iron 29, TIBC 455 mcg/ml. Still has severe iron deficiency, needs to continue oral iron therapy. The patient restarted taking oral iron supplementation which was probably stopped in the end of November 2015.        Laboratory study on 03/22/2016 reported normal WBC 8450, neutrophils 6100, lymphs is 1400 and monocytes 550. Serum iron was 26, TIBC 469 on saturation 6% and ferritin less than 5 NG/ML. Chemistry lab reported normal renal function with a creatinine 0.69, normal liver function panel, total protein 7.5 and albumin 4.2, normal electrolytes. Patient was restarted back on oral on sedimentation twice a day with good tolerance.      Patient continues take Lortab as needed for left leg cramping. Urine drug test on 08/05/2016 was completely negative, and repeated study on August 26 was positive for opiate, negative for other recreational drugs.      Repeat laboratory study on 08/31/2016 reported iron 21, ferritin less than 5, iron saturation 5%, TIBC 462. Hemoglobin was 11.5 MCV 78.1 MCHC 30.4. Platelets 163,000. Total WBC 8870 including neutrophils 6400 and lymphocytes 1500. BCR/ABL was 0.294% by RT-PCR method.       Patient was given IV Feraheme treatment in September 2016, with 2 doses. Repeated laboratory study on 10/06/2016 reported significantly improved and normalized ferritin 269, iron 80, TIBC 365 and iron saturation 22%. Her hemoglobin was 12.2, and MCV 80.8.      Patient reported she was seen by Dr. Fam in  10/2016 and was started on half tablets of Topamax. I reviewed Dr. Fam’s clinic note and telephone conversation record. The patient reports she has no recurrence of migraine headache. However, she is very tearful today, reporting that she has thoughts of not taking any medications. She did assure me that she has been taking the medication up to this point. 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 The patient denies suicide ideation. When she was initially diagnosed of CML back in 2014, she had depression and I started the patient on Prozac. The patient reported initially it helped her, however, she no longer feels the effect of Prozac. She feels depressed. The patient reports she has no personal hobby. She goes to work and goes home, taking care of her kids. She does not enjoy life as she used to.      Laboratory study on December 29, 2016 reported at ferritin 19.9, iron 33, TIBC 347 iron saturation 10%.  Hemoglobin was 13, normal WBC and platelets.  Unremarkable CMP.  Patient was given 2 doses of Feraheme treatment in early January 2017.      Cytogenetic study on March 14, 2017 reported a BCR-ABL products at 0.098%, showed further improved residual disease.  There is also reported a ferritin 103.7, iron 79, TIBC 336 and iron saturation 24%.  Hemoglobin was 13.5, MCV 92.3, platelets 199,000 WBC 7000.  She had a completely normal CMP.       Repeated laboratory study on June 20, 2017 reported at nondetectable BCR-ABL product.  Ferritin was 45, iron 35 TIBC 333 and iron saturation 11%.  Had a normal CBC and CMP.      MEDICATIONS: The current medication list was reviewed with the patient and updated in the EMR this date per the medical assistant. Medication dosages and frequencies were confirmed to be accurate.        Allergies   Allergen Reactions   • Sulfa Antibiotics Unknown (See Comments)     Childhood  "reaction     SOCIAL HISTORY: . She smoked cigarettes previously, quit in 2006 with 8 pack year history. Social drinker, maybe once a month. No illegal drug use. No risk for HIV except tattoos.  Hairstylist.       FAMILY HISTORY: Maternal grandmother had esophageal/stomach adenocarcinoma diagnosed at age of 72 and  of disease progress at age of 73. The patient' s mother has hypertension but otherwise no family history of malignancy, especially no leukemia.        I have reviewed the patient's medical history in detail and updated the computerized patient record.    Review of Systems   Constitutional: Positive for fatigue. Negative for appetite change, chills and fever.   HENT:   Negative for trouble swallowing.    Respiratory: Negative for cough and shortness of breath.    Cardiovascular: Negative for chest pain and leg swelling.   Gastrointestinal: Positive for abdominal pain. Negative for blood in stool, constipation, diarrhea and nausea.   Musculoskeletal: Negative for arthralgias and myalgias.        Cramping in legs   Skin: Negative for rash.   Neurological: Negative for dizziness and extremity weakness.   Hematological: Does not bruise/bleed easily.     VITAL SIGNS:   Vitals:    18 1153   BP: 110/70   Pulse: 82   Resp: 12   Temp: 98.5 °F (36.9 °C)   TempSrc: Oral   SpO2: 100%   Weight: 76.6 kg (168 lb 12.8 oz)   Height: 172.7 cm (67.99\")   PainSc: 4  Comment: Pain in arms and stomach.   ECOG 0        PHYSICAL EXAMINATION:    GENERAL: Well-developed, well-nourished  female in no acute distress.    SKIN: Warm, dry without rashes, purpura or petechiae.   HEAD: Normocephalic.    EYES: Pupils equal, round. Conjunctivae normal.    EARS: Hearing intact.    NOSE: No nasal discharge.    MOUTH: Tongue is well papillated; Oral mucosa moist, no stomatitis or ulcers. Lips normal.    CHEST: Lungs clear to auscultation.  No wheezing no crackles.  Normal respiratory effort.   CARDIAC: Regular " rate and rhythm without murmurs.  Normal S1 and S2.   ABDOMEN: Soft, mild tenderness in the lower abdomen/pelvis,  no guarding no rebound.  Bowel sounds present.    EXTREMITIES: No edema no cyanosis.    NEUROLOGICAL: No focal deficits.        LABORATORY DATA:      Results from last 7 days  Lab Units 08/31/18  1134   WBC 10*3/mm3 13.30*   NEUTROS ABS 10*3/mm3 11.15*   HEMOGLOBIN g/dL 14.6   HEMATOCRIT % 43.3   PLATELETS 10*3/mm3 229       Results from last 7 days  Lab Units 08/31/18  1134   SODIUM mmol/L 140   POTASSIUM mmol/L 3.8   CHLORIDE mmol/L 100   CO2 mmol/L 28.7   BUN mg/dL 6   CREATININE mg/dL 0.70   CALCIUM mg/dL 9.8   ALBUMIN g/dL 4.60   BILIRUBIN mg/dL 0.3   ALK PHOS U/L 77   ALT (SGPT) U/L 14   AST (SGOT) U/L 13   GLUCOSE mg/dL 88   FERRITIN ng/mL 248.40*   IRON mcg/dL 48   TIBC mcg/dL 266   Iron saturation 18%      BCR-ABL 0% by RT-PCR on 3/15/2018 and 6/7/2018, pending today.      ASSESSMENT:    1. Chronic myelogenous leukemia, chronic phase with excellent response to Sprycel and complete molecular response in August 2014. However she had interuption of treatment in early part of 2016, because of insurance issues and miscommunication, she stopped the medicine without telling us, and laboratory test on 03/22/2016 reported disease relapse with increased BCR/ABL product at 12.626%. subsequently she was restarted back on Sprycel, and responded well.  Since June 2017, she has completed molecular response as tested every 3 months.  She has been compliant with treatment.     Recently in the end of July 2018, she reported worsening significant cramping involving her legs, so we decreased her Sprycel to 50 mg daily starting early August.  She's been having less problem since that time.  We'll continue same dose now.  We are repeating her BCR-ABL by RT-PCR today.    2.  Leukocytosis with significant abdomen pain and nausea.  She is on Flagyl and the doxycycline with improved WBC for the past 2 weeks.  She is  waiting for GYN evaluation.      3. Recurrent Iron deficiency anemia.  She was treated again with Feraheme October 2017.   Iron deficiency thought to be related to ulcer identified on EGD, being treated with Carafate.  She had a recurrent on deficiency again and was given Feraheme 2 doses in March 2018.  Subsequently recurrent on deficiency in July 2018, she was was switched her to Venofer 3 doses total 900 mg.    Repeat laboratory study today showed no evidence of iron deficiency.  We'll continue to monitor her iron status.      4.  Migraine headache. Was followed by neurologist Dr. López and associates.  She was started on gabapentin in early February 2017 but discontinued by herself.  She also has been taking Topamax.      5. Muscle cramping. See #1 above.      PLAN:    1. Continue Sprycel at a reduced dose 50mg daily.  2. BCR-ABL pending today. Continue to repeat every 3 months.    3. Continue oral Flagyl and doxycycline.  Keep appointment with GYN physician.  4. Return for MD follow up in 3 months with CBC, CMP, Iron ferritin and BCR/ABL by PCR.     Isabel Castle MD PhD  8/31/2018      cc:   ISHAAN REGAN M.D.

## 2018-09-05 ENCOUNTER — OFFICE VISIT (OUTPATIENT)
Dept: OBSTETRICS AND GYNECOLOGY | Facility: CLINIC | Age: 33
End: 2018-09-05

## 2018-09-05 VITALS
HEIGHT: 68 IN | DIASTOLIC BLOOD PRESSURE: 84 MMHG | SYSTOLIC BLOOD PRESSURE: 111 MMHG | BODY MASS INDEX: 25.52 KG/M2 | WEIGHT: 168.4 LBS

## 2018-09-05 DIAGNOSIS — Z87.42 HISTORY OF PID: Primary | ICD-10-CM

## 2018-09-05 DIAGNOSIS — D72.828 OTHER ELEVATED WHITE BLOOD CELL (WBC) COUNT: ICD-10-CM

## 2018-09-05 DIAGNOSIS — Z13.9 SCREENING FOR CONDITION: ICD-10-CM

## 2018-09-05 DIAGNOSIS — N76.0 ACUTE VAGINITIS: ICD-10-CM

## 2018-09-05 PROBLEM — D72.829 LEUCOCYTOSIS: Status: ACTIVE | Noted: 2018-09-05

## 2018-09-05 LAB
B-HCG UR QL: NEGATIVE
BASOPHILS # BLD AUTO: 0.06 10*3/MM3 (ref 0–0.2)
BASOPHILS NFR BLD AUTO: 0.7 % (ref 0–2)
BILIRUB BLD-MCNC: NEGATIVE MG/DL
CLARITY, POC: CLEAR
COLOR UR: YELLOW
EOSINOPHIL # BLD AUTO: 0.04 10*3/MM3 (ref 0.1–0.3)
EOSINOPHIL NFR BLD AUTO: 0.4 % (ref 0–4)
ERYTHROCYTE [DISTWIDTH] IN BLOOD BY AUTOMATED COUNT: 12.8 % (ref 11.5–14.5)
GLUCOSE UR STRIP-MCNC: NEGATIVE MG/DL
HCT VFR BLD AUTO: 39.3 % (ref 37–47)
HGB BLD-MCNC: 12.9 G/DL (ref 12–16)
IMM GRANULOCYTES # BLD: 0.07 10*3/MM3 (ref 0–0.03)
IMM GRANULOCYTES NFR BLD: 0.8 % (ref 0–0.5)
INTERNAL NEGATIVE CONTROL: NEGATIVE
INTERNAL POSITIVE CONTROL: POSITIVE
KETONES UR QL: NEGATIVE
LEUKOCYTE EST, POC: NEGATIVE
LYMPHOCYTES # BLD AUTO: 1.12 10*3/MM3 (ref 0.6–4.8)
LYMPHOCYTES NFR BLD AUTO: 12.6 % (ref 20–45)
Lab: NORMAL
MCH RBC QN AUTO: 31.5 PG (ref 27–31)
MCHC RBC AUTO-ENTMCNC: 32.8 G/DL (ref 31–37)
MCV RBC AUTO: 96.1 FL (ref 81–99)
MONOCYTES # BLD AUTO: 0.77 10*3/MM3 (ref 0–1)
MONOCYTES NFR BLD AUTO: 8.6 % (ref 3–8)
NEUTROPHILS # BLD AUTO: 6.85 10*3/MM3 (ref 1.5–8.3)
NEUTROPHILS NFR BLD AUTO: 76.9 % (ref 45–70)
NITRITE UR-MCNC: NEGATIVE MG/ML
NRBC BLD AUTO-RTO: 0 /100 WBC (ref 0–0)
PH UR: 5 [PH] (ref 5–8)
PLATELET # BLD AUTO: 209 10*3/MM3 (ref 140–500)
PROT UR STRIP-MCNC: NEGATIVE MG/DL
RBC # BLD AUTO: 4.09 10*6/MM3 (ref 4.2–5.4)
RBC # UR STRIP: NEGATIVE /UL
SP GR UR: 1 (ref 1–1.03)
UROBILINOGEN UR QL: NORMAL
WBC # BLD AUTO: 8.91 10*3/MM3 (ref 4.8–10.8)

## 2018-09-05 PROCEDURE — 99213 OFFICE O/P EST LOW 20 MIN: CPT | Performed by: NURSE PRACTITIONER

## 2018-09-05 PROCEDURE — 81025 URINE PREGNANCY TEST: CPT | Performed by: NURSE PRACTITIONER

## 2018-09-05 RX ORDER — METHYLPREDNISOLONE 4 MG/1
TABLET ORAL
COMMUNITY
Start: 2018-09-02 | End: 2018-09-28

## 2018-09-05 RX ORDER — ALBUTEROL SULFATE 90 UG/1
2 AEROSOL, METERED RESPIRATORY (INHALATION) EVERY 6 HOURS PRN
COMMUNITY
Start: 2018-09-02 | End: 2018-12-12

## 2018-09-05 NOTE — PROGRESS NOTES
"Subjective     Chief Complaint   Patient presents with   • Follow-up       Hu Houston is a 33 y.o.  whose LMP is No LMP recorded (lmp unknown). Patient is not currently having periods (Reason: Other).. She presents for follow up of PID. She was seen in [facility] ER on 18 for complaints of pelvic and back pain. She was discharged home with minimal relief in her symptoms. SHe called her oncologist who ref her to [facility] ER and was dx'd with PID. She was treated with Rocephin and zithromax IV. SHe was discharged home with Doxycyclines and Flagyl. She is approx 4 months s/p tubal ligation and endometrial ablation. She reports her pain has improved but she continues to have occas cramping. She has completed all of her medication.      HPI    HPI    The following portions of the patient's history were reviewed and updated as appropriate:vital signs, allergies, current medications, past medical history, past social history, past surgical history and problem list      Review of Systems     Review of Systems   Constitutional: Negative.    Respiratory: Negative.    Cardiovascular: Negative.    Gastrointestinal: Negative.    Endocrine: Negative.    Genitourinary: Positive for pelvic pain (occas cramping).   Musculoskeletal: Negative.    Skin: Negative.    Neurological: Negative.    Psychiatric/Behavioral: Negative.        Objective      /84   Ht 172.7 cm (67.99\")   Wt 76.4 kg (168 lb 6.4 oz)   LMP  (LMP Unknown) Comment: ablation  Breastfeeding? No   BMI 25.61 kg/m²     Physical Exam    Physical Exam   Constitutional: She is oriented to person, place, and time. She appears well-developed and well-nourished.   Pulmonary/Chest: Effort normal. No respiratory distress.   Abdominal: Soft. She exhibits no distension. There is no tenderness.   Genitourinary: Pelvic exam was performed with patient supine. There is no rash, tenderness, lesion or injury on the right labia. There is no rash, " tenderness, lesion or injury on the left labia. Uterus is enlarged and tender. Uterus is not deviated and not fixed. Cervix exhibits no motion tenderness, no discharge and no friability. Right adnexum displays no mass, no tenderness and no fullness. Left adnexum displays no mass, no tenderness and no fullness. No erythema, tenderness or bleeding in the vagina. No foreign body in the vagina. No signs of injury around the vagina. Vaginal discharge found.   Musculoskeletal: Normal range of motion.   Neurological: She is alert and oriented to person, place, and time.   Skin: Skin is warm and dry.   Psychiatric: She has a normal mood and affect. Her behavior is normal.   Vitals reviewed.      Lab Review   Labs: Urine pregnancy test, Urinalysis - with micro. Labs from ER visit     Imaging   Ultrasound - Pelvic Vaginal from ER visit    Assessment  Hu was seen today for follow-up.    Diagnoses and all orders for this visit:    Screening for condition  -     POC Urinalysis Dipstick  -     POC Pregnancy, Urine      Additional Assessment:   1) H/O PID  2) Leukocytosis     Plan     1. Resolving PID- Pt continues to have uterine tenderness. Check NuSwab. Instructed patient to finish her medication as prescribed.     2. Scheduled for: STD Labs - Nu Swab - Myco, bv, yeast, and leuk , Ultrasound of the -  Consider TVUS if no improvement of symptoms  , Mammography - N/A , Bone Density Test - N/A , Additional Labs - CBC    3. Pap:  9/17 NIL      4. Contraception: Tubal, s/p ablation     5. STD:  Enc condom use.     6.   Smoking status: non smoker     7.   Patient's Body mass index is 25.61 kg/m². BMI is within normal parameters. No follow-up required.    8.   Annual Exam scheduled for: 9/18    RTO  PRFELIPA Perez  9/5/2018

## 2018-09-11 LAB
A VAGINAE DNA VAG QL NAA+PROBE: ABNORMAL SCORE
BVAB2 DNA VAG QL NAA+PROBE: ABNORMAL SCORE
C ALBICANS DNA VAG QL NAA+PROBE: POSITIVE
C GLABRATA DNA VAG QL NAA+PROBE: POSITIVE
C TRACH RRNA SPEC QL NAA+PROBE: NEGATIVE
LABORATORY COMMENT REPORT: NORMAL
M GENITALIUM DNA SPEC QL NAA+PROBE: NEGATIVE
M HOMINIS DNA SPEC QL NAA+PROBE: NEGATIVE
MEGA1 DNA VAG QL NAA+PROBE: ABNORMAL SCORE
N GONORRHOEA RRNA SPEC QL NAA+PROBE: NEGATIVE
REF LAB TEST METHOD: NORMAL
T VAGINALIS RRNA SPEC QL NAA+PROBE: NEGATIVE
UREAPLASMA DNA SPEC QL NAA+PROBE: NEGATIVE

## 2018-09-17 ENCOUNTER — TELEPHONE (OUTPATIENT)
Dept: OBSTETRICS AND GYNECOLOGY | Facility: CLINIC | Age: 33
End: 2018-09-17

## 2018-09-17 NOTE — TELEPHONE ENCOUNTER
Patient called and stated she starting bleeding heavier and cramping and was told to call and advise you

## 2018-09-25 ENCOUNTER — OFFICE VISIT (OUTPATIENT)
Dept: OBSTETRICS AND GYNECOLOGY | Facility: CLINIC | Age: 33
End: 2018-09-25

## 2018-09-25 VITALS
BODY MASS INDEX: 26.06 KG/M2 | HEIGHT: 67 IN | SYSTOLIC BLOOD PRESSURE: 122 MMHG | WEIGHT: 166 LBS | DIASTOLIC BLOOD PRESSURE: 70 MMHG

## 2018-09-25 DIAGNOSIS — Z13.9 SCREENING FOR CONDITION: ICD-10-CM

## 2018-09-25 DIAGNOSIS — N93.8 DUB (DYSFUNCTIONAL UTERINE BLEEDING): Primary | ICD-10-CM

## 2018-09-25 LAB
B-HCG UR QL: NEGATIVE
INTERNAL NEGATIVE CONTROL: NEGATIVE
INTERNAL POSITIVE CONTROL: POSITIVE
Lab: 3545

## 2018-09-25 PROCEDURE — 99214 OFFICE O/P EST MOD 30 MIN: CPT | Performed by: OBSTETRICS & GYNECOLOGY

## 2018-09-25 PROCEDURE — 81025 URINE PREGNANCY TEST: CPT | Performed by: OBSTETRICS & GYNECOLOGY

## 2018-09-25 NOTE — PROGRESS NOTES
"      Hu Houston is a 33 y.o. patient who presents for follow up of   Chief Complaint   Patient presents with   • Pre-op Exam       HPI this is a 33-year-old female who complains of worsening dysfunctional uterine bleeding, cramping and clotting.  She had an ablation in April of this year.  Initially she had no bleeding.  Unfortunately her periods have returned and are getting worse.  She was recently treated for PID.  Follow-up testing was negative for bacterial infections but positive for yeast.  She has completed therapy.  Her main problems her bleeding and pain associated with the bleeding. This is a new problem.     The following portions of the patient's history were reviewed and updated as appropriate: allergies, current medications and problem list.    Review of Systems   Constitutional: Negative for appetite change, fever and unexpected weight change.   HENT: Negative for congestion and sore throat.    Respiratory: Negative for cough and shortness of breath.    Cardiovascular: Negative for chest pain and palpitations.   Gastrointestinal: Negative for abdominal distention, abdominal pain, constipation, diarrhea, nausea and vomiting.   Endocrine: Negative.    Genitourinary: Positive for menstrual problem and pelvic pain. Negative for dyspareunia and vaginal discharge.   Skin: Negative.    Neurological: Negative for dizziness and syncope.   Hematological: Negative.    Psychiatric/Behavioral: Negative for dysphoric mood and sleep disturbance. The patient is not nervous/anxious.        /70   Ht 170.2 cm (67\")   Wt 75.3 kg (166 lb)   LMP 09/17/2018   Breastfeeding? No   BMI 26.00 kg/m²     Physical Exam   Constitutional: She is oriented to person, place, and time. She appears well-developed and well-nourished.   HENT:   Head: Normocephalic and atraumatic.   Pulmonary/Chest: Effort normal. No respiratory distress.   Abdominal: Soft. She exhibits no distension and no mass. There is no tenderness. " There is no rebound and no guarding.   Musculoskeletal: Normal range of motion.   Neurological: She is alert and oriented to person, place, and time.   Skin: Skin is warm and dry.   Psychiatric: She has a normal mood and affect. Her behavior is normal. Judgment and thought content normal.   Nursing note and vitals reviewed.    Ultrasound was reviewed with the patient from her ER admission.  The uterus is normal in shape and contour.  Endometrial lining was thickened at 1.3 cm.  This is unusual given the fact that she previously had an ablation.  Both ovaries appeared normal.    Her previous op note was also reviewed.  The cul-de-sacs were clear and there is no evidence of endometriosis.    Assessment/Plan    Hu was seen today for pre-op exam.    Diagnoses and all orders for this visit:    DUB (dysfunctional uterine bleeding)    Screening for condition  -     POC Pregnancy, Urine    This appears to be a failed ablation.  I think her PID is resolved.  Consider hysteroscopy D&C versus total laparoscopic hysterectomy.  Risks benefits alternatives of both were discussed with the patient and patient desires to be conservative at first.  We'll plan hysteroscopy D&C for tissue diagnosis.  May ultimately need laparoscopic hysterectomy.    Return for hysteroscopy, D&C.      Beck Cornejo MD  9/25/2018  11:31 AM

## 2018-09-26 ENCOUNTER — PREP FOR SURGERY (OUTPATIENT)
Dept: OTHER | Facility: HOSPITAL | Age: 33
End: 2018-09-26

## 2018-09-26 DIAGNOSIS — N92.1 MENORRHAGIA WITH IRREGULAR CYCLE: Primary | ICD-10-CM

## 2018-09-26 RX ORDER — SODIUM CHLORIDE 9 MG/ML
40 INJECTION, SOLUTION INTRAVENOUS AS NEEDED
Status: CANCELLED | OUTPATIENT
Start: 2018-09-26

## 2018-09-26 RX ORDER — SODIUM CHLORIDE 0.9 % (FLUSH) 0.9 %
1-10 SYRINGE (ML) INJECTION AS NEEDED
Status: CANCELLED | OUTPATIENT
Start: 2018-09-26

## 2018-09-27 ENCOUNTER — TELEPHONE (OUTPATIENT)
Dept: OBSTETRICS AND GYNECOLOGY | Facility: CLINIC | Age: 33
End: 2018-09-27

## 2018-09-27 NOTE — TELEPHONE ENCOUNTER
Patient saw you recently and you gave her cream for yeast infection, She states she still has it and is requesting Diflucan.

## 2018-09-28 ENCOUNTER — ANESTHESIA EVENT (OUTPATIENT)
Dept: PERIOP | Facility: HOSPITAL | Age: 33
End: 2018-09-28

## 2018-09-28 ENCOUNTER — APPOINTMENT (OUTPATIENT)
Dept: PREADMISSION TESTING | Facility: HOSPITAL | Age: 33
End: 2018-09-28

## 2018-09-28 VITALS
BODY MASS INDEX: 25.46 KG/M2 | DIASTOLIC BLOOD PRESSURE: 60 MMHG | WEIGHT: 168 LBS | HEIGHT: 68 IN | OXYGEN SATURATION: 98 % | SYSTOLIC BLOOD PRESSURE: 102 MMHG | HEART RATE: 60 BPM | RESPIRATION RATE: 16 BRPM

## 2018-09-28 DIAGNOSIS — N92.1 MENORRHAGIA WITH IRREGULAR CYCLE: ICD-10-CM

## 2018-09-28 LAB
ALBUMIN SERPL-MCNC: 4.4 G/DL (ref 3.5–5.2)
ALBUMIN/GLOB SERPL: 1.6 G/DL
ALP SERPL-CCNC: 69 U/L (ref 40–129)
ALT SERPL W P-5'-P-CCNC: 11 U/L (ref 5–33)
ANION GAP SERPL CALCULATED.3IONS-SCNC: 10.3 MMOL/L
AST SERPL-CCNC: 12 U/L (ref 5–32)
BASOPHILS # BLD AUTO: 0.03 10*3/MM3 (ref 0–0.2)
BASOPHILS NFR BLD AUTO: 0.4 % (ref 0–2)
BILIRUB SERPL-MCNC: 0.4 MG/DL (ref 0.2–1.2)
BUN BLD-MCNC: 10 MG/DL (ref 6–20)
BUN/CREAT SERPL: 14.1 (ref 7–25)
CALCIUM SPEC-SCNC: 9.3 MG/DL (ref 8.6–10.5)
CHLORIDE SERPL-SCNC: 104 MMOL/L (ref 98–107)
CO2 SERPL-SCNC: 25.7 MMOL/L (ref 22–29)
CREAT BLD-MCNC: 0.71 MG/DL (ref 0.57–1)
DEPRECATED RDW RBC AUTO: 43.5 FL (ref 37–54)
EOSINOPHIL # BLD AUTO: 0.11 10*3/MM3 (ref 0.1–0.3)
EOSINOPHIL NFR BLD AUTO: 1.3 % (ref 0–4)
ERYTHROCYTE [DISTWIDTH] IN BLOOD BY AUTOMATED COUNT: 12.7 % (ref 11.5–14.5)
GFR SERPL CREATININE-BSD FRML MDRD: 94 ML/MIN/1.73
GLOBULIN UR ELPH-MCNC: 2.8 GM/DL
GLUCOSE BLD-MCNC: 90 MG/DL (ref 65–99)
HCG SERPL QL: NEGATIVE
HCT VFR BLD AUTO: 42 % (ref 37–47)
HGB BLD-MCNC: 14.3 G/DL (ref 12–16)
IMM GRANULOCYTES # BLD: 0.04 10*3/MM3 (ref 0–0.03)
IMM GRANULOCYTES NFR BLD: 0.5 % (ref 0–0.5)
LYMPHOCYTES # BLD AUTO: 1.43 10*3/MM3 (ref 0.6–4.8)
LYMPHOCYTES NFR BLD AUTO: 16.7 % (ref 20–45)
MCH RBC QN AUTO: 31.7 PG (ref 27–31)
MCHC RBC AUTO-ENTMCNC: 34 G/DL (ref 31–37)
MCV RBC AUTO: 93.1 FL (ref 81–99)
MONOCYTES # BLD AUTO: 0.59 10*3/MM3 (ref 0–1)
MONOCYTES NFR BLD AUTO: 6.9 % (ref 3–8)
NEUTROPHILS # BLD AUTO: 6.37 10*3/MM3 (ref 1.5–8.3)
NEUTROPHILS NFR BLD AUTO: 74.2 % (ref 45–70)
NRBC BLD MANUAL-RTO: 0 /100 WBC (ref 0–0)
PLATELET # BLD AUTO: 184 10*3/MM3 (ref 140–500)
PMV BLD AUTO: 10.1 FL (ref 7.4–10.4)
POTASSIUM BLD-SCNC: 3.4 MMOL/L (ref 3.5–5.2)
PROT SERPL-MCNC: 7.2 G/DL (ref 6–8.5)
RBC # BLD AUTO: 4.51 10*6/MM3 (ref 4.2–5.4)
SODIUM BLD-SCNC: 140 MMOL/L (ref 136–145)
WBC NRBC COR # BLD: 8.57 10*3/MM3 (ref 4.8–10.8)

## 2018-09-28 PROCEDURE — 80053 COMPREHEN METABOLIC PANEL: CPT | Performed by: OBSTETRICS & GYNECOLOGY

## 2018-09-28 PROCEDURE — 36415 COLL VENOUS BLD VENIPUNCTURE: CPT

## 2018-09-28 PROCEDURE — 84703 CHORIONIC GONADOTROPIN ASSAY: CPT | Performed by: OBSTETRICS & GYNECOLOGY

## 2018-09-28 PROCEDURE — 85025 COMPLETE CBC W/AUTO DIFF WBC: CPT | Performed by: OBSTETRICS & GYNECOLOGY

## 2018-09-28 RX ORDER — FLUCONAZOLE 150 MG/1
150 TABLET ORAL ONCE
Qty: 1 TABLET | Refills: 1 | Status: SHIPPED | OUTPATIENT
Start: 2018-09-28 | End: 2018-09-28

## 2018-10-01 ENCOUNTER — ANESTHESIA (OUTPATIENT)
Dept: PERIOP | Facility: HOSPITAL | Age: 33
End: 2018-10-01

## 2018-10-01 ENCOUNTER — HOSPITAL ENCOUNTER (OUTPATIENT)
Facility: HOSPITAL | Age: 33
Setting detail: HOSPITAL OUTPATIENT SURGERY
Discharge: HOME | End: 2018-10-01
Attending: OBSTETRICS & GYNECOLOGY | Admitting: OBSTETRICS & GYNECOLOGY

## 2018-10-01 VITALS
RESPIRATION RATE: 16 BRPM | SYSTOLIC BLOOD PRESSURE: 104 MMHG | WEIGHT: 168.8 LBS | HEIGHT: 68 IN | BODY MASS INDEX: 25.58 KG/M2 | DIASTOLIC BLOOD PRESSURE: 53 MMHG | TEMPERATURE: 98.4 F | HEART RATE: 68 BPM | OXYGEN SATURATION: 98 %

## 2018-10-01 DIAGNOSIS — N92.1 MENORRHAGIA WITH IRREGULAR CYCLE: ICD-10-CM

## 2018-10-01 PROCEDURE — 25010000002 DEXAMETHASONE PER 1 MG: Performed by: NURSE ANESTHETIST, CERTIFIED REGISTERED

## 2018-10-01 PROCEDURE — 88305 TISSUE EXAM BY PATHOLOGIST: CPT

## 2018-10-01 PROCEDURE — 25010000002 PROPOFOL 10 MG/ML EMULSION: Performed by: ANESTHESIOLOGY

## 2018-10-01 PROCEDURE — 25010000002 HYDROMORPHONE PER 4 MG: Performed by: ANESTHESIOLOGY

## 2018-10-01 PROCEDURE — 25010000002 ONDANSETRON PER 1 MG: Performed by: NURSE ANESTHETIST, CERTIFIED REGISTERED

## 2018-10-01 PROCEDURE — 25010000002 FENTANYL CITRATE (PF) 100 MCG/2ML SOLUTION: Performed by: ANESTHESIOLOGY

## 2018-10-01 PROCEDURE — 25010000002 KETOROLAC TROMETHAMINE PER 15 MG: Performed by: ANESTHESIOLOGY

## 2018-10-01 PROCEDURE — 25010000002 MIDAZOLAM PER 1 MG: Performed by: NURSE ANESTHETIST, CERTIFIED REGISTERED

## 2018-10-01 PROCEDURE — 58558 HYSTEROSCOPY BIOPSY: CPT | Performed by: OBSTETRICS & GYNECOLOGY

## 2018-10-01 RX ORDER — MIDAZOLAM HYDROCHLORIDE 1 MG/ML
1 INJECTION INTRAMUSCULAR; INTRAVENOUS
Status: DISCONTINUED | OUTPATIENT
Start: 2018-10-01 | End: 2018-10-01 | Stop reason: HOSPADM

## 2018-10-01 RX ORDER — SODIUM CHLORIDE, SODIUM LACTATE, POTASSIUM CHLORIDE, CALCIUM CHLORIDE 600; 310; 30; 20 MG/100ML; MG/100ML; MG/100ML; MG/100ML
100 INJECTION, SOLUTION INTRAVENOUS CONTINUOUS
Status: DISCONTINUED | OUTPATIENT
Start: 2018-10-01 | End: 2018-10-01 | Stop reason: HOSPADM

## 2018-10-01 RX ORDER — OXYCODONE HYDROCHLORIDE AND ACETAMINOPHEN 5; 325 MG/1; MG/1
1 TABLET ORAL ONCE AS NEEDED
Status: COMPLETED | OUTPATIENT
Start: 2018-10-01 | End: 2018-10-01

## 2018-10-01 RX ORDER — LIDOCAINE HYDROCHLORIDE 10 MG/ML
0.5 INJECTION, SOLUTION EPIDURAL; INFILTRATION; INTRACAUDAL; PERINEURAL ONCE AS NEEDED
Status: COMPLETED | OUTPATIENT
Start: 2018-10-01 | End: 2018-10-01

## 2018-10-01 RX ORDER — HYDROCODONE BITARTRATE AND ACETAMINOPHEN 5; 325 MG/1; MG/1
2 TABLET ORAL EVERY 4 HOURS PRN
Qty: 20 TABLET | Refills: 0 | Status: ON HOLD | OUTPATIENT
Start: 2018-10-01 | End: 2018-10-29

## 2018-10-01 RX ORDER — SODIUM CHLORIDE 9 MG/ML
40 INJECTION, SOLUTION INTRAVENOUS AS NEEDED
Status: DISCONTINUED | OUTPATIENT
Start: 2018-10-01 | End: 2018-10-01 | Stop reason: HOSPADM

## 2018-10-01 RX ORDER — LIDOCAINE HYDROCHLORIDE 10 MG/ML
0.5 INJECTION, SOLUTION EPIDURAL; INFILTRATION; INTRACAUDAL; PERINEURAL ONCE AS NEEDED
Status: DISCONTINUED | OUTPATIENT
Start: 2018-10-01 | End: 2018-10-01 | Stop reason: HOSPADM

## 2018-10-01 RX ORDER — FENTANYL CITRATE 50 UG/ML
INJECTION, SOLUTION INTRAMUSCULAR; INTRAVENOUS AS NEEDED
Status: DISCONTINUED | OUTPATIENT
Start: 2018-10-01 | End: 2018-10-01 | Stop reason: SURG

## 2018-10-01 RX ORDER — SODIUM CHLORIDE 0.9 % (FLUSH) 0.9 %
1-10 SYRINGE (ML) INJECTION AS NEEDED
Status: DISCONTINUED | OUTPATIENT
Start: 2018-10-01 | End: 2018-10-01 | Stop reason: HOSPADM

## 2018-10-01 RX ORDER — HYDROMORPHONE HYDROCHLORIDE 1 MG/ML
0.5 INJECTION, SOLUTION INTRAMUSCULAR; INTRAVENOUS; SUBCUTANEOUS AS NEEDED
Status: DISCONTINUED | OUTPATIENT
Start: 2018-10-01 | End: 2018-10-01 | Stop reason: HOSPADM

## 2018-10-01 RX ORDER — ONDANSETRON 2 MG/ML
4 INJECTION INTRAMUSCULAR; INTRAVENOUS ONCE AS NEEDED
Status: DISCONTINUED | OUTPATIENT
Start: 2018-10-01 | End: 2018-10-01 | Stop reason: HOSPADM

## 2018-10-01 RX ORDER — DEXAMETHASONE SODIUM PHOSPHATE 4 MG/ML
8 INJECTION, SOLUTION INTRA-ARTICULAR; INTRALESIONAL; INTRAMUSCULAR; INTRAVENOUS; SOFT TISSUE ONCE AS NEEDED
Status: COMPLETED | OUTPATIENT
Start: 2018-10-01 | End: 2018-10-01

## 2018-10-01 RX ORDER — KETOROLAC TROMETHAMINE 30 MG/ML
INJECTION, SOLUTION INTRAMUSCULAR; INTRAVENOUS AS NEEDED
Status: DISCONTINUED | OUTPATIENT
Start: 2018-10-01 | End: 2018-10-01 | Stop reason: SURG

## 2018-10-01 RX ORDER — LIDOCAINE HYDROCHLORIDE 20 MG/ML
INJECTION, SOLUTION INFILTRATION; PERINEURAL AS NEEDED
Status: DISCONTINUED | OUTPATIENT
Start: 2018-10-01 | End: 2018-10-01 | Stop reason: SURG

## 2018-10-01 RX ORDER — MIDAZOLAM HYDROCHLORIDE 1 MG/ML
2 INJECTION INTRAMUSCULAR; INTRAVENOUS
Status: DISCONTINUED | OUTPATIENT
Start: 2018-10-01 | End: 2018-10-01 | Stop reason: HOSPADM

## 2018-10-01 RX ORDER — PROPOFOL 10 MG/ML
VIAL (ML) INTRAVENOUS AS NEEDED
Status: DISCONTINUED | OUTPATIENT
Start: 2018-10-01 | End: 2018-10-01 | Stop reason: SURG

## 2018-10-01 RX ORDER — SODIUM CHLORIDE, SODIUM LACTATE, POTASSIUM CHLORIDE, CALCIUM CHLORIDE 600; 310; 30; 20 MG/100ML; MG/100ML; MG/100ML; MG/100ML
9 INJECTION, SOLUTION INTRAVENOUS CONTINUOUS
Status: DISCONTINUED | OUTPATIENT
Start: 2018-10-01 | End: 2018-10-01 | Stop reason: HOSPADM

## 2018-10-01 RX ORDER — ONDANSETRON 2 MG/ML
4 INJECTION INTRAMUSCULAR; INTRAVENOUS ONCE AS NEEDED
Status: COMPLETED | OUTPATIENT
Start: 2018-10-01 | End: 2018-10-01

## 2018-10-01 RX ORDER — MAGNESIUM HYDROXIDE 1200 MG/15ML
LIQUID ORAL AS NEEDED
Status: DISCONTINUED | OUTPATIENT
Start: 2018-10-01 | End: 2018-10-01 | Stop reason: HOSPADM

## 2018-10-01 RX ORDER — MEPERIDINE HYDROCHLORIDE 25 MG/ML
12.5 INJECTION INTRAMUSCULAR; INTRAVENOUS; SUBCUTANEOUS
Status: DISCONTINUED | OUTPATIENT
Start: 2018-10-01 | End: 2018-10-01 | Stop reason: HOSPADM

## 2018-10-01 RX ADMIN — LIDOCAINE HYDROCHLORIDE 80 MG: 20 INJECTION, SOLUTION INFILTRATION; PERINEURAL at 10:21

## 2018-10-01 RX ADMIN — FENTANYL CITRATE 50 MCG: 50 INJECTION, SOLUTION INTRAMUSCULAR; INTRAVENOUS at 11:00

## 2018-10-01 RX ADMIN — MIDAZOLAM HYDROCHLORIDE 2 MG: 1 INJECTION, SOLUTION INTRAMUSCULAR; INTRAVENOUS at 10:11

## 2018-10-01 RX ADMIN — FAMOTIDINE 20 MG: 10 INJECTION, SOLUTION INTRAVENOUS at 10:11

## 2018-10-01 RX ADMIN — SODIUM CHLORIDE, POTASSIUM CHLORIDE, SODIUM LACTATE AND CALCIUM CHLORIDE 9 ML/HR: 600; 310; 30; 20 INJECTION, SOLUTION INTRAVENOUS at 10:01

## 2018-10-01 RX ADMIN — KETOROLAC TROMETHAMINE 30 MG: 30 INJECTION INTRAMUSCULAR; INTRAVENOUS at 10:32

## 2018-10-01 RX ADMIN — LIDOCAINE HYDROCHLORIDE 0.5 ML: 10 INJECTION, SOLUTION EPIDURAL; INFILTRATION; INTRACAUDAL; PERINEURAL at 10:01

## 2018-10-01 RX ADMIN — ONDANSETRON 4 MG: 2 INJECTION, SOLUTION INTRAMUSCULAR; INTRAVENOUS at 10:11

## 2018-10-01 RX ADMIN — OXYCODONE HYDROCHLORIDE AND ACETAMINOPHEN 1 TABLET: 5; 325 TABLET ORAL at 12:33

## 2018-10-01 RX ADMIN — FENTANYL CITRATE 50 MCG: 50 INJECTION, SOLUTION INTRAMUSCULAR; INTRAVENOUS at 10:21

## 2018-10-01 RX ADMIN — DEXAMETHASONE SODIUM PHOSPHATE 8 MG: 4 INJECTION, SOLUTION INTRAMUSCULAR; INTRAVENOUS at 10:11

## 2018-10-01 RX ADMIN — PROPOFOL 150 MG: 10 INJECTION, EMULSION INTRAVENOUS at 10:21

## 2018-10-01 RX ADMIN — MEPERIDINE HYDROCHLORIDE 12.5 MG: 25 INJECTION INTRAMUSCULAR; INTRAVENOUS; SUBCUTANEOUS at 11:03

## 2018-10-01 RX ADMIN — HYDROMORPHONE HYDROCHLORIDE 0.5 MG: 1 INJECTION, SOLUTION INTRAMUSCULAR; INTRAVENOUS; SUBCUTANEOUS at 11:19

## 2018-10-01 NOTE — OP NOTE
Operative Note    Date of Service:  10/01/18  Time of Service:  11:28 AM    Surgical Staff: Surgeon(s) and Role:     * Beck Cornejo MD - Primary   Additional Staff: none   Pre-operative diagnosis(es): Pre-Op Diagnosis Codes:     * Menorrhagia with irregular cycle [N92.1]     Post-operative diagnosis(es): Post-Op Diagnosis Codes:     * Menorrhagia with irregular cycle [N92.1]   Procedure(s): Procedure(s):  DILATATION AND CURETTAGE HYSTEROSCOPY     Antibiotics: None ordered on call to OR     Anesthesia: Type: General  ASA:  II         Operative findings: Thickened endometrial tissue    Specimens removed: ID Type Source Tests Collected by Time   A :  Tissue Endometrial Curettings TISSUE PATHOLOGY EXAM Beck Cornejo MD 10/1/2018 1035      Fluid Intake: 500 mL   Output: Documented Output  Est. Blood Loss 10 mL  Urine Output 10 mL  NG/OG Output 0 mL    I/O this shift:  In: 300 [I.V.:300]  Out: 5 [Blood:5]     Blood products used: No   Drains: [REMOVED] Urethral Catheter Straight-tip 16 Fr. (Removed)      Implant Information: Nothing was implanted during the procedure   Complications: none   Intraoperative consult(s):    Condition: stable   Disposition: to PACU and then admit to Home          Assessment/Plan     33-year-old female with a history of failed ablation here for dysfunctional uterine bleeding.  She was taken operating room placed under general anesthesia.  Her legs were placed in candycane stirrups and she was prepped and draped in sterile fashion.  Open sided speculum was placed in vagina and anterior lip the cervix grasped with single-tooth tenaculum.  Cervix is dilated with Felipe dilators.  Hysteroscopy revealed fluffy thickened endometrial tissue.  Sharp curettage was performed and returned large amount of endometrial tissue.  This was sent to pathology.  There is good hemostasis on procedure.  Sponge and lap counts are correct patient stable to the procedure.    Beck Sotomayor  MD [place]

## 2018-10-01 NOTE — ANESTHESIA PROCEDURE NOTES
Airway  Urgency: elective    Date/Time: 10/1/2018 10:22 AM  Airway not difficult    General Information and Staff    Patient location during procedure: OR  Anesthesiologist: BETI VANG    Indications and Patient Condition  Indications for airway management: airway protection    Preoxygenated: yes  MILS maintained throughout  Mask difficulty assessment: 1 - vent by mask    Final Airway Details  Final airway type: supraglottic airway      Successful airway: unique  Size 4    Number of attempts at approach: 1    Additional Comments  Atraumatic intubation.

## 2018-10-01 NOTE — INTERVAL H&P NOTE
H&P reviewed. The patient was examined and there are no changes to the H&P. Emotional Behavior: Appropriate Debilities/Disabilities Identified: None LMP 09/17/2018  Plan hysteroscopy D&C.     Beck Cornejo MD

## 2018-10-01 NOTE — ANESTHESIA POSTPROCEDURE EVALUATION
Patient: Hu Houston    Procedure Summary     Date:  10/01/18 Room / Location:  [facility] OR 4 /  [facility] OR    Anesthesia Start:  1016 Anesthesia Stop:  1050    Procedure:  DILATATION AND CURETTAGE HYSTEROSCOPY (N/A Uterus) Diagnosis:       Menorrhagia with irregular cycle      (Menorrhagia with irregular cycle [N92.1])    Surgeon:  Beck Cornejo MD Provider:  Tejal Castillo MD    Anesthesia Type:  general ASA Status:  2          Anesthesia Type: general  Last vitals  BP   96/54 (10/01/18 1141)   Temp   98.4 °F (36.9 °C) (10/01/18 1141)   Pulse   62 (10/01/18 1141)   Resp   16 (10/01/18 1141)     SpO2   98 % (10/01/18 1141)     Post Anesthesia Care and Evaluation    Patient location during evaluation: PHASE II  Patient participation: complete - patient participated  Level of consciousness: awake and alert  Pain score: 2  Pain management: satisfactory to patient  Airway patency: patent  Anesthetic complications: No anesthetic complications  PONV Status: none  Cardiovascular status: acceptable  Respiratory status: acceptable  Hydration status: acceptable

## 2018-10-01 NOTE — ANESTHESIA PREPROCEDURE EVALUATION
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  Not repaired  GI/Hepatic/Renal/Endo    (+)  GERD well controlled,      ROS Comment: Enlarged spleen    Musculoskeletal     Abdominal  - normal exam   Substance History      OB/GYN          Other   (+) blood dyscrasia   history of cancer remission      Other Comment: Chronic myeloid leukemia  Remission for 1 year                  Anesthesia Plan    ASA 2     general     intravenous induction   Anesthetic plan, all risks, benefits, and alternatives have been provided, discussed and informed consent has been obtained with: patient and spouse/significant other.

## 2018-10-02 ENCOUNTER — TELEPHONE (OUTPATIENT)
Dept: OBSTETRICS AND GYNECOLOGY | Facility: CLINIC | Age: 33
End: 2018-10-02

## 2018-10-02 NOTE — TELEPHONE ENCOUNTER
Patient called in c/o poss uti, frequency and burning. Per Dr. Cornejo, ok to call in Rx. Macrobid 100mb bid 7 days. I called in to [facility] and left message on patient voice mail that I had done so.

## 2018-10-03 ENCOUNTER — HOSPITAL ENCOUNTER (EMERGENCY)
Facility: HOSPITAL | Age: 33
Discharge: HOME | End: 2018-10-03
Attending: EMERGENCY MEDICINE | Admitting: EMERGENCY MEDICINE

## 2018-10-03 VITALS
TEMPERATURE: 98 F | SYSTOLIC BLOOD PRESSURE: 108 MMHG | HEIGHT: 68 IN | DIASTOLIC BLOOD PRESSURE: 66 MMHG | HEART RATE: 84 BPM | WEIGHT: 166 LBS | RESPIRATION RATE: 18 BRPM | BODY MASS INDEX: 25.16 KG/M2 | OXYGEN SATURATION: 100 %

## 2018-10-03 DIAGNOSIS — N30.00 ACUTE CYSTITIS WITHOUT HEMATURIA: Primary | ICD-10-CM

## 2018-10-03 LAB
BACTERIA UR QL AUTO: ABNORMAL /HPF
BILIRUB UR QL STRIP: NEGATIVE
CLARITY UR: ABNORMAL
COLOR UR: YELLOW
CYTO UR: NORMAL
GLUCOSE UR STRIP-MCNC: NEGATIVE MG/DL
HGB UR QL STRIP.AUTO: ABNORMAL
HYALINE CASTS UR QL AUTO: ABNORMAL /LPF
KETONES UR QL STRIP: NEGATIVE
LAB AP CASE REPORT: NORMAL
LEUKOCYTE ESTERASE UR QL STRIP.AUTO: ABNORMAL
NITRITE UR QL STRIP: NEGATIVE
PATH REPORT.FINAL DX SPEC: NORMAL
PATH REPORT.GROSS SPEC: NORMAL
PH UR STRIP.AUTO: 8.5 [PH] (ref 4.5–8)
PROT UR QL STRIP: ABNORMAL
RBC # UR: ABNORMAL /HPF
REF LAB TEST METHOD: ABNORMAL
SP GR UR STRIP: 1.01 (ref 1–1.03)
SQUAMOUS #/AREA URNS HPF: ABNORMAL /HPF
UROBILINOGEN UR QL STRIP: ABNORMAL
WBC UR QL AUTO: ABNORMAL /HPF

## 2018-10-03 PROCEDURE — 88305 TISSUE EXAM BY PATHOLOGIST: CPT

## 2018-10-03 PROCEDURE — 81001 URINALYSIS AUTO W/SCOPE: CPT | Performed by: EMERGENCY MEDICINE

## 2018-10-03 PROCEDURE — 99283 EMERGENCY DEPT VISIT LOW MDM: CPT

## 2018-10-03 PROCEDURE — 99282 EMERGENCY DEPT VISIT SF MDM: CPT | Performed by: PHYSICIAN ASSISTANT

## 2018-10-03 RX ORDER — PHENAZOPYRIDINE HYDROCHLORIDE 100 MG/1
100 TABLET, FILM COATED ORAL 3 TIMES DAILY PRN
Qty: 6 TABLET | Refills: 0 | Status: SHIPPED | OUTPATIENT
Start: 2018-10-03 | End: 2018-10-05

## 2018-10-03 RX ORDER — CEFUROXIME AXETIL 250 MG/1
250 TABLET ORAL 2 TIMES DAILY
Qty: 20 TABLET | Refills: 0 | Status: SHIPPED | OUTPATIENT
Start: 2018-10-03 | End: 2018-10-13

## 2018-10-03 RX ORDER — NAPROXEN 500 MG/1
500 TABLET ORAL 2 TIMES DAILY PRN
Qty: 20 TABLET | Refills: 0 | Status: SHIPPED | OUTPATIENT
Start: 2018-10-03 | End: 2018-10-30 | Stop reason: HOSPADM

## 2018-10-03 RX ORDER — HYDROCODONE BITARTRATE AND ACETAMINOPHEN 5; 325 MG/1; MG/1
1 TABLET ORAL ONCE
Status: COMPLETED | OUTPATIENT
Start: 2018-10-03 | End: 2018-10-03

## 2018-10-03 RX ORDER — NITROFURANTOIN 25; 75 MG/1; MG/1
100 CAPSULE ORAL 2 TIMES DAILY
COMMUNITY
End: 2018-10-03

## 2018-10-03 RX ORDER — ONDANSETRON 4 MG/1
4 TABLET, ORALLY DISINTEGRATING ORAL 4 TIMES DAILY PRN
Qty: 12 TABLET | Refills: 0 | Status: SHIPPED | OUTPATIENT
Start: 2018-10-03 | End: 2018-12-12

## 2018-10-03 RX ADMIN — HYDROCODONE BITARTRATE AND ACETAMINOPHEN 1 TABLET: 5; 325 TABLET ORAL at 13:30

## 2018-10-03 NOTE — ED PROVIDER NOTES
"Subjective   History of Present Illness  History of Present Illness    Chief complaint: \"I think I have a uti\"    Location: L flank, suprapubic    Quality/Severity:  Burning, moderate    Timing/Duration: 2 days, worsening    Modifying Factors: urination makes worse     Associated Symptoms: + n/ -v.  Denies fevers or chills.    Narrative: 33-year-old female status post D&C 2 days ago presents with UTI symptoms.  She had told her GYN about them on Monday before the d&c and yesterday had a prescription for Macrobid called in.  Patient states that she has taken 2 of them, but is not feeling better. No change in character from prior uti's. No h/o stones.     Review of Systems  General: Denies fevers or chills.  Denies any weakness or fatigue.  Denies any weight loss or weight gain.  SKIN: Denies any rashes lesions or ulcers.  Denies color change.  ENT: Denies sore throat or rhinorrhea.  Denies ear pain.    EYES: Denies any blurred vision.  Denies any change in vision.  Denies any photophobia.  Denies any vision loss.  LUNGS: Denies any shortness of breath or wheezing.  Denies any cough.  Denies any hemoptysis.  CARDIAC: Denies any chest pain.  Denies palpitations.  Denies syncope.  Denies any edema  ABD: Denies any abdominal pain.  Denies any nausea or vomiting or diarrhea.  Denies any rectal bleeding.  Denies constipation  : Denies any dysuria, urgency, frequency or hematuria.  Denies discharge.  Denies flank pain.  NEURO: Denies any focal weakness.  Denies headache.  Denies seizures.  Denies changes in speech or difficulty walking.  ENDOCRINE: Denies polydipsia and polyuria  M/S: Denies arthralgias, back pain, myalgias or neck pain  HEME/LYMPH: Negative for adenopathy. Does not bruise/bleed easily.   PSYCH: Negative for suicidal ideas. Denies anxiety or depression  review was performed in addition to those in the above all other reviews are negative.      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   • Pulmonary hypertension (CMS/HCC)    • Seizures (CMS/HCC)     as child after head injry   • Splenomegaly    • Status post D&C    • Vitamin D deficiency        Allergies   Allergen Reactions   • Sulfa Antibiotics Unknown (See Comments)     Childhood reaction       Past Surgical History:   Procedure Laterality Date   • BONE MARROW BIOPSY  2013   • D&C HYSTEROSCOPY N/A 10/1/2018    Procedure: DILATATION AND CURETTAGE HYSTEROSCOPY;  Surgeon: Beck Cornejo MD;  Location: [facility] OR;  Service: Obstetrics/Gynecology   • D&C HYSTEROSCOPY ENDOMETRIAL ABLATION N/A 4/9/2018    Procedure: Hysteroscopy NovaSure ablation;  Surgeon: Beck Cornejo MD;  Location: [facility] OR;  Service: Obstetrics/Gynecology   • ENDOSCOPY N/A 8/4/2017    Procedure: ESOPHAGOGASTRODUODENOSCOPY with biopsies;  Surgeon: Sophie Trejo MD;  Location: [facility] OR;  Service:    • GYNECOLOGY EXAM UNDER ANESTHESIA      IUD removal   • TUBAL COAGULATION LAPAROSCOPIC Bilateral 4/9/2018    Procedure: TUBAL COAGULATION LAPAROSCOPIC;  Surgeon: Beck Cornejo MD;  Location: [facility] OR;  Service: Obstetrics/Gynecology       Family History   Problem Relation Age of Onset   • Hyperlipidemia Mother    • Hypertension Mother    • Stomach cancer Maternal Grandmother 72        Adenocarcinoma esophagus and stomach   • Stroke Paternal Grandmother    • Malig Hyperthermia Neg Hx         Social History     Social History   • Marital status:      Occupational History   •  [employer]     Social History Main Topics   • Smoking status: Former Smoker     Packs/day: 1.00     Years: 8.00     Types: Cigarettes     Quit date: 7/4/2006   • Smokeless tobacco: Never Used   • Alcohol use Yes      Comment: Social, maybe once every 6 months   • Drug use: Yes     Types: Cocaine      Comment: prior to 2006   • Sexual activity: Defer      Comment: Tubal     Other Topics Concern   • Not on file     Social History Narrative    Has tattoos. Studying for her Master's degree.       Current Facility-Administered Medications:   •  HYDROcodone-acetaminophen (NORCO) 5-325 MG per tablet 1 tablet, 1 tablet, Oral, Once, Froy Case MD    Current Outpatient Prescriptions:   •  cefuroxime (CEFTIN) 250 MG tablet, Take 1 tablet by mouth 2 (Two) Times a Day for 10 days., Disp: 20 tablet, Rfl: 0  •  dasatinib (SPRYCEL) 50 MG chemo tablet, Take 1 tablet by mouth Daily. (Patient taking differently: Take 50 mg by mouth Every Night.), Disp: 30 tablet, Rfl: 6  •  HYDROcodone-acetaminophen (NORCO) 5-325 MG per tablet, Take 2 tablets by mouth Every 4 (Four) Hours As Needed for Severe Pain ., Disp: 20 tablet, Rfl: 0  •  iron dextran complex in sodium chloride 0.9 % 250 mL IVPB, Infuse  into a venous catheter Take As Directed., Disp: , Rfl:   •  naproxen (NAPROSYN) 500 MG tablet, Take 1 tablet by mouth 2 (Two) Times a Day As Needed for Mild Pain . Take with meals, Disp: 20 tablet, Rfl: 0  •  ondansetron (ZOFRAN) 8 MG tablet, Take 8 mg by mouth Every 8 (Eight) Hours As Needed for Nausea or Vomiting., Disp: , Rfl:   •  ondansetron ODT (ZOFRAN-ODT) 4 MG disintegrating tablet, Take 1 tablet by mouth 4 (Four) Times a Day As Needed for Nausea or Vomiting., Disp: 12 tablet, Rfl: 0  •  phenazopyridine (PYRIDIUM) 100 MG tablet, Take 1 tablet by mouth 3 (Three) Times a Day As Needed for bladder spasms for up  to 2 days., Disp: 6 tablet, Rfl: 0  •  sucralfate (CARAFATE) 1 G tablet, Take 1 g by mouth 4 (Four) Times a Day As Needed (reflux)., Disp: , Rfl:   •  VENTOLIN  (90 Base) MCG/ACT inhaler, Inhale 2 puffs Every 6 (Six) Hours As Needed for Wheezing or Shortness of Air., Disp: , Rfl:         Objective   Physical Exam  Vitals:    10/03/18 1227   BP: 108/66   Pulse: 84   Resp: 18   Temp: 98 °F (36.7 °C)   SpO2: 100%     GENERAL: Alert and oriented ×4.  No apparent distress.  SKIN: Warm, pink and dry  HEENT: Atraumatic normocephalic  LUNGS: Clear to auscultation bilaterally without wheezes, rales or rhonchi  CARDIAC: Regular rate and rhythm.  S1 and S2.  No murmurs, rubs or gallops.  ABD: Soft, moderate suprapubic tenderness.  No guarding or rebound tenderness.  Normal bowel sounds.  No CVA tenderness  M/S: MAEW, no deformity  PSYCH: Normal mood and affect    Procedures           ED Course      Results for orders placed or performed during the hospital encounter of 10/03/18   Urinalysis With Culture If Indicated - Urine, Clean Catch   Result Value Ref Range    Color, UA Yellow Yellow, Straw    Appearance, UA Slightly Cloudy (A) Clear    pH, UA 8.5 (H) 4.5 - 8.0    Specific Gravity, UA 1.015 1.003 - 1.030    Glucose, UA Negative Negative    Ketones, UA Negative Negative, 80 mg/dL (3+), >=160 mg/dL (4+)    Bilirubin, UA Negative Negative    Blood, UA Small (1+) (A) Negative    Protein, UA Trace (A) Negative    Leuk Esterase, UA Trace (A) Negative    Nitrite, UA Negative Negative    Urobilinogen, UA 0.2 E.U./dL 0.2 - 1.0 E.U./dL   Urinalysis, Microscopic Only - Urine, Clean Catch   Result Value Ref Range    RBC, UA 6-12 (A) None Seen /HPF    WBC, UA 3-5 (A) None Seen /HPF    Bacteria, UA Trace (A) None Seen /HPF    Squamous Epithelial Cells, UA 3-6 (A) None Seen, 0-2 /HPF    Hyaline Casts, UA 0-2 None Seen /LPF    Methodology Manual Light Microscopy      vitals stable, afebrile, will tx for uti. Change abx to ceftin.   Add pyridium, naproxen, zofran.    Discussed pertinent labs and imaging findings with the patient/family.  Patient/Family voiced understanding of need to follow-up for recheck, further testing as needed.  Return to the emergency Department warnings were given.                MDM  Number of Diagnoses or Management Options  Acute cystitis without hematuria: new and requires workup     Amount and/or Complexity of Data Reviewed  Clinical lab tests: reviewed and ordered  Tests in the medicine section of CPT®: ordered and reviewed    Risk of Complications, Morbidity, and/or Mortality  Presenting problems: low  Diagnostic procedures: low  Management options: low    Patient Progress  Patient progress: improved    My differential diagnosis for abdominal pain includes but is not limited to:  Gastritis, gastroenteritis, peptic ulcer disease, GERD, esophageal perforation, acute appendicitis, mesenteric adenitis, Meckel’s diverticulum, epiploic appendagitis, diverticulitis, colon cancer, ulcerative colitis, Crohn’s disease, intussusception, small bowel obstruction, adhesions, ischemic bowel, perforated viscus, ileus, obstipation, biliary colic, cholecystitis, cholelithiasis, Armando-Bryant Dominguez, hepatitis, pancreatitis, common bile duct obstruction, cholangitis, bile leak, splenic trauma, splenic rupture, splenic infarction, splenic abscess, abdominal abscess, ascites, spontaneous bacterial peritonitis, hernia, UTI, cystitis,ureterolithiasis, urinary obstruction, ovarian cyst, torsion, pregnancy, ectopic pregnancy, PID, pelvic abscess, mittelschmerz, endometriosis, AAA, myocardial infarction, pneumonia, cancer, porphyria, DKA, medications, sickle cell, viral syndrome, zoster      Final diagnoses:   Acute cystitis without hematuria     Dictated utilizing Dragon dictation         Manda Eldridge PA-C  10/03/18 8214

## 2018-10-03 NOTE — ED NOTES
"Pt reported she was at this hospital on Monday for a D&C.  Pt reports \"I told them then that I had a UTI and they called me in something\" - Macrobid.  Pt reported she had been experiencing burning with urination and increased right flank pain     Brenna Tilley RN  10/03/18 9289    "

## 2018-10-16 ENCOUNTER — OFFICE VISIT (OUTPATIENT)
Dept: OBSTETRICS AND GYNECOLOGY | Facility: CLINIC | Age: 33
End: 2018-10-16

## 2018-10-16 ENCOUNTER — PREP FOR SURGERY (OUTPATIENT)
Dept: OTHER | Facility: HOSPITAL | Age: 33
End: 2018-10-16

## 2018-10-16 VITALS
DIASTOLIC BLOOD PRESSURE: 80 MMHG | WEIGHT: 167 LBS | HEIGHT: 68 IN | BODY MASS INDEX: 25.31 KG/M2 | SYSTOLIC BLOOD PRESSURE: 110 MMHG

## 2018-10-16 DIAGNOSIS — N94.6 DYSMENORRHEA: ICD-10-CM

## 2018-10-16 DIAGNOSIS — R35.0 FREQUENCY OF URINATION: ICD-10-CM

## 2018-10-16 DIAGNOSIS — N92.0 MENORRHAGIA WITH REGULAR CYCLE: Primary | ICD-10-CM

## 2018-10-16 LAB
BILIRUB BLD-MCNC: NEGATIVE MG/DL
CLARITY, POC: CLEAR
COLOR UR: YELLOW
GLUCOSE UR STRIP-MCNC: NEGATIVE MG/DL
KETONES UR QL: NEGATIVE
LEUKOCYTE EST, POC: NEGATIVE
NITRITE UR-MCNC: NEGATIVE MG/ML
PH UR: 6 [PH] (ref 5–8)
PROT UR STRIP-MCNC: NEGATIVE MG/DL
RBC # UR STRIP: ABNORMAL /UL
SP GR UR: 1.01 (ref 1–1.03)
UROBILINOGEN UR QL: NORMAL

## 2018-10-16 PROCEDURE — 99213 OFFICE O/P EST LOW 20 MIN: CPT | Performed by: OBSTETRICS & GYNECOLOGY

## 2018-10-16 RX ORDER — SODIUM CHLORIDE 0.9 % (FLUSH) 0.9 %
1-10 SYRINGE (ML) INJECTION AS NEEDED
Status: CANCELLED | OUTPATIENT
Start: 2018-10-29

## 2018-10-16 RX ORDER — SODIUM CHLORIDE 9 MG/ML
40 INJECTION, SOLUTION INTRAVENOUS AS NEEDED
Status: CANCELLED | OUTPATIENT
Start: 2018-10-29

## 2018-10-16 RX ORDER — SODIUM CHLORIDE 0.9 % (FLUSH) 0.9 %
3 SYRINGE (ML) INJECTION EVERY 12 HOURS SCHEDULED
Status: CANCELLED | OUTPATIENT
Start: 2018-10-29

## 2018-10-16 RX ORDER — CEFAZOLIN SODIUM 2 G/50ML
2 SOLUTION INTRAVENOUS ONCE
Status: CANCELLED | OUTPATIENT
Start: 2018-10-29 | End: 2018-10-29

## 2018-10-16 NOTE — PROGRESS NOTES
"      Hu Houston is a 33 y.o. patient who presents for follow up of   Chief Complaint   Patient presents with   • Post-op       HPI this is a 33-year-old multiparous patient with a tubal ligation is here for follow-up from hysteroscopy D&C.  She had an ablation in April of this year.  Initially she had amenorrhea.  Unfortunately her bleeding has returned.  She complains of menorrhagia and severe dysmenorrhea with each bleeding episode.  At the time of hysteroscopy D&C endometrial sampling was done.  She reports that her bleeding stopped after the operation.  She had no fevers.  She denies any pain today.  She reports that she did have a cycle since that operation and the dysmenorrhea was worsening.    Patient's past medical, surgical and social history were up-to-date in Epic.    The following portions of the patient's history were reviewed and updated as appropriate: allergies, current medications and problem list.    Review of Systems   Constitutional: Negative for appetite change, fever and unexpected weight change.   HENT: Negative for congestion and sore throat.    Respiratory: Negative for cough and shortness of breath.    Cardiovascular: Negative for chest pain and palpitations.   Gastrointestinal: Negative for abdominal distention, abdominal pain, constipation, diarrhea, nausea and vomiting.   Endocrine: Negative.    Genitourinary: Positive for menstrual problem and pelvic pain. Negative for dyspareunia and vaginal discharge.   Skin: Negative.    Neurological: Negative for dizziness and syncope.   Hematological: Negative.    Psychiatric/Behavioral: Negative for dysphoric mood and sleep disturbance. The patient is not nervous/anxious.        /80   Ht 172.7 cm (67.99\")   Wt 75.8 kg (167 lb)   LMP 09/17/2018   BMI 25.40 kg/m²     Physical Exam   Constitutional: She is oriented to person, place, and time. She appears well-developed and well-nourished.   HENT:   Head: Normocephalic and atraumatic. "   Pulmonary/Chest: Effort normal. No respiratory distress.   Abdominal: Soft. She exhibits no distension and no mass. There is no tenderness. There is no rebound and no guarding.   Musculoskeletal: Normal range of motion.   Neurological: She is alert and oriented to person, place, and time.   Skin: Skin is warm and dry.   Psychiatric: She has a normal mood and affect. Her behavior is normal. Judgment and thought content normal.   Nursing note and vitals reviewed.        Assessment/Plan    Hu was seen today for post-op.    Diagnoses and all orders for this visit:    Menorrhagia with regular cycle    Dysmenorrhea    Frequency of urination  -     POC Urinalysis Dipstick    Pathology was reviewed with patient.  No hyperplasia or carcinoma seen.  Discussed options of oral contraceptive pills versus an operation.  Risk benefits alternatives of both were discussed with the patient.  She decided since this is worsening and she has failed an ablation that she would like to proceed with total laparoscopic hysterectomy and bilateral salpingectomy.  The surgery was described as well as postop expectations.  Risk benefits alternatives were described.  Patient's questions were answered.    Return for [facility].      Beck Cornejo MD  10/16/2018  10:55 AM

## 2018-10-18 ENCOUNTER — APPOINTMENT (OUTPATIENT)
Dept: PREADMISSION TESTING | Facility: HOSPITAL | Age: 33
End: 2018-10-18

## 2018-10-18 VITALS
WEIGHT: 169 LBS | RESPIRATION RATE: 16 BRPM | HEART RATE: 72 BPM | HEIGHT: 68 IN | SYSTOLIC BLOOD PRESSURE: 101 MMHG | BODY MASS INDEX: 25.61 KG/M2 | OXYGEN SATURATION: 99 % | DIASTOLIC BLOOD PRESSURE: 59 MMHG

## 2018-10-18 DIAGNOSIS — N92.0 MENORRHAGIA WITH REGULAR CYCLE: ICD-10-CM

## 2018-10-18 LAB
ABO GROUP BLD: NORMAL
ALBUMIN SERPL-MCNC: 4.5 G/DL (ref 3.5–5.2)
ALBUMIN/GLOB SERPL: 1.7 G/DL
ALP SERPL-CCNC: 65 U/L (ref 40–129)
ALT SERPL W P-5'-P-CCNC: 9 U/L (ref 5–33)
ANION GAP SERPL CALCULATED.3IONS-SCNC: 10.3 MMOL/L
AST SERPL-CCNC: 9 U/L (ref 5–32)
BASOPHILS # BLD AUTO: 0.02 10*3/MM3 (ref 0–0.2)
BASOPHILS NFR BLD AUTO: 0.3 % (ref 0–2)
BILIRUB SERPL-MCNC: 0.2 MG/DL (ref 0.2–1.2)
BLD GP AB SCN SERPL QL: NEGATIVE
BUN BLD-MCNC: 13 MG/DL (ref 6–20)
BUN/CREAT SERPL: 20 (ref 7–25)
CALCIUM SPEC-SCNC: 9.2 MG/DL (ref 8.6–10.5)
CHLORIDE SERPL-SCNC: 108 MMOL/L (ref 98–107)
CO2 SERPL-SCNC: 26.7 MMOL/L (ref 22–29)
CREAT BLD-MCNC: 0.65 MG/DL (ref 0.57–1)
DEPRECATED RDW RBC AUTO: 42.3 FL (ref 37–54)
EOSINOPHIL # BLD AUTO: 0.09 10*3/MM3 (ref 0.1–0.3)
EOSINOPHIL NFR BLD AUTO: 1.3 % (ref 0–4)
ERYTHROCYTE [DISTWIDTH] IN BLOOD BY AUTOMATED COUNT: 12.4 % (ref 11.5–14.5)
GFR SERPL CREATININE-BSD FRML MDRD: 104 ML/MIN/1.73
GLOBULIN UR ELPH-MCNC: 2.6 GM/DL
GLUCOSE BLD-MCNC: 85 MG/DL (ref 65–99)
HCT VFR BLD AUTO: 40.7 % (ref 37–47)
HGB BLD-MCNC: 13.8 G/DL (ref 12–16)
IMM GRANULOCYTES # BLD: 0.04 10*3/MM3 (ref 0–0.03)
IMM GRANULOCYTES NFR BLD: 0.6 % (ref 0–0.5)
LYMPHOCYTES # BLD AUTO: 1.21 10*3/MM3 (ref 0.6–4.8)
LYMPHOCYTES NFR BLD AUTO: 17.2 % (ref 20–45)
MCH RBC QN AUTO: 31.4 PG (ref 27–31)
MCHC RBC AUTO-ENTMCNC: 33.9 G/DL (ref 31–37)
MCV RBC AUTO: 92.5 FL (ref 81–99)
MONOCYTES # BLD AUTO: 0.55 10*3/MM3 (ref 0–1)
MONOCYTES NFR BLD AUTO: 7.8 % (ref 3–8)
NEUTROPHILS # BLD AUTO: 5.12 10*3/MM3 (ref 1.5–8.3)
NEUTROPHILS NFR BLD AUTO: 72.8 % (ref 45–70)
NRBC BLD MANUAL-RTO: 0 /100 WBC (ref 0–0)
PLATELET # BLD AUTO: 177 10*3/MM3 (ref 140–500)
PMV BLD AUTO: 10 FL (ref 7.4–10.4)
POTASSIUM BLD-SCNC: 3.7 MMOL/L (ref 3.5–5.2)
PROT SERPL-MCNC: 7.1 G/DL (ref 6–8.5)
RBC # BLD AUTO: 4.4 10*6/MM3 (ref 4.2–5.4)
RH BLD: POSITIVE
SODIUM BLD-SCNC: 145 MMOL/L (ref 136–145)
T&S EXPIRATION DATE: NORMAL
WBC NRBC COR # BLD: 7.03 10*3/MM3 (ref 4.8–10.8)

## 2018-10-18 PROCEDURE — 86901 BLOOD TYPING SEROLOGIC RH(D): CPT | Performed by: OBSTETRICS & GYNECOLOGY

## 2018-10-18 PROCEDURE — 36415 COLL VENOUS BLD VENIPUNCTURE: CPT

## 2018-10-18 PROCEDURE — 86900 BLOOD TYPING SEROLOGIC ABO: CPT | Performed by: OBSTETRICS & GYNECOLOGY

## 2018-10-18 PROCEDURE — 80053 COMPREHEN METABOLIC PANEL: CPT | Performed by: OBSTETRICS & GYNECOLOGY

## 2018-10-18 PROCEDURE — 93005 ELECTROCARDIOGRAM TRACING: CPT

## 2018-10-18 PROCEDURE — 85025 COMPLETE CBC W/AUTO DIFF WBC: CPT | Performed by: OBSTETRICS & GYNECOLOGY

## 2018-10-18 PROCEDURE — 93010 ELECTROCARDIOGRAM REPORT: CPT | Performed by: INTERNAL MEDICINE

## 2018-10-18 PROCEDURE — 86850 RBC ANTIBODY SCREEN: CPT | Performed by: OBSTETRICS & GYNECOLOGY

## 2018-10-18 NOTE — DISCHARGE INSTRUCTIONS
PRE-ADMISSION TESTING INSTRUCTIONS FOR ADULTS    Take these medications the morning of surgery with a small sip of water: use inhaler if needed     Take meds the night before per your routine  No aspirin, advil, aleve, ibuprofen, naproxen, diet pills, decongestants, or herbal/vitamins for a week prior to surgery.    General Instructions:    • Do not eat solid food after midnight the night before surgery.  No gum, mints, or hard candy after midnight the night before surgery.  • You may drink clear liquids the day of surgery up until 2 hours before your arrival time.  • Clear liquids are liquids you can see through. Nothing RED in color.    Plain water    Sports drinks  Sodas     Gelatin (Jell-O)  Fruit juices without pulp such as white grape juice and apple juice  Popsicles that contain no fruit or yogurt  Tea or coffee (no cream or milk added)    • It is beneficial for you to have a clear drink that contains carbohydrates just before you leave your house and before your fasting time begins.  We suggest a 20 ounce bottle of Gatorade or Powerade for non-diabetic patients or a 20 ounce bottle of G2 or Powerade Zero for diabetic patients.     • Patients who avoid smoking, chewing tobacco and alcohol for 4 weeks prior to surgery have a reduced risk of post-operative complications.  If at all possible, quit smoking as many days before surgery as you can.    • Do not smoke, use chewing tobacco or drink alcohol the day of surgery    • Bring your C-PAP/ BI-PAP machine if you use one.  • Wear clean comfortable clothes and socks.  • Do not wear contact lenses, lotion, deodorant, or make-up.  Bring a case for your glasses if applicable. You may brush your teeth the morning of surgery.  • You may wear dentures/partials, do not put adhesive/glue on them.  • Bring crutches or walker if applicable.  • Leave all other jewelry and valuables at home.      Preventing a Surgical Site Infection:    • Shower the night before and on the  morning of surgery using the chlorhexidine soap you were given.  Use a clean washcloth with the soap.  Place clean sheets on your bed after showering the night before surgery. Do not use the CHG soap on your hair, face, or private areas. Wash your body gently for five (5) minutes. Do not scrub your skin.  Dry with a clean towel and dress in clean clothing.    • Do not shave the surgical area for 10 days-2 weeks prior to surgery  because the razor can irritate skin and make it easier to develop an infection.  • Make sure you, your family, and all healthcare providers clean their hands with soap and water or an alcohol based hand  before caring for you or your wound.      Day of surgery:    Your surgeon’s office will advise you of your arrival time for the day of surgery.    Upon arrival, a Pre-op nurse and Anesthesia provider will review your health history, obtain vital signs, and answer questions you may have.  The only belongings needed at this time will be your home medications and if applicable your C-PAP/BI-PAP machine.  If you are staying overnight your family can leave the rest of your belongings in the car and bring them to your room later.  A Pre-op nurse will start an IV and you may receive medication in preparation for surgery, including something to help you relax.  Your family will be able to see you in the Pre-op area.  While you are in surgery your family should notify the waiting room  if they leave the waiting room area and provide a contact phone number.    IF you have any questions, you can call the Pre-Admission Department at [phone] or your surgeon's office.  Notify your surgeon if  you become sick, have a fever, productive cough, or cannot be here the day of surgery    Please be aware that surgery does come with discomfort.  We want to make every effort to control your discomfort so please discuss any uncontrolled symptoms with your nurse.   Your doctor will most  likely have prescribed pain medications.      If you are going home after surgery, you will receive individualized written care instructions before being discharged.  A responsible adult (over the age of 18) must drive you to and from the hospital on the day of your surgery and stay with you for 24 hours after anesthesia.    If you are staying overnight following surgery, you will be transported to your hospital room following the recovery period.  [facility] has all private rooms.    Deductibles and co-payments are collected on the day of service. Please be prepared to pay the required co-pay, deductible or deposit on the day of service as defined by your plan.

## 2018-10-26 ENCOUNTER — ANESTHESIA EVENT (OUTPATIENT)
Dept: PERIOP | Facility: HOSPITAL | Age: 33
End: 2018-10-26

## 2018-10-29 ENCOUNTER — ANESTHESIA (OUTPATIENT)
Dept: PERIOP | Facility: HOSPITAL | Age: 33
End: 2018-10-29

## 2018-10-29 ENCOUNTER — HOSPITAL ENCOUNTER (OUTPATIENT)
Facility: HOSPITAL | Age: 33
Discharge: HOME | End: 2018-10-30
Attending: OBSTETRICS & GYNECOLOGY | Admitting: OBSTETRICS & GYNECOLOGY

## 2018-10-29 DIAGNOSIS — N92.0 MENORRHAGIA WITH REGULAR CYCLE: ICD-10-CM

## 2018-10-29 LAB — HCG SERPL QL: NEGATIVE

## 2018-10-29 PROCEDURE — 25010000002 DEXAMETHASONE PER 1 MG: Performed by: NURSE ANESTHETIST, CERTIFIED REGISTERED

## 2018-10-29 PROCEDURE — 94799 UNLISTED PULMONARY SVC/PX: CPT

## 2018-10-29 PROCEDURE — 25010000002 HYDROMORPHONE 1 MG/ML SOLUTION: Performed by: NURSE ANESTHETIST, CERTIFIED REGISTERED

## 2018-10-29 PROCEDURE — S0260 H&P FOR SURGERY: HCPCS | Performed by: OBSTETRICS & GYNECOLOGY

## 2018-10-29 PROCEDURE — 25010000002 ONDANSETRON PER 1 MG: Performed by: NURSE ANESTHETIST, CERTIFIED REGISTERED

## 2018-10-29 PROCEDURE — 25010000002 LORAZEPAM PER 2 MG: Performed by: NURSE ANESTHETIST, CERTIFIED REGISTERED

## 2018-10-29 PROCEDURE — G0378 HOSPITAL OBSERVATION PER HR: HCPCS

## 2018-10-29 PROCEDURE — 25010000002 KETOROLAC TROMETHAMINE PER 15 MG: Performed by: NURSE ANESTHETIST, CERTIFIED REGISTERED

## 2018-10-29 PROCEDURE — 25010000002 NEOSTIGMINE PER 0.5 MG: Performed by: NURSE ANESTHETIST, CERTIFIED REGISTERED

## 2018-10-29 PROCEDURE — 88307 TISSUE EXAM BY PATHOLOGIST: CPT | Performed by: OBSTETRICS & GYNECOLOGY

## 2018-10-29 PROCEDURE — 84703 CHORIONIC GONADOTROPIN ASSAY: CPT | Performed by: OBSTETRICS & GYNECOLOGY

## 2018-10-29 PROCEDURE — 58571 TLH W/T/O 250 G OR LESS: CPT | Performed by: OBSTETRICS & GYNECOLOGY

## 2018-10-29 PROCEDURE — 25010000002 PROPOFOL 10 MG/ML EMULSION: Performed by: NURSE ANESTHETIST, CERTIFIED REGISTERED

## 2018-10-29 PROCEDURE — 25010000002 FENTANYL CITRATE (PF) 100 MCG/2ML SOLUTION: Performed by: NURSE ANESTHETIST, CERTIFIED REGISTERED

## 2018-10-29 PROCEDURE — 25010000003 CEFAZOLIN SODIUM-DEXTROSE 2-3 GM-%(50ML) RECONSTITUTED SOLUTION: Performed by: OBSTETRICS & GYNECOLOGY

## 2018-10-29 RX ORDER — SODIUM CHLORIDE 0.9 % (FLUSH) 0.9 %
1-10 SYRINGE (ML) INJECTION AS NEEDED
Status: DISCONTINUED | OUTPATIENT
Start: 2018-10-29 | End: 2018-10-29 | Stop reason: HOSPADM

## 2018-10-29 RX ORDER — ONDANSETRON 4 MG/1
4 TABLET, FILM COATED ORAL EVERY 6 HOURS PRN
Status: DISCONTINUED | OUTPATIENT
Start: 2018-10-29 | End: 2018-10-30 | Stop reason: HOSPADM

## 2018-10-29 RX ORDER — ONDANSETRON 2 MG/ML
4 INJECTION INTRAMUSCULAR; INTRAVENOUS ONCE AS NEEDED
Status: COMPLETED | OUTPATIENT
Start: 2018-10-29 | End: 2018-10-29

## 2018-10-29 RX ORDER — GLYCOPYRROLATE 0.2 MG/ML
INJECTION INTRAMUSCULAR; INTRAVENOUS AS NEEDED
Status: DISCONTINUED | OUTPATIENT
Start: 2018-10-29 | End: 2018-10-29 | Stop reason: SURG

## 2018-10-29 RX ORDER — KETOROLAC TROMETHAMINE 30 MG/ML
INJECTION, SOLUTION INTRAMUSCULAR; INTRAVENOUS AS NEEDED
Status: DISCONTINUED | OUTPATIENT
Start: 2018-10-29 | End: 2018-10-29 | Stop reason: SURG

## 2018-10-29 RX ORDER — MIDAZOLAM HYDROCHLORIDE 1 MG/ML
1 INJECTION INTRAMUSCULAR; INTRAVENOUS
Status: DISCONTINUED | OUTPATIENT
Start: 2018-10-29 | End: 2018-10-29 | Stop reason: HOSPADM

## 2018-10-29 RX ORDER — SODIUM CHLORIDE 9 MG/ML
40 INJECTION, SOLUTION INTRAVENOUS AS NEEDED
Status: DISCONTINUED | OUTPATIENT
Start: 2018-10-29 | End: 2018-10-29 | Stop reason: HOSPADM

## 2018-10-29 RX ORDER — PROPOFOL 10 MG/ML
VIAL (ML) INTRAVENOUS AS NEEDED
Status: DISCONTINUED | OUTPATIENT
Start: 2018-10-29 | End: 2018-10-29 | Stop reason: SURG

## 2018-10-29 RX ORDER — CEFAZOLIN SODIUM 2 G/50ML
2 SOLUTION INTRAVENOUS ONCE
Status: COMPLETED | OUTPATIENT
Start: 2018-10-29 | End: 2018-10-29

## 2018-10-29 RX ORDER — KETAMINE HYDROCHLORIDE 100 MG/ML
INJECTION INTRAMUSCULAR; INTRAVENOUS AS NEEDED
Status: DISCONTINUED | OUTPATIENT
Start: 2018-10-29 | End: 2018-10-29 | Stop reason: SURG

## 2018-10-29 RX ORDER — SODIUM CHLORIDE 0.9 % (FLUSH) 0.9 %
3 SYRINGE (ML) INJECTION EVERY 12 HOURS SCHEDULED
Status: DISCONTINUED | OUTPATIENT
Start: 2018-10-29 | End: 2018-10-29 | Stop reason: HOSPADM

## 2018-10-29 RX ORDER — ONDANSETRON 2 MG/ML
4 INJECTION INTRAMUSCULAR; INTRAVENOUS ONCE AS NEEDED
Status: DISCONTINUED | OUTPATIENT
Start: 2018-10-29 | End: 2018-10-29 | Stop reason: HOSPADM

## 2018-10-29 RX ORDER — SODIUM CHLORIDE, SODIUM LACTATE, POTASSIUM CHLORIDE, CALCIUM CHLORIDE 600; 310; 30; 20 MG/100ML; MG/100ML; MG/100ML; MG/100ML
9 INJECTION, SOLUTION INTRAVENOUS CONTINUOUS
Status: DISCONTINUED | OUTPATIENT
Start: 2018-10-29 | End: 2018-10-30 | Stop reason: HOSPADM

## 2018-10-29 RX ORDER — MAGNESIUM HYDROXIDE 1200 MG/15ML
LIQUID ORAL AS NEEDED
Status: DISCONTINUED | OUTPATIENT
Start: 2018-10-29 | End: 2018-10-29 | Stop reason: HOSPADM

## 2018-10-29 RX ORDER — HYDROCODONE BITARTRATE AND ACETAMINOPHEN 5; 325 MG/1; MG/1
2 TABLET ORAL EVERY 4 HOURS PRN
Status: DISCONTINUED | OUTPATIENT
Start: 2018-10-29 | End: 2018-10-30 | Stop reason: HOSPADM

## 2018-10-29 RX ORDER — DEXAMETHASONE SODIUM PHOSPHATE 4 MG/ML
8 INJECTION, SOLUTION INTRA-ARTICULAR; INTRALESIONAL; INTRAMUSCULAR; INTRAVENOUS; SOFT TISSUE ONCE AS NEEDED
Status: COMPLETED | OUTPATIENT
Start: 2018-10-29 | End: 2018-10-29

## 2018-10-29 RX ORDER — MEPERIDINE HYDROCHLORIDE 25 MG/ML
12.5 INJECTION INTRAMUSCULAR; INTRAVENOUS; SUBCUTANEOUS
Status: DISCONTINUED | OUTPATIENT
Start: 2018-10-29 | End: 2018-10-29 | Stop reason: HOSPADM

## 2018-10-29 RX ORDER — MIDAZOLAM HYDROCHLORIDE 1 MG/ML
2 INJECTION INTRAMUSCULAR; INTRAVENOUS
Status: DISCONTINUED | OUTPATIENT
Start: 2018-10-29 | End: 2018-10-29 | Stop reason: HOSPADM

## 2018-10-29 RX ORDER — ONDANSETRON 4 MG/1
4 TABLET, ORALLY DISINTEGRATING ORAL EVERY 6 HOURS PRN
Status: DISCONTINUED | OUTPATIENT
Start: 2018-10-29 | End: 2018-10-30 | Stop reason: HOSPADM

## 2018-10-29 RX ORDER — MORPHINE SULFATE 10 MG/ML
2 INJECTION INTRAMUSCULAR; INTRAVENOUS; SUBCUTANEOUS
Status: DISCONTINUED | OUTPATIENT
Start: 2018-10-29 | End: 2018-10-29 | Stop reason: HOSPADM

## 2018-10-29 RX ORDER — ONDANSETRON 2 MG/ML
4 INJECTION INTRAMUSCULAR; INTRAVENOUS EVERY 6 HOURS PRN
Status: DISCONTINUED | OUTPATIENT
Start: 2018-10-29 | End: 2018-10-30 | Stop reason: HOSPADM

## 2018-10-29 RX ORDER — LIDOCAINE HYDROCHLORIDE 10 MG/ML
0.5 INJECTION, SOLUTION EPIDURAL; INFILTRATION; INTRACAUDAL; PERINEURAL ONCE AS NEEDED
Status: COMPLETED | OUTPATIENT
Start: 2018-10-29 | End: 2018-10-29

## 2018-10-29 RX ORDER — ROCURONIUM BROMIDE 10 MG/ML
INJECTION, SOLUTION INTRAVENOUS AS NEEDED
Status: DISCONTINUED | OUTPATIENT
Start: 2018-10-29 | End: 2018-10-29 | Stop reason: SURG

## 2018-10-29 RX ORDER — SODIUM CHLORIDE, SODIUM LACTATE, POTASSIUM CHLORIDE, CALCIUM CHLORIDE 600; 310; 30; 20 MG/100ML; MG/100ML; MG/100ML; MG/100ML
100 INJECTION, SOLUTION INTRAVENOUS CONTINUOUS
Status: DISCONTINUED | OUTPATIENT
Start: 2018-10-29 | End: 2018-10-30 | Stop reason: HOSPADM

## 2018-10-29 RX ORDER — LIDOCAINE HYDROCHLORIDE 20 MG/ML
INJECTION, SOLUTION INFILTRATION; PERINEURAL AS NEEDED
Status: DISCONTINUED | OUTPATIENT
Start: 2018-10-29 | End: 2018-10-29 | Stop reason: SURG

## 2018-10-29 RX ORDER — FENTANYL CITRATE 50 UG/ML
INJECTION, SOLUTION INTRAMUSCULAR; INTRAVENOUS AS NEEDED
Status: DISCONTINUED | OUTPATIENT
Start: 2018-10-29 | End: 2018-10-29 | Stop reason: SURG

## 2018-10-29 RX ORDER — LORAZEPAM 2 MG/ML
1 INJECTION INTRAMUSCULAR
Status: DISCONTINUED | OUTPATIENT
Start: 2018-10-29 | End: 2018-10-29 | Stop reason: HOSPADM

## 2018-10-29 RX ADMIN — ROCURONIUM BROMIDE 10 MG: 10 INJECTION INTRAVENOUS at 11:07

## 2018-10-29 RX ADMIN — DEXAMETHASONE SODIUM PHOSPHATE 8 MG: 4 INJECTION, SOLUTION INTRAMUSCULAR; INTRAVENOUS at 09:46

## 2018-10-29 RX ADMIN — HYDROMORPHONE HYDROCHLORIDE 1 MG: 1 INJECTION, SOLUTION INTRAMUSCULAR; INTRAVENOUS; SUBCUTANEOUS at 12:28

## 2018-10-29 RX ADMIN — LORAZEPAM 1 MG: 2 INJECTION INTRAMUSCULAR; INTRAVENOUS at 13:17

## 2018-10-29 RX ADMIN — FENTANYL CITRATE 50 MCG: 50 INJECTION, SOLUTION INTRAMUSCULAR; INTRAVENOUS at 11:07

## 2018-10-29 RX ADMIN — ONDANSETRON 4 MG: 2 INJECTION INTRAMUSCULAR; INTRAVENOUS at 09:46

## 2018-10-29 RX ADMIN — SODIUM CHLORIDE, POTASSIUM CHLORIDE, SODIUM LACTATE AND CALCIUM CHLORIDE 100 ML/HR: 600; 310; 30; 20 INJECTION, SOLUTION INTRAVENOUS at 14:02

## 2018-10-29 RX ADMIN — SODIUM CHLORIDE, POTASSIUM CHLORIDE, SODIUM LACTATE AND CALCIUM CHLORIDE 9 ML/HR: 600; 310; 30; 20 INJECTION, SOLUTION INTRAVENOUS at 09:35

## 2018-10-29 RX ADMIN — PROPOFOL 30 MG: 10 INJECTION, EMULSION INTRAVENOUS at 11:08

## 2018-10-29 RX ADMIN — GLYCOPYRROLATE 0.2 MG: 0.2 INJECTION INTRAMUSCULAR; INTRAVENOUS at 11:43

## 2018-10-29 RX ADMIN — FENTANYL CITRATE 100 MCG: 50 INJECTION, SOLUTION INTRAMUSCULAR; INTRAVENOUS at 10:04

## 2018-10-29 RX ADMIN — HYDROCODONE BITARTRATE AND ACETAMINOPHEN 2 TABLET: 5; 325 TABLET ORAL at 14:49

## 2018-10-29 RX ADMIN — HYDROCODONE BITARTRATE AND ACETAMINOPHEN 2 TABLET: 5; 325 TABLET ORAL at 18:43

## 2018-10-29 RX ADMIN — LIDOCAINE HYDROCHLORIDE 0.5 ML: 10 INJECTION, SOLUTION EPIDURAL; INFILTRATION; INTRACAUDAL; PERINEURAL at 09:35

## 2018-10-29 RX ADMIN — HYDROCODONE BITARTRATE AND ACETAMINOPHEN 2 TABLET: 5; 325 TABLET ORAL at 22:30

## 2018-10-29 RX ADMIN — ROCURONIUM BROMIDE 5 MG: 10 INJECTION INTRAVENOUS at 10:04

## 2018-10-29 RX ADMIN — LIDOCAINE HYDROCHLORIDE 100 MG: 20 INJECTION, SOLUTION INFILTRATION; PERINEURAL at 10:05

## 2018-10-29 RX ADMIN — KETOROLAC TROMETHAMINE 30 MG: 30 INJECTION INTRAMUSCULAR; INTRAVENOUS at 11:25

## 2018-10-29 RX ADMIN — GLYCOPYRROLATE 0.2 MG: 0.2 INJECTION INTRAMUSCULAR; INTRAVENOUS at 11:44

## 2018-10-29 RX ADMIN — SODIUM CHLORIDE, POTASSIUM CHLORIDE, SODIUM LACTATE AND CALCIUM CHLORIDE: 600; 310; 30; 20 INJECTION, SOLUTION INTRAVENOUS at 09:53

## 2018-10-29 RX ADMIN — NEOSTIGMINE METHYLSULFATE 2.4 MG: 1 INJECTION, SOLUTION INTRAMUSCULAR; INTRAVENOUS; SUBCUTANEOUS at 11:44

## 2018-10-29 RX ADMIN — PROPOFOL 150 MG: 10 INJECTION, EMULSION INTRAVENOUS at 10:05

## 2018-10-29 RX ADMIN — KETAMINE HYDROCHLORIDE 30 MG: 100 INJECTION INTRAMUSCULAR; INTRAVENOUS at 10:25

## 2018-10-29 RX ADMIN — FENTANYL CITRATE 50 MCG: 50 INJECTION, SOLUTION INTRAMUSCULAR; INTRAVENOUS at 12:15

## 2018-10-29 RX ADMIN — CEFAZOLIN SODIUM 2 G: 2 SOLUTION INTRAVENOUS at 10:05

## 2018-10-29 RX ADMIN — HYDROMORPHONE HYDROCHLORIDE 1 MG: 1 INJECTION, SOLUTION INTRAMUSCULAR; INTRAVENOUS; SUBCUTANEOUS at 12:59

## 2018-10-29 RX ADMIN — PROPOFOL 20 MG: 10 INJECTION, EMULSION INTRAVENOUS at 10:08

## 2018-10-29 RX ADMIN — ROCURONIUM BROMIDE 35 MG: 10 INJECTION INTRAVENOUS at 10:05

## 2018-10-29 NOTE — ADDENDUM NOTE
Addendum  created 10/29/18 1313 by Miquel Quevedo CRNA    Order list changed, Order sets accessed

## 2018-10-29 NOTE — ANESTHESIA PREPROCEDURE EVALUATION
Anesthesia Evaluation     Patient summary reviewed and Nursing notes reviewed   no history of anesthetic complications:  NPO Solid Status: > 8 hours  NPO Liquid Status: > 8 hours           Airway   Mallampati: II  TM distance: >3 FB  Neck ROM: full  No difficulty expected  Dental - normal exam     Pulmonary - normal exam    breath sounds clear to auscultation  (+) asthma (last used inhaler this AM),   Cardiovascular - negative cardio ROS and normal exam    Rhythm: regular  Rate: normal        Neuro/Psych  (+) psychiatric history Depression,     Seizures: 20 years ago.  GI/Hepatic/Renal/Endo    (+)  GERD poorly controlled,      Musculoskeletal (-) negative ROS    Abdominal  - normal exam   Substance History   (+) alcohol use (occ),      OB/GYN          Other      history of cancer remission                    Anesthesia Plan    ASA 2     general   (Refused ITN)  intravenous induction   Anesthetic plan, all risks, benefits, and alternatives have been provided, discussed and informed consent has been obtained with: patient.  Use of blood products discussed with patient  Consented to blood products.

## 2018-10-29 NOTE — ANESTHESIA PROCEDURE NOTES
Airway  Urgency: elective    Airway not difficult    General Information and Staff    Patient location during procedure: OR  CRNA: ANSHUL KIRK    Indications and Patient Condition  Indications for airway management: airway protection    Preoxygenated: yes  MILS not maintained throughout  Mask difficulty assessment: 1 - vent by mask    Final Airway Details  Final airway type: endotracheal airway      Successful airway: ETT  Cuffed: yes   Successful intubation technique: direct laryngoscopy  Endotracheal tube insertion site: oral  Blade: Dodge  Blade size: 2  ETT size: 7.0 mm  Cormack-Lehane Classification: grade I - full view of glottis  Placement verified by: chest auscultation and capnometry   Cuff volume (mL): 6  Measured from: lips  ETT to lips (cm): 21  Number of attempts at approach: 1    Additional Comments  Atraumatic

## 2018-10-29 NOTE — ANESTHESIA POSTPROCEDURE EVALUATION
Patient: Hu Houston    Procedure Summary     Date:  10/29/18 Room / Location:  [facility] OR 1 /  [facility] OR    Anesthesia Start:  1000 Anesthesia Stop:  1209    Procedure:  TOTAL LAPAROSCOPIC HYSTERECTOMY, bilateral salpingectomy (Bilateral Abdomen) Diagnosis:       Menorrhagia with regular cycle      (Menorrhagia with regular cycle [N92.0])    Surgeon:  Beck Cornejo MD Provider:  Dagoberto Kirby CRNA    Anesthesia Type:  general, ITN ASA Status:  2          Anesthesia Type: general, ITN  Last vitals  BP   114/64 (10/29/18 1227)   Temp   98.3 °F (36.8 °C) (10/29/18 0902)   Pulse   62 (10/29/18 1227)   Resp   16 (10/29/18 0902)     SpO2   98 % (10/29/18 1227)     Post Anesthesia Care and Evaluation    Patient location during evaluation: PACU  Patient participation: complete - patient participated  Level of consciousness: awake and alert  Pain score: 0  Pain management: adequate  Airway patency: patent  Anesthetic complications: No anesthetic complications    Cardiovascular status: acceptable  Respiratory status: acceptable  Hydration status: acceptable

## 2018-10-30 VITALS
RESPIRATION RATE: 18 BRPM | BODY MASS INDEX: 25.4 KG/M2 | HEIGHT: 68 IN | DIASTOLIC BLOOD PRESSURE: 51 MMHG | HEART RATE: 82 BPM | SYSTOLIC BLOOD PRESSURE: 121 MMHG | WEIGHT: 167.6 LBS | TEMPERATURE: 98.2 F | OXYGEN SATURATION: 100 %

## 2018-10-30 LAB
DEPRECATED RDW RBC AUTO: 43.9 FL (ref 37–54)
ERYTHROCYTE [DISTWIDTH] IN BLOOD BY AUTOMATED COUNT: 12.7 % (ref 11.5–14.5)
HCT VFR BLD AUTO: 38.1 % (ref 37–47)
HGB BLD-MCNC: 13 G/DL (ref 12–16)
MCH RBC QN AUTO: 32.2 PG (ref 27–31)
MCHC RBC AUTO-ENTMCNC: 34.1 G/DL (ref 31–37)
MCV RBC AUTO: 94.3 FL (ref 81–99)
PLATELET # BLD AUTO: 190 10*3/MM3 (ref 140–500)
PMV BLD AUTO: 10.6 FL (ref 7.4–10.4)
RBC # BLD AUTO: 4.04 10*6/MM3 (ref 4.2–5.4)
WBC NRBC COR # BLD: 18.06 10*3/MM3 (ref 4.8–10.8)

## 2018-10-30 PROCEDURE — 94799 UNLISTED PULMONARY SVC/PX: CPT

## 2018-10-30 PROCEDURE — 85027 COMPLETE CBC AUTOMATED: CPT | Performed by: OBSTETRICS & GYNECOLOGY

## 2018-10-30 PROCEDURE — G0378 HOSPITAL OBSERVATION PER HR: HCPCS

## 2018-10-30 PROCEDURE — 99024 POSTOP FOLLOW-UP VISIT: CPT | Performed by: OBSTETRICS & GYNECOLOGY

## 2018-10-30 RX ORDER — OXYCODONE HYDROCHLORIDE AND ACETAMINOPHEN 5; 325 MG/1; MG/1
1 TABLET ORAL EVERY 4 HOURS PRN
Qty: 30 TABLET | Refills: 0 | Status: SHIPPED | OUTPATIENT
Start: 2018-10-30 | End: 2018-12-12

## 2018-10-30 RX ORDER — OXYCODONE HYDROCHLORIDE AND ACETAMINOPHEN 5; 325 MG/1; MG/1
TABLET ORAL
Status: COMPLETED
Start: 2018-10-30 | End: 2018-10-30

## 2018-10-30 RX ORDER — IBUPROFEN 600 MG/1
600 TABLET ORAL EVERY 6 HOURS PRN
Qty: 30 TABLET | Refills: 0 | Status: SHIPPED | OUTPATIENT
Start: 2018-10-30 | End: 2018-12-12

## 2018-10-30 RX ORDER — OXYCODONE HYDROCHLORIDE AND ACETAMINOPHEN 5; 325 MG/1; MG/1
2 TABLET ORAL ONCE
Status: COMPLETED | OUTPATIENT
Start: 2018-10-30 | End: 2018-10-30

## 2018-10-30 RX ADMIN — HYDROCODONE BITARTRATE AND ACETAMINOPHEN 2 TABLET: 5; 325 TABLET ORAL at 06:12

## 2018-10-30 RX ADMIN — OXYCODONE HYDROCHLORIDE AND ACETAMINOPHEN 2 TABLET: 5; 325 TABLET ORAL at 15:12

## 2018-10-30 RX ADMIN — HYDROCODONE BITARTRATE AND ACETAMINOPHEN 2 TABLET: 5; 325 TABLET ORAL at 10:48

## 2018-10-31 LAB
CYTO UR: NORMAL
LAB AP CASE REPORT: NORMAL
PATH REPORT.FINAL DX SPEC: NORMAL
PATH REPORT.GROSS SPEC: NORMAL

## 2018-11-06 ENCOUNTER — OFFICE VISIT (OUTPATIENT)
Dept: OBSTETRICS AND GYNECOLOGY | Facility: CLINIC | Age: 33
End: 2018-11-06

## 2018-11-06 VITALS
BODY MASS INDEX: 25.31 KG/M2 | SYSTOLIC BLOOD PRESSURE: 120 MMHG | HEIGHT: 68 IN | DIASTOLIC BLOOD PRESSURE: 64 MMHG | WEIGHT: 167 LBS

## 2018-11-06 DIAGNOSIS — Z13.9 SCREENING FOR CONDITION: Primary | ICD-10-CM

## 2018-11-06 DIAGNOSIS — N92.0 MENORRHAGIA WITH REGULAR CYCLE: ICD-10-CM

## 2018-11-06 LAB
BILIRUB BLD-MCNC: NEGATIVE MG/DL
CLARITY, POC: CLEAR
COLOR UR: YELLOW
GLUCOSE UR STRIP-MCNC: NEGATIVE MG/DL
KETONES UR QL: NEGATIVE
LEUKOCYTE EST, POC: NEGATIVE
NITRITE UR-MCNC: NEGATIVE MG/ML
PH UR: 6 [PH] (ref 5–8)
PROT UR STRIP-MCNC: NEGATIVE MG/DL
RBC # UR STRIP: ABNORMAL /UL
SP GR UR: 1.02 (ref 1–1.03)
UROBILINOGEN UR QL: NORMAL

## 2018-11-06 PROCEDURE — 99024 POSTOP FOLLOW-UP VISIT: CPT | Performed by: OBSTETRICS & GYNECOLOGY

## 2018-11-06 PROCEDURE — 81002 URINALYSIS NONAUTO W/O SCOPE: CPT | Performed by: OBSTETRICS & GYNECOLOGY

## 2018-11-06 RX ORDER — PHENAZOPYRIDINE HYDROCHLORIDE 200 MG/1
200 TABLET, FILM COATED ORAL 3 TIMES DAILY PRN
Qty: 12 TABLET | Refills: 0 | Status: SHIPPED | OUTPATIENT
Start: 2018-11-06 | End: 2018-12-12

## 2018-11-06 NOTE — PROGRESS NOTES
"      Hu Houston is a 33 y.o. patient who presents for follow up of   Chief Complaint   Patient presents with   • Post-op     1 week TLH bleeding       HPI patient is 1 week status post total laparoscopic hysterectomy.  Postoperatively the patient had some vaginal bleeding followed by serous vaginal discharge.  That lasted about 3 days and it is completely stopped.  She's had no further bleeding or liquidy discharge.  She reports no fevers.  She was constipated but now is having regular bowel movements.  She had easily laxative.  Her pain is improving and she wants to restart work in 2 weeks.  The patient does however report painful urination.  She denies any fevers or back pain.    The following portions of the patient's history were reviewed and updated as appropriate: allergies, current medications and problem list.    Review of Systems   Constitutional: Negative for appetite change, fever and unexpected weight change.   HENT: Negative for congestion and sore throat.    Respiratory: Negative for cough and shortness of breath.    Cardiovascular: Negative for chest pain and palpitations.   Gastrointestinal: Negative for abdominal distention, abdominal pain, constipation, diarrhea, nausea and vomiting.   Endocrine: Negative.    Genitourinary: Negative for dyspareunia, menstrual problem, pelvic pain and vaginal discharge.   Skin: Negative.    Neurological: Negative for dizziness and syncope.   Hematological: Negative.    Psychiatric/Behavioral: Negative for dysphoric mood and sleep disturbance. The patient is not nervous/anxious.        /64   Ht 172.7 cm (68\")   Wt 75.8 kg (167 lb)   LMP 10/11/2018   Breastfeeding? No   BMI 25.39 kg/m²     Physical Exam   Constitutional: She is oriented to person, place, and time. She appears well-developed and well-nourished.   HENT:   Head: Normocephalic and atraumatic.   Pulmonary/Chest: Effort normal. No respiratory distress.   Abdominal: Soft. She exhibits no " distension and no mass. There is no tenderness. There is no rebound and no guarding.   Musculoskeletal: Normal range of motion.   Neurological: She is alert and oriented to person, place, and time.   Skin: Skin is warm and dry.   Psychiatric: She has a normal mood and affect. Her behavior is normal. Judgment and thought content normal.   Nursing note and vitals reviewed.    Urinalysis was reviewed and is nitrite negative.  Trace of blood is present.    Assessment/Plan    Hu was seen today for post-op.    Diagnoses and all orders for this visit:    Screening for condition  -     POC Urinalysis Dipstick    Menorrhagia with regular cycle    Other orders  -     phenazopyridine (PYRIDIUM) 200 MG tablet; Take 1 tablet by mouth 3 (Three) Times a Day As Needed for bladder spasms.    No evidence of UTI.  Pain with voiding may be related to the surgery or the history of a Smith catheter being present.  We'll start oral Pyridium.  Follow-up for 5 week cuff check.    Return in about 5 weeks (around 12/11/2018) for Postop.      Beck Cornejo MD  11/6/2018  1:09 PM

## 2018-11-13 ENCOUNTER — OFFICE VISIT (OUTPATIENT)
Dept: OBSTETRICS AND GYNECOLOGY | Facility: CLINIC | Age: 33
End: 2018-11-13

## 2018-11-13 VITALS — SYSTOLIC BLOOD PRESSURE: 122 MMHG | WEIGHT: 167 LBS | DIASTOLIC BLOOD PRESSURE: 82 MMHG | BODY MASS INDEX: 25.39 KG/M2

## 2018-11-13 DIAGNOSIS — N92.0 MENORRHAGIA WITH REGULAR CYCLE: Primary | ICD-10-CM

## 2018-11-13 PROCEDURE — 99024 POSTOP FOLLOW-UP VISIT: CPT | Performed by: OBSTETRICS & GYNECOLOGY

## 2018-11-13 NOTE — PROGRESS NOTES
Hu Houston is a 33 y.o. patient who presents for follow up of   Chief Complaint   Patient presents with   • Vaginal Bleeding       HPI 2 weeks postop status post total laparoscopic hysterectomy and bilateral salpingectomy.  Patient reports that she's had some bloody discharge for the last 24-48 hours.  It is light in nature.  Her postop pain is gone.  She has had some abdominal cramping.  She denies any constipation or dysuria.  She reports no fevers.    The following portions of the patient's history were reviewed and updated as appropriate: allergies, current medications and problem list.    Review of Systems   Constitutional: Negative for appetite change, fever and unexpected weight change.   HENT: Negative for congestion and sore throat.    Respiratory: Negative for cough and shortness of breath.    Cardiovascular: Negative for chest pain and palpitations.   Gastrointestinal: Negative for abdominal distention, abdominal pain, constipation, diarrhea, nausea and vomiting.   Endocrine: Negative.    Genitourinary: Positive for vaginal bleeding (scant). Negative for dyspareunia, menstrual problem, pelvic pain and vaginal discharge.   Skin: Negative.    Neurological: Negative for dizziness and syncope.   Hematological: Negative.    Psychiatric/Behavioral: Negative for dysphoric mood and sleep disturbance. The patient is not nervous/anxious.        /82   Wt 75.8 kg (167 lb)   LMP 10/11/2018   BMI 25.39 kg/m²     Physical Exam   Constitutional: She appears well-developed and well-nourished.   Pulmonary/Chest: Effort normal. No respiratory distress.   Abdominal: Soft. She exhibits no distension and no mass. There is no tenderness. There is no rebound and no guarding.   Genitourinary: There is no rash, tenderness or lesion on the right labia. There is no rash, tenderness or lesion on the left labia. There is tenderness in the vagina. No erythema (no active BRB, did not open speculum to cuff completely  due to fact only 2 weeks out. ) or bleeding in the vagina. No vaginal discharge found.   Nursing note and vitals reviewed.        Assessment/Plan    Hu was seen today for vaginal bleeding.    Diagnoses and all orders for this visit:    Menorrhagia with regular cycle    Reassured at this time.  Bleeding may have been related to dissolving Vicryl suture.  We will repeat vaginal cuff exam at 6 week postpartum check.  Return to office if bleeding continues or worsens.    Return in about 4 weeks (around 12/11/2018) for Postop.      Beck Cornejo MD  11/13/2018  10:04 AM

## 2018-11-30 ENCOUNTER — APPOINTMENT (OUTPATIENT)
Dept: LAB | Facility: HOSPITAL | Age: 33
End: 2018-11-30

## 2018-11-30 ENCOUNTER — APPOINTMENT (OUTPATIENT)
Dept: ONCOLOGY | Facility: CLINIC | Age: 33
End: 2018-11-30

## 2018-12-12 ENCOUNTER — OFFICE VISIT (OUTPATIENT)
Dept: OBSTETRICS AND GYNECOLOGY | Facility: CLINIC | Age: 33
End: 2018-12-12

## 2018-12-12 VITALS
DIASTOLIC BLOOD PRESSURE: 76 MMHG | WEIGHT: 164 LBS | BODY MASS INDEX: 24.86 KG/M2 | SYSTOLIC BLOOD PRESSURE: 120 MMHG | HEIGHT: 68 IN

## 2018-12-12 DIAGNOSIS — D50.0 IRON DEFICIENCY ANEMIA DUE TO CHRONIC BLOOD LOSS: ICD-10-CM

## 2018-12-12 DIAGNOSIS — N92.1 MENORRHAGIA WITH IRREGULAR CYCLE: Primary | ICD-10-CM

## 2018-12-12 PROCEDURE — 99024 POSTOP FOLLOW-UP VISIT: CPT | Performed by: OBSTETRICS & GYNECOLOGY

## 2018-12-12 NOTE — PROGRESS NOTES
"      Hu Houston is a 33 y.o. patient who presents for follow up of   Chief Complaint   Patient presents with   • Post-op       HPI 6 weeks status post total laparoscopic hysterectomy and bilateral salpingectomy.  Patient reports no fever no vaginal bleeding.  She is getting her NG back.  She is tying regular diet with no nausea vomiting.  She is voiding without difficulty.    The following portions of the patient's history were reviewed and updated as appropriate: allergies, current medications and problem list.    Review of Systems   Constitutional: Negative for appetite change, fever and unexpected weight change.   HENT: Negative for congestion and sore throat.    Respiratory: Negative for cough and shortness of breath.    Cardiovascular: Negative for chest pain and palpitations.   Gastrointestinal: Negative for abdominal distention, abdominal pain, constipation, diarrhea, nausea and vomiting.   Endocrine: Negative.    Genitourinary: Negative for dyspareunia, pelvic pain, vaginal bleeding and vaginal discharge.   Skin: Negative.    Neurological: Negative for dizziness and syncope.   Hematological: Negative.    Psychiatric/Behavioral: Negative for dysphoric mood and sleep disturbance. The patient is not nervous/anxious.        /76   Ht 172.7 cm (68\")   Wt 74.4 kg (164 lb)   LMP 10/11/2018   BMI 24.94 kg/m²     Physical Exam   Constitutional: She is oriented to person, place, and time. She appears well-developed and well-nourished.   HENT:   Head: Normocephalic and atraumatic.   Pulmonary/Chest: Effort normal. No respiratory distress.   Abdominal: Soft. She exhibits no distension and no mass. There is no tenderness. There is no rebound and no guarding.   Genitourinary: There is no rash, tenderness or lesion on the right labia. There is no rash, tenderness or lesion on the left labia. Right adnexum displays no mass, no tenderness and no fullness. Left adnexum displays no mass, no tenderness and no " fullness. There is tenderness in the vagina. No erythema or bleeding in the vagina. No foreign body in the vagina. No signs of injury (cuff well healed) around the vagina. No vaginal discharge found.   Musculoskeletal: Normal range of motion.   Neurological: She is alert and oriented to person, place, and time.   Skin: Skin is warm and dry.   Psychiatric: She has a normal mood and affect. Her behavior is normal. Judgment and thought content normal.   Nursing note and vitals reviewed.        Assessment/Plan    Hu was seen today for post-op.    Diagnoses and all orders for this visit:    Menorrhagia with irregular cycle    Iron deficiency anemia due to chronic blood loss    Patient's incisions are well-healed.  She is still slightly tender at the cuff.  I would recommend pelvic rest for another week or 2 just for the tenderness.  Follow-up in 1 year for annual physical.    Return in about 1 year (around 12/12/2019) for Annual physical.      Beck Cornejo MD  12/12/2018  2:02 PM

## 2019-01-18 ENCOUNTER — TELEPHONE (OUTPATIENT)
Dept: ONCOLOGY | Facility: HOSPITAL | Age: 34
End: 2019-01-18

## 2019-01-30 ENCOUNTER — OFFICE VISIT (OUTPATIENT)
Dept: ONCOLOGY | Facility: CLINIC | Age: 34
End: 2019-01-30

## 2019-01-30 ENCOUNTER — LAB (OUTPATIENT)
Dept: LAB | Facility: HOSPITAL | Age: 34
End: 2019-01-30

## 2019-01-30 VITALS
OXYGEN SATURATION: 100 % | HEIGHT: 67 IN | TEMPERATURE: 98.3 F | DIASTOLIC BLOOD PRESSURE: 76 MMHG | SYSTOLIC BLOOD PRESSURE: 116 MMHG | HEART RATE: 72 BPM | BODY MASS INDEX: 27 KG/M2 | RESPIRATION RATE: 16 BRPM | WEIGHT: 172 LBS

## 2019-01-30 DIAGNOSIS — T50.905A DRUG-INDUCED HEPATITIS: Primary | ICD-10-CM

## 2019-01-30 DIAGNOSIS — C92.10 CML (CHRONIC MYELOID LEUKEMIA) (HCC): ICD-10-CM

## 2019-01-30 DIAGNOSIS — D50.0 IRON DEFICIENCY ANEMIA DUE TO CHRONIC BLOOD LOSS: ICD-10-CM

## 2019-01-30 DIAGNOSIS — D50.9 IRON DEFICIENCY ANEMIA, UNSPECIFIED IRON DEFICIENCY ANEMIA TYPE: ICD-10-CM

## 2019-01-30 DIAGNOSIS — K71.6 DRUG-INDUCED HEPATITIS: Primary | ICD-10-CM

## 2019-01-30 LAB
ALBUMIN SERPL-MCNC: 4 G/DL (ref 3.5–5.2)
ALBUMIN/GLOB SERPL: 1.4 G/DL (ref 1.1–2.4)
ALP SERPL-CCNC: 234 U/L (ref 38–116)
ALT SERPL W P-5'-P-CCNC: 655 U/L (ref 0–33)
ANION GAP SERPL CALCULATED.3IONS-SCNC: 10.3 MMOL/L
AST SERPL-CCNC: 382 U/L (ref 0–32)
BASOPHILS # BLD AUTO: 0.04 10*3/MM3 (ref 0–0.1)
BASOPHILS NFR BLD AUTO: 0.8 % (ref 0–1.1)
BILIRUB SERPL-MCNC: 0.9 MG/DL (ref 0.1–1.2)
BUN BLD-MCNC: 10 MG/DL (ref 6–20)
BUN/CREAT SERPL: 12.8 (ref 7.3–30)
CALCIUM SPEC-SCNC: 9.4 MG/DL (ref 8.5–10.2)
CHLORIDE SERPL-SCNC: 104 MMOL/L (ref 98–107)
CO2 SERPL-SCNC: 27.7 MMOL/L (ref 22–29)
CREAT BLD-MCNC: 0.78 MG/DL (ref 0.6–1.1)
DEPRECATED RDW RBC AUTO: 46.5 FL (ref 37–49)
EOSINOPHIL # BLD AUTO: 0.14 10*3/MM3 (ref 0–0.36)
EOSINOPHIL NFR BLD AUTO: 2.8 % (ref 1–5)
ERYTHROCYTE [DISTWIDTH] IN BLOOD BY AUTOMATED COUNT: 13.2 % (ref 11.7–14.5)
FERRITIN SERPL-MCNC: 507.1 NG/ML (ref 11–207)
GFR SERPL CREATININE-BSD FRML MDRD: 85 ML/MIN/1.73
GLOBULIN UR ELPH-MCNC: 2.8 GM/DL (ref 1.8–3.5)
GLUCOSE BLD-MCNC: 99 MG/DL (ref 74–124)
HCT VFR BLD AUTO: 41.6 % (ref 34–45)
HGB BLD-MCNC: 13.7 G/DL (ref 11.5–14.9)
IMM GRANULOCYTES # BLD AUTO: 0.05 10*3/MM3 (ref 0–0.03)
IMM GRANULOCYTES NFR BLD AUTO: 1 % (ref 0–0.5)
IRON 24H UR-MRATE: 184 MCG/DL (ref 37–145)
IRON SATN MFR SERPL: 47 % (ref 14–48)
LYMPHOCYTES # BLD AUTO: 0.93 10*3/MM3 (ref 1–3.5)
LYMPHOCYTES NFR BLD AUTO: 18.3 % (ref 20–49)
MCH RBC QN AUTO: 31.5 PG (ref 27–33)
MCHC RBC AUTO-ENTMCNC: 32.9 G/DL (ref 32–35)
MCV RBC AUTO: 95.6 FL (ref 83–97)
MONOCYTES # BLD AUTO: 0.63 10*3/MM3 (ref 0.25–0.8)
MONOCYTES NFR BLD AUTO: 12.4 % (ref 4–12)
NEUTROPHILS # BLD AUTO: 3.29 10*3/MM3 (ref 1.5–7)
NEUTROPHILS NFR BLD AUTO: 64.7 % (ref 39–75)
NRBC BLD AUTO-RTO: 0 /100 WBC (ref 0–0)
PLATELET # BLD AUTO: 186 10*3/MM3 (ref 150–375)
PMV BLD AUTO: 10.4 FL (ref 8.9–12.1)
POTASSIUM BLD-SCNC: 4.3 MMOL/L (ref 3.5–4.7)
PROT SERPL-MCNC: 6.8 G/DL (ref 6.3–8)
RBC # BLD AUTO: 4.35 10*6/MM3 (ref 3.9–5)
SODIUM BLD-SCNC: 142 MMOL/L (ref 134–145)
TIBC SERPL-MCNC: 388 MCG/DL (ref 249–505)
TRANSFERRIN SERPL-MCNC: 277 MG/DL (ref 200–360)
WBC NRBC COR # BLD: 5.08 10*3/MM3 (ref 4–10)

## 2019-01-30 PROCEDURE — 99215 OFFICE O/P EST HI 40 MIN: CPT | Performed by: INTERNAL MEDICINE

## 2019-01-30 PROCEDURE — 83540 ASSAY OF IRON: CPT

## 2019-01-30 PROCEDURE — 36415 COLL VENOUS BLD VENIPUNCTURE: CPT | Performed by: INTERNAL MEDICINE

## 2019-01-30 PROCEDURE — 84466 ASSAY OF TRANSFERRIN: CPT

## 2019-01-30 PROCEDURE — 80053 COMPREHEN METABOLIC PANEL: CPT

## 2019-01-30 PROCEDURE — 82728 ASSAY OF FERRITIN: CPT

## 2019-01-30 PROCEDURE — 85025 COMPLETE CBC W/AUTO DIFF WBC: CPT | Performed by: INTERNAL MEDICINE

## 2019-01-30 NOTE — PROGRESS NOTES
REASONS FOR FOLLOWUP:  pt. Denies abd pain more of abd pressure   1. 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     2. Patient was started on hydroxyurea temporarily on 10/23/2013 with significant drop of WBC counts.     3. Patient started on Sprycel on 12/02/2013. Peripheral blood tested positive with 3.4% of cells positive for BCR-ABL translocation, tested 02/17/2014.     4. The patient had CCyR 6 months into treatment as tested on 05/15/2014.     5. Complete molecular response as tested 08/08/14 with negative product of BCR/ABL.     6. There was interruption of her treatment due to insurance coverage and misunderstanding from patient's part, she had a relapse of disease with BCR/ABL product at 12.626% in March 2016, and she restarted back on Sprycel, with good response as tested on 08/31/2016 with BCR/ABL at 0.294%.   7. Recurrent iron deficiency anemia not responding to oral iron supplementation.  She also had significant constipation associated with oral iron. Patient was given Feraheme treatment 2 doses in September 2016 and repeated in January 2017.     8.  Since June 2017, patient has been persistently tested negative for BCR/ABL by RT-PCR every 3 month period.     9.  Patient reported worsening leg cramping in end of July 2018.  Sprycel was decreased to 50 mg daily.  Laboratory study on 8/31/2018 was negative for BCR-ABL by RT-PCR.       HISTORY OF PRESENT ILLNESS: The patient is a 33 y.o.   female presenting today for 3-month follow-up and lab review.  Patient is accompanied by her  today.    Patient reports she has discomfort in the epigastric area for the past 2-3 days.  She denies nausea no vomiting.  She reports recently finished a course of antibiotics for urinary tract infection.   Patient reports no fever, no sweating, no chills. She does not remember the name of antibiotic.  I called and spoke to her  pharmacist and I was told that she was given cefdinir 300 mg q.12 h. for 10 days. In the meantime, she was also given Diflucan 200 mg 3 times a week for a total of 3 doses. Patient reports this was given for prevention of yeast infection which she had previously after oral antibiotic. Patient denies any other new medication recently.     Patient reports she has significant fatigue for the past several days and she thinks this is because of her recurrent iron deficiency. However, lab study today reported normal iron saturation 47% and elevated ferritin 507.1 ng/mL with free iron 184 and TIBC 388.  Laboratory studies today also report her hemoglobin is normal at 13.7 and platelets 186,000. normal WBC at 5080 including ANC 3300, lymphocytes 930 and monocytes 630.     More importantly laboratory study also reported significantly elevated ALT at 655,  and alkaline phosphatase 234 but normal total bilirubin 0.9. She had normal renal function creatinine 0.78. Normal electrolytes.     I searched Up-to-Date and suspect that this patient has drug-induced hepatitis from Diflucan or with combination of Diflucan with Sprycel. This patient has been on Sprycel for years and had no problem with abnormal liver panel previously. It seems Diflucan inhibits CY moderately, which likely will interfere with the metabolism of Sprycel. I instructed the patient to hold her Sprycel for now and we will repeat her liver panel every 2 weeks for reassessment.        Past Medical History:   Diagnosis Date   • Anemia in neoplastic disease    • Asthma     childhood   • Chiari I malformation (CMS/HCC)     eval by Dr Moore, NS 2016   • Chronic myelogenous leukemia (CMS/HCC)     remission, Dr Castle follows   • Chronic pain    • CML (chronic myelocytic leukemia) (CMS/HCC)    • Cystitis    • Depression    • GERD (gastroesophageal reflux disease)     ulcer   • H/O Iron deficiency anemia    • H/O Lower extremity pain     Resolved.   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      *  Hysterectomy in October 2018      OB/GYN HISTORY: Menarche age 9. G5, P4, 1 miscarriage of the third pregnancy. First pregnancy at age 17.        HEMATOLOGIC/ONCOLOGIC HISTORY: History from previous dates can be found in the separate document.        Laboratory results on 09/23/2015 showed normalization of hemoglobin 12.2, but still has macrocytosis, MCV 73.3. She has normal platelets and WBC at 9400. Serum ferritin < 5 ng/ml, iron sats 6.3%, iron 29, TIBC 455 mcg/ml. Still has severe iron deficiency, needs to continue oral iron therapy. The patient restarted taking oral iron supplementation which was probably stopped in the end of November 2015.        Laboratory study on 03/22/2016 reported normal WBC 8450, neutrophils 6100, lymphs is 1400 and monocytes 550. Serum iron was 26, TIBC 469 on saturation 6% and ferritin less than 5 NG/ML. Chemistry lab reported normal renal function with a creatinine 0.69, normal liver function panel, total protein 7.5 and albumin 4.2, normal electrolytes. Patient was restarted back on oral on sedimentation twice a day with good tolerance.      Patient continues take Lortab as needed for left leg cramping. Urine drug test on 08/05/2016 was completely negative, and repeated study on August 26 was positive for opiate, negative for other recreational drugs.      Repeat laboratory study on 08/31/2016 reported iron 21, ferritin less than 5, iron saturation 5%, TIBC 462. Hemoglobin was 11.5 MCV 78.1 MCHC 30.4. Platelets 163,000. Total WBC 8870 including neutrophils 6400 and lymphocytes 1500. BCR/ABL was 0.294% by RT-PCR method.        Patient was given IV Feraheme treatment in September 2016, with 2 doses. Repeated laboratory study on 10/06/2016 reported significantly improved and normalized ferritin 269, iron 80, TIBC 365 and iron saturation 22%. Her hemoglobin was 12.2, and MCV 80.8.      Patient reported she was seen by Dr. Fam in 10/2016 and was started on half tablets of Topamax. I reviewed Dr. Fam’s clinic note and telephone conversation record. The patient reports she has no recurrence of migraine headache. However, she is very tearful today, reporting that she has thoughts of not taking any medications. She did assure me that she has been taking the medication up to this point. 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 The patient denies suicide ideation. When she was initially diagnosed of CML back in 2014, she had depression and I started the patient on Prozac. The patient reported initially it helped her, however, she no longer feels the effect of Prozac. She feels depressed. The patient reports she has no personal hobby. She goes to work and goes home, taking care of her kids. She does not enjoy life as she used to.      Laboratory study on December 29, 2016 reported at ferritin 19.9, iron 33, TIBC 347 iron saturation 10%.  Hemoglobin was 13, normal WBC and platelets.  Unremarkable CMP.  Patient was given 2 doses of Feraheme treatment in early January 2017.      Cytogenetic study on March 14, 2017 reported a BCR-ABL products at 0.098%, showed further improved residual disease.  There is also reported a ferritin 103.7, iron 79, TIBC 336 and iron saturation 24%.  Hemoglobin was 13.5, MCV 92.3, platelets 199,000 WBC 7000.  She had a completely normal CMP.       Repeated laboratory study on June 20, 2017 reported at nondetectable BCR-ABL product.  Ferritin was 45, iron 35 TIBC 333 and iron saturation 11%.  Had a  normal CBC and CMP.      In the end of 2018, patient called reporting worsening significant leg cramping, and getting worse recently and has been taking 6 tablets of Advil with no significant improvement.  We suspect it might be caused by Sprycel for her CML.  We discussed with patient, and decreased to half dose at 50 mg daily.  Patient reports that she is improved leg cramping since dose reduction.    Per medical records this patient had emergency room visit again on 2018.  Patient reports she had significant abdomen/pelvic pain at a time associate with nausea.  Laboratory study showed significantly elevated WBC at a 25,900 including neutrophils 24,400, with elevated hemoglobin 15.8 and platelets 146,000.     She had a thorough investigation, including CT scan for abdomen pelvis which was unremarkable.  She then had ultrasound for the pelvis and it was also unremarkable.  She had ultrasound for the gallbladder examination on the same day and was unremarkable.  She had a normal CMP except of marginally elevated glucose at 112.  Her urinalysis showed only marginally increased WBC 3-5/HPF.  She was negative for yeast infection, negative for Chlamydia trichomonas and Neisseria gonorrhea.  Patient was prescribed Flagyl and doxycycline and discharged to home.        MEDICATIONS: The current medication list was reviewed with the patient and updated in the EMR this date per the medical assistant. Medication dosages and frequencies were confirmed to be accurate.        Allergies   Allergen Reactions   • Sulfa Antibiotics Unknown (See Comments)     Childhood reaction     SOCIAL HISTORY: . She smoked cigarettes previously, quit in 2006 with 8-pack-year history. Social drinker, maybe once a month. No illegal drug use. No risk for HIV except tattoos.  Hairstylist.       FAMILY HISTORY: Maternal grandmother had esophageal/stomach adenocarcinoma diagnosed at age of 72 and  of disease progress at age  "of 73. The patient' s mother has hypertension but otherwise no family history of malignancy, especially no leukemia.        I have reviewed the patient's medical history in detail and updated the computerized patient record.    Review of Systems   Constitutional: Positive for fatigue. Negative for appetite change, chills and fever.   HENT:   Negative for trouble swallowing.    Respiratory: Negative for cough and shortness of breath.    Cardiovascular: Negative for chest pain, leg swelling and palpitations.   Gastrointestinal: Positive for abdominal pain. Negative for blood in stool, constipation, diarrhea, nausea and vomiting.   Genitourinary: Negative for dysuria and hematuria.    Musculoskeletal: Negative for arthralgias and myalgias.   Skin: Negative for rash.   Neurological: Negative for dizziness, extremity weakness and headaches.   Hematological: Does not bruise/bleed easily.   Psychiatric/Behavioral: Negative for confusion.     VITAL SIGNS:   Vitals:    01/30/19 1128   BP: 116/76   Pulse: 72   Resp: 16   Temp: 98.3 °F (36.8 °C)   SpO2: 100%  Comment: at rest   Weight: 78 kg (172 lb)   Height: 171 cm (67.32\")  Comment: new ht.   PainSc:   4   PainLoc: Comment: headache   ECOG 0        PHYSICAL EXAMINATION:    GENERAL: Well-developed, well-nourished  female in no acute distress.    SKIN: Warm, dry without rashes, purpura or petechiae.   HEAD: Normocephalic.    EYES: Pupils equal, round. Conjunctivae normal.    EARS: Hearing intact.    NOSE: No nasal discharge.    MOUTH: Tongue is well papillated; Oral mucosa moist, no stomatitis or ulcers. Lips normal.    CHEST: Lungs clear to auscultation.  No wheezing no crackles.  Normal respiratory effort.   CARDIAC: Regular rate and rhythm without murmurs.  Normal S1 and S2.   ABDOMEN: Soft, no tenderness in the epigastric area,  no guarding no rebound.  Bowel sounds present.    EXTREMITIES: No edema no cyanosis.    NEUROLOGICAL: No focal deficits. "        LABORATORY DATA:    Results from last 7 days   Lab Units 01/30/19  1109   WBC 10*3/mm3 5.08   NEUTROS ABS 10*3/mm3 3.29   HEMOGLOBIN g/dL 13.7   HEMATOCRIT % 41.6   PLATELETS 10*3/mm3 186     Results from last 7 days   Lab Units 01/30/19  1114   SODIUM mmol/L 142   POTASSIUM mmol/L 4.3   CHLORIDE mmol/L 104   CO2 mmol/L 27.7   BUN mg/dL 10   CREATININE mg/dL 0.78   CALCIUM mg/dL 9.4   ALBUMIN g/dL 4.00   BILIRUBIN mg/dL 0.9   ALK PHOS U/L 234*   ALT (SGPT) U/L 655*   AST (SGOT) U/L 382*   GLUCOSE mg/dL 99   FERRITIN ng/mL 507.10*   IRON mcg/dL 184*   TIBC mcg/dL 388   Iron saturation 47%      BCR-ABL 0% by RT-PCR on 3/15/2018 and 6/7/2018 and 8/31/2018, pending today.      ASSESSMENT:    1. Chronic myelogenous leukemia, chronic phase with excellent response to Sprycel and complete molecular response in August 2014. However she had interuption of treatment in early part of 2016, because of insurance issues and miscommunication, she stopped the medicine without telling us, and laboratory test on 03/22/2016 reported disease relapse with increased BCR/ABL product at 12.626%. subsequently she was restarted back on Sprycel, and responded well.  Since June 2017, she has completed molecular response as tested every 3 months.  She has been compliant with treatment.     In the end of July 2018, she reported worsening significant cramping involving her legs, so we decreased her Sprycel to 50 mg daily starting early August.  She's been having less problem since that time.  She was tested negative for BCR-ABL on 8/31/2018.      Because of significantly elevated liver panel for AST, ALT and alk phosphatase, suspected drug related hepatitis from Diflucan or the combination of Diflucan and Sprycel, I asked patient to hold Sprycel for now and to liver function improves.    We are repeating her BCR-ABL by RT-PCR today.    2.  Drug-induced hepatitis.  Patient complains of epigastric area discomfort, significant fatigue for the  past several days.  This all happened after she finished oral Cefdinir and Diflucan for the past week.  I recommended monitoring her liver function panel every 2 weeks.  Hold Sprycel for now.    3. Recurrent Iron deficiency anemia.  She was treated again with Feraheme October 2017.   Iron deficiency thought to be related to ulcer identified on EGD, being treated with Carafate.  She had recurrent iron deficiency again and was given Feraheme 2 doses in March 2018.  Subsequently recurrent iron deficiency in July 2018, she was was switched to Venofer 3 doses total 900 mg.    Repeat laboratory study today showed no evidence of iron deficiency.  We'll continue to monitor her iron status.      4. Muscle cramping.  Secondary to Sprycel.  This has resolved after dose reduction..      PLAN:    1. Hold Sprycel (has been on 50mg daily since July 2018).  2. BCR-ABL by RT-PCR is pending today. Continue to repeat every 3 months.    3. Check CMP every 2 weeks.  Results will be reported to patient and only with much improved liver panel and then we'll start Sprycel again.  4. Return for MD follow up in 3 months with CBC, CMP, Iron ferritin and BCR/ABL by PCR.     I searched Up-to-Date for drug interaction.  More than 45 min were used for patient care, over half of that time were for counseling.      Isabel Castle MD PhD  1/30/2019      cc:   ISHAAN REGAN M.D.

## 2019-02-05 LAB — REF LAB TEST METHOD: NORMAL

## 2019-02-07 ENCOUNTER — OFFICE VISIT (OUTPATIENT)
Dept: OBSTETRICS AND GYNECOLOGY | Facility: CLINIC | Age: 34
End: 2019-02-07

## 2019-02-07 VITALS
BODY MASS INDEX: 25.9 KG/M2 | HEIGHT: 67 IN | DIASTOLIC BLOOD PRESSURE: 74 MMHG | SYSTOLIC BLOOD PRESSURE: 110 MMHG | WEIGHT: 165 LBS

## 2019-02-07 DIAGNOSIS — N89.8 VAGINAL DISCHARGE: Primary | ICD-10-CM

## 2019-02-07 DIAGNOSIS — N76.0 ACUTE VAGINITIS: ICD-10-CM

## 2019-02-07 DIAGNOSIS — Z11.3 SCREEN FOR STD (SEXUALLY TRANSMITTED DISEASE): ICD-10-CM

## 2019-02-07 PROCEDURE — 99213 OFFICE O/P EST LOW 20 MIN: CPT | Performed by: NURSE PRACTITIONER

## 2019-02-07 RX ORDER — METRONIDAZOLE 7.5 MG/G
GEL VAGINAL NIGHTLY
Qty: 70 G | Refills: 0 | Status: SHIPPED | OUTPATIENT
Start: 2019-02-07 | End: 2019-02-12

## 2019-02-07 NOTE — PROGRESS NOTES
"Subjective     Chief Complaint   Patient presents with   • Exposure to STD       Hu Houston is a 33 y.o.  whose LMP is Patient's last menstrual period was 10/11/2018.. She presents for a STD screen. States she has discharge. Reports she has recurrent yeast infections. She has recently been sexually active with her ex- . She reports having another sex partner prior to this as well. She denies odor or irritation right now with her discharge. She also thinks she might have a UTI. She mentioned that her chemo was placed on hold d/t elevated liver enzymes. States her oncologist told her she had drug induced hepatitis. Her oncology doctor feels she had a reaction with her chemo and diflucan.  She is S/P hysterectomy.     HPI    HPI    The following portions of the patient's history were reviewed and updated as appropriate:vital signs, allergies, current medications, past medical history, past social history, past surgical history and problem list      Review of Systems     Review of Systems   Constitutional: Negative.    Gastrointestinal: Negative.    Genitourinary: Positive for dysuria, flank pain, frequency and vaginal discharge.   Psychiatric/Behavioral: Negative.        Objective      /74   Ht 171 cm (67.32\")   Wt 74.8 kg (165 lb)   LMP 10/11/2018   BMI 25.60 kg/m²     Physical Exam    Physical Exam   Constitutional: She is oriented to person, place, and time. She appears well-developed and well-nourished.   Abdominal: Soft. Bowel sounds are normal. Hernia confirmed negative in the right inguinal area and confirmed negative in the left inguinal area.   Genitourinary: Rectum normal. Pelvic exam was performed with patient supine. There is no rash, tenderness, lesion or injury on the right labia. There is no rash, tenderness, lesion or injury on the left labia. Right adnexum displays no mass, no tenderness and no fullness. Left adnexum displays no mass, no tenderness and no fullness. No " erythema, tenderness or bleeding in the vagina. No foreign body in the vagina. No signs of injury around the vagina. Vaginal discharge found.   Musculoskeletal: Normal range of motion.   Lymphadenopathy:        Right: No inguinal adenopathy present.        Left: No inguinal adenopathy present.   Neurological: She is alert and oriented to person, place, and time.   Skin: Skin is warm and dry.   Psychiatric: She has a normal mood and affect. Her behavior is normal.   Vitals reviewed.      Lab Review   Labs: Urinalysis - with micro     Imaging   No data reviewed    Assessment  Hu was seen today for exposure to std.    Diagnoses and all orders for this visit:    Well female exam with routine gynecological exam  -     POC Urinalysis Dipstick  -     Cancel: PapIG, CtNgTv, HPV, Rfx 16 / 18    Encounter for screening for human papillomavirus (HPV)  -     Cancel: PapIG, CtNgTv, HPV, Rfx 16 / 18      Additional Assessment:   1) STD screen  2) Vaginal discharge  3) Elevated liver enzymes   4) S/P hysterectomy     Plan     1. Vaginal discharge- Check NuSwab.     2. Scheduled for: STD Labs - STD Serum Panel and Nu Swab - Myco, yeast, bv, leuk , Ultrasound of the -  N/A , Mammography - N/A , Bone Density Test - N/A , Additional Labs - CMP     3. Elevated liver enzymes- Repeat CMP today.     4. Contraception: S/P hysterectomy     5. STD:  Enc condom use.     6. Smoking status: non smoker     7.   BMI Status: Patient's Body mass index is 25.6 kg/m². BMI is within normal parameters. No follow-up required.      Humera Hayward, APRN  2/7/2019

## 2019-02-08 ENCOUNTER — TELEPHONE (OUTPATIENT)
Dept: ONCOLOGY | Facility: HOSPITAL | Age: 34
End: 2019-02-08

## 2019-02-08 DIAGNOSIS — B17.10 ACUTE HEPATITIS C VIRUS INFECTION WITHOUT HEPATIC COMA: Primary | ICD-10-CM

## 2019-02-08 LAB
HBA1C MFR BLD: 4.7 % (ref 4.8–5.6)
HBV SURFACE AG SERPL QL IA: NEGATIVE
HCV AB S/CO SERPL IA: >11 S/CO RATIO (ref 0–0.9)
HIV 1+2 AB+HIV1 P24 AG SERPL QL IA: NON REACTIVE
HSV1 IGG SER IA-ACNC: <0.91 INDEX (ref 0–0.9)
HSV2 IGG SER IA-ACNC: <0.91 INDEX (ref 0–0.9)
RPR SER QL: NORMAL

## 2019-02-08 NOTE — TELEPHONE ENCOUNTER
radha calling to inform  that pt has positive hep c test result. They are contacting her and setting her up with ID. Should be the cause of her elevated liver enzymes, probably not drug induced hepatitis according to the doctors there. Msg sent to .     ----- Message from Mary Herrera sent at 2/8/2019 11:53 AM EST -----  Contact: [phone]  Radha Hayward [facility] ob/gyn   Calling concerning labs.

## 2019-02-11 ENCOUNTER — OFFICE VISIT (OUTPATIENT)
Dept: OBSTETRICS AND GYNECOLOGY | Facility: CLINIC | Age: 34
End: 2019-02-11

## 2019-02-11 ENCOUNTER — APPOINTMENT (OUTPATIENT)
Dept: LAB | Facility: HOSPITAL | Age: 34
End: 2019-02-11

## 2019-02-11 ENCOUNTER — TELEPHONE (OUTPATIENT)
Dept: ONCOLOGY | Facility: CLINIC | Age: 34
End: 2019-02-11

## 2019-02-11 VITALS
SYSTOLIC BLOOD PRESSURE: 120 MMHG | HEIGHT: 67 IN | DIASTOLIC BLOOD PRESSURE: 82 MMHG | BODY MASS INDEX: 26.63 KG/M2 | WEIGHT: 169.7 LBS

## 2019-02-11 DIAGNOSIS — Z13.9 SCREENING FOR CONDITION: ICD-10-CM

## 2019-02-11 DIAGNOSIS — R10.9 FLANK PAIN: ICD-10-CM

## 2019-02-11 DIAGNOSIS — R10.11 RIGHT UPPER QUADRANT ABDOMINAL PAIN: Primary | ICD-10-CM

## 2019-02-11 DIAGNOSIS — N23 KIDNEY PAIN: Primary | ICD-10-CM

## 2019-02-11 DIAGNOSIS — R10.11 RIGHT UPPER QUADRANT PAIN: ICD-10-CM

## 2019-02-11 DIAGNOSIS — B17.10 ACUTE HEPATITIS C VIRUS INFECTION WITHOUT HEPATIC COMA: ICD-10-CM

## 2019-02-11 LAB
A VAGINAE DNA VAG QL NAA+PROBE: ABNORMAL SCORE
ALBUMIN SERPL-MCNC: 4.4 G/DL (ref 3.5–5.2)
ALBUMIN/GLOB SERPL: 1.5 G/DL
ALP SERPL-CCNC: 151 U/L (ref 40–129)
ALT SERPL W P-5'-P-CCNC: 117 U/L (ref 5–33)
ANION GAP SERPL CALCULATED.3IONS-SCNC: 10.6 MMOL/L
AST SERPL-CCNC: 49 U/L (ref 5–32)
BASOPHILS # BLD AUTO: 0.05 10*3/MM3 (ref 0–0.2)
BASOPHILS NFR BLD AUTO: 0.7 % (ref 0–1.5)
BILIRUB BLD-MCNC: NEGATIVE MG/DL
BILIRUB SERPL-MCNC: 0.5 MG/DL (ref 0.2–1.2)
BUN BLD-MCNC: 8 MG/DL (ref 6–20)
BUN/CREAT SERPL: 10.4 (ref 7–25)
BVAB2 DNA VAG QL NAA+PROBE: ABNORMAL SCORE
C ALBICANS DNA VAG QL NAA+PROBE: NEGATIVE
C GLABRATA DNA VAG QL NAA+PROBE: NEGATIVE
C TRACH RRNA SPEC QL NAA+PROBE: NEGATIVE
CALCIUM SPEC-SCNC: 9.7 MG/DL (ref 8.6–10.5)
CHLORIDE SERPL-SCNC: 102 MMOL/L (ref 98–107)
CLARITY, POC: CLEAR
CO2 SERPL-SCNC: 26.4 MMOL/L (ref 22–29)
COLOR UR: YELLOW
CREAT BLD-MCNC: 0.77 MG/DL (ref 0.57–1)
DEPRECATED RDW RBC AUTO: 44.5 FL (ref 37–54)
EOSINOPHIL # BLD AUTO: 0.19 10*3/MM3 (ref 0–0.4)
EOSINOPHIL NFR BLD AUTO: 2.7 % (ref 0.3–6.2)
ERYTHROCYTE [DISTWIDTH] IN BLOOD BY AUTOMATED COUNT: 13.1 % (ref 12.3–15.4)
GFR SERPL CREATININE-BSD FRML MDRD: 86 ML/MIN/1.73
GLOBULIN UR ELPH-MCNC: 3 GM/DL
GLUCOSE BLD-MCNC: 83 MG/DL (ref 65–99)
GLUCOSE UR STRIP-MCNC: NEGATIVE MG/DL
HCT VFR BLD AUTO: 45.6 % (ref 34–46.6)
HGB BLD-MCNC: 15.4 G/DL (ref 12–15.9)
IMM GRANULOCYTES # BLD AUTO: 0.07 10*3/MM3 (ref 0–0.05)
IMM GRANULOCYTES NFR BLD AUTO: 1 % (ref 0–0.5)
KETONES UR QL: NEGATIVE
LABORATORY COMMENT REPORT: ABNORMAL
LEUKOCYTE EST, POC: NEGATIVE
LYMPHOCYTES # BLD AUTO: 0.83 10*3/MM3 (ref 0.7–3.1)
LYMPHOCYTES NFR BLD AUTO: 12 % (ref 19.6–45.3)
M GENITALIUM DNA SPEC QL NAA+PROBE: NEGATIVE
M HOMINIS DNA SPEC QL NAA+PROBE: NEGATIVE
MCH RBC QN AUTO: 31.3 PG (ref 26.6–33)
MCHC RBC AUTO-ENTMCNC: 33.8 G/DL (ref 31.5–35.7)
MCV RBC AUTO: 92.7 FL (ref 79–97)
MEGA1 DNA VAG QL NAA+PROBE: ABNORMAL SCORE
MONOCYTES # BLD AUTO: 0.73 10*3/MM3 (ref 0.1–0.9)
MONOCYTES NFR BLD AUTO: 10.6 % (ref 5–12)
N GONORRHOEA RRNA SPEC QL NAA+PROBE: NEGATIVE
NEUTROPHILS # BLD AUTO: 5.04 10*3/MM3 (ref 1.4–7)
NEUTROPHILS NFR BLD AUTO: 73 % (ref 42.7–76)
NITRITE UR-MCNC: NEGATIVE MG/ML
NRBC BLD AUTO-RTO: 0 /100 WBC (ref 0–0)
PH UR: 5 [PH] (ref 5–8)
PLATELET # BLD AUTO: 183 10*3/MM3 (ref 140–450)
PMV BLD AUTO: 10.3 FL (ref 6–12)
POTASSIUM BLD-SCNC: 3.8 MMOL/L (ref 3.5–5.2)
PROT SERPL-MCNC: 7.4 G/DL (ref 6–8.5)
PROT UR STRIP-MCNC: NEGATIVE MG/DL
RBC # BLD AUTO: 4.92 10*6/MM3 (ref 3.77–5.28)
RBC # UR STRIP: NEGATIVE /UL
SODIUM BLD-SCNC: 139 MMOL/L (ref 136–145)
SP GR UR: 1 (ref 1–1.03)
T VAGINALIS RRNA SPEC QL NAA+PROBE: NEGATIVE
UREAPLASMA DNA SPEC QL NAA+PROBE: POSITIVE
UROBILINOGEN UR QL: NORMAL
WBC NRBC COR # BLD: 6.91 10*3/MM3 (ref 3.4–10.8)

## 2019-02-11 PROCEDURE — 85025 COMPLETE CBC W/AUTO DIFF WBC: CPT | Performed by: NURSE PRACTITIONER

## 2019-02-11 PROCEDURE — 99213 OFFICE O/P EST LOW 20 MIN: CPT | Performed by: NURSE PRACTITIONER

## 2019-02-11 PROCEDURE — 36415 COLL VENOUS BLD VENIPUNCTURE: CPT | Performed by: NURSE PRACTITIONER

## 2019-02-11 PROCEDURE — 87522 HEPATITIS C REVRS TRNSCRPJ: CPT | Performed by: NURSE PRACTITIONER

## 2019-02-11 PROCEDURE — 80053 COMPREHEN METABOLIC PANEL: CPT | Performed by: NURSE PRACTITIONER
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
## 2019-02-11 NOTE — TELEPHONE ENCOUNTER
I was informed the patient was found to having hepatitis C infection at her GYN physician's office and that she is being referred to hepatologist.    Patient had a laboratory study today which showed a significantly improved liver function panel, although slightly elevated.  I will call the patient and ask her to resume Sprycel.    Hepatitis C is a curable disease now with a newly developed the medication in the past few years.  If needed we can hold her Sprycel, for her to finish treatment for hepatitis C, with a treatment usually takes a few months.  Patient has no detectable BCR-ABL by RT-PCR recently.

## 2019-02-13 LAB
BACTERIA UR CULT: ABNORMAL
BACTERIA UR CULT: ABNORMAL
HCV RNA SERPL NAA+PROBE-ACNC: NORMAL IU/ML
HCV RNA SERPL NAA+PROBE-LOG IU: 6.38 LOG10 IU/ML
OTHER ANTIBIOTIC SUSC ISLT: ABNORMAL
TEST INFORMATION: NORMAL

## 2019-02-14 ENCOUNTER — TELEPHONE (OUTPATIENT)
Dept: OBSTETRICS AND GYNECOLOGY | Facility: CLINIC | Age: 34
End: 2019-02-14

## 2019-02-14 NOTE — TELEPHONE ENCOUNTER
This a mutual patient. She was seen in our office for a STD screen. She was noted to have Hep C, ureaplasma and a UTI. My understanding per the patient is that her chemo was placed on hold d/t her elevated liver enzymes. Her Hep C quant is 2,380,000 and her repeat liver enzymes have improved but not normalized: , ALT 49. In efforts to treat her UTI,  which culture indicated Klebsiella pneumoniae, and her ureaplasma noted on vaginal swab, I have sent an ERX for Levaquin for her to take.

## 2019-02-15 PROBLEM — Z11.3 SCREEN FOR STD (SEXUALLY TRANSMITTED DISEASE): Status: ACTIVE | Noted: 2019-02-15

## 2019-02-15 PROBLEM — N89.8 VAGINAL DISCHARGE: Status: ACTIVE | Noted: 2019-02-15

## 2019-02-18 ENCOUNTER — TELEPHONE (OUTPATIENT)
Dept: ONCOLOGY | Facility: HOSPITAL | Age: 34
End: 2019-02-18

## 2019-02-18 PROBLEM — R10.11 RIGHT UPPER QUADRANT PAIN: Status: ACTIVE | Noted: 2019-02-18

## 2019-02-18 PROBLEM — N23 KIDNEY PAIN: Status: ACTIVE | Noted: 2019-02-18

## 2019-02-18 NOTE — TELEPHONE ENCOUNTER
----- Message from Deja Castañeda sent at 2/18/2019 12:34 PM EST -----  [phone] / or [phone] after 2:00  Let Dr. Castle know about the Hep C test she has had and does he want her to  Have chemo?

## 2019-02-18 NOTE — TELEPHONE ENCOUNTER
Pt calling to let Dr. Castle know that her Hep C levels are back and whether or not Dr Castle wanted her to start chemo.  She also wanted him to know that she was referred to Dr. Karyn Mendieta and will see her on March 8th.  Message sent to Dr. Castle regarding both.  Pt informed Dr. Castle was on vacation this week, but we will be sending him a message.

## 2019-02-19 ENCOUNTER — TELEPHONE (OUTPATIENT)
Dept: ONCOLOGY | Facility: CLINIC | Age: 34
End: 2019-02-19

## 2019-02-19 ENCOUNTER — HOSPITAL ENCOUNTER (OUTPATIENT)
Dept: ULTRASOUND IMAGING | Facility: HOSPITAL | Age: 34
Discharge: HOME | End: 2019-02-19

## 2019-02-19 ENCOUNTER — HOSPITAL ENCOUNTER (OUTPATIENT)
Dept: ULTRASOUND IMAGING | Facility: HOSPITAL | Age: 34
Discharge: HOME | End: 2019-02-19
Admitting: NURSE PRACTITIONER

## 2019-02-19 DIAGNOSIS — R10.9 FLANK PAIN: ICD-10-CM

## 2019-02-19 DIAGNOSIS — R10.11 RIGHT UPPER QUADRANT ABDOMINAL PAIN: ICD-10-CM

## 2019-02-19 PROCEDURE — 76700 US EXAM ABDOM COMPLETE: CPT

## 2019-02-19 NOTE — TELEPHONE ENCOUNTER
----- Message from Judy Blanca RN sent at 2/19/2019  8:52 AM EST -----  Regarding: FW: lab results and chemo  Could you please call her and tell her Dr. Castle wants her to take her Sprycel?  Ty  ----- Message -----  From: Isabel Castle MD PhD  Sent: 2/18/2019   9:30 PM  To: Judy Blanca RN  Subject: RE: lab results and chemo                        OK to continue her chemo, she is on Sprycel.     Thanks Judy!        ----- Message -----  From: Judy Blanca RN  Sent: 2/18/2019   5:11 PM  To: Isabel Castle MD PhD  Subject: lab results and chemo                            Pt wanted you to know her Hep levels are back and if you want to start chemo.  She also wanted you to know she was referred to Dr. Karyn Mendieta and sees her on March 8th.  Let us know what you would like us to tell her.  Thanks

## 2019-02-19 NOTE — TELEPHONE ENCOUNTER
Pt. Informed per order of dr. orantes to start back on the sprycel.  States someone had already called her to let her know that.  Pt. Instructed to call for any further questions or concerns.  V/u.

## 2019-02-27 ENCOUNTER — LAB (OUTPATIENT)
Dept: LAB | Facility: HOSPITAL | Age: 34
End: 2019-02-27

## 2019-02-27 ENCOUNTER — TELEPHONE (OUTPATIENT)
Dept: ONCOLOGY | Facility: CLINIC | Age: 34
End: 2019-02-27

## 2019-02-27 DIAGNOSIS — K71.6 DRUG-INDUCED HEPATITIS: ICD-10-CM

## 2019-02-27 DIAGNOSIS — T50.905A DRUG-INDUCED HEPATITIS: ICD-10-CM

## 2019-02-27 LAB
ALBUMIN SERPL-MCNC: 4.1 G/DL (ref 3.5–5.2)
ALBUMIN/GLOB SERPL: 1.4 G/DL
ALP SERPL-CCNC: 185 U/L (ref 40–129)
ALT SERPL W P-5'-P-CCNC: 296 U/L (ref 5–33)
ANION GAP SERPL CALCULATED.3IONS-SCNC: 6.7 MMOL/L
AST SERPL-CCNC: 177 U/L (ref 5–32)
BILIRUB SERPL-MCNC: 0.5 MG/DL (ref 0.2–1.2)
BUN BLD-MCNC: 12 MG/DL (ref 6–20)
BUN/CREAT SERPL: 12.5 (ref 7–25)
CALCIUM SPEC-SCNC: 9.4 MG/DL (ref 8.6–10.5)
CHLORIDE SERPL-SCNC: 103 MMOL/L (ref 98–107)
CO2 SERPL-SCNC: 30.3 MMOL/L (ref 22–29)
CREAT BLD-MCNC: 0.96 MG/DL (ref 0.57–1)
GFR SERPL CREATININE-BSD FRML MDRD: 66 ML/MIN/1.73
GLOBULIN UR ELPH-MCNC: 2.9 GM/DL
GLUCOSE BLD-MCNC: 93 MG/DL (ref 65–99)
POTASSIUM BLD-SCNC: 4 MMOL/L (ref 3.5–5.2)
PROT SERPL-MCNC: 7 G/DL (ref 6–8.5)
SODIUM BLD-SCNC: 140 MMOL/L (ref 136–145)

## 2019-02-27 PROCEDURE — 80053 COMPREHEN METABOLIC PANEL: CPT | Performed by: INTERNAL MEDICINE

## 2019-02-27 PROCEDURE — 36415 COLL VENOUS BLD VENIPUNCTURE: CPT | Performed by: INTERNAL MEDICINE

## 2019-02-27 NOTE — TELEPHONE ENCOUNTER
I re-reviewed her laboratory results today.  I called and left a message for patient this evening, I asked her to hold Sprycel for treatment of CML.  She has worsening liver panel, and this patient has a recent hepatitis C infection and is likely undergoing treatment for that.     BAO SLADE M.D., Ph.D.

## 2019-02-27 NOTE — TELEPHONE ENCOUNTER
----- Message from Naomi Sanchez sent at 2/27/2019  2:27 PM EST -----  Regarding: appt.  Per : please add a lab appt in about 1 month from now. Tests should include a CBC and CMP. No RN review necessary. Thank You. Prateek

## 2019-03-04 ENCOUNTER — TELEPHONE (OUTPATIENT)
Dept: ONCOLOGY | Facility: HOSPITAL | Age: 34
End: 2019-03-04

## 2019-03-04 NOTE — TELEPHONE ENCOUNTER
Called and notified pt.     ----- Message from Brenna Mendoza RN sent at 3/4/2019  8:34 AM EST -----  Contact: [phone]  Can you please let her know that dr. orantes is ok with her getting the hep a shot but she should check with infectious disease doc.  Thanks.   ----- Message -----  From: Isabel Orantes MD PhD  Sent: 3/3/2019   8:17 PM  To: Brenna Mendoza RN    Fannie,     She needs to check with ID service.     I don't have problem with that.     Thanks!    Dr. Orantes      ----- Message -----  From: Brenna Mendoza RN  Sent: 3/1/2019   8:50 AM  To: Isabel Orantes MD PhD    Dr. Orantes, I wasn't sure with her diagnosis of Hep C, if she should get the Hep A shot.  She will hold off till you return to the office next week, if you can let us know when you return.  Thanks.   ----- Message -----  From: Alysha Ovalle  Sent: 3/1/2019   8:25 AM  To: [facility]    Pt wants to know if it is okay to get her second Hep A shot today

## 2019-03-08 ENCOUNTER — OFFICE VISIT (OUTPATIENT)
Dept: INFECTIOUS DISEASES | Facility: CLINIC | Age: 34
End: 2019-03-08

## 2019-03-08 ENCOUNTER — APPOINTMENT (OUTPATIENT)
Dept: LAB | Facility: HOSPITAL | Age: 34
End: 2019-03-08

## 2019-03-08 VITALS
SYSTOLIC BLOOD PRESSURE: 132 MMHG | WEIGHT: 173.2 LBS | TEMPERATURE: 98.2 F | DIASTOLIC BLOOD PRESSURE: 69 MMHG | HEART RATE: 65 BPM | BODY MASS INDEX: 26.25 KG/M2 | RESPIRATION RATE: 12 BRPM | HEIGHT: 68 IN

## 2019-03-08 DIAGNOSIS — B17.10 ACUTE HEPATITIS C VIRUS INFECTION WITHOUT HEPATIC COMA: Primary | ICD-10-CM

## 2019-03-08 LAB
ALBUMIN SERPL-MCNC: 4.3 G/DL (ref 3.5–5.2)
ALBUMIN/GLOB SERPL: 1.3 G/DL
ALP SERPL-CCNC: 163 U/L (ref 39–117)
ALT SERPL W P-5'-P-CCNC: 242 U/L (ref 1–33)
ANION GAP SERPL CALCULATED.3IONS-SCNC: 9.8 MMOL/L
AST SERPL-CCNC: 124 U/L (ref 1–32)
BASOPHILS # BLD AUTO: 0.04 10*3/MM3 (ref 0–0.2)
BASOPHILS NFR BLD AUTO: 0.8 % (ref 0–1.5)
BILIRUB SERPL-MCNC: 0.6 MG/DL (ref 0.1–1.2)
BUN BLD-MCNC: 7 MG/DL (ref 6–20)
BUN/CREAT SERPL: 10.6 (ref 7–25)
CALCIUM SPEC-SCNC: 9.8 MG/DL (ref 8.6–10.5)
CHLORIDE SERPL-SCNC: 103 MMOL/L (ref 98–107)
CO2 SERPL-SCNC: 27.2 MMOL/L (ref 22–29)
CREAT BLD-MCNC: 0.66 MG/DL (ref 0.57–1)
DEPRECATED RDW RBC AUTO: 45.2 FL (ref 37–54)
EOSINOPHIL # BLD AUTO: 0.14 10*3/MM3 (ref 0–0.4)
EOSINOPHIL NFR BLD AUTO: 2.6 % (ref 0.3–6.2)
ERYTHROCYTE [DISTWIDTH] IN BLOOD BY AUTOMATED COUNT: 13.3 % (ref 12.3–15.4)
GFR SERPL CREATININE-BSD FRML MDRD: 102 ML/MIN/1.73
GLOBULIN UR ELPH-MCNC: 3.2 GM/DL
GLUCOSE BLD-MCNC: 84 MG/DL (ref 65–99)
HBV SURFACE AB SER RIA-ACNC: REACTIVE
HCT VFR BLD AUTO: 46.5 % (ref 34–46.6)
HGB BLD-MCNC: 15.4 G/DL (ref 12–15.9)
IMM GRANULOCYTES # BLD AUTO: 0.05 10*3/MM3 (ref 0–0.05)
IMM GRANULOCYTES NFR BLD AUTO: 0.9 % (ref 0–0.5)
INR PPP: 1 (ref 0.9–1.1)
LYMPHOCYTES # BLD AUTO: 0.86 10*3/MM3 (ref 0.7–3.1)
LYMPHOCYTES NFR BLD AUTO: 16.2 % (ref 19.6–45.3)
MCH RBC QN AUTO: 30.7 PG (ref 26.6–33)
MCHC RBC AUTO-ENTMCNC: 33.1 G/DL (ref 31.5–35.7)
MCV RBC AUTO: 92.8 FL (ref 79–97)
MONOCYTES # BLD AUTO: 0.67 10*3/MM3 (ref 0.1–0.9)
MONOCYTES NFR BLD AUTO: 12.6 % (ref 5–12)
NEUTROPHILS # BLD AUTO: 3.55 10*3/MM3 (ref 1.4–7)
NEUTROPHILS NFR BLD AUTO: 66.9 % (ref 42.7–76)
NRBC BLD AUTO-RTO: 0 /100 WBC (ref 0–0)
PLATELET # BLD AUTO: 191 10*3/MM3 (ref 140–450)
PMV BLD AUTO: 10.4 FL (ref 6–12)
POTASSIUM BLD-SCNC: 4 MMOL/L (ref 3.5–5.2)
PROT SERPL-MCNC: 7.5 G/DL (ref 6–8.5)
PROTHROMBIN TIME: 13 SECONDS (ref 11.7–14.2)
RBC # BLD AUTO: 5.01 10*6/MM3 (ref 3.77–5.28)
SODIUM BLD-SCNC: 140 MMOL/L (ref 136–145)
WBC NRBC COR # BLD: 5.31 10*3/MM3 (ref 3.4–10.8)

## 2019-03-08 PROCEDURE — 81596 NFCT DS CHRNC HCV 6 ASSAYS: CPT | Performed by: INTERNAL MEDICINE

## 2019-03-08 PROCEDURE — 85610 PROTHROMBIN TIME: CPT | Performed by: INTERNAL MEDICINE

## 2019-03-08 PROCEDURE — 80053 COMPREHEN METABOLIC PANEL: CPT | Performed by: INTERNAL MEDICINE

## 2019-03-08 PROCEDURE — 99244 OFF/OP CNSLTJ NEW/EST MOD 40: CPT | Performed by: INTERNAL MEDICINE

## 2019-03-08 PROCEDURE — 87902 NFCT AGT GNTYP ALYS HEP C: CPT | Performed by: INTERNAL MEDICINE

## 2019-03-08 PROCEDURE — 36415 COLL VENOUS BLD VENIPUNCTURE: CPT | Performed by: INTERNAL MEDICINE

## 2019-03-08 PROCEDURE — 85025 COMPLETE CBC W/AUTO DIFF WBC: CPT | Performed by: INTERNAL MEDICINE

## 2019-03-08 PROCEDURE — 87522 HEPATITIS C REVRS TRNSCRPJ: CPT | Performed by: INTERNAL MEDICINE

## 2019-03-08 PROCEDURE — 86706 HEP B SURFACE ANTIBODY: CPT | Performed by: INTERNAL MEDICINE
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
## 2019-03-10 LAB
HCV RNA SERPL NAA+PROBE-ACNC: NORMAL IU/ML
HCV RNA SERPL NAA+PROBE-ACNC: NORMAL IU/ML
HCV RNA SERPL NAA+PROBE-LOG IU: 7.15 LOG10 IU/ML
TEST INFORMATION: NORMAL

## 2019-03-12 LAB
A2 MACROGLOB SERPL-MCNC: 291 MG/DL (ref 110–276)
ALT SERPL W P-5'-P-CCNC: 276 IU/L (ref 0–40)
APO A-I SERPL-MCNC: 148 MG/DL (ref 116–209)
BILIRUB SERPL-MCNC: 0.6 MG/DL (ref 0–1.2)
FIBROSIS SCORING:: ABNORMAL
FIBROSIS STAGE SERPL QL: ABNORMAL
GGT SERPL-CCNC: 224 IU/L (ref 0–60)
HAPTOGLOB SERPL-MCNC: 47 MG/DL (ref 34–200)
HCV AB SER QL: ABNORMAL
LABORATORY COMMENT REPORT: ABNORMAL
LIMITATIONS:: ABNORMAL
LIVER FIBR SCORE SERPL CALC.FIBROSURE: 0.59 (ref 0–0.21)
NECROINFLAMM ACTIVITY SCORING:: ABNORMAL
NECROINFLAMMATORY ACT GRADE SERPL QL: ABNORMAL
NECROINFLAMMATORY ACT SCORE SERPL: 0.92 (ref 0–0.17)

## 2019-03-14 LAB
HCV GENTYP SERPL NAA+PROBE: NORMAL
Lab: NORMAL

## 2019-03-20 ENCOUNTER — TELEPHONE (OUTPATIENT)
Dept: INFECTIOUS DISEASES | Facility: CLINIC | Age: 34
End: 2019-03-20

## 2019-03-20 NOTE — TELEPHONE ENCOUNTER
----- Message from Karyn Mendieta MD sent at 3/20/2019 12:02 PM EDT -----  I talk to the patient's oncologist Dr. Castle and due to the fact that her liver enzymes are elevated he would like to hold her chemotherapy until she is done with her hepatitis C treatment.  If she has any questions or concerns please tell her to call the CBC clinic.  Thanks

## 2019-03-29 ENCOUNTER — LAB (OUTPATIENT)
Dept: LAB | Facility: HOSPITAL | Age: 34
End: 2019-03-29

## 2019-03-29 DIAGNOSIS — T50.905A DRUG-INDUCED HEPATITIS: ICD-10-CM

## 2019-03-29 DIAGNOSIS — K71.6 DRUG-INDUCED HEPATITIS: ICD-10-CM

## 2019-03-29 DIAGNOSIS — C92.10 CML (CHRONIC MYELOID LEUKEMIA) (HCC): ICD-10-CM

## 2019-03-29 LAB
ALBUMIN SERPL-MCNC: 4.2 G/DL (ref 3.5–5.2)
ALBUMIN/GLOB SERPL: 1.3 G/DL (ref 1.1–2.4)
ALP SERPL-CCNC: 169 U/L (ref 38–116)
ALT SERPL W P-5'-P-CCNC: 222 U/L (ref 0–33)
ANION GAP SERPL CALCULATED.3IONS-SCNC: 11.8 MMOL/L
AST SERPL-CCNC: 125 U/L (ref 0–32)
BASOPHILS # BLD AUTO: 0.04 10*3/MM3 (ref 0–0.2)
BASOPHILS NFR BLD AUTO: 0.5 % (ref 0–1.5)
BILIRUB SERPL-MCNC: 0.5 MG/DL (ref 0.2–1.2)
BUN BLD-MCNC: 7 MG/DL (ref 6–20)
BUN/CREAT SERPL: 11.1 (ref 7.3–30)
CALCIUM SPEC-SCNC: 9.6 MG/DL (ref 8.5–10.2)
CHLORIDE SERPL-SCNC: 101 MMOL/L (ref 98–107)
CO2 SERPL-SCNC: 27.2 MMOL/L (ref 22–29)
CREAT BLD-MCNC: 0.63 MG/DL (ref 0.6–1.1)
DEPRECATED RDW RBC AUTO: 43.8 FL (ref 37–54)
EOSINOPHIL # BLD AUTO: 0.33 10*3/MM3 (ref 0–0.4)
EOSINOPHIL NFR BLD AUTO: 3.8 % (ref 0.3–6.2)
ERYTHROCYTE [DISTWIDTH] IN BLOOD BY AUTOMATED COUNT: 12.9 % (ref 12.3–15.4)
GFR SERPL CREATININE-BSD FRML MDRD: 108 ML/MIN/1.73
GLOBULIN UR ELPH-MCNC: 3.2 GM/DL (ref 1.8–3.5)
GLUCOSE BLD-MCNC: 102 MG/DL (ref 74–124)
HCT VFR BLD AUTO: 46.4 % (ref 34–46.6)
HGB BLD-MCNC: 15.4 G/DL (ref 12–15.9)
IMM GRANULOCYTES # BLD AUTO: 0.04 10*3/MM3 (ref 0–0.05)
IMM GRANULOCYTES NFR BLD AUTO: 0.5 % (ref 0–0.5)
LYMPHOCYTES # BLD AUTO: 1 10*3/MM3 (ref 0.7–3.1)
LYMPHOCYTES NFR BLD AUTO: 11.5 % (ref 19.6–45.3)
MCH RBC QN AUTO: 30.9 PG (ref 26.6–33)
MCHC RBC AUTO-ENTMCNC: 33.2 G/DL (ref 31.5–35.7)
MCV RBC AUTO: 93 FL (ref 79–97)
MONOCYTES # BLD AUTO: 0.9 10*3/MM3 (ref 0.1–0.9)
MONOCYTES NFR BLD AUTO: 10.4 % (ref 5–12)
NEUTROPHILS # BLD AUTO: 6.35 10*3/MM3 (ref 1.4–7)
NEUTROPHILS NFR BLD AUTO: 73.3 % (ref 42.7–76)
NRBC BLD AUTO-RTO: 0 /100 WBC (ref 0–0)
PLATELET # BLD AUTO: 176 10*3/MM3 (ref 140–450)
PMV BLD AUTO: 9.9 FL (ref 6–12)
POTASSIUM BLD-SCNC: 3.9 MMOL/L (ref 3.5–4.7)
PROT SERPL-MCNC: 7.4 G/DL (ref 6.3–8)
RBC # BLD AUTO: 4.99 10*6/MM3 (ref 3.77–5.28)
SODIUM BLD-SCNC: 140 MMOL/L (ref 134–145)
WBC NRBC COR # BLD: 8.66 10*3/MM3 (ref 3.4–10.8)

## 2019-03-29 PROCEDURE — 36415 COLL VENOUS BLD VENIPUNCTURE: CPT

## 2019-03-29 PROCEDURE — 85025 COMPLETE CBC W/AUTO DIFF WBC: CPT

## 2019-03-29 PROCEDURE — 80053 COMPREHEN METABOLIC PANEL: CPT

## 2019-04-02 LAB — REF LAB TEST METHOD: NORMAL

## 2019-04-09 ENCOUNTER — TELEPHONE (OUTPATIENT)
Dept: INFECTIOUS DISEASES | Facility: CLINIC | Age: 34
End: 2019-04-09

## 2019-04-09 NOTE — TELEPHONE ENCOUNTER
Patient states that she received notification from [employer] that Mavyret is being shipped to the patient tomorrow.  I scheduled the patient to come in and see Dr. Mendieta on 04/17 and informed her not to take any of the medication but to bring it with her to her appt.  Patient voiced understanding.

## 2019-04-17 ENCOUNTER — OFFICE VISIT (OUTPATIENT)
Dept: INFECTIOUS DISEASES | Facility: CLINIC | Age: 34
End: 2019-04-17

## 2019-04-17 VITALS
DIASTOLIC BLOOD PRESSURE: 73 MMHG | TEMPERATURE: 97.8 F | BODY MASS INDEX: 26.01 KG/M2 | SYSTOLIC BLOOD PRESSURE: 108 MMHG | RESPIRATION RATE: 12 BRPM | HEIGHT: 68 IN | WEIGHT: 171.6 LBS | HEART RATE: 70 BPM

## 2019-04-17 DIAGNOSIS — B18.2 HEP C W/O COMA, CHRONIC (HCC): Primary | ICD-10-CM

## 2019-04-17 PROCEDURE — 99213 OFFICE O/P EST LOW 20 MIN: CPT | Performed by: INTERNAL MEDICINE

## 2019-04-17 NOTE — PROGRESS NOTES
Reason for clinic visits: F/u clinic visit for hepatitis C    HPI: Hu Houston is a 34 y.o. female was last seen in the infectious disease clinic at the beginning of March.  She presents to clinic today as she is ready to start mavyret for her hepatitis C. she denies any abdominal pain nausea vomiting or diarrhea.  No shortness of breath cough rhinorrhea or sore throat.  No rashes or skin lesions.  We talked extensively about the potential side effects of hematocrit including but not limited to headache.  Right us nausea and LFT abnormalities.      Past Medical History:   Diagnosis Date   • Anemia in neoplastic disease    • Asthma     childhood   • Chiari I malformation (CMS/HCC)     eval by Dr Moore, NS 2016   • Chronic myelogenous leukemia (CMS/HCC)     remission, Dr Castle follows   • Chronic pain    • CML (chronic myelocytic leukemia) (CMS/HCC)    • Cystitis    • Depression    • GERD (gastroesophageal reflux disease)     ulcer   • H/O Iron deficiency anemia    • H/O Lower extremity pain     Resolved.   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;  Service: Obstetrics/Gynecology   • D&C HYSTEROSCOPY ENDOMETRIAL ABLATION N/A 4/9/2018    Procedure: Hysteroscopy NovaSure ablation;  Surgeon: Beck Cornejo MD;  Location: [facility] OR;  Service: Obstetrics/Gynecology   • ENDOSCOPY N/A 8/4/2017    Procedure: ESOPHAGOGASTRODUODENOSCOPY with biopsies;  Surgeon: Sophie Trejo MD;  Location: [facility] OR;   Service:    • GYNECOLOGY EXAM UNDER ANESTHESIA      IUD removal   • TOTAL LAPAROSCOPIC HYSTERECTOMY Bilateral 10/29/2018    Procedure: TOTAL LAPAROSCOPIC HYSTERECTOMY, bilateral salpingectomy;  Surgeon: Beck Cornejo MD;  Location: [facility];  Service: Obstetrics/Gynecology   • TUBAL COAGULATION LAPAROSCOPIC Bilateral 4/9/2018    Procedure: TUBAL COAGULATION LAPAROSCOPIC;  Surgeon: Beck Cornejo MD;  Location: [facility];  Service: Obstetrics/Gynecology       Social History   reports that she quit smoking about 12 years ago. Her smoking use included cigarettes. She has a 8.00 pack-year smoking history. She has never used smokeless tobacco. She reports that she drinks alcohol. She reports that she uses drugs. Drug: Cocaine.    Family History  family history includes Hyperlipidemia in her mother; Hypertension in her mother; Stomach cancer (age of onset: 72) in her maternal grandmother; Stroke in her paternal grandmother.    Allergies   Allergen Reactions   • Sulfa Antibiotics Unknown (See Comments)     Childhood reaction       The medication list has been reviewed and updated.     Review of Systems  Pertinent items are noted in HPI, all other systems reviewed and negative    Vital Signs   Temp:  [97.8 °F (36.6 °C)] 97.8 °F (36.6 °C)  Heart Rate:  [70] 70  Resp:  [12] 12  BP: (108)/(73) 108/73    Physical Exam:   General: In no acute distress  Cardiovascular: RRR, no LE edema    Respiratory: Breathing comfortably on room air   GI: Abdomen is soft, non-tender, non-distended, positive bowel sounds bilaterally  Skin: No rashes    Lab Results   Component Value Date    WBC 8.66 03/29/2019    HGB 15.4 03/29/2019    HCT 46.4 03/29/2019    MCV 93.0 03/29/2019     03/29/2019       Lab Results   Component Value Date    GLUCOSE 102 03/29/2019    BUN 7 03/29/2019    CREATININE 0.63 03/29/2019    EGFRIFNONA 108 03/29/2019    BCR 11.1 03/29/2019    CO2 27.2 03/29/2019    CALCIUM 9.6 03/29/2019     ALBUMIN 4.20 03/29/2019     (C) 03/29/2019     (C) 03/29/2019 2/11 Hep C VL [id number]  2/7 Hep C ab positive  2/7 hep B surface antigen negative  2/7 HSV 1 and 2 antibody IgG negative  2/7 HIV negative  2/7 RPR negative    Assessment:  This is a 34 y.o. female who presents to clinic today for f/u of hepatitis C. she is a G has had one a day.  She has a fiber sure score of F3.  She is treatment naïve and ready to start Mavyret x 8 weeks.  She will return to in 4 weeks to get her blood drawn.  Talked extensively over the potential side effects of the medication.  The patient is willing to proceed.  She will call the infectious disease clinic with any questions or concerns.  Her CML there is now on hold.    Plan:   1.  Start  mavyret x 8 weeks  2.  Repeat a hepatitis C viral load and CMP in 4 weeks     Return to Infectious Disease clinic in 3 months

## 2019-04-22 ENCOUNTER — DOCUMENTATION (OUTPATIENT)
Dept: ONCOLOGY | Facility: CLINIC | Age: 34
End: 2019-04-22

## 2019-04-22 NOTE — PROGRESS NOTES
Fax rec from [facility] stating pt used her last Sprycel refill on her 4/10/19 dispense. Per notes from Dr Castle-Pt was to hold Sprycel until other treatments were complete. I have escribed the rx to [facility] for them to profile until pt calls for refill.

## 2019-04-26 ENCOUNTER — APPOINTMENT (OUTPATIENT)
Dept: ONCOLOGY | Facility: CLINIC | Age: 34
End: 2019-04-26

## 2019-04-26 ENCOUNTER — APPOINTMENT (OUTPATIENT)
Dept: LAB | Facility: HOSPITAL | Age: 34
End: 2019-04-26

## 2019-04-30 ENCOUNTER — LAB (OUTPATIENT)
Dept: LAB | Facility: HOSPITAL | Age: 34
End: 2019-04-30

## 2019-04-30 ENCOUNTER — OFFICE VISIT (OUTPATIENT)
Dept: ONCOLOGY | Facility: CLINIC | Age: 34
End: 2019-04-30

## 2019-04-30 VITALS
DIASTOLIC BLOOD PRESSURE: 70 MMHG | OXYGEN SATURATION: 100 % | HEIGHT: 67 IN | TEMPERATURE: 97.8 F | BODY MASS INDEX: 27.26 KG/M2 | HEART RATE: 58 BPM | RESPIRATION RATE: 14 BRPM | SYSTOLIC BLOOD PRESSURE: 107 MMHG | WEIGHT: 173.7 LBS

## 2019-04-30 DIAGNOSIS — B18.2 HEP C W/O COMA, CHRONIC (HCC): ICD-10-CM

## 2019-04-30 DIAGNOSIS — K71.6 DRUG-INDUCED HEPATITIS: ICD-10-CM

## 2019-04-30 DIAGNOSIS — T50.905A DRUG-INDUCED HEPATITIS: ICD-10-CM

## 2019-04-30 DIAGNOSIS — D50.0 IRON DEFICIENCY ANEMIA DUE TO CHRONIC BLOOD LOSS: Primary | ICD-10-CM

## 2019-04-30 DIAGNOSIS — C92.10 CML (CHRONIC MYELOID LEUKEMIA) (HCC): ICD-10-CM

## 2019-04-30 LAB
ALBUMIN SERPL-MCNC: 4.3 G/DL (ref 3.5–5.2)
ALBUMIN/GLOB SERPL: 1.3 G/DL (ref 1.1–2.4)
ALP SERPL-CCNC: 114 U/L (ref 38–116)
ALT SERPL W P-5'-P-CCNC: 19 U/L (ref 0–33)
ANION GAP SERPL CALCULATED.3IONS-SCNC: 11.3 MMOL/L
AST SERPL-CCNC: 14 U/L (ref 0–32)
BASOPHILS # BLD AUTO: 0.06 10*3/MM3 (ref 0–0.2)
BASOPHILS NFR BLD AUTO: 0.8 % (ref 0–1.5)
BILIRUB SERPL-MCNC: 0.7 MG/DL (ref 0.2–1.2)
BUN BLD-MCNC: 7 MG/DL (ref 6–20)
BUN/CREAT SERPL: 10.1 (ref 7.3–30)
CALCIUM SPEC-SCNC: 10 MG/DL (ref 8.5–10.2)
CHLORIDE SERPL-SCNC: 101 MMOL/L (ref 98–107)
CO2 SERPL-SCNC: 27.7 MMOL/L (ref 22–29)
CREAT BLD-MCNC: 0.69 MG/DL (ref 0.6–1.1)
DEPRECATED RDW RBC AUTO: 39.1 FL (ref 37–54)
EOSINOPHIL # BLD AUTO: 0.2 10*3/MM3 (ref 0–0.4)
EOSINOPHIL NFR BLD AUTO: 2.6 % (ref 0.3–6.2)
ERYTHROCYTE [DISTWIDTH] IN BLOOD BY AUTOMATED COUNT: 11.9 % (ref 12.3–15.4)
FERRITIN SERPL-MCNC: 458.7 NG/ML (ref 11–207)
GFR SERPL CREATININE-BSD FRML MDRD: 97 ML/MIN/1.73
GLOBULIN UR ELPH-MCNC: 3.4 GM/DL (ref 1.8–3.5)
GLUCOSE BLD-MCNC: 93 MG/DL (ref 74–124)
HCT VFR BLD AUTO: 44.3 % (ref 34–46.6)
HGB BLD-MCNC: 15.6 G/DL (ref 12–15.9)
IMM GRANULOCYTES # BLD AUTO: 0.08 10*3/MM3 (ref 0–0.05)
IMM GRANULOCYTES NFR BLD AUTO: 1 % (ref 0–0.5)
IRON 24H UR-MRATE: 148 MCG/DL (ref 37–145)
IRON SATN MFR SERPL: 40 % (ref 14–48)
LYMPHOCYTES # BLD AUTO: 1.51 10*3/MM3 (ref 0.7–3.1)
LYMPHOCYTES NFR BLD AUTO: 19.4 % (ref 19.6–45.3)
MCH RBC QN AUTO: 32 PG (ref 26.6–33)
MCHC RBC AUTO-ENTMCNC: 35.2 G/DL (ref 31.5–35.7)
MCV RBC AUTO: 90.8 FL (ref 79–97)
MONOCYTES # BLD AUTO: 0.62 10*3/MM3 (ref 0.1–0.9)
MONOCYTES NFR BLD AUTO: 8 % (ref 5–12)
NEUTROPHILS # BLD AUTO: 5.3 10*3/MM3 (ref 1.7–7)
NEUTROPHILS NFR BLD AUTO: 68.2 % (ref 42.7–76)
NRBC BLD AUTO-RTO: 0 /100 WBC (ref 0–0.2)
PLATELET # BLD AUTO: 181 10*3/MM3 (ref 140–450)
PMV BLD AUTO: 10.1 FL (ref 6–12)
POTASSIUM BLD-SCNC: 4.1 MMOL/L (ref 3.5–4.7)
PROT SERPL-MCNC: 7.7 G/DL (ref 6.3–8)
RBC # BLD AUTO: 4.88 10*6/MM3 (ref 3.77–5.28)
SODIUM BLD-SCNC: 140 MMOL/L (ref 134–145)
TIBC SERPL-MCNC: 368 MCG/DL (ref 249–505)
TRANSFERRIN SERPL-MCNC: 263 MG/DL (ref 200–360)
WBC NRBC COR # BLD: 7.77 10*3/MM3 (ref 3.4–10.8)

## 2019-04-30 PROCEDURE — 83540 ASSAY OF IRON: CPT | Performed by: INTERNAL MEDICINE

## 2019-04-30 PROCEDURE — 36415 COLL VENOUS BLD VENIPUNCTURE: CPT | Performed by: INTERNAL MEDICINE

## 2019-04-30 PROCEDURE — 82728 ASSAY OF FERRITIN: CPT | Performed by: INTERNAL MEDICINE

## 2019-04-30 PROCEDURE — 85025 COMPLETE CBC W/AUTO DIFF WBC: CPT | Performed by: INTERNAL MEDICINE

## 2019-04-30 PROCEDURE — 80053 COMPREHEN METABOLIC PANEL: CPT | Performed by: INTERNAL MEDICINE

## 2019-04-30 PROCEDURE — 84466 ASSAY OF TRANSFERRIN: CPT | Performed by: INTERNAL MEDICINE

## 2019-04-30 PROCEDURE — 99214 OFFICE O/P EST MOD 30 MIN: CPT | Performed by: INTERNAL MEDICINE

## 2019-05-02 ENCOUNTER — TELEPHONE (OUTPATIENT)
Dept: INFECTIOUS DISEASES | Facility: CLINIC | Age: 34
End: 2019-05-02

## 2019-05-02 LAB
HCV RNA SERPL NAA+PROBE-ACNC: 100 IU/ML
HCV RNA SERPL NAA+PROBE-LOG IU: 2 LOG10 IU/ML
TEST INFORMATION: NORMAL

## 2019-05-02 NOTE — TELEPHONE ENCOUNTER
Patient phoned to say she thinks she is having a reaction to the Mavyret. She has an itchy rash on her arms and legs x 2-3 days. She has not changed any detergents, soaps. Lotions etc... I advised trying some OTC antihistamines like Benadryl or zyrtec for the itching. She states she is willing to continue the medication since it is only for a few weeks if that is what Dr. Mendieta recommends. Please advise. Mikala escalante RN

## 2019-05-03 RX ORDER — HYDROXYZINE HYDROCHLORIDE 25 MG/1
25 TABLET, FILM COATED ORAL EVERY 6 HOURS PRN
Qty: 40 TABLET | Refills: 0 | Status: SHIPPED | OUTPATIENT
Start: 2019-05-03 | End: 2019-05-13

## 2019-05-03 NOTE — TELEPHONE ENCOUNTER
I called in hydroxyzine 25 mg p.o. every 6 hours as needed for the patient.  This should help with her itching.  Let us see if we can continue with the Mavyret.  If the rash continues to spread please tell her to let us know. Thanks

## 2019-05-03 NOTE — TELEPHONE ENCOUNTER
Patient notified script has been called in for the itching. She will try the med and let us know if symptoms worsen. Mikala Leung RN

## 2019-05-14 ENCOUNTER — DOCUMENTATION (OUTPATIENT)
Dept: ONCOLOGY | Facility: CLINIC | Age: 34
End: 2019-05-14

## 2019-05-22 ENCOUNTER — HOSPITAL ENCOUNTER (EMERGENCY)
Facility: HOSPITAL | Age: 34
Discharge: HOME | End: 2019-05-22
Attending: EMERGENCY MEDICINE | Admitting: EMERGENCY MEDICINE

## 2019-05-22 ENCOUNTER — LAB (OUTPATIENT)
Dept: LAB | Facility: HOSPITAL | Age: 34
End: 2019-05-22

## 2019-05-22 VITALS
SYSTOLIC BLOOD PRESSURE: 120 MMHG | HEART RATE: 79 BPM | HEIGHT: 68 IN | BODY MASS INDEX: 25.76 KG/M2 | OXYGEN SATURATION: 98 % | DIASTOLIC BLOOD PRESSURE: 70 MMHG | TEMPERATURE: 98 F | RESPIRATION RATE: 16 BRPM | WEIGHT: 170 LBS

## 2019-05-22 DIAGNOSIS — S91.312A FOOT LACERATION, LEFT, INITIAL ENCOUNTER: Primary | ICD-10-CM

## 2019-05-22 DIAGNOSIS — B18.2 HEP C W/O COMA, CHRONIC (HCC): ICD-10-CM

## 2019-05-22 LAB
ALBUMIN SERPL-MCNC: 4.5 G/DL (ref 3.5–5.2)
ALBUMIN/GLOB SERPL: 1.5 G/DL
ALP SERPL-CCNC: 108 U/L (ref 39–117)
ALT SERPL W P-5'-P-CCNC: 12 U/L (ref 1–33)
ANION GAP SERPL CALCULATED.3IONS-SCNC: 9.4 MMOL/L
AST SERPL-CCNC: 16 U/L (ref 1–32)
BILIRUB SERPL-MCNC: 0.5 MG/DL (ref 0.2–1.2)
BUN BLD-MCNC: 8 MG/DL (ref 6–20)
BUN/CREAT SERPL: 9.5 (ref 7–25)
CALCIUM SPEC-SCNC: 9.2 MG/DL (ref 8.6–10.5)
CHLORIDE SERPL-SCNC: 102 MMOL/L (ref 98–107)
CO2 SERPL-SCNC: 29.6 MMOL/L (ref 22–29)
CREAT BLD-MCNC: 0.84 MG/DL (ref 0.57–1)
GFR SERPL CREATININE-BSD FRML MDRD: 77 ML/MIN/1.73
GLOBULIN UR ELPH-MCNC: 3 GM/DL
GLUCOSE BLD-MCNC: 93 MG/DL (ref 65–99)
POTASSIUM BLD-SCNC: 3.6 MMOL/L (ref 3.5–5.2)
PROT SERPL-MCNC: 7.5 G/DL (ref 6–8.5)
SODIUM BLD-SCNC: 141 MMOL/L (ref 136–145)

## 2019-05-22 PROCEDURE — 99283 EMERGENCY DEPT VISIT LOW MDM: CPT

## 2019-05-22 PROCEDURE — 25010000002 TDAP 5-2.5-18.5 LF-MCG/0.5 SUSPENSION

## 2019-05-22 PROCEDURE — 12002 RPR S/N/AX/GEN/TRNK2.6-7.5CM: CPT | Performed by: EMERGENCY MEDICINE

## 2019-05-22 PROCEDURE — 90471 IMMUNIZATION ADMIN: CPT

## 2019-05-22 PROCEDURE — 36415 COLL VENOUS BLD VENIPUNCTURE: CPT

## 2019-05-22 PROCEDURE — 80053 COMPREHEN METABOLIC PANEL: CPT

## 2019-05-22 PROCEDURE — 90715 TDAP VACCINE 7 YRS/> IM: CPT

## 2019-05-22 RX ORDER — LIDOCAINE HYDROCHLORIDE AND EPINEPHRINE BITARTRATE 20; .01 MG/ML; MG/ML
10 INJECTION, SOLUTION SUBCUTANEOUS ONCE
Status: COMPLETED | OUTPATIENT
Start: 2019-05-22 | End: 2019-05-22

## 2019-05-22 RX ADMIN — TETANUS TOXOID, REDUCED DIPHTHERIA TOXOID AND ACELLULAR PERTUSSIS VACCINE, ADSORBED 0.5 ML: 5; 2.5; 8; 8; 2.5 SUSPENSION INTRAMUSCULAR at 16:57

## 2019-05-22 RX ADMIN — LIDOCAINE HYDROCHLORIDE,EPINEPHRINE BITARTRATE 10 ML: 20; .01 INJECTION, SOLUTION INFILTRATION; PERINEURAL at 17:30

## 2019-05-24 ENCOUNTER — TELEPHONE (OUTPATIENT)
Dept: ONCOLOGY | Facility: HOSPITAL | Age: 34
End: 2019-05-24

## 2019-05-24 NOTE — TELEPHONE ENCOUNTER
Called pt back and asked her to go to urgent care or back to ER to have foot laceration stitches evaluated. Pt v/u    ----- Message from Deja Castañeda sent at 5/24/2019  9:59 AM EDT -----  [phone]   Returning your call.

## 2019-05-24 NOTE — TELEPHONE ENCOUNTER
Called pt back, no answer. Mailbox full, unable to leave msg.     ----- Message from Paige Pratt sent at 5/24/2019  9:18 AM EDT -----  [phone]    Seen on Wed in ER in [place], had stitches, starting to get red and swelling.  Is off her chemo right now because of other health problems.  She doesn't have a PCP, can Dr. Castle give her some antibiotics or should she go back to ER.

## 2019-07-02 ENCOUNTER — LAB (OUTPATIENT)
Dept: LAB | Facility: HOSPITAL | Age: 34
End: 2019-07-02

## 2019-07-02 ENCOUNTER — OFFICE VISIT (OUTPATIENT)
Dept: ONCOLOGY | Facility: CLINIC | Age: 34
End: 2019-07-02

## 2019-07-02 VITALS
SYSTOLIC BLOOD PRESSURE: 103 MMHG | OXYGEN SATURATION: 100 % | BODY MASS INDEX: 26.93 KG/M2 | HEIGHT: 67 IN | WEIGHT: 171.6 LBS | TEMPERATURE: 98.3 F | DIASTOLIC BLOOD PRESSURE: 70 MMHG | RESPIRATION RATE: 14 BRPM | HEART RATE: 59 BPM

## 2019-07-02 DIAGNOSIS — T50.905A DRUG-INDUCED HEPATITIS: ICD-10-CM

## 2019-07-02 DIAGNOSIS — D50.0 IRON DEFICIENCY ANEMIA DUE TO CHRONIC BLOOD LOSS: ICD-10-CM

## 2019-07-02 DIAGNOSIS — C92.10 CML (CHRONIC MYELOID LEUKEMIA) (HCC): Primary | ICD-10-CM

## 2019-07-02 DIAGNOSIS — K71.6 DRUG-INDUCED HEPATITIS: ICD-10-CM

## 2019-07-02 DIAGNOSIS — B17.10 ACUTE HEPATITIS C VIRUS INFECTION WITHOUT HEPATIC COMA: ICD-10-CM

## 2019-07-02 DIAGNOSIS — C92.10 CML (CHRONIC MYELOID LEUKEMIA) (HCC): ICD-10-CM

## 2019-07-02 LAB
ALBUMIN SERPL-MCNC: 4.7 G/DL (ref 3.5–5.2)
ALBUMIN/GLOB SERPL: 1.6 G/DL (ref 1.1–2.4)
ALP SERPL-CCNC: 76 U/L (ref 38–116)
ALT SERPL W P-5'-P-CCNC: 8 U/L (ref 0–33)
ANION GAP SERPL CALCULATED.3IONS-SCNC: 12.6 MMOL/L (ref 5–15)
AST SERPL-CCNC: 14 U/L (ref 0–32)
BASOPHILS # BLD AUTO: 0.03 10*3/MM3 (ref 0–0.2)
BASOPHILS NFR BLD AUTO: 0.5 % (ref 0–1.5)
BILIRUB SERPL-MCNC: 0.5 MG/DL (ref 0.2–1.2)
BUN BLD-MCNC: 10 MG/DL (ref 6–20)
BUN/CREAT SERPL: 12 (ref 7.3–30)
CALCIUM SPEC-SCNC: 9.8 MG/DL (ref 8.5–10.2)
CHLORIDE SERPL-SCNC: 102 MMOL/L (ref 98–107)
CO2 SERPL-SCNC: 24.4 MMOL/L (ref 22–29)
CREAT BLD-MCNC: 0.83 MG/DL (ref 0.6–1.1)
DEPRECATED RDW RBC AUTO: 39 FL (ref 37–54)
EOSINOPHIL # BLD AUTO: 0.09 10*3/MM3 (ref 0–0.4)
EOSINOPHIL NFR BLD AUTO: 1.4 % (ref 0.3–6.2)
ERYTHROCYTE [DISTWIDTH] IN BLOOD BY AUTOMATED COUNT: 11.9 % (ref 12.3–15.4)
GFR SERPL CREATININE-BSD FRML MDRD: 78 ML/MIN/1.73
GLOBULIN UR ELPH-MCNC: 3 GM/DL (ref 1.8–3.5)
GLUCOSE BLD-MCNC: 97 MG/DL (ref 74–124)
HCT VFR BLD AUTO: 44 % (ref 34–46.6)
HGB BLD-MCNC: 15.5 G/DL (ref 12–15.9)
IMM GRANULOCYTES # BLD AUTO: 0.01 10*3/MM3 (ref 0–0.05)
IMM GRANULOCYTES NFR BLD AUTO: 0.2 % (ref 0–0.5)
LYMPHOCYTES # BLD AUTO: 1.37 10*3/MM3 (ref 0.7–3.1)
LYMPHOCYTES NFR BLD AUTO: 20.8 % (ref 19.6–45.3)
MCH RBC QN AUTO: 31.8 PG (ref 26.6–33)
MCHC RBC AUTO-ENTMCNC: 35.2 G/DL (ref 31.5–35.7)
MCV RBC AUTO: 90.3 FL (ref 79–97)
MONOCYTES # BLD AUTO: 0.52 10*3/MM3 (ref 0.1–0.9)
MONOCYTES NFR BLD AUTO: 7.9 % (ref 5–12)
NEUTROPHILS # BLD AUTO: 4.56 10*3/MM3 (ref 1.7–7)
NEUTROPHILS NFR BLD AUTO: 69.2 % (ref 42.7–76)
NRBC BLD AUTO-RTO: 0 /100 WBC (ref 0–0.2)
PLATELET # BLD AUTO: 170 10*3/MM3 (ref 140–450)
PMV BLD AUTO: 10.2 FL (ref 6–12)
POTASSIUM BLD-SCNC: 4.2 MMOL/L (ref 3.5–4.7)
PROT SERPL-MCNC: 7.7 G/DL (ref 6.3–8)
RBC # BLD AUTO: 4.87 10*6/MM3 (ref 3.77–5.28)
SODIUM BLD-SCNC: 139 MMOL/L (ref 134–145)
WBC NRBC COR # BLD: 6.58 10*3/MM3 (ref 3.4–10.8)

## 2019-07-02 PROCEDURE — 36415 COLL VENOUS BLD VENIPUNCTURE: CPT | Performed by: INTERNAL MEDICINE

## 2019-07-02 PROCEDURE — 99215 OFFICE O/P EST HI 40 MIN: CPT | Performed by: INTERNAL MEDICINE

## 2019-07-02 PROCEDURE — 85025 COMPLETE CBC W/AUTO DIFF WBC: CPT

## 2019-07-02 PROCEDURE — 80053 COMPREHEN METABOLIC PANEL: CPT | Performed by: INTERNAL MEDICINE

## 2019-07-02 NOTE — PROGRESS NOTES
REASONS FOR FOLLOWUP:  pt. Denies abd pain more of abd pressure   1. 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     2. Patient was started on hydroxyurea temporarily on 10/23/2013 with significant drop of WBC counts.     3. Patient started on Sprycel on 12/02/2013. Peripheral blood tested positive with 3.4% of cells positive for BCR-ABL translocation, tested 02/17/2014.     4. The patient had CCyR 6 months into treatment as tested on 05/15/2014.     5. Complete molecular response as tested 08/08/14 with negative product of BCR/ABL.     6. There was interruption of her treatment due to insurance coverage and misunderstanding from patient's part, she had a relapse of disease with BCR/ABL product at 12.626% in March 2016, and she restarted back on Sprycel, with good response as tested on 08/31/2016 with BCR/ABL at 0.294%.   7. Recurrent iron deficiency anemia not responding to oral iron supplementation.  She also had significant constipation associated with oral iron. Patient was given Feraheme treatment 2 doses in September 2016 and repeated in January 2017.     8.  Since June 2017, patient has been persistently tested negative for BCR/ABL by RT-PCR every 3 month period.     9.  Patient reported worsening leg cramping in end of July 2018.  Sprycel was decreased to 50 mg daily.  Laboratory studies on 8/31/2018, on 1/30/2019 and on 3/29/2019 were negative for BCR-ABL by RT-PCR.    10.  Patient was diagnosed with acute hepatitis C in early 2019.  Patient was treated by Dr. Karyn Mendieta.  Sprycel was on hold during her treatment for hepatitis C to avoid drug interaction and side effects (probably had drug related hepatitis in March 2019 possible due to Diflucan in combination with Sprycel).       HISTORY OF PRESENT ILLNESS: The patient is a 34 y.o.  female presenting today for 2-month follow-up and lab review with anticipation of restarting  sprycel treatment.     Patient reports feeling well overall. She notes her energy level has improved. She has completed Hepatits C treatment as of two weeks ago and notes that her ID specialist Dr. Mendieta recommended having her viral load checked here today since she is here to have blood tests.     Laboratory studies today report her hemoglobin is normal at 15.5 and platelets 170,000. normal WBC at 6580 including ANC 4560, lymphocytes 1370 and monocytes 520. CMP today is pending.    Past Medical History:   Diagnosis Date   • Anemia in neoplastic disease    • Asthma     childhood   • Chiari I malformation (CMS/HCC)     eval by Dr Moore, NS 2016   • Chronic myelogenous leukemia (CMS/HCC)     remission, Dr Castle follows   • Chronic pain    • CML (chronic myelocytic leukemia) (CMS/HCC)    • Cystitis    • Depression    • GERD (gastroesophageal reflux disease)     ulcer   • H/O Iron deficiency anemia    • H/O Lower extremity pain     Resolved.   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      *  Hysterectomy in October 2018      *  Acute hepatitis C in early 2019    OB/GYN HISTORY: Menarche age 9. G5, P4, 1 miscarriage of the third pregnancy. First pregnancy at age 17. Patient underwent partial hysterectomy in 2018.       HEMATOLOGIC/ONCOLOGIC HISTORY: History from previous dates can be found in the separate document.        Laboratory results on 09/23/2015 showed normalization of hemoglobin 12.2, but still has macrocytosis, MCV 73.3. She has normal platelets and WBC at 9400. Serum ferritin < 5 ng/ml, iron sats 6.3%, iron 29, TIBC 455 mcg/ml. Still has severe iron deficiency, needs to continue oral iron  therapy. The patient restarted taking oral iron supplementation which was probably stopped in the end of November 2015.        Laboratory study on 03/22/2016 reported normal WBC 8450, neutrophils 6100, lymphs is 1400 and monocytes 550. Serum iron was 26, TIBC 469 on saturation 6% and ferritin less than 5 NG/ML. Chemistry lab reported normal renal function with a creatinine 0.69, normal liver function panel, total protein 7.5 and albumin 4.2, normal electrolytes. Patient was restarted back on oral on sedimentation twice a day with good tolerance.      Patient continues take Lortab as needed for left leg cramping. Urine drug test on 08/05/2016 was completely negative, and repeated study on August 26 was positive for opiate, negative for other recreational drugs.      Repeat laboratory study on 08/31/2016 reported iron 21, ferritin less than 5, iron saturation 5%, TIBC 462. Hemoglobin was 11.5 MCV 78.1 MCHC 30.4. Platelets 163,000. Total WBC 8870 including neutrophils 6400 and lymphocytes 1500. BCR/ABL was 0.294% by RT-PCR method.       Patient was given IV Feraheme treatment in September 2016, with 2 doses. Repeated laboratory study on 10/06/2016 reported significantly improved and normalized ferritin 269, iron 80, TIBC 365 and iron saturation 22%. Her hemoglobin was 12.2, and MCV 80.8.      Patient reported she was seen by Dr. Fam in 10/2016 and was started on half tablets of Topamax. I reviewed Dr. Fam’s clinic note and telephone conversation record. The patient reports she has no recurrence of migraine headache. However, she is very tearful today, reporting that she has thoughts of not taking any medications. She did assure me that she has been taking the medication up to this point. 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 The patient denies suicide ideation. When she was initially diagnosed of  CML back in 2014, she had depression and I started the patient on Prozac. The patient reported initially it helped her, however, she no longer feels the effect of Prozac. She feels depressed. The patient reports she has no personal hobby. She goes to work and goes home, taking care of her kids. She does not enjoy life as she used to.      Laboratory study on December 29, 2016 reported at ferritin 19.9, iron 33, TIBC 347 iron saturation 10%.  Hemoglobin was 13, normal WBC and platelets.  Unremarkable CMP.  Patient was given 2 doses of Feraheme treatment in early January 2017.      Cytogenetic study on March 14, 2017 reported a BCR-ABL products at 0.098%, showed further improved residual disease.  There is also reported a ferritin 103.7, iron 79, TIBC 336 and iron saturation 24%.  Hemoglobin was 13.5, MCV 92.3, platelets 199,000 WBC 7000.  She had a completely normal CMP.       Repeated laboratory study on June 20, 2017 reported at nondetectable BCR-ABL product.  Ferritin was 45, iron 35 TIBC 333 and iron saturation 11%.  Had a normal CBC and CMP.      In the end of July 2018, patient called reporting worsening significant leg cramping, and getting worse recently and has been taking 6 tablets of Advil with no significant improvement.  We suspect it might be caused by Sprycel for her CML.  We discussed with patient, and decreased to half dose at 50 mg daily.  Patient reports that she is improved leg cramping since dose reduction.    Per medical records this patient had emergency room visit again on 8/18/2018.  Patient reports she had significant abdomen/pelvic pain at a time associate with nausea.  Laboratory study showed significantly elevated WBC at 25,900 including neutrophils 24,400, with elevated hemoglobin 15.8 and platelets 146,000.     She had a thorough investigation, including CT scan for abdomen pelvis which was unremarkable.  She then had ultrasound for the pelvis and it was also unremarkable.  She had  ultrasound for the gallbladder examination on the same day and was unremarkable.  She had a normal CMP except of marginally elevated glucose at 112.  Her urinalysis showed only marginally increased WBC 3-5/HPF.  She was negative for yeast infection, negative for Chlamydia trichomonas and Neisseria gonorrhea.  Patient was prescribed Flagyl and doxycycline and discharged to home.      lab study on 2019 reported normal iron saturation 47% and elevated ferritin 507.1 ng/mL with free iron 184 and TIBC 388.  hemoglobin is normal at 13.7 and platelets 186,000. normal WBC at 5080 including ANC 3300, lymphocytes 930 and monocytes 630. Labs reported significantly elevated ALT at 655,  and alkaline phosphatase 234 but normal total bilirubin 0.9. She had normal renal function creatinine 0.78. Normal electrolytes.     On 2019, I searched Up-to-Date and suspected that this patient had drug-induced hepatitis from Diflucan or with combination of Diflucan with Sprycel. This patient had been on Sprycel for years and had no problem with abnormal liver panel previously. It seems Diflucan inhibits CY moderately, which likely interferes with the metabolism of Sprycel. I instructed the patient to hold her Sprycel for now and we will repeat her liver panel every 2 weeks for reassessment.    Sprycel treatment continued as of 2019 upon completion of MEVYRET.      MEDICATIONS: The current medication list was reviewed with the patient and updated in the EMR this date per the medical assistant. Medication dosages and frequencies were confirmed to be accurate.        Allergies   Allergen Reactions   • Sulfa Antibiotics Unknown (See Comments)     Childhood reaction     SOCIAL HISTORY: . She smoked cigarettes previously, quit in 2006 with 8-pack-year history. Social drinker, maybe once a month. No illegal drug use. No risk for HIV except tattoos.  Hairstylist.       FAMILY HISTORY: Maternal grandmother  "had esophageal/stomach adenocarcinoma diagnosed at age of 72 and  of disease progress at age of 73. The patient' s mother has hypertension but otherwise no family history of malignancy, especially no leukemia.        I have reviewed the patient's medical history in detail and updated the computerized patient record.    Review of Systems   Constitutional: Positive for fatigue. Negative for appetite change, chills and fever.   HENT:   Negative for trouble swallowing.    Respiratory: Negative for cough and shortness of breath.    Cardiovascular: Negative for chest pain, leg swelling and palpitations.   Gastrointestinal: Positive for abdominal pain. Negative for blood in stool, constipation, diarrhea, nausea and vomiting.   Genitourinary: Negative for dysuria and hematuria.    Musculoskeletal: Negative for arthralgias and myalgias.   Skin: Negative for rash.   Neurological: Negative for dizziness, extremity weakness and headaches.   Hematological: Does not bruise/bleed easily.   Psychiatric/Behavioral: Negative for confusion.     VITAL SIGNS:   Vitals:    19 1109   BP: 103/70   Pulse: 59   Resp: 14   Temp: 98.3 °F (36.8 °C)   SpO2: 100%   Weight: 77.8 kg (171 lb 9.6 oz)   Height: 171 cm (67.32\")   ECOG 0        PHYSICAL EXAMINATION:      GENERAL:  Well-developed, well-nourished female in no acute distress.   SKIN:  Warm, dry without rashes, purpura or petechiae.  EYES:  Pupils equal, round and reactive to light.  EOMs intact.  Conjunctivae normal.  EARS:  Hearing intact.  NOSE:  No nasal discharge.  MOUTH:  Tongue is well-papillated; no stomatitis or ulcers.  Lips normal.  THROAT:  Oropharynx without lesions or exudates.  NECK:  Supple with good range of motion; no thyromegaly or masses.  LYMPHATICS:  No cervical, supraclavicular adenopathy.  CHEST:  Lungs clear to auscultation. Good airflow.  CARDIAC:  Regular rate and rhythm without murmurs. Normal S1,S2.  ABDOMEN:  Soft, nontender with no hepatosplenomegaly " or masses. Normal bowel sounds.  Bowel sounds normal.  EXTREMITIES:  No clubbing, cyanosis or edema.  NEUROLOGICAL:  Cranial Nerves II-XII grossly intact.  No focal neurological deficits.  PSYCHIATRIC:  Normal affect and mood.          LABORATORY DATA:    Results from last 7 days   Lab Units 07/02/19  1054   WBC 10*3/mm3 6.58   NEUTROS ABS 10*3/mm3 4.56   HEMOGLOBIN g/dL 15.5   HEMATOCRIT % 44.0   PLATELETS 10*3/mm3 170       Lab Results   Component Value Date    IRON 148 (H) 04/30/2019    TIBC 368 04/30/2019    FERRITIN 458.70 (H) 04/30/2019   Iron saturation 40% on 4/30/2019      Results from last 7 days   Lab Units 07/02/19  1054   SODIUM mmol/L 139   POTASSIUM mmol/L 4.2   CHLORIDE mmol/L 102   CO2 mmol/L 24.4   BUN mg/dL 10   CREATININE mg/dL 0.83   CALCIUM mg/dL 9.8   ALBUMIN g/dL 4.70   BILIRUBIN mg/dL 0.5   ALK PHOS U/L 76   ALT (SGPT) U/L 8   AST (SGOT) U/L 14   GLUCOSE mg/dL 97       BCR-ABL 0% by RT-PCR on 3/15/2018 and 6/7/2018 and 8/31/2018, and negative on 1/30/2019 and 3/29/2019. BCR-ABL pending 07/02/2019.       ASSESSMENT:    1. Chronic myelogenous leukemia, chronic phase with excellent response to Sprycel and complete molecular response in August 2014. However she had interuption of treatment in early part of 2016, because of insurance issues and miscommunication, she stopped the medicine without telling us, and laboratory test on 03/22/2016 reported disease relapse with increased BCR/ABL product at 12.626%. subsequently she was restarted back on Sprycel, and responded well.  Since June 2017, she has completed molecular response as tested every 3 months.  She has been compliant with treatment.     In the end of July 2018, she reported worsening significant cramping involving her legs, so we decreased her Sprycel to 50 mg daily starting early August.  She's been having less problem since that time.  She was tested negative for BCR-ABL on 8/31/2018.      Patient had a negative BCR-ABL by RT-PCR in  end of January 2019 and also on 3/29/2019.     Patient was later found having significantly elevated liver function panel.  Sprycel was on hold and she was subsequently found having acute hepatitis C infection.      I discussed with Dr. Karyn Mendieta recently, we'll hold her Sprycel for now until she finishes hepatitis C treatment.      She was started on hepatitis C treatment on 4/18/2019 and finished an 8-week course.    07/02/2019 - BCR-ABL by RT-PCR is pending. Ordered HCV laboratory study STAT today. I will call the patient with the results of the lab. If it is negative, she is to re-start Sprycel treatment.     2.  Hepatitis C infection starting early 2019 evidenced by his severely elevated liver function panel.  Patient was started on treatment for hepatitis C on 4/18/2019 for 8-week course..     We are repeating hepatitis C RNA by PCR on 7/2/2019.    Patient will continue follow-up with Dr. Mendieta.    3. Recurrent Iron deficiency anemia.  She was treated again with Feraheme October 2017.   Iron deficiency thought to be related to ulcer identified on EGD, being treated with Carafate.  She had recurrent iron deficiency again and was given Feraheme 2 doses in March 2018.  Subsequently recurrent iron deficiency in July 2018, she was was switched to Venofer 3 doses total 900 mg.    Repeated laboratory study on 04/30/2019 showed no evidence of iron deficiency.  She actually has elevated ferritin 458 ng/mL, free iron 148 TIBC 368 and iron saturation 40%.  We'll continue to monitor her iron status.     4. Muscle cramping.  Secondary to Sprycel.  This has resolved after dose reduction.       PLAN:    1. I will call the patient with results of HCV labs and will instruct her to re-start Sprycel treatment (has been on 50mg daily since July 2018) if HCV labs are negative.  2. Patient to follow up with me in 3 months with labs -  CBC, CMP, ferritin, iron profile, and BCR/ABL by PCR.     I, Ellen Littlejohn, acted as scribe for  Isabel Castle MD PhD  in documenting the service or procedure. To the best of my knowledge, I recorded what was dictated by Isabel Castle MD PhD      I reviewed the note scribed by Ms. Ellen Littlejohn and made appropriate corrections.       Isabel Castle MD PhD  07/02/2019        cc:   ISHAAN REGAN M.D.     Karyn Mendieta M.D.

## 2019-07-05 LAB
HCV GENTYP SERPL NAA+PROBE: NORMAL
HCV RNA SERPL NAA+PROBE-ACNC: NORMAL IU/ML
HCV RNA SERPL NAA+PROBE-LOG IU: NORMAL LOG10 IU/ML
REF LAB TEST REF RANGE: NORMAL

## 2019-07-09 LAB — REF LAB TEST METHOD: NORMAL

## 2019-07-12 ENCOUNTER — DOCUMENTATION (OUTPATIENT)
Dept: ONCOLOGY | Facility: CLINIC | Age: 34
End: 2019-07-12

## 2019-07-18 ENCOUNTER — TELEPHONE (OUTPATIENT)
Dept: ONCOLOGY | Facility: HOSPITAL | Age: 34
End: 2019-07-18

## 2019-07-18 NOTE — TELEPHONE ENCOUNTER
Called pt and informed her HCV panel was negative. Pt questions result of BCR-ABL. Reviewed with Dr. Cope (md #2) Per Dr. Cope, informed pt that BCR-ABL was detected. Per Dr. Cope, pt instructed to resume sprycel 50mg daily. Pt will be scheduled to see Dr. Castle to review once Dr. Castle back in office. Message sent to appt desk to schedule. Pt v/u.     ----- Message from Balwinder Fuchs sent at 7/18/2019  3:14 PM EDT -----  Contact: [phone]  Results of labs

## 2019-07-30 NOTE — PROGRESS NOTES
REASONS FOR FOLLOWUP:     1. Chronic myelogenous leukemia with splenomegaly, chronic phase, positive [place] chromosome, no mutation at kinase domain.     2. Patient was started on hydroxyurea temporarily on 10/23/2013 with significant drop of WBC counts.     3. Patient started on Sprycel on 12/02/2013. Peripheral blood tested positive with 3.4% of cells positive for BCR-ABL translocation, tested 02/17/2014.     4. The patient had CCyR 6 months into treatment as tested on 05/15/2014.     5. Complete molecular response as tested 08/08/14 with negative product of BCR/ABL.     6. There was interruption of her treatment due to insurance coverage and misunderstanding from patient's part, she had a relapse of disease with BCR/ABL product at 12.626% in March 2016, and she restarted back on Sprycel, with good response as tested on 08/31/2016 with BCR/ABL at 0.294%.   7. Recurrent iron deficiency anemia not responding to oral iron supplementation.  She also had significant constipation associated with oral iron. Patient was given Feraheme treatment 2 doses in September 2016 and repeated in January 2017.     8.  Since June 2017, patient has been persistently tested negative for BCR/ABL by RT-PCR every 3 month period.     9.  Patient reported worsening leg cramping in end of July 2018.  Sprycel was decreased to 50 mg daily.  Laboratory studies on 8/31/2018, on 1/30/2019 and on 3/29/2019 were negative for BCR-ABL by RT-PCR.    10.  Patient was diagnosed with acute hepatitis C in early 2019.  Patient was treated by Dr. Karyn Mendieta.  Sprycel was on hold during her treatment for hepatitis C to avoid drug interaction and side effects (probably had drug related hepatitis in March 2019 possible due to Diflucan in combination with Sprycel).    11.  Liver study on 7/2/2019 reported no detectable hepatitis C viral infection.  She was weakly positive for BCR-ABL by PCR.  Sprycel 50 mg daily was restarted.      HISTORY OF PRESENT  ILLNESS: The patient is a 34 y.o.  female presenting today for 1-month follow-up and lab review after holding sprycel in 03/2019 and restarted sprycel treatment 50 mg on 07/02/2019 upon completion of MEVYRET for her hepatitis C infection and confirmed to be no detectable virus.    Patient reports she has been feeling somewhat fatigued recently since restarting Sprycel. Patient is currently followed up by her chiropractor for left hip pain. She denies any lower extremity edema or pain or any muscle cramping at this time.     Laboratory study performed on 07/02/2019 showed normal CBC and CMP. BCR/ABL by RT-PCR detected BCR/ABL1 Major 0.0141%. HCV RNA by PCR showed HCV not detected.    Laboratory studies today report her hemoglobin is normal at 15.2 and platelets 146,000. normal WBC at 7650 including ANC 5190, lymphocytes 1600 and monocytes 630.      Past Medical History:   Diagnosis Date   • Anemia in neoplastic disease    • Asthma     childhood   • Chiari I malformation (CMS/HCC)     eval by Dr Moore, NS 2016   • Chronic myelogenous leukemia (CMS/HCC)     remission, Dr Castle follows   • Chronic pain    • CML (chronic myelocytic leukemia) (CMS/HCC)    • Cystitis    • Depression    • GERD (gastroesophageal reflux disease)     ulcer   • H/O Iron deficiency anemia    • H/O Lower extremity pain     Resolved.   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      *  Hysterectomy in October 2018      *  Acute hepatitis C in early 2019    OB/GYN HISTORY: Menarche age 9. G5, P4, 1 miscarriage of the third pregnancy. First pregnancy at age 17. Patient underwent partial hysterectomy in 2018.        HEMATOLOGIC/ONCOLOGIC HISTORY: History from previous dates can be found in the separate document.        Laboratory results on 09/23/2015 showed normalization of hemoglobin 12.2, but still has macrocytosis, MCV 73.3. She has normal platelets and WBC at 9400. Serum ferritin < 5 ng/ml, iron sats 6.3%, iron 29, TIBC 455 mcg/ml. Still has severe iron deficiency, needs to continue oral iron therapy. The patient restarted taking oral iron supplementation which was probably stopped in the end of November 2015.        Laboratory study on 03/22/2016 reported normal WBC 8450, neutrophils 6100, lymphs is 1400 and monocytes 550. Serum iron was 26, TIBC 469 on saturation 6% and ferritin less than 5 NG/ML. Chemistry lab reported normal renal function with a creatinine 0.69, normal liver function panel, total protein 7.5 and albumin 4.2, normal electrolytes. Patient was restarted back on oral on sedimentation twice a day with good tolerance.      Patient continues take Lortab as needed for left leg cramping. Urine drug test on 08/05/2016 was completely negative, and repeated study on August 26 was positive for opiate, negative for other recreational drugs.      Repeat laboratory study on 08/31/2016 reported iron 21, ferritin less than 5, iron saturation 5%, TIBC 462. Hemoglobin was 11.5 MCV 78.1 MCHC 30.4. Platelets 163,000. Total WBC 8870 including neutrophils 6400 and lymphocytes 1500. BCR/ABL was 0.294% by RT-PCR method.       Patient was given IV Feraheme treatment in September 2016, with 2 doses. Repeated laboratory study on 10/06/2016 reported significantly improved and normalized ferritin 269, iron 80, TIBC 365 and iron saturation 22%. Her hemoglobin was 12.2, and MCV 80.8.      Patient reported she was seen by Dr. Fam in 10/2016 and was started on half tablets of Topamax. I reviewed Dr. Fam’s clinic note and telephone conversation record. The patient reports she has no recurrence of migraine headache.  However, she is very tearful today, reporting that she has thoughts of not taking any medications. She did assure me that she has been taking the medication up to this point. 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 The patient denies suicide ideation. When she was initially diagnosed of CML back in 2014, she had depression and I started the patient on Prozac. The patient reported initially it helped her, however, she no longer feels the effect of Prozac. She feels depressed. The patient reports she has no personal hobby. She goes to work and goes home, taking care of her kids. She does not enjoy life as she used to.      Laboratory study on December 29, 2016 reported at ferritin 19.9, iron 33, TIBC 347 iron saturation 10%.  Hemoglobin was 13, normal WBC and platelets.  Unremarkable CMP.  Patient was given 2 doses of Feraheme treatment in early January 2017.      Cytogenetic study on March 14, 2017 reported a BCR-ABL products at 0.098%, showed further improved residual disease.  There is also reported a ferritin 103.7, iron 79, TIBC 336 and iron saturation 24%.  Hemoglobin was 13.5, MCV 92.3, platelets 199,000 WBC 7000.  She had a completely normal CMP.       Repeated laboratory study on June 20, 2017 reported at nondetectable BCR-ABL product.  Ferritin was 45, iron 35 TIBC 333 and iron saturation 11%.  Had a normal CBC and CMP.      In the end of July 2018, patient called reporting worsening significant leg cramping, and getting worse recently and has been taking 6 tablets of Advil with no significant improvement.  We suspect it might be caused by Sprycel for her CML.  We discussed with patient, and decreased to half dose at 50 mg daily.  Patient reports that she is improved leg cramping since dose reduction.    Per medical records this patient had emergency room visit again on 8/18/2018.  Patient reports she  had significant abdomen/pelvic pain at a time associate with nausea.  Laboratory study showed significantly elevated WBC at 25,900 including neutrophils 24,400, with elevated hemoglobin 15.8 and platelets 146,000.     She had a thorough investigation, including CT scan for abdomen pelvis which was unremarkable.  She then had ultrasound for the pelvis and it was also unremarkable.  She had ultrasound for the gallbladder examination on the same day and was unremarkable.  She had a normal CMP except of marginally elevated glucose at 112.  Her urinalysis showed only marginally increased WBC 3-5/HPF.  She was negative for yeast infection, negative for Chlamydia trichomonas and Neisseria gonorrhea.  Patient was prescribed Flagyl and doxycycline and discharged to home.      lab study on 2019 reported normal iron saturation 47% and elevated ferritin 507.1 ng/mL with free iron 184 and TIBC 388.  hemoglobin is normal at 13.7 and platelets 186,000. normal WBC at 5080 including ANC 3300, lymphocytes 930 and monocytes 630. Labs reported significantly elevated ALT at 655,  and alkaline phosphatase 234 but normal total bilirubin 0.9. She had normal renal function creatinine 0.78. Normal electrolytes.     On 2019, I searched Up-to-Date and suspected that this patient had drug-induced hepatitis from Diflucan or with combination of Diflucan with Sprycel. This patient had been on Sprycel for years and had no problem with abnormal liver panel previously. It seems Diflucan inhibits CY moderately, which likely interferes with the metabolism of Sprycel. I instructed the patient to hold her Sprycel for now and we will repeat her liver panel every 2 weeks for reassessment.    Sprycel treatment continued as of 2019 upon completion of MEVYRET.    Laboratory study performed on 2019 showed normal CBC and CMP. BCR/ABL by RT-PCR detected BCR/ABL1 Major. HCV RNA by PCR showed HCV not detected.    MEDICATIONS: The  "current medication list was reviewed with the patient and updated in the EMR this date per the medical assistant. Medication dosages and frequencies were confirmed to be accurate.        Allergies   Allergen Reactions   • Sulfa Antibiotics Unknown (See Comments)     Childhood reaction     SOCIAL HISTORY: . She smoked cigarettes previously, quit in 2006 with 8-pack-year history. Social drinker, maybe once a month. No illegal drug use. No risk for HIV except tattoos.  Hairstylist.       FAMILY HISTORY: Maternal grandmother had esophageal/stomach adenocarcinoma diagnosed at age of 72 and  of disease progress at age of 73. The patient' s mother has hypertension but otherwise no family history of malignancy, especially no leukemia.        I have reviewed the patient's medical history in detail and updated the computerized patient record.    Review of Systems   Constitutional: Positive for fatigue. Negative for appetite change, chills, diaphoresis and fever.   HENT:   Negative for mouth sores and trouble swallowing.    Eyes: Negative for icterus.   Respiratory: Negative for cough and shortness of breath.    Cardiovascular: Negative for chest pain, leg swelling and palpitations.   Gastrointestinal: Negative for abdominal pain, blood in stool, constipation, diarrhea and nausea.   Genitourinary: Negative for dysuria and hematuria.    Musculoskeletal: Negative for arthralgias and myalgias.   Skin: Negative for rash.   Neurological: Negative for dizziness, extremity weakness and headaches.   Hematological: Does not bruise/bleed easily.   Psychiatric/Behavioral: Negative for confusion.     VITAL SIGNS:   Vitals:    19 0812   BP: 122/75   Pulse: 70   Resp: 16   Temp: 98.3 °F (36.8 °C)   SpO2: 99%   Weight: 79.4 kg (175 lb)   Height: 171 cm (67.32\")   PainSc:   4   PainLoc: Comment: headache   ECOG 0        PHYSICAL EXAMINATION:      GENERAL:  Well-developed, well-nourished female in no acute distress. "   SKIN:  Warm, dry without rashes, purpura or petechiae.  EYES:  Pupils equal, round and reactive to light.  EOMs intact.  Conjunctivae normal.  EARS:  Hearing intact.  NOSE:  No nasal discharge.  MOUTH:  Tongue is well-papillated; no stomatitis or ulcers.  Lips normal.  THROAT:  Oropharynx without lesions or exudates.  NECK:  Supple with good range of motion; no thyromegaly or masses, no JVD.  LYMPHATICS:  No cervical, supraclavicular adenopathy.  CHEST:  Lungs clear to auscultation. Good airflow.  CARDIAC:  Regular rate and rhythm without murmurs, rubs or gallops. Normal S1,S2.  ABDOMEN:  Soft, nontender with no hepatosplenomegaly or masses. Bowel sounds normal.  EXTREMITIES:  No clubbing, cyanosis or edema.  NEUROLOGICAL:  Cranial Nerves II-XII grossly intact.  No focal neurological deficits.  PSYCHIATRIC:  Normal affect and mood.      LABORATORY DATA:    Results from last 7 days   Lab Units 07/31/19  0749   WBC 10*3/mm3 7.65   NEUTROS ABS 10*3/mm3 5.19   HEMOGLOBIN g/dL 15.2   HEMATOCRIT % 42.5   PLATELETS 10*3/mm3 146       Lab Results   Component Value Date    IRON 148 (H) 04/30/2019    TIBC 368 04/30/2019    FERRITIN 458.70 (H) 04/30/2019   Iron saturation 40% on 4/30/2019            ASSESSMENT:    1. Chronic myelogenous leukemia, chronic phase with excellent response to Sprycel and complete molecular response in August 2014. However she had interuption of treatment in early part of 2016, because of insurance issues and miscommunication, she stopped the medicine without telling us, and laboratory test on 03/22/2016 reported disease relapse with increased BCR/ABL product at 12.626%. subsequently she was restarted back on Sprycel, and responded well.  Since June 2017, she has completed molecular response as tested every 3 months.  She has been compliant with treatment.     · In the end of July 2018, she reported worsening significant cramping involving her legs, so we decreased her Sprycel to 50 mg daily  starting early August.  She's been having less problem since that time.  She was tested negative for BCR-ABL on 8/31/2018.    · Patient had a negative BCR-ABL by RT-PCR in end of January 2019 and also on 3/29/2019.   · Patient was later found having significantly elevated liver function panel.  Sprycel was on hold and she was subsequently found having acute hepatitis C infection.    · I discussed with Dr. Karyn Mendieta recently, we'll hold her Sprycel for now until she finishes hepatitis C treatment.   · She was started on hepatitis C treatment on 4/18/2019 and finished an 8-week course.  · On 07/02/2019 patient's labs showed BCR-ABL Major (p210) detected by RT-PCR at 0.0141%. BCR/ABL1 MINOR (p190) was not detected. HCV was not detected.  · Sprycel treatment continued as of 07/02/2019 upon completion of MEVYRET.  · Laboratory study on 07/02/2019 showed BCR/ABL by RT-PCR test detected BCR/ABL1 Major and patient will need to continue on sprycel treatment.   · We will repeat studies in 2 months.    2.  Hepatitis C infection starting early 2019 evidenced by his severely elevated liver function panel.  Patient was started on treatment for hepatitis C on 4/18/2019 for 8-week course.    · We repeated hepatitis C RNA by PCR on 7/2/2019, HCV not detected.  · Patient will continue follow-up with Dr. Mednieta.    3. Recurrent Iron deficiency anemia.  She was treated again with Feraheme October 2017.   Iron deficiency thought to be related to ulcer identified on EGD, being treated with Carafate.  She had recurrent iron deficiency again and was given Feraheme 2 doses in March 2018.  Subsequently recurrent iron deficiency in July 2018, she was was switched to Venofer 3 doses total 900 mg.    · We'll continue to monitor her iron status. Repeat laboratory study in 2 months.     4. Muscle cramping.  Secondary to Sprycel.  This has resolved after dose reduction. Patient denies any muscle cramping today.       PLAN:    1. Patient to continue  Sprycel treatment.   2. Patient to follow up with me in 2 months with labs -  CBC, CMP, ferritin, iron profile, and BCR/ABL by PCR.     I, Ellen Littlejohn, acted as scribe for Isabel Castle MD PhD  in documenting the service or procedure. To the best of my knowledge, I recorded what was dictated by Isabel Castle MD PhD      I reviewed the note scribed by Ms. Ellen Littlejohn and made appropriate corrections.       Isabel Castle MD PhD  07/31/2019      cc:   ISHAAN REGAN M.D.     Karyn Mendieta M.D.

## 2019-07-31 ENCOUNTER — OFFICE VISIT (OUTPATIENT)
Dept: ONCOLOGY | Facility: CLINIC | Age: 34
End: 2019-07-31

## 2019-07-31 ENCOUNTER — LAB (OUTPATIENT)
Dept: LAB | Facility: HOSPITAL | Age: 34
End: 2019-07-31

## 2019-07-31 VITALS
OXYGEN SATURATION: 99 % | HEIGHT: 67 IN | HEART RATE: 70 BPM | WEIGHT: 175 LBS | DIASTOLIC BLOOD PRESSURE: 75 MMHG | RESPIRATION RATE: 16 BRPM | TEMPERATURE: 98.3 F | SYSTOLIC BLOOD PRESSURE: 122 MMHG | BODY MASS INDEX: 27.47 KG/M2

## 2019-07-31 DIAGNOSIS — K90.9 MALABSORPTION OF IRON: Primary | ICD-10-CM

## 2019-07-31 DIAGNOSIS — D50.0 IRON DEFICIENCY ANEMIA DUE TO CHRONIC BLOOD LOSS: ICD-10-CM

## 2019-07-31 DIAGNOSIS — C92.10 CML (CHRONIC MYELOID LEUKEMIA) (HCC): Primary | ICD-10-CM

## 2019-07-31 LAB
BASOPHILS # BLD AUTO: 0.04 10*3/MM3 (ref 0–0.2)
BASOPHILS NFR BLD AUTO: 0.5 % (ref 0–1.5)
DEPRECATED RDW RBC AUTO: 36.4 FL (ref 37–54)
EOSINOPHIL # BLD AUTO: 0.15 10*3/MM3 (ref 0–0.4)
EOSINOPHIL NFR BLD AUTO: 2 % (ref 0.3–6.2)
ERYTHROCYTE [DISTWIDTH] IN BLOOD BY AUTOMATED COUNT: 11.5 % (ref 12.3–15.4)
HCT VFR BLD AUTO: 42.5 % (ref 34–46.6)
HGB BLD-MCNC: 15.2 G/DL (ref 12–15.9)
IMM GRANULOCYTES # BLD AUTO: 0.04 10*3/MM3 (ref 0–0.05)
IMM GRANULOCYTES NFR BLD AUTO: 0.5 % (ref 0–0.5)
LYMPHOCYTES # BLD AUTO: 1.6 10*3/MM3 (ref 0.7–3.1)
LYMPHOCYTES NFR BLD AUTO: 20.9 % (ref 19.6–45.3)
MCH RBC QN AUTO: 31 PG (ref 26.6–33)
MCHC RBC AUTO-ENTMCNC: 35.8 G/DL (ref 31.5–35.7)
MCV RBC AUTO: 86.7 FL (ref 79–97)
MONOCYTES # BLD AUTO: 0.63 10*3/MM3 (ref 0.1–0.9)
MONOCYTES NFR BLD AUTO: 8.2 % (ref 5–12)
NEUTROPHILS # BLD AUTO: 5.19 10*3/MM3 (ref 1.7–7)
NEUTROPHILS NFR BLD AUTO: 67.9 % (ref 42.7–76)
NRBC BLD AUTO-RTO: 0 /100 WBC (ref 0–0.2)
PLATELET # BLD AUTO: 146 10*3/MM3 (ref 140–450)
PMV BLD AUTO: 10.4 FL (ref 6–12)
RBC # BLD AUTO: 4.9 10*6/MM3 (ref 3.77–5.28)
WBC NRBC COR # BLD: 7.65 10*3/MM3 (ref 3.4–10.8)

## 2019-07-31 PROCEDURE — 85025 COMPLETE CBC W/AUTO DIFF WBC: CPT | Performed by: INTERNAL MEDICINE

## 2019-07-31 PROCEDURE — 36415 COLL VENOUS BLD VENIPUNCTURE: CPT | Performed by: INTERNAL MEDICINE

## 2019-07-31 PROCEDURE — 99214 OFFICE O/P EST MOD 30 MIN: CPT | Performed by: INTERNAL MEDICINE

## 2019-07-31 RX ORDER — DASATINIB 50 MG/1
TABLET ORAL
COMMUNITY
Start: 2019-07-11 | End: 2019-12-02 | Stop reason: SDUPTHER

## 2019-09-06 ENCOUNTER — DOCUMENTATION (OUTPATIENT)
Dept: ONCOLOGY | Facility: CLINIC | Age: 34
End: 2019-09-06

## 2019-09-11 ENCOUNTER — APPOINTMENT (OUTPATIENT)
Dept: LAB | Facility: HOSPITAL | Age: 34
End: 2019-09-11

## 2019-09-11 ENCOUNTER — OFFICE VISIT (OUTPATIENT)
Dept: INFECTIOUS DISEASES | Facility: CLINIC | Age: 34
End: 2019-09-11

## 2019-09-11 VITALS
HEART RATE: 60 BPM | RESPIRATION RATE: 12 BRPM | DIASTOLIC BLOOD PRESSURE: 64 MMHG | BODY MASS INDEX: 26.49 KG/M2 | WEIGHT: 174.8 LBS | HEIGHT: 68 IN | TEMPERATURE: 98 F | SYSTOLIC BLOOD PRESSURE: 98 MMHG

## 2019-09-11 DIAGNOSIS — B18.2 CHRONIC HEPATITIS C WITHOUT HEPATIC COMA (HCC): Primary | ICD-10-CM

## 2019-09-11 PROCEDURE — 36415 COLL VENOUS BLD VENIPUNCTURE: CPT | Performed by: INTERNAL MEDICINE

## 2019-09-11 PROCEDURE — 87522 HEPATITIS C REVRS TRNSCRPJ: CPT | Performed by: INTERNAL MEDICINE

## 2019-09-11 PROCEDURE — 99214 OFFICE O/P EST MOD 30 MIN: CPT | Performed by: INTERNAL MEDICINE

## 2019-09-11 NOTE — PROGRESS NOTES
Reason for clinic visits: F/u clinic visit for hepatitis C    HPI: Hu Houston is a 34 y.o. female was last seen in the infectious disease clinic in April.  Since that time we started the patient on Mavyret and the patient completed an 8-week course.  She had issues with a rash on her lower extremities that was pruritic.  We prescribed her hydroxyzine which helped and after few days the rash resolved.  She completed her therapy without any issues.  Her four-week hepatitis C viral load was 100.  She had another viral load checked in July which was also negative.  Her LFTs have returned to normal.  She has been restarted on her CML therapy.  Her partner tested negative for hepatitis C.  She denies any abdominal pain nausea vomiting or diarrhea.  No fevers chills or night sweats.    Past Medical History:   Diagnosis Date   • Anemia in neoplastic disease    • Asthma     childhood   • Chiari I malformation (CMS/HCC)     eval by Dr Moore, NS 2016   • Chronic myelogenous leukemia (CMS/HCC)     remission, Dr Castle follows   • Chronic pain    • CML (chronic myelocytic leukemia) (CMS/HCC)    • Cystitis    • Depression    • GERD (gastroesophageal reflux disease)     ulcer   • H/O Iron deficiency anemia    • H/O Lower extremity pain     Resolved.   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   • Pulmonary hypertension (CMS/HCC)    • Seizures (CMS/HCC)     as child after head injry   • Splenomegaly    • Status post D&C    • Vitamin D deficiency        Past Surgical History:   Procedure Laterality Date   • BONE MARROW BIOPSY  2013   • D&C HYSTEROSCOPY N/A 10/1/2018    Procedure: DILATATION AND CURETTAGE HYSTEROSCOPY;  Surgeon: Beck Cornejo MD;  [facility];  Service: Obstetrics/Gynecology   • D&C HYSTEROSCOPY ENDOMETRIAL ABLATION N/A 4/9/2018    Procedure: Hysteroscopy NovaSure  ablation;  Surgeon: Beck Cornejo MD;  Location: [facility] OR;  Service: Obstetrics/Gynecology   • ENDOSCOPY N/A 8/4/2017    Procedure: ESOPHAGOGASTRODUODENOSCOPY with biopsies;  Surgeon: Sophie Trejo MD;  Location: [facility] OR;  Service:    • GYNECOLOGY EXAM UNDER ANESTHESIA      IUD removal   • TOTAL LAPAROSCOPIC HYSTERECTOMY Bilateral 10/29/2018    Procedure: TOTAL LAPAROSCOPIC HYSTERECTOMY, bilateral salpingectomy;  Surgeon: eBck Cornejo MD;  Location: [facility] OR;  Service: Obstetrics/Gynecology   • TUBAL COAGULATION LAPAROSCOPIC Bilateral 4/9/2018    Procedure: TUBAL COAGULATION LAPAROSCOPIC;  Surgeon: Beck Cornejo MD;  Location: [facility] OR;  Service: Obstetrics/Gynecology       Social History   reports that she quit smoking about 13 years ago. Her smoking use included cigarettes. She has a 8.00 pack-year smoking history. She has never used smokeless tobacco. She reports that she drinks alcohol. She reports that she uses drugs. Drug: Cocaine.    Family History  family history includes Hyperlipidemia in her mother; Hypertension in her mother; Stomach cancer (age of onset: 72) in her maternal grandmother; Stroke in her paternal grandmother.    Allergies   Allergen Reactions   • Sulfa Antibiotics Unknown (See Comments)     Childhood reaction       The medication list has been reviewed and updated.     Review of Systems  Pertinent items are noted in HPI, all other systems reviewed and negative    Vital Signs   Temp:  [98 °F (36.7 °C)] 98 °F (36.7 °C)  Heart Rate:  [60] 60  Resp:  [12] 12  BP: (98)/(64) 98/64    Physical Exam:   General: In no acute distress  Cardiovascular: RRR, no LE edema    Respiratory: Breathing comfortably on room air   GI: Abdomen is soft, non-tender, non-distended, positive bowel sounds bilaterally  Skin: No rashes    Lab Results   Component Value Date    WBC 7.65 07/31/2019    HGB 15.2 07/31/2019    HCT 42.5 07/31/2019    MCV 86.7 07/31/2019      07/31/2019       Lab Results   Component Value Date    GLUCOSE 97 07/02/2019    BUN 10 07/02/2019    CREATININE 0.83 07/02/2019    EGFRIFNONA 78 07/02/2019    BCR 12.0 07/02/2019    CO2 24.4 07/02/2019    CALCIUM 9.8 07/02/2019    ALBUMIN 4.70 07/02/2019    AST 14 07/02/2019    ALT 8 07/02/2019 7/2/2019 not detected   4/30 HCV   3/2019 Fibro-sure F3  2/11 Hep C VL [id number]  2/7 Hep C ab positive  2/7 hep B surface antigen negative  2/7 HSV 1 and 2 antibody IgG negative  2/7 HIV negative  2/7 RPR negative    Assessment:  This is a 34 y.o. female who presents to clinic today for f/u of hepatitis C. She has completed an 8-week course of Mavyret April/May 2019.  Her hepatitis C viral load checked in July is negative.  At this time I would like to repeat a hepatitis C viral load 12 weeks after completion of therapy.  If negative the patient would be considered to have achieved sustained virological response.  Moving forward given her FibroSure score of F3 she will need biannual liver ultrasound screening tests.  I will order her first 1 today.    Plan:   1.  Repeat a hepatitis C viral load   2.  Obtain an ultrasound of the liver    Return to Infectious Disease clinic in 6 months

## 2019-09-13 LAB
HCV RNA SERPL NAA+PROBE-ACNC: NORMAL IU/ML
TEST INFORMATION: NORMAL

## 2019-09-25 ENCOUNTER — APPOINTMENT (OUTPATIENT)
Dept: ONCOLOGY | Facility: CLINIC | Age: 34
End: 2019-09-25

## 2019-09-25 ENCOUNTER — APPOINTMENT (OUTPATIENT)
Dept: LAB | Facility: HOSPITAL | Age: 34
End: 2019-09-25

## 2019-10-24 ENCOUNTER — HOSPITAL ENCOUNTER (OUTPATIENT)
Dept: ULTRASOUND IMAGING | Facility: HOSPITAL | Age: 34
Discharge: HOME | End: 2019-10-24
Admitting: INTERNAL MEDICINE

## 2019-10-24 DIAGNOSIS — B18.2 CHRONIC HEPATITIS C WITHOUT HEPATIC COMA (HCC): ICD-10-CM

## 2019-10-24 PROCEDURE — 76705 ECHO EXAM OF ABDOMEN: CPT

## 2019-10-29 ENCOUNTER — OFFICE VISIT (OUTPATIENT)
Dept: ONCOLOGY | Facility: CLINIC | Age: 34
End: 2019-10-29

## 2019-10-29 ENCOUNTER — LAB (OUTPATIENT)
Dept: LAB | Facility: HOSPITAL | Age: 34
End: 2019-10-29

## 2019-10-29 VITALS
TEMPERATURE: 98 F | HEART RATE: 70 BPM | HEIGHT: 67 IN | BODY MASS INDEX: 27.91 KG/M2 | OXYGEN SATURATION: 100 % | RESPIRATION RATE: 16 BRPM | WEIGHT: 177.8 LBS | DIASTOLIC BLOOD PRESSURE: 77 MMHG | SYSTOLIC BLOOD PRESSURE: 116 MMHG

## 2019-10-29 DIAGNOSIS — D50.0 IRON DEFICIENCY ANEMIA DUE TO CHRONIC BLOOD LOSS: ICD-10-CM

## 2019-10-29 DIAGNOSIS — C92.10 CML (CHRONIC MYELOID LEUKEMIA) (HCC): Primary | ICD-10-CM

## 2019-10-29 DIAGNOSIS — C92.10 CML (CHRONIC MYELOID LEUKEMIA) (HCC): ICD-10-CM

## 2019-10-29 LAB
ALBUMIN SERPL-MCNC: 4.8 G/DL (ref 3.5–5.2)
ALBUMIN/GLOB SERPL: 1.5 G/DL (ref 1.1–2.4)
ALP SERPL-CCNC: 70 U/L (ref 38–116)
ALT SERPL W P-5'-P-CCNC: 10 U/L (ref 0–33)
ANION GAP SERPL CALCULATED.3IONS-SCNC: 10.4 MMOL/L (ref 5–15)
AST SERPL-CCNC: 14 U/L (ref 0–32)
BASOPHILS # BLD AUTO: 0.03 10*3/MM3 (ref 0–0.2)
BASOPHILS NFR BLD AUTO: 0.5 % (ref 0–1.5)
BILIRUB SERPL-MCNC: 0.4 MG/DL (ref 0.2–1.2)
BUN BLD-MCNC: 8 MG/DL (ref 6–20)
BUN/CREAT SERPL: 11.4 (ref 7.3–30)
CALCIUM SPEC-SCNC: 10.1 MG/DL (ref 8.5–10.2)
CHLORIDE SERPL-SCNC: 100 MMOL/L (ref 98–107)
CO2 SERPL-SCNC: 29.6 MMOL/L (ref 22–29)
CREAT BLD-MCNC: 0.7 MG/DL (ref 0.6–1.1)
DEPRECATED RDW RBC AUTO: 42.5 FL (ref 37–54)
EOSINOPHIL # BLD AUTO: 0.09 10*3/MM3 (ref 0–0.4)
EOSINOPHIL NFR BLD AUTO: 1.5 % (ref 0.3–6.2)
ERYTHROCYTE [DISTWIDTH] IN BLOOD BY AUTOMATED COUNT: 12.6 % (ref 12.3–15.4)
FERRITIN SERPL-MCNC: 325.6 NG/ML (ref 11–207)
GFR SERPL CREATININE-BSD FRML MDRD: 95 ML/MIN/1.73
GLOBULIN UR ELPH-MCNC: 3.1 GM/DL (ref 1.8–3.5)
GLUCOSE BLD-MCNC: 92 MG/DL (ref 74–124)
HCT VFR BLD AUTO: 42.5 % (ref 34–46.6)
HGB BLD-MCNC: 14.3 G/DL (ref 12–15.9)
IMM GRANULOCYTES # BLD AUTO: 0.02 10*3/MM3 (ref 0–0.05)
IMM GRANULOCYTES NFR BLD AUTO: 0.3 % (ref 0–0.5)
IRON 24H UR-MRATE: 63 MCG/DL (ref 37–145)
IRON SATN MFR SERPL: 21 % (ref 14–48)
LYMPHOCYTES # BLD AUTO: 1.32 10*3/MM3 (ref 0.7–3.1)
LYMPHOCYTES NFR BLD AUTO: 22.6 % (ref 19.6–45.3)
MCH RBC QN AUTO: 31 PG (ref 26.6–33)
MCHC RBC AUTO-ENTMCNC: 33.6 G/DL (ref 31.5–35.7)
MCV RBC AUTO: 92 FL (ref 79–97)
MONOCYTES # BLD AUTO: 0.43 10*3/MM3 (ref 0.1–0.9)
MONOCYTES NFR BLD AUTO: 7.4 % (ref 5–12)
NEUTROPHILS # BLD AUTO: 3.95 10*3/MM3 (ref 1.7–7)
NEUTROPHILS NFR BLD AUTO: 67.7 % (ref 42.7–76)
NRBC BLD AUTO-RTO: 0 /100 WBC (ref 0–0.2)
PLATELET # BLD AUTO: 175 10*3/MM3 (ref 140–450)
PMV BLD AUTO: 10.1 FL (ref 6–12)
POTASSIUM BLD-SCNC: 4 MMOL/L (ref 3.5–4.7)
PROT SERPL-MCNC: 7.9 G/DL (ref 6.3–8)
RBC # BLD AUTO: 4.62 10*6/MM3 (ref 3.77–5.28)
SODIUM BLD-SCNC: 140 MMOL/L (ref 134–145)
TIBC SERPL-MCNC: 307 MCG/DL (ref 249–505)
TRANSFERRIN SERPL-MCNC: 219 MG/DL (ref 200–360)
WBC NRBC COR # BLD: 5.84 10*3/MM3 (ref 3.4–10.8)

## 2019-10-29 PROCEDURE — 36415 COLL VENOUS BLD VENIPUNCTURE: CPT

## 2019-10-29 PROCEDURE — 85025 COMPLETE CBC W/AUTO DIFF WBC: CPT

## 2019-10-29 PROCEDURE — 82728 ASSAY OF FERRITIN: CPT

## 2019-10-29 PROCEDURE — G0463 HOSPITAL OUTPT CLINIC VISIT: HCPCS | Performed by: INTERNAL MEDICINE

## 2019-10-29 PROCEDURE — 83540 ASSAY OF IRON: CPT

## 2019-10-29 PROCEDURE — 80053 COMPREHEN METABOLIC PANEL: CPT

## 2019-10-29 PROCEDURE — 99214 OFFICE O/P EST MOD 30 MIN: CPT | Performed by: INTERNAL MEDICINE

## 2019-10-29 PROCEDURE — 84466 ASSAY OF TRANSFERRIN: CPT

## 2019-10-29 RX ORDER — ALBUTEROL SULFATE 90 UG/1
2 AEROSOL, METERED RESPIRATORY (INHALATION) EVERY 4 HOURS PRN
COMMUNITY
Start: 2019-08-31

## 2019-10-29 NOTE — PROGRESS NOTES
REASONS FOR FOLLOWUP:     1. Chronic myelogenous leukemia with splenomegaly, chronic phase, positive [place] chromosome, no mutation at kinase domain.     2. Patient was started on hydroxyurea temporarily on 10/23/2013 with significant drop of WBC counts.     3. Patient started on Sprycel on 12/02/2013. Peripheral blood tested positive with 3.4% of cells positive for BCR-ABL translocation, tested 02/17/2014.     4. The patient had CCyR 6 months into treatment as tested on 05/15/2014.     5. Complete molecular response as tested 08/08/14 with negative product of BCR/ABL.     6. There was interruption of her treatment due to insurance coverage and misunderstanding from patient's part, she had a relapse of disease with BCR/ABL product at 12.626% in March 2016, and she restarted back on Sprycel, with good response as tested on 08/31/2016 with BCR/ABL at 0.294%.   7. Recurrent iron deficiency anemia not responding to oral iron supplementation.  She also had significant constipation associated with oral iron. Patient was given Feraheme treatment 2 doses in September 2016 and repeated in January 2017.     8.  Since June 2017, patient has been persistently tested negative for BCR/ABL by RT-PCR every 3 month period.     9.  Patient reported worsening leg cramping in end of July 2018.  Sprycel was decreased to 50 mg daily.  Laboratory studies on 8/31/2018, on 1/30/2019 and on 3/29/2019 were negative for BCR-ABL by RT-PCR.    10.  Patient was diagnosed with acute hepatitis C in early 2019.  Patient was treated by Dr. Karyn Mendieta.  Sprycel was on hold during her treatment for hepatitis C to avoid drug interaction and side effects (probably had drug related hepatitis in March 2019 possible due to Diflucan in combination with Sprycel).    11.  Liver study on 7/2/2019 reported no detectable hepatitis C viral infection.  She was weakly positive for BCR-ABL by PCR.  Sprycel 50 mg daily was restarted. Patient continues on Sprycel  50 mg daily treatment.         HISTORY OF PRESENT ILLNESS: The patient is a 34 y.o.  female presenting today for 3-month follow-up and lab review.      She continues on Sprycel 50 mg daily with good tolerance. She has no complaints. She denies nausea, vomiting, abdominal pain or cramping, edema, and syncope.  She has excellent performance that is ECOG 0.     Recent laboratory studies confirmed to be no detectable virus on 9/11/2019.    Her U/S Liver performed on 10/24/2019 reported negative hepatic ultrasound exam with no focal liver lesion, negative gallbladder examination with no cholelithiasis or bile duct dilation noted, however borderline splenomegaly 13 cm was noted.     Laboratory study performed today, 10/29/2019, reported a completely normal CBC. CMP, iron study, and BCR/ABL is pending at time of dictation.      Past Medical History:   Diagnosis Date   • Anemia in neoplastic disease    • Asthma     childhood   • Chiari I malformation (CMS/HCC)     eval by Dr Moore, NS 2016   • Chronic myelogenous leukemia (CMS/HCC)     remission, Dr Castle follows   • Chronic pain    • CML (chronic myelocytic leukemia) (CMS/HCC)    • Cystitis    • Depression    • GERD (gastroesophageal reflux disease)     ulcer   • H/O Iron deficiency anemia    • H/O Lower extremity pain     Resolved.   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      *  Hysterectomy in October 2018      *  Acute hepatitis C in early 2019    OB/GYN HISTORY: Menarche age 9. G5, P4, 1 miscarriage of the third pregnancy. First pregnancy at age 17. Patient underwent partial hysterectomy in 2018.       HEMATOLOGIC/ONCOLOGIC HISTORY: History from  previous dates can be found in the separate document.        Laboratory results on 09/23/2015 showed normalization of hemoglobin 12.2, but still has macrocytosis, MCV 73.3. She has normal platelets and WBC at 9400. Serum ferritin < 5 ng/ml, iron sats 6.3%, iron 29, TIBC 455 mcg/ml. Still has severe iron deficiency, needs to continue oral iron therapy. The patient restarted taking oral iron supplementation which was probably stopped in the end of November 2015.        Laboratory study on 03/22/2016 reported normal WBC 8450, neutrophils 6100, lymphs is 1400 and monocytes 550. Serum iron was 26, TIBC 469 on saturation 6% and ferritin less than 5 NG/ML. Chemistry lab reported normal renal function with a creatinine 0.69, normal liver function panel, total protein 7.5 and albumin 4.2, normal electrolytes. Patient was restarted back on oral on sedimentation twice a day with good tolerance.      Patient continues take Lortab as needed for left leg cramping. Urine drug test on 08/05/2016 was completely negative, and repeated study on August 26 was positive for opiate, negative for other recreational drugs.      Repeat laboratory study on 08/31/2016 reported iron 21, ferritin less than 5, iron saturation 5%, TIBC 462. Hemoglobin was 11.5 MCV 78.1 MCHC 30.4. Platelets 163,000. Total WBC 8870 including neutrophils 6400 and lymphocytes 1500. BCR/ABL was 0.294% by RT-PCR method.       Patient was given IV Feraheme treatment in September 2016, with 2 doses. Repeated laboratory study on 10/06/2016 reported significantly improved and normalized ferritin 269, iron 80, TIBC 365 and iron saturation 22%. Her hemoglobin was 12.2, and MCV 80.8.      Patient reported she was seen by Dr. Fam in 10/2016 and was started on half tablets of Topamax. I reviewed Dr. Fam’s clinic note and telephone conversation record. The patient reports she has no recurrence of migraine headache. However, she is very tearful today, reporting that  she has thoughts of not taking any medications. She did assure me that she has been taking the medication up to this point. 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 The patient denies suicide ideation. When she was initially diagnosed of CML back in 2014, she had depression and I started the patient on Prozac. The patient reported initially it helped her, however, she no longer feels the effect of Prozac. She feels depressed. The patient reports she has no personal hobby. She goes to work and goes home, taking care of her kids. She does not enjoy life as she used to.      Laboratory study on December 29, 2016 reported at ferritin 19.9, iron 33, TIBC 347 iron saturation 10%.  Hemoglobin was 13, normal WBC and platelets.  Unremarkable CMP.  Patient was given 2 doses of Feraheme treatment in early January 2017.      Cytogenetic study on March 14, 2017 reported a BCR-ABL products at 0.098%, showed further improved residual disease.  There is also reported a ferritin 103.7, iron 79, TIBC 336 and iron saturation 24%.  Hemoglobin was 13.5, MCV 92.3, platelets 199,000 WBC 7000.  She had a completely normal CMP.       Repeated laboratory study on June 20, 2017 reported at nondetectable BCR-ABL product.  Ferritin was 45, iron 35 TIBC 333 and iron saturation 11%.  Had a normal CBC and CMP.      In the end of July 2018, patient called reporting worsening significant leg cramping, and getting worse recently and has been taking 6 tablets of Advil with no significant improvement.  We suspect it might be caused by Sprycel for her CML.  We discussed with patient, and decreased to half dose at 50 mg daily.  Patient reports that she is improved leg cramping since dose reduction.    Per medical records this patient had emergency room visit again on 8/18/2018.  Patient reports she had significant abdomen/pelvic pain at a time  associate with nausea.  Laboratory study showed significantly elevated WBC at 25,900 including neutrophils 24,400, with elevated hemoglobin 15.8 and platelets 146,000.     She had a thorough investigation, including CT scan for abdomen pelvis which was unremarkable.  She then had ultrasound for the pelvis and it was also unremarkable.  She had ultrasound for the gallbladder examination on the same day and was unremarkable.  She had a normal CMP except of marginally elevated glucose at 112.  Her urinalysis showed only marginally increased WBC 3-5/HPF.  She was negative for yeast infection, negative for Chlamydia trichomonas and Neisseria gonorrhea.  Patient was prescribed Flagyl and doxycycline and discharged to home.      lab study on 2019 reported normal iron saturation 47% and elevated ferritin 507.1 ng/mL with free iron 184 and TIBC 388.  hemoglobin is normal at 13.7 and platelets 186,000. normal WBC at 5080 including ANC 3300, lymphocytes 930 and monocytes 630. Labs reported significantly elevated ALT at 655,  and alkaline phosphatase 234 but normal total bilirubin 0.9. She had normal renal function creatinine 0.78. Normal electrolytes.     On 2019, I searched Up-to-Date and suspected that this patient had drug-induced hepatitis from Diflucan or with combination of Diflucan with Sprycel. This patient had been on Sprycel for years and had no problem with abnormal liver panel previously. It seems Diflucan inhibits CY moderately, which likely interferes with the metabolism of Sprycel. I instructed the patient to hold her Sprycel for now and we will repeat her liver panel every 2 weeks for reassessment.    Sprycel treatment continued as of 2019 upon completion of MEVYRET.    Laboratory study performed on 2019 showed normal CBC and CMP. BCR/ABL by RT-PCR detected BCR/ABL1 Major. HCV RNA by PCR showed HCV not detected.    Laboratory studies 2019 report her hemoglobin is normal at  15.2 and platelets 146,000. normal WBC at 7650 including ANC 5190, lymphocytes 1600 and monocytes 630.    U/S Liver performed on 10/24/2019 reported negative hepatic ultrasound exam with no focal liver lesion, negative gallbladder examination with no cholelithiasis or bile duct dilation noted, however borderline splenomegaly was noted.     MEDICATIONS: The current medication list was reviewed with the patient and updated in the EMR this date per the medical assistant. Medication dosages and frequencies were confirmed to be accurate.        Allergies   Allergen Reactions   • Sulfa Antibiotics Unknown (See Comments)     Childhood reaction     SOCIAL HISTORY: . She smoked cigarettes previously, quit in 2006 with 8-pack-year history. Social drinker, maybe once a month. No illegal drug use. No risk for HIV except tattoos.  Hairstylist.       FAMILY HISTORY: Maternal grandmother had esophageal/stomach adenocarcinoma diagnosed at age of 72 and  of disease progress at age of 73. The patient' s mother has hypertension but otherwise no family history of malignancy, especially no leukemia.        I have reviewed the patient's medical history in detail and updated the computerized patient record.    Review of Systems   Constitutional: Positive for fatigue. Negative for appetite change, chills, diaphoresis and fever.   HENT:   Negative for mouth sores and trouble swallowing.    Eyes: Negative for icterus.   Respiratory: Negative for cough and shortness of breath.    Cardiovascular: Negative for chest pain, leg swelling and palpitations.   Gastrointestinal: Negative for abdominal pain, blood in stool, constipation, diarrhea and nausea.   Genitourinary: Negative for dysuria and hematuria.    Musculoskeletal: Negative for arthralgias and myalgias.   Skin: Negative for rash.   Neurological: Negative for dizziness, extremity weakness and headaches.   Hematological: Does not bruise/bleed easily.  "  Psychiatric/Behavioral: Negative for confusion.     VITAL SIGNS:   Vitals:    10/29/19 1133   BP: 116/77   Pulse: 70   Resp: 16   Temp: 98 °F (36.7 °C)   SpO2: 100%   Weight: 80.6 kg (177 lb 12.8 oz)   Height: 171 cm (67.32\")   PainSc: 0-No pain   ECOG 0        PHYSICAL EXAMINATION:    GENERAL:  Well-developed, well-nourished  female in no acute distress.   SKIN:  Warm, dry without rashes, purpura or petechiae.  EYES:  Pupils equal, round and reactive to light.  EOMs intact.  Conjunctivae normal.  EARS:  Hearing intact.  NOSE:  No nasal discharge.  MOUTH:  Tongue is well-papillated; no stomatitis or ulcers.  Lips normal.  THROAT:  Oropharynx without lesions or exudates.  NECK:  No thyromegaly or masses.  LYMPHATICS:  No cervical, supraclavicular, axillary adenopathy.  CHEST:  Lungs clear to auscultation. Good airflow.  CARDIAC:  Regular rate and rhythm without murmurs, rubs or gallops. Normal S1,S2.  ABDOMEN:  Soft, nontender with no hepatosplenomegaly or masses. Bowel sounds normal.  EXTREMITIES:  No clubbing, cyanosis or edema.  NEUROLOGICAL:  Cranial Nerves II-XII grossly intact.  No focal neurological deficits.  PSYCHIATRIC:  Normal affect and mood.      LABORATORY DATA:    Results from last 7 days   Lab Units 10/29/19  1122   WBC 10*3/mm3 5.84   NEUTROS ABS 10*3/mm3 3.95   HEMOGLOBIN g/dL 14.3   HEMATOCRIT % 42.5   PLATELETS 10*3/mm3 175     Lab Results   Component Value Date    IRON 148 (H) 04/30/2019    TIBC 368 04/30/2019    FERRITIN 458.70 (H) 04/30/2019   Iron saturation 40% on 4/30/2019       iron studies pending on 10/29/2019              ASSESSMENT:    1. Chronic myelogenous leukemia, chronic phase with excellent response to Sprycel and complete molecular response in August 2014. However she had interuption of treatment in early part of 2016, because of insurance issues and miscommunication, she stopped the medicine without telling us, and laboratory test on 03/22/2016 reported disease " relapse with increased BCR/ABL product at 12.626%. subsequently she was restarted back on Sprycel, and responded well.  Since June 2017, she has completed molecular response as tested every 3 months.  She has been compliant with treatment.   · In the end of July 2018, she reported worsening significant cramping involving her legs, so we decreased her Sprycel to 50 mg daily starting early August.  She's been having less problem since that time.  She was tested negative for BCR-ABL on 8/31/2018.    · Patient had a negative BCR-ABL by RT-PCR in end of January 2019 and also on 3/29/2019.   · Patient was later found having significantly elevated liver function panel.  Sprycel was on hold and she was subsequently found having acute hepatitis C infection.    · I discussed with Dr. Karyn Mendieta recently, we'll hold her Sprycel for now until she finishes hepatitis C treatment.   · She was started on hepatitis C treatment on 4/18/2019 and finished an 8-week course.  · On 07/02/2019 patient's labs showed BCR-ABL Major (p210) detected by RT-PCR at 0.0141%. BCR/ABL1 MINOR (p190) was not detected. HCV was not detected.  · Sprycel treatment was resumed as of 07/02/2019 upon completion of MEVYRET.  · Laboratory study performed today, 10/29/2019, reported a normal CBC, however BCR/ABL is pending as well as iron study. Patient will need to continue on sprycel treatment.   · We will repeat studies in 3 months.    2.  Hepatitis C infection starting early 2019 evidenced by his severely elevated liver function panel.  Patient was started on treatment for hepatitis C on 4/18/2019 for 8-week course.  · We repeated hepatitis C RNA by PCR on 7/2/2019, HCV not detected.  · Repeat laboratory study on 9/11/2019 showed a negative for HCV by PCR.  · U/S Liver performed on 10/24/2019 reported negative hepatic ultrasound exam with no focal liver lesion, negative gallbladder examination with no cholelithiasis or bile duct dilation noted, however  borderline splenomegaly was noted.   · Patient will continue follow-up with Dr. Mendieta.    3. Recurrent Iron deficiency anemia.  She was treated again with Feraheme October 2017.   Iron deficiency thought to be related to ulcer identified on EGD, being treated with Carafate.    · She had recurrent iron deficiency again and was given Feraheme 2 doses in March 2018.    · Subsequently recurrent iron deficiency in July 2018, she was was switched to Venofer 3 doses total 900 mg.  · Laboratory study showed supratherapeutic ferritin 458 and iron saturation 40% on 4/30/2019.  We'll continue to monitor her iron status.   · Laboratory study performed today, 10/29/2019, is pending.     4. Muscle cramping.  Secondary to Sprycel.  This has resolved after dose reduction. Patient denies any muscle cramping today.       PLAN:    1. Patient to continue Sprycel treatment 50 mg daily.   2. Patient to follow up with me in 3 months with labs -  CBC, CMP, ferritin, iron profile, and BCR/ABL by PCR.     I, Ellen Littlejohn, acted as scribe for Bao Slade MD PhD  in documenting the service or procedure. To the best of my knowledge, I recorded what was dictated by Bao Slade MD PhD    I reviewed the note scribed by Ms. Ellen Littlejohn and made appropriate corrections.     BAO SLDAE M.D., Ph.D.      Addendum:  Lab Results   Component Value Date    IRON 63 10/29/2019    TIBC 307 10/29/2019    FERRITIN 325.60 (H) 10/29/2019     Iron saturation 21%.    Glucose   Date Value Ref Range Status   10/29/2019 92 74 - 124 mg/dL Final     BUN   Date Value Ref Range Status   10/29/2019 8 6 - 20 mg/dL Final     Creatinine   Date Value Ref Range Status   10/29/2019 0.70 0.60 - 1.10 mg/dL Final     Sodium   Date Value Ref Range Status   10/29/2019 140 134 - 145 mmol/L Final     Potassium   Date Value Ref Range Status   10/29/2019 4.0 3.5 - 4.7 mmol/L Final     Chloride   Date Value Ref Range Status   10/29/2019 100 98 - 107 mmol/L Final     CO2    Date Value Ref Range Status   10/29/2019 29.6 (H) 22.0 - 29.0 mmol/L Final     Calcium   Date Value Ref Range Status   10/29/2019 10.1 8.5 - 10.2 mg/dL Final     Total Protein   Date Value Ref Range Status   10/29/2019 7.9 6.3 - 8.0 g/dL Final     Albumin   Date Value Ref Range Status   10/29/2019 4.80 3.50 - 5.20 g/dL Final     ALT (SGPT)   Date Value Ref Range Status   10/29/2019 10 0 - 33 U/L Final     AST (SGOT)   Date Value Ref Range Status   10/29/2019 14 0 - 32 U/L Final     Alkaline Phosphatase   Date Value Ref Range Status   10/29/2019 70 38 - 116 U/L Final     Total Bilirubin   Date Value Ref Range Status   10/29/2019 0.4 0.2 - 1.2 mg/dL Final     eGFR Non  Amer   Date Value Ref Range Status   10/29/2019 95 >60 mL/min/1.73 Final     BUN/Creatinine Ratio   Date Value Ref Range Status   10/29/2019 11.4 7.3 - 30.0 Final     Anion Gap   Date Value Ref Range Status   10/29/2019 10.4 5.0 - 15.0 mmol/L Final     Patient had a normal iron iron saturation and is still slightly elevated ferritin 325 ng/dL.  I called and reported to patient for the laboratory results.    There is no need for IV iron therapy.  Continue monitor iron study.       Isabel Castle MD PhD  10/29/2019      cc:   ISHAAN REGAN M.D.     Karyn Mendieta M.D.

## 2019-11-05 LAB — REF LAB TEST METHOD: NORMAL

## 2019-12-03 RX ORDER — DASATINIB 50 MG/1
TABLET ORAL
Qty: 30 TABLET | Refills: 0 | Status: SHIPPED | OUTPATIENT
Start: 2019-12-03 | End: 2020-02-17 | Stop reason: SDUPTHER

## 2019-12-03 NOTE — TELEPHONE ENCOUNTER
Sprycel refill request rec electronically from [employer] [facility]. Per last office note from Dr RiversPt is to continue. Request approved.

## 2020-01-24 ENCOUNTER — OFFICE VISIT (OUTPATIENT)
Dept: ONCOLOGY | Facility: CLINIC | Age: 35
End: 2020-01-24

## 2020-01-24 ENCOUNTER — LAB (OUTPATIENT)
Dept: LAB | Facility: HOSPITAL | Age: 35
End: 2020-01-24

## 2020-01-24 VITALS
RESPIRATION RATE: 12 BRPM | HEART RATE: 58 BPM | WEIGHT: 175.9 LBS | BODY MASS INDEX: 27.61 KG/M2 | HEIGHT: 67 IN | SYSTOLIC BLOOD PRESSURE: 98 MMHG | OXYGEN SATURATION: 100 % | DIASTOLIC BLOOD PRESSURE: 66 MMHG | TEMPERATURE: 98.1 F

## 2020-01-24 DIAGNOSIS — D50.0 IRON DEFICIENCY ANEMIA DUE TO CHRONIC BLOOD LOSS: ICD-10-CM

## 2020-01-24 DIAGNOSIS — C92.10 CML (CHRONIC MYELOID LEUKEMIA) (HCC): ICD-10-CM

## 2020-01-24 DIAGNOSIS — C92.10 CML (CHRONIC MYELOID LEUKEMIA) (HCC): Primary | ICD-10-CM

## 2020-01-24 LAB
ALBUMIN SERPL-MCNC: 4.8 G/DL (ref 3.5–5.2)
ALBUMIN/GLOB SERPL: 1.8 G/DL (ref 1.1–2.4)
ALP SERPL-CCNC: 57 U/L (ref 38–116)
ALT SERPL W P-5'-P-CCNC: 19 U/L (ref 0–33)
ANION GAP SERPL CALCULATED.3IONS-SCNC: 12.1 MMOL/L (ref 5–15)
AST SERPL-CCNC: 28 U/L (ref 0–32)
BASOPHILS # BLD AUTO: 0.04 10*3/MM3 (ref 0–0.2)
BASOPHILS NFR BLD AUTO: 0.6 % (ref 0–1.5)
BILIRUB SERPL-MCNC: 0.3 MG/DL (ref 0.2–1.2)
BUN BLD-MCNC: 7 MG/DL (ref 6–20)
BUN/CREAT SERPL: 9.5 (ref 7.3–30)
CALCIUM SPEC-SCNC: 10.1 MG/DL (ref 8.5–10.2)
CHLORIDE SERPL-SCNC: 102 MMOL/L (ref 98–107)
CO2 SERPL-SCNC: 27.9 MMOL/L (ref 22–29)
CREAT BLD-MCNC: 0.74 MG/DL (ref 0.6–1.1)
DEPRECATED RDW RBC AUTO: 43.7 FL (ref 37–54)
EOSINOPHIL # BLD AUTO: 0.1 10*3/MM3 (ref 0–0.4)
EOSINOPHIL NFR BLD AUTO: 1.5 % (ref 0.3–6.2)
ERYTHROCYTE [DISTWIDTH] IN BLOOD BY AUTOMATED COUNT: 12.8 % (ref 12.3–15.4)
FERRITIN SERPL-MCNC: 273.2 NG/ML (ref 11–207)
GFR SERPL CREATININE-BSD FRML MDRD: 89 ML/MIN/1.73
GLOBULIN UR ELPH-MCNC: 2.6 GM/DL (ref 1.8–3.5)
GLUCOSE BLD-MCNC: 85 MG/DL (ref 74–124)
HCT VFR BLD AUTO: 43.5 % (ref 34–46.6)
HGB BLD-MCNC: 14.3 G/DL (ref 12–15.9)
IMM GRANULOCYTES # BLD AUTO: 0.03 10*3/MM3 (ref 0–0.05)
IMM GRANULOCYTES NFR BLD AUTO: 0.5 % (ref 0–0.5)
IRON 24H UR-MRATE: 31 MCG/DL (ref 37–145)
IRON SATN MFR SERPL: 11 % (ref 14–48)
LYMPHOCYTES # BLD AUTO: 1.51 10*3/MM3 (ref 0.7–3.1)
LYMPHOCYTES NFR BLD AUTO: 23 % (ref 19.6–45.3)
MCH RBC QN AUTO: 30.4 PG (ref 26.6–33)
MCHC RBC AUTO-ENTMCNC: 32.9 G/DL (ref 31.5–35.7)
MCV RBC AUTO: 92.6 FL (ref 79–97)
MONOCYTES # BLD AUTO: 0.5 10*3/MM3 (ref 0.1–0.9)
MONOCYTES NFR BLD AUTO: 7.6 % (ref 5–12)
NEUTROPHILS # BLD AUTO: 4.39 10*3/MM3 (ref 1.7–7)
NEUTROPHILS NFR BLD AUTO: 66.8 % (ref 42.7–76)
NRBC BLD AUTO-RTO: 0 /100 WBC (ref 0–0.2)
PLATELET # BLD AUTO: 180 10*3/MM3 (ref 140–450)
PMV BLD AUTO: 10.2 FL (ref 6–12)
POTASSIUM BLD-SCNC: 4.2 MMOL/L (ref 3.5–4.7)
PROT SERPL-MCNC: 7.4 G/DL (ref 6.3–8)
RBC # BLD AUTO: 4.7 10*6/MM3 (ref 3.77–5.28)
SODIUM BLD-SCNC: 142 MMOL/L (ref 134–145)
TIBC SERPL-MCNC: 274 MCG/DL (ref 249–505)
TRANSFERRIN SERPL-MCNC: 196 MG/DL (ref 200–360)
WBC NRBC COR # BLD: 6.57 10*3/MM3 (ref 3.4–10.8)

## 2020-01-24 PROCEDURE — 85025 COMPLETE CBC W/AUTO DIFF WBC: CPT

## 2020-01-24 PROCEDURE — 84466 ASSAY OF TRANSFERRIN: CPT

## 2020-01-24 PROCEDURE — 36415 COLL VENOUS BLD VENIPUNCTURE: CPT

## 2020-01-24 PROCEDURE — 82728 ASSAY OF FERRITIN: CPT

## 2020-01-24 PROCEDURE — 83540 ASSAY OF IRON: CPT

## 2020-01-24 PROCEDURE — 80053 COMPREHEN METABOLIC PANEL: CPT

## 2020-01-24 PROCEDURE — 99214 OFFICE O/P EST MOD 30 MIN: CPT | Performed by: INTERNAL MEDICINE

## 2020-01-24 NOTE — PROGRESS NOTES
REASONS FOR FOLLOWUP:     1. Chronic myelogenous leukemia with splenomegaly, chronic phase, positive [place] chromosome, no mutation at kinase domain.     2. Patient was started on hydroxyurea temporarily on 10/23/2013 with significant drop of WBC counts.     3. Patient started on Sprycel on 12/02/2013. Peripheral blood tested positive with 3.4% of cells positive for BCR-ABL translocation, tested 02/17/2014.     4. The patient had CCyR 6 months into treatment as tested on 05/15/2014.     5. Complete molecular response as tested 08/08/14 with negative product of BCR/ABL.     6. There was interruption of her treatment due to insurance coverage and misunderstanding from patient's part, she had a relapse of disease with BCR/ABL product at 12.626% in March 2016, and she restarted back on Sprycel, with good response as tested on 08/31/2016 with BCR/ABL at 0.294%.   7. Recurrent iron deficiency anemia not responding to oral iron supplementation.  She also had significant constipation associated with oral iron. Patient was given Feraheme treatment 2 doses in September 2016 and repeated in January 2017.     8.  Since June 2017, patient has been persistently tested negative for BCR/ABL by RT-PCR every 3 month period.     9.  Patient reported worsening leg cramping in end of July 2018.  Sprycel was decreased to 50 mg daily.  Laboratory studies on 8/31/2018, on 1/30/2019 and on 3/29/2019 were negative for BCR-ABL by RT-PCR.    10.  Patient was diagnosed with acute hepatitis C in early 2019.  Patient was treated by Dr. Karyn Mendieta.  Sprycel was on hold during her treatment for hepatitis C to avoid drug interaction and side effects (probably had drug related hepatitis in March 2019 possible due to Diflucan in combination with Sprycel).    11.  Lab study on 7/2/2019 reported no detectable hepatitis C viral infection.  She was weakly positive for BCR-ABL by PCR.  Sprycel 50 mg daily was restarted. Patient continues on Sprycel 50  mg daily treatment.    12.  On 10/29/2019 BCR/ABL1 MAJOR (p210) and BCR/ABL1 MINOR (p190) were not detected.         HISTORY OF PRESENT ILLNESS: The patient is a 34 y.o.  female presenting today for 3-month follow-up and lab review.  The patient is accompanied by her significant other today.    She continues on Sprycel 50 mg daily with good tolerance. She has no complaints. She denies any recurrent leg cramping, fatigue, nausea, vomiting, abdominal pain or cramping, edema, seizures, or syncope.  She has excellent performance that is ECOG 0.     Since her last office visit, she has not followed up with Dr. Mendieta.  She is scheduled to see Dr. Mendieta in March 2020.    Laboratory studies today, 1/24/2020, show hemoglobin 14.3 MCV 92.6 platelets 180,000 and WBC 6570 including neutrophils 4400.  She also has completely normal CMP.  Iron studies and BCR-ABL by PCR are pending at this time.    Past Medical History:   Diagnosis Date   • Anemia in neoplastic disease    • Asthma     childhood   • Chiari I malformation (CMS/HCC)     eval by [facility] 2016   • Chronic myelogenous leukemia (CMS/HCC)     remission, Dr Castle follows   • Chronic pain    • CML (chronic myelocytic leukemia) (CMS/HCC)    • Cystitis    • Depression    • GERD (gastroesophageal reflux disease)     ulcer   • H/O Iron deficiency anemia    • H/O Lower extremity pain     Resolved.   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      *  Hysterectomy in October 2018      *  Acute hepatitis C in early 2019    OB/GYN HISTORY: Menarche age 9. G5, P4, 1 miscarriage of the third pregnancy. First pregnancy at age 17. Patient underwent partial  hysterectomy in 2018.       HEMATOLOGIC/ONCOLOGIC HISTORY: History from previous dates can be found in the separate document.        Laboratory results on 09/23/2015 showed normalization of hemoglobin 12.2, but still has macrocytosis, MCV 73.3. She has normal platelets and WBC at 9400. Serum ferritin < 5 ng/ml, iron sats 6.3%, iron 29, TIBC 455 mcg/ml. Still has severe iron deficiency, needs to continue oral iron therapy. The patient restarted taking oral iron supplementation which was probably stopped in the end of November 2015.        Laboratory study on 03/22/2016 reported normal WBC 8450, neutrophils 6100, lymphs is 1400 and monocytes 550. Serum iron was 26, TIBC 469 on saturation 6% and ferritin less than 5 NG/ML. Chemistry lab reported normal renal function with a creatinine 0.69, normal liver function panel, total protein 7.5 and albumin 4.2, normal electrolytes. Patient was restarted back on oral on sedimentation twice a day with good tolerance.      Patient continues take Lortab as needed for left leg cramping. Urine drug test on 08/05/2016 was completely negative, and repeated study on August 26 was positive for opiate, negative for other recreational drugs.      Repeat laboratory study on 08/31/2016 reported iron 21, ferritin less than 5, iron saturation 5%, TIBC 462. Hemoglobin was 11.5 MCV 78.1 MCHC 30.4. Platelets 163,000. Total WBC 8870 including neutrophils 6400 and lymphocytes 1500. BCR/ABL was 0.294% by RT-PCR method.       Patient was given IV Feraheme treatment in September 2016, with 2 doses. Repeated laboratory study on 10/06/2016 reported significantly improved and normalized ferritin 269, iron 80, TIBC 365 and iron saturation 22%. Her hemoglobin was 12.2, and MCV 80.8.      Patient reported she was seen by Dr. Fam in 10/2016 and was started on half tablets of Topamax. I reviewed Dr. Fam’s clinic note and telephone conversation record. The patient reports she has no recurrence  of migraine headache. However, she is very tearful today, reporting that she has thoughts of not taking any medications. She did assure me that she has been taking the medication up to this point. 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 The patient denies suicide ideation. When she was initially diagnosed of CML back in 2014, she had depression and I started the patient on Prozac. The patient reported initially it helped her, however, she no longer feels the effect of Prozac. She feels depressed. The patient reports she has no personal hobby. She goes to work and goes home, taking care of her kids. She does not enjoy life as she used to.      Laboratory study on December 29, 2016 reported at ferritin 19.9, iron 33, TIBC 347 iron saturation 10%.  Hemoglobin was 13, normal WBC and platelets.  Unremarkable CMP.  Patient was given 2 doses of Feraheme treatment in early January 2017.      Cytogenetic study on March 14, 2017 reported a BCR-ABL products at 0.098%, showed further improved residual disease.  There is also reported a ferritin 103.7, iron 79, TIBC 336 and iron saturation 24%.  Hemoglobin was 13.5, MCV 92.3, platelets 199,000 WBC 7000.  She had a completely normal CMP.       Repeated laboratory study on June 20, 2017 reported at nondetectable BCR-ABL product.  Ferritin was 45, iron 35 TIBC 333 and iron saturation 11%.  Had a normal CBC and CMP.      In the end of July 2018, patient called reporting worsening significant leg cramping, and getting worse recently and has been taking 6 tablets of Advil with no significant improvement.  We suspect it might be caused by Sprycel for her CML.  We discussed with patient, and decreased to half dose at 50 mg daily.  Patient reports that she is improved leg cramping since dose reduction.    Per medical records this patient had emergency room visit again on 8/18/2018.   Patient reports she had significant abdomen/pelvic pain at a time associate with nausea.  Laboratory study showed significantly elevated WBC at 25,900 including neutrophils 24,400, with elevated hemoglobin 15.8 and platelets 146,000.     She had a thorough investigation, including CT scan for abdomen pelvis which was unremarkable.  She then had ultrasound for the pelvis and it was also unremarkable.  She had ultrasound for the gallbladder examination on the same day and was unremarkable.  She had a normal CMP except of marginally elevated glucose at 112.  Her urinalysis showed only marginally increased WBC 3-5/HPF.  She was negative for yeast infection, negative for Chlamydia trichomonas and Neisseria gonorrhea.  Patient was prescribed Flagyl and doxycycline and discharged to home.      lab study on 2019 reported normal iron saturation 47% and elevated ferritin 507.1 ng/mL with free iron 184 and TIBC 388.  hemoglobin is normal at 13.7 and platelets 186,000. normal WBC at 5080 including ANC 3300, lymphocytes 930 and monocytes 630. Labs reported significantly elevated ALT at 655,  and alkaline phosphatase 234 but normal total bilirubin 0.9. She had normal renal function creatinine 0.78. Normal electrolytes.     On 2019, I searched Up-to-Date and suspected that this patient had drug-induced hepatitis from Diflucan or with combination of Diflucan with Sprycel. This patient had been on Sprycel for years and had no problem with abnormal liver panel previously. It seems Diflucan inhibits CY moderately, which likely interferes with the metabolism of Sprycel. I instructed the patient to hold her Sprycel for now and we will repeat her liver panel every 2 weeks for reassessment.    [facility]cel treatment [facility] as of 2019 [facility]    Laboratory study performed on 2019 showed normal CBC and CMP. BCR/ABL by RT-PCR detected BCR/ABL1 Major. HCV RNA by PCR showed HCV not  detected.    Laboratory studies 2019 report her hemoglobin is normal at 15.2 and platelets 146,000. normal WBC at 7650 including ANC 5190, lymphocytes 1600 and monocytes 630.    Recent laboratory studies confirmed to be no detectable virus on 2019.    U/S Liver performed on 10/24/2019 reported negative hepatic ultrasound exam with no focal liver lesion, negative gallbladder examination with no cholelithiasis or bile duct dilation noted, however borderline splenomegaly was noted.     Laboratory study performed on 10/29/2019, reported a completely normal CBC, CMP. Normal iron saturation and still slightly elevated ferritin 325 ng/dL  BCR/ABL1 MAJOR (p210) and BCR/ABL1 MINOR (p190) were not detected.       MEDICATIONS: The current medication list was reviewed with the patient and updated in the EMR this date per the medical assistant. Medication dosages and frequencies were confirmed to be accurate.        Allergies   Allergen Reactions   • Sulfa Antibiotics Unknown - Low Severity     Childhood reaction     SOCIAL HISTORY: . She smoked cigarettes previously, quit in 2006 with 8-pack-year history. Social drinker, maybe once a month. No illegal drug use. No risk for HIV except tattoos.  Hairstylist.       FAMILY HISTORY: Maternal grandmother had esophageal/stomach adenocarcinoma diagnosed at age of 72 and  of disease progress at age of 73. The patient' s mother has hypertension but otherwise no family history of malignancy, especially no leukemia.        I have reviewed the patient's medical history in detail and updated the computerized patient record.    Review of Systems   Constitutional: Negative for appetite change, chills, diaphoresis, fatigue and fever.   HENT:   Negative for mouth sores and trouble swallowing.    Eyes: Negative for icterus.   Respiratory: Negative for cough and shortness of breath.    Cardiovascular: Negative for chest pain, leg swelling and palpitations.  "  Gastrointestinal: Negative for abdominal pain, blood in stool, constipation, diarrhea and nausea.   Genitourinary: Negative for dysuria and hematuria.    Musculoskeletal: Negative for arthralgias and myalgias.   Skin: Negative for rash.   Neurological: Negative for dizziness, extremity weakness and headaches.   Hematological: Does not bruise/bleed easily.   Psychiatric/Behavioral: Negative for confusion.     VITAL SIGNS:   Vitals:    01/24/20 1309   BP: 98/66   Pulse: 58   Resp: 12   Temp: 98.1 °F (36.7 °C)   TempSrc: Oral   SpO2: 100%   Weight: 79.8 kg (175 lb 14.4 oz)   Height: 171 cm (67.32\")   PainSc: 0-No pain   ECOG 0        PHYSICAL EXAMINATION:    CONSTITUTIONAL:  Vital signs reviewed.  Well-developed, well-nourished, no distress, looks comfortable.  EYES:  Conjunctiva and lids unremarkable.  PERRLA  EARS,NOSE,MOUTH,THROAT:  Ears and nose appear unremarkable.  Lips appear unremarkable.  RESPIRATORY:  Normal respiratory effort.  Lungs clear to auscultation bilaterally.  CARDIOVASCULAR: Regular rhythm and rate.  Normal S1, S2.  No murmurs rubs or gallops.  No significant lower extremity edema.  GASTROINTESTINAL: Abdomen appears unremarkable.  Bowel sounds normal.  Nontender.  No hepatomegaly.  No splenomegaly.   LYMPHATIC:  No cervical, supraclavicular, axillary lymphadenopathy.  MUSCULOSKELETAL:  Unremarkable gait and station.  Unremarkable digits/nails.  No cyanosis or clubbing.  SKIN:  Warm.  No rashes.  PSYCHIATRIC:  Normal judgment and insight.  Normal mood and affect.    LABORATORY DATA:    Lab Results   Component Value Date    GLUCOSE 92 10/29/2019    BUN 8 10/29/2019    CREATININE 0.70 10/29/2019    EGFRIFNONA 95 10/29/2019    BCR 11.4 10/29/2019    K 4.0 10/29/2019    CO2 29.6 (H) 10/29/2019    CALCIUM 10.1 10/29/2019    ALBUMIN 4.80 10/29/2019    AST 14 10/29/2019    ALT 10 10/29/2019      Lab Results   Component Value Date    NEUTROABS 4.39 01/24/2020        Results from last 7 days   Lab Units " 01/24/20  1203   WBC 10*3/mm3 6.57   NEUTROS ABS 10*3/mm3 4.39   HEMOGLOBIN g/dL 14.3   HEMATOCRIT % 43.5   PLATELETS 10*3/mm3 180     Lab Results   Component Value Date    IRON 63 10/29/2019    TIBC 307 10/29/2019    FERRITIN 325.60 (H) 10/29/2019   Iron saturation 21% on 10/29/2019       Iron studies pending on 1/24/2020            ASSESSMENT:    1. Chronic myelogenous leukemia, chronic phase with excellent response to Sprycel and complete molecular response in August 2014. However she had interuption of treatment in early part of 2016, because of insurance issues and miscommunication, she stopped the medicine without telling us, and laboratory test on 03/22/2016 reported disease relapse with increased BCR/ABL product at 12.626%. subsequently she was restarted back on Sprycel, and responded well.  Since June 2017, she has completed molecular response as tested every 3 months.  She has been compliant with treatment.   · In the end of July 2018, she reported worsening significant cramping involving her legs, so we decreased her Sprycel to 50 mg daily starting early August.  She's been having less problem since that time.  She was tested negative for BCR-ABL on 8/31/2018.    · Patient had a negative BCR-ABL by RT-PCR in end of January 2019 and also on 3/29/2019.   · Patient was later found having significantly elevated liver function panel.  Sprycel was on hold and she was subsequently found having acute hepatitis C infection.    · I discussed with Dr. Karyn Mendieta recently, we'll hold her Sprycel for now until she finishes hepatitis C treatment.   · She was started on hepatitis C treatment on 4/18/2019 and finished an 8-week course.  · On 07/02/2019 patient's labs showed BCR-ABL Major (p210) detected by RT-PCR at 0.0141%. BCR/ABL1 MINOR (p190) was not detected. HCV was not detected.  · Sprycel treatment was resumed as of 07/02/2019 upon completion of [facility].  · Laboratory study performed, 10/29/2019, reported normal  CBC. BCR/ABL1 MAJOR (p210) and BCR/ABL1 MINOR (p190) were not detected.    · BCR-ABL by PCR on 1/24/2020 pending at this time.  I will call the patient with the results.   · Patient will need to continue on sprycel treatment.   · We will repeat studies in 3 months.    2.  Hepatitis C infection starting early 2019 evidenced by his severely elevated liver function panel.  Patient was started on treatment for hepatitis C on 4/18/2019 for 8-week course.  · We repeated hepatitis C RNA by PCR on 7/2/2019, HCV not detected.  · Repeat laboratory study on 9/11/2019 showed negative for HCV by PCR.  · U/S Liver performed on 10/24/2019 reported negative hepatic ultrasound exam with no focal liver lesion, negative gallbladder examination with no cholelithiasis or bile duct dilation noted, however borderline splenomegaly was noted.   · Patient will continue follow-up with Dr. Mendieta.  Scheduled to see Dr. Mendieta again in March 2020.    3. Recurrent Iron deficiency anemia.    · She was treated again with Feraheme October 2017.   Iron deficiency thought to be related to ulcer identified on EGD, being treated with Carafate.  She also had heavy menses.  · She had recurrent iron deficiency again and was given Feraheme 2 doses in March 2018.    · Subsequently recurrent iron deficiency in July 2018, she was was switched to Venofer 3 doses total 900 mg.   · Patient had a simple hysterectomy in October 2018.  · Laboratory study showed supratherapeutic ferritin 458 and iron saturation 40% on 4/30/2019.  We'll continue to monitor her iron status.   · Laboratory study performed 10/29/2019, showed normal iron saturation and still slightly elevated ferritin 325 ng/dL.  · Iron studies pending at this time.  We will call the patient with the results.     4. Muscle cramping.  Secondary to Sprycel.  This has resolved after dose reduction. Patient denies any muscle cramping today.       PLAN:    1. Iron studies and BCR/ABL by PCR pending at this time.   We will call the patient with the results.   2. Patient to continue Sprycel treatment 50 mg daily.  3. Continue follow up with Dr. Mendieta.  Scheduled to see her again in March 2020.  4. Patient to follow up with me in 3 months with labs -  CBC, CMP, ferritin, iron profile, and BCR/ABL by PCR.     The patient is accompanied by her significant other today.    This patient is on high risk medication and needs close monitoring.     By signing my name here, I Gonzalo Torres, attest that all documentation on 01/24/20 at 1:29 PM has been prepared under the direction and in the presence of Dr. Bao Castle MD.    I reviewed the note scribed by Gonzalo Torres and made appropriate corrections.     Bao Castle MD PhD       Addendum:  Lab Results   Component Value Date    IRON 31 (L) 01/24/2020    TIBC 274 01/24/2020    FERRITIN 273.20 (H) 01/24/2020   Iron saturation 11%     Patient has excellent ferritin level 273, despite slightly low iron saturation 11%.  I do not think this patient needs IV iron treatment.     BCR-ABL study result is still pending.     We will inform patient the results.    BAO CASTLE M.D., Ph.D.    1/25/2020      cc:   ISHAAN REGAN M.D.     Karyn Mendieta M.D.

## 2020-01-28 LAB — REF LAB TEST METHOD: NORMAL

## 2020-02-17 ENCOUNTER — TELEPHONE (OUTPATIENT)
Dept: ONCOLOGY | Facility: CLINIC | Age: 35
End: 2020-02-17

## 2020-02-17 NOTE — TELEPHONE ENCOUNTER
[facility] called for a refill on    Sprycel 50mg  1 tab daily  30 day refill    Fax [phone]  Phone [phone]

## 2020-02-17 NOTE — TELEPHONE ENCOUNTER
Staff message rec from Fannie CRAWFORD RN stating [facility] is requesting a new Sprycel rx. Per last office note from Dr Castle-pt is to continue. I have escribed a new rx to [facility]

## 2020-03-11 ENCOUNTER — TELEPHONE (OUTPATIENT)
Dept: ONCOLOGY | Facility: CLINIC | Age: 35
End: 2020-03-11

## 2020-03-11 DIAGNOSIS — D50.0 IRON DEFICIENCY ANEMIA DUE TO CHRONIC BLOOD LOSS: Primary | ICD-10-CM

## 2020-03-11 NOTE — TELEPHONE ENCOUNTER
----- Message from Danni Jc, DYLAN sent at 3/11/2020  1:00 PM EDT -----  Please schedule pt for CBC, FERRITIN AND IRON PANEL and RN review. Earliest convenience for pt

## 2020-03-11 NOTE — TELEPHONE ENCOUNTER
Pt called wanting to come in for labs. She feels that her iron might be low. She is very tired and has no energy. Denies SOA. Orders placed and message sent to scheduling for labs and RN review.

## 2020-03-12 ENCOUNTER — CLINICAL SUPPORT (OUTPATIENT)
Dept: ONCOLOGY | Facility: HOSPITAL | Age: 35
End: 2020-03-12

## 2020-03-12 ENCOUNTER — LAB (OUTPATIENT)
Dept: OTHER | Facility: HOSPITAL | Age: 35
End: 2020-03-12

## 2020-03-12 VITALS — SYSTOLIC BLOOD PRESSURE: 117 MMHG | HEART RATE: 69 BPM | TEMPERATURE: 98.3 F | DIASTOLIC BLOOD PRESSURE: 74 MMHG

## 2020-03-12 DIAGNOSIS — R53.82 CHRONIC FATIGUE: Primary | ICD-10-CM

## 2020-03-12 DIAGNOSIS — D50.0 IRON DEFICIENCY ANEMIA DUE TO CHRONIC BLOOD LOSS: ICD-10-CM

## 2020-03-12 LAB
BASOPHILS # BLD AUTO: 0.04 10*3/MM3 (ref 0–0.2)
BASOPHILS NFR BLD AUTO: 0.6 % (ref 0–1.5)
DEPRECATED RDW RBC AUTO: 44.2 FL (ref 37–54)
EOSINOPHIL # BLD AUTO: 0.09 10*3/MM3 (ref 0–0.4)
EOSINOPHIL NFR BLD AUTO: 1.3 % (ref 0.3–6.2)
ERYTHROCYTE [DISTWIDTH] IN BLOOD BY AUTOMATED COUNT: 12.9 % (ref 12.3–15.4)
FERRITIN SERPL-MCNC: 212.5 NG/ML (ref 13–150)
HCT VFR BLD AUTO: 40.4 % (ref 34–46.6)
HGB BLD-MCNC: 13.1 G/DL (ref 12–15.9)
IMM GRANULOCYTES # BLD AUTO: 0.02 10*3/MM3 (ref 0–0.05)
IMM GRANULOCYTES NFR BLD AUTO: 0.3 % (ref 0–0.5)
IRON 24H UR-MRATE: 40 MCG/DL (ref 37–145)
IRON SATN MFR SERPL: 15 % (ref 20–50)
LYMPHOCYTES # BLD AUTO: 1.39 10*3/MM3 (ref 0.7–3.1)
LYMPHOCYTES NFR BLD AUTO: 19.6 % (ref 19.6–45.3)
MCH RBC QN AUTO: 30.3 PG (ref 26.6–33)
MCHC RBC AUTO-ENTMCNC: 32.4 G/DL (ref 31.5–35.7)
MCV RBC AUTO: 93.3 FL (ref 79–97)
MONOCYTES # BLD AUTO: 0.6 10*3/MM3 (ref 0.1–0.9)
MONOCYTES NFR BLD AUTO: 8.5 % (ref 5–12)
NEUTROPHILS # BLD AUTO: 4.95 10*3/MM3 (ref 1.7–7)
NEUTROPHILS NFR BLD AUTO: 69.7 % (ref 42.7–76)
NRBC BLD AUTO-RTO: 0 /100 WBC (ref 0–0.2)
PLATELET # BLD AUTO: 181 10*3/MM3 (ref 140–450)
PMV BLD AUTO: 10.5 FL (ref 6–12)
RBC # BLD AUTO: 4.33 10*6/MM3 (ref 3.77–5.28)
TIBC SERPL-MCNC: 276 MCG/DL (ref 298–536)
TRANSFERRIN SERPL-MCNC: 185 MG/DL (ref 200–360)
WBC NRBC COR # BLD: 7.09 10*3/MM3 (ref 3.4–10.8)

## 2020-03-12 PROCEDURE — 83540 ASSAY OF IRON: CPT | Performed by: INTERNAL MEDICINE

## 2020-03-12 PROCEDURE — 84466 ASSAY OF TRANSFERRIN: CPT | Performed by: INTERNAL MEDICINE

## 2020-03-12 PROCEDURE — 84439 ASSAY OF FREE THYROXINE: CPT | Performed by: INTERNAL MEDICINE

## 2020-03-12 PROCEDURE — 82728 ASSAY OF FERRITIN: CPT | Performed by: INTERNAL MEDICINE

## 2020-03-12 PROCEDURE — G0463 HOSPITAL OUTPT CLINIC VISIT: HCPCS

## 2020-03-12 PROCEDURE — 84443 ASSAY THYROID STIM HORMONE: CPT | Performed by: INTERNAL MEDICINE

## 2020-03-12 PROCEDURE — 36415 COLL VENOUS BLD VENIPUNCTURE: CPT

## 2020-03-12 PROCEDURE — 85025 COMPLETE CBC W/AUTO DIFF WBC: CPT | Performed by: INTERNAL MEDICINE

## 2020-03-12 PROCEDURE — 84481 FREE ASSAY (FT-3): CPT | Performed by: INTERNAL MEDICINE

## 2020-03-12 NOTE — PROGRESS NOTES
Pt presents for RN review after calling with c/o severe fatigue. Pt denies fever, chills, nausea, vomiting, or other complaints. She states she is eating and drinking well. Pt questions if she needs IV iron. Pt requesting RN call with lab results once available.     Called pt with results. Iron sat 15, ferritin 212, hgb 13.1. Explained to her that labs will be reviewed with Dr. Castle on Monday (he is out this week) and she will be called with his recommendations. She v/u. Message sent to Dr. Castle to review labs.

## 2020-03-13 ENCOUNTER — APPOINTMENT (OUTPATIENT)
Dept: OTHER | Facility: HOSPITAL | Age: 35
End: 2020-03-13

## 2020-03-13 ENCOUNTER — APPOINTMENT (OUTPATIENT)
Dept: ONCOLOGY | Facility: HOSPITAL | Age: 35
End: 2020-03-13

## 2020-03-13 LAB
T3FREE SERPL-MCNC: 2.97 PG/ML (ref 2–4.4)
T4 FREE SERPL-MCNC: 1.37 NG/DL (ref 0.93–1.7)
TSH SERPL DL<=0.05 MIU/L-ACNC: 1.2 UIU/ML (ref 0.27–4.2)

## 2020-03-17 ENCOUNTER — TELEPHONE (OUTPATIENT)
Dept: ONCOLOGY | Facility: CLINIC | Age: 35
End: 2020-03-17

## 2020-03-17 ENCOUNTER — TELEPHONE (OUTPATIENT)
Dept: ONCOLOGY | Facility: HOSPITAL | Age: 35
End: 2020-03-17

## 2020-03-17 NOTE — TELEPHONE ENCOUNTER
Pt had labs drawn on 3/12/20 pt was told if she didn't hear from anyone to call the office today about a possible infusion     Pt contact # [phone]

## 2020-03-17 NOTE — TELEPHONE ENCOUNTER
Per Rosio, pt is close to cut off for qualifying based on lab results. Pt has been c/o severe fatigue. Pt can receive IV iron if MD orders. Message sent to Dr. Castle to confirm if he wants pt to have IV injectafer. Thyroid labs WNL.     ----- Message from Isabel Castle MD PhD sent at 3/12/2020  4:54 PM EDT -----  Would you double check with Rosio or Francine at [facility]?   She may not qualify for it.  I also requested lab to add thyroid profile.     Thanks!    WJZ    ----- Message -----  From: Arlene Bingham RN  Sent: 3/12/2020   3:00 PM EDT  To: Isabel Castle MD PhD    Pt was in today for RN review after calling with c/o severe fatigue. Denied bleeding, SOA, or other complaints. Ferritin 212, iron sat 15, hgb 13.1. She questioned if she qualifies for IV iron. Please let me know! Thanks

## 2020-03-24 NOTE — TELEPHONE ENCOUNTER
Sprycel refill request rec electronically from [email or web address]. Per last office note from DR Castle-pt is to continue 50 mg daily. Request approved.

## 2020-04-24 ENCOUNTER — LAB (OUTPATIENT)
Dept: LAB | Facility: HOSPITAL | Age: 35
End: 2020-04-24

## 2020-04-24 ENCOUNTER — TELEMEDICINE (OUTPATIENT)
Dept: ONCOLOGY | Facility: CLINIC | Age: 35
End: 2020-04-24

## 2020-04-24 VITALS
HEART RATE: 54 BPM | TEMPERATURE: 98.1 F | BODY MASS INDEX: 25.68 KG/M2 | HEIGHT: 67 IN | RESPIRATION RATE: 16 BRPM | WEIGHT: 163.6 LBS | SYSTOLIC BLOOD PRESSURE: 114 MMHG | DIASTOLIC BLOOD PRESSURE: 73 MMHG | OXYGEN SATURATION: 99 %

## 2020-04-24 DIAGNOSIS — C92.10 CML (CHRONIC MYELOID LEUKEMIA) (HCC): ICD-10-CM

## 2020-04-24 DIAGNOSIS — D50.0 IRON DEFICIENCY ANEMIA DUE TO CHRONIC BLOOD LOSS: ICD-10-CM

## 2020-04-24 DIAGNOSIS — C92.10 CML (CHRONIC MYELOID LEUKEMIA) (HCC): Primary | ICD-10-CM

## 2020-04-24 LAB
ALBUMIN SERPL-MCNC: 4.7 G/DL (ref 3.5–5.2)
ALBUMIN/GLOB SERPL: 1.5 G/DL (ref 1.1–2.4)
ALP SERPL-CCNC: 60 U/L (ref 38–116)
ALT SERPL W P-5'-P-CCNC: 11 U/L (ref 0–33)
ANION GAP SERPL CALCULATED.3IONS-SCNC: 13.1 MMOL/L (ref 5–15)
AST SERPL-CCNC: 17 U/L (ref 0–32)
BASOPHILS # BLD AUTO: 0.04 10*3/MM3 (ref 0–0.2)
BASOPHILS NFR BLD AUTO: 0.7 % (ref 0–1.5)
BILIRUB SERPL-MCNC: 0.4 MG/DL (ref 0.2–1.2)
BUN BLD-MCNC: 14 MG/DL (ref 6–20)
BUN/CREAT SERPL: 18.9 (ref 7.3–30)
CALCIUM SPEC-SCNC: 9.9 MG/DL (ref 8.5–10.2)
CHLORIDE SERPL-SCNC: 99 MMOL/L (ref 98–107)
CO2 SERPL-SCNC: 25.9 MMOL/L (ref 22–29)
CREAT BLD-MCNC: 0.74 MG/DL (ref 0.6–1.1)
DEPRECATED RDW RBC AUTO: 42.3 FL (ref 37–54)
EOSINOPHIL # BLD AUTO: 0.13 10*3/MM3 (ref 0–0.4)
EOSINOPHIL NFR BLD AUTO: 2.3 % (ref 0.3–6.2)
ERYTHROCYTE [DISTWIDTH] IN BLOOD BY AUTOMATED COUNT: 12.4 % (ref 12.3–15.4)
FERRITIN SERPL-MCNC: 262.4 NG/ML (ref 11–207)
GFR SERPL CREATININE-BSD FRML MDRD: 89 ML/MIN/1.73
GLOBULIN UR ELPH-MCNC: 3.2 GM/DL (ref 1.8–3.5)
GLUCOSE BLD-MCNC: 84 MG/DL (ref 74–124)
HCT VFR BLD AUTO: 45.2 % (ref 34–46.6)
HGB BLD-MCNC: 14.8 G/DL (ref 12–15.9)
IMM GRANULOCYTES # BLD AUTO: 0.02 10*3/MM3 (ref 0–0.05)
IMM GRANULOCYTES NFR BLD AUTO: 0.3 % (ref 0–0.5)
IRON 24H UR-MRATE: 63 MCG/DL (ref 37–145)
IRON SATN MFR SERPL: 21 % (ref 14–48)
LYMPHOCYTES # BLD AUTO: 1.14 10*3/MM3 (ref 0.7–3.1)
LYMPHOCYTES NFR BLD AUTO: 19.9 % (ref 19.6–45.3)
MCH RBC QN AUTO: 30.2 PG (ref 26.6–33)
MCHC RBC AUTO-ENTMCNC: 32.7 G/DL (ref 31.5–35.7)
MCV RBC AUTO: 92.2 FL (ref 79–97)
MONOCYTES # BLD AUTO: 0.59 10*3/MM3 (ref 0.1–0.9)
MONOCYTES NFR BLD AUTO: 10.3 % (ref 5–12)
NEUTROPHILS # BLD AUTO: 3.82 10*3/MM3 (ref 1.7–7)
NEUTROPHILS NFR BLD AUTO: 66.5 % (ref 42.7–76)
NRBC BLD AUTO-RTO: 0 /100 WBC (ref 0–0.2)
PLATELET # BLD AUTO: 161 10*3/MM3 (ref 140–450)
PMV BLD AUTO: 10.6 FL (ref 6–12)
POTASSIUM BLD-SCNC: 4.1 MMOL/L (ref 3.5–4.7)
PROT SERPL-MCNC: 7.9 G/DL (ref 6.3–8)
RBC # BLD AUTO: 4.9 10*6/MM3 (ref 3.77–5.28)
SODIUM BLD-SCNC: 138 MMOL/L (ref 134–145)
TIBC SERPL-MCNC: 300 MCG/DL (ref 249–505)
TRANSFERRIN SERPL-MCNC: 214 MG/DL (ref 200–360)
WBC NRBC COR # BLD: 5.74 10*3/MM3 (ref 3.4–10.8)

## 2020-04-24 PROCEDURE — 85025 COMPLETE CBC W/AUTO DIFF WBC: CPT

## 2020-04-24 PROCEDURE — 84466 ASSAY OF TRANSFERRIN: CPT

## 2020-04-24 PROCEDURE — 99213 OFFICE O/P EST LOW 20 MIN: CPT | Performed by: INTERNAL MEDICINE

## 2020-04-24 PROCEDURE — 80053 COMPREHEN METABOLIC PANEL: CPT

## 2020-04-24 PROCEDURE — 83540 ASSAY OF IRON: CPT

## 2020-04-24 PROCEDURE — 82728 ASSAY OF FERRITIN: CPT

## 2020-04-24 PROCEDURE — 36415 COLL VENOUS BLD VENIPUNCTURE: CPT

## 2020-04-24 NOTE — PROGRESS NOTES
REASONS FOR FOLLOWUP:    1. Chronic myelogenous leukemia with splenomegaly, chronic phase, positive [place] chromosome, no mutation at kinase domain.    2. Patient was started on hydroxyurea temporarily on 10/23/2013 with significant drop of WBC counts.    3. Patient started on Sprycel on 12/02/2013. Peripheral blood tested with 3.4% of cells positive for BCR-ABL translocation, tested 02/17/2014.    4. The patient had CCyR 6 months into treatment as tested on 05/15/2014.    5. Complete molecular response as tested 08/08/14 with negative product of BCR/ABL.    6. There was interruption of her treatment due to insurance coverage and misunderstanding from patient's part, she had a relapse of disease with BCR/ABL product at 12.626% in March 2016, and she restarted back on Sprycel, with good response as tested on 08/31/2016 with BCR/ABL at 0.294%.  7. Recurrent iron deficiency anemia not responding to oral iron supplementation.  She also had significant constipation associated with oral iron. Patient was given Feraheme treatment 2 doses in September 2016 and repeated in January 2017.  Repeated IV iron therapy Venofer in July 2018 due to recurrent iron deficiency.  8. Since June 2017, patient has been persistently tested negative for BCR/ABL by RT-PCR every 3 month period.    9. Patient reported worsening leg cramping in end of July 2018.  Sprycel was decreased to 50 mg daily.  Laboratory studies on 8/31/2018, on 1/30/2019 and on 3/29/2019 were negative for BCR-ABL by RT-PCR.   10. Patient was diagnosed with acute hepatitis C in early 2019.  Patient was treated by Dr. Karyn Mendieta.  Sprycel was on hold during her treatment for hepatitis C to avoid drug interaction and side effects (probably had drug related hepatitis in March 2019 possible due to Diflucan in combination with Sprycel).   11. Lab study on 7/2/2019 reported no detectable hepatitis C viral infection.  She was weakly positive for BCR-ABL by PCR.  Sprycel 50  mg daily was restarted.    12. On 10/29/2019 BCR/ABL1 MAJOR (p210) and BCR/ABL1 MINOR (p190) were not detected.   13. BCR-ABL by RT-PCR with 0% 1/24/2020.         HISTORY OF PRESENT ILLNESS: The patient is a 35 y.o.  female presenting today for 3-month follow-up and lab review.      Due to pandemic coronavirus infection, patient has been staying home with all 4 children.  Patient reports significant fatigue for the past month, similar to the time when she had iron deficiency.  Patient reports prior to that she was having regular exercise and with good energy level.    Patient reports compliant with Sprycel 50 mg daily.  She denies leg cramping.  She has no nausea no vomiting no abdominal pains.  She has good appetite and no diarrhea.  She denies headaches vision changes no dizziness or seizure activity.    Her laboratory study on 3/12/2020 reported marginal iron saturation 15% however had a good ferritin 212.5 ng/mL.  She had a hemoglobin 13.1.    Today on 4/24/2020 her iron study showed ferritin 262 and iron saturation 21%.  Hemoglobin is 14.8.  She has normal WBC 5740 including ANC 3820, and a normal platelets 161,000.    Laboratory study on 3/12/2020 reported a normal thyroid profile including TSH 1.20, free T4 at 1.37 ng/dL and a free T3 at 2.97 pg/mL his ferritin was 212.5 and iron saturation 15%.      Past Medical History:   Diagnosis Date   • Anemia in neoplastic disease    • Asthma     childhood   • Chiari I malformation (CMS/HCC)     eval by Dr Moore, NS 2016   • Chronic myelogenous leukemia (CMS/HCC)     remission, Dr Castle follows   • Chronic pain    • CML (chronic myelocytic leukemia) (CMS/HCC)    • Cystitis    • Depression    • GERD (gastroesophageal reflux disease)     ulcer   • H/O Iron deficiency anemia    • H/O Lower extremity pain     Resolved.   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      *  Hysterectomy in October 2018      *  Acute hepatitis C in early 2019    OB/GYN HISTORY: Menarche age 9. G5, P4, 1 miscarriage of the third pregnancy. First pregnancy at age 17. Patient underwent partial hysterectomy in 2018.       HEMATOLOGIC/ONCOLOGIC HISTORY: History from previous dates can be found in the separate document.        Laboratory results on 09/23/2015 showed normalization of hemoglobin 12.2, but still has macrocytosis, MCV 73.3. She has normal platelets and WBC at 9400. Serum ferritin < 5 ng/ml, iron sats 6.3%, iron 29, TIBC 455 mcg/ml. Still has severe iron deficiency, needs to continue oral iron therapy. The patient restarted taking oral iron supplementation which was probably stopped in the end of November 2015.        Laboratory study on 03/22/2016 reported normal WBC 8450, neutrophils 6100, lymphs is 1400 and monocytes 550. Serum iron was 26, TIBC 469 on saturation 6% and ferritin less than 5 NG/ML. Chemistry lab reported normal renal function with a creatinine 0.69, normal liver function panel, total protein 7.5 and albumin 4.2, normal electrolytes. Patient was restarted back on oral on sedimentation twice a day with good tolerance.      Patient continues take Lortab as needed for left leg cramping. Urine drug test on 08/05/2016 was completely negative, and repeated study on August 26 was positive for opiate, negative for other recreational drugs.      Repeat laboratory study on 08/31/2016 reported iron 21, ferritin less than 5, iron saturation 5%, TIBC 462. Hemoglobin was 11.5 MCV 78.1 MCHC 30.4. Platelets 163,000. Total WBC 8870 including neutrophils 6400 and lymphocytes 1500. BCR/ABL was 0.294% by RT-PCR method.       Patient was given IV Feraheme treatment in September  2016, with 2 doses. Repeated laboratory study on 10/06/2016 reported significantly improved and normalized ferritin 269, iron 80, TIBC 365 and iron saturation 22%. Her hemoglobin was 12.2, and MCV 80.8.      Patient reported she was seen by Dr. Fam in 10/2016 and was started on half tablets of Topamax. I reviewed Dr. Fam’s clinic note and telephone conversation record. The patient reports she has no recurrence of migraine headache. However, she is very tearful today, reporting that she has thoughts of not taking any medications. She did assure me that she has been taking the medication up to this point. 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 The patient denies suicide ideation. When she was initially diagnosed of CML back in 2014, she had depression and I started the patient on Prozac. The patient reported initially it helped her, however, she no longer feels the effect of Prozac. She feels depressed. The patient reports she has no personal hobby. She goes to work and goes home, taking care of her kids. She does not enjoy life as she used to.      Laboratory study on December 29, 2016 reported at ferritin 19.9, iron 33, TIBC 347 iron saturation 10%.  Hemoglobin was 13, normal WBC and platelets.  Unremarkable CMP.  Patient was given 2 doses of Feraheme treatment in early January 2017.      Cytogenetic study on March 14, 2017 reported a BCR-ABL products at 0.098%, showed further improved residual disease.  There is also reported a ferritin 103.7, iron 79, TIBC 336 and iron saturation 24%.  Hemoglobin was 13.5, MCV 92.3, platelets 199,000 WBC 7000.  She had a completely normal CMP.       Repeated laboratory study on June 20, 2017 reported at nondetectable BCR-ABL product.  Ferritin was 45, iron 35 TIBC 333 and iron saturation 11%.  Had a normal CBC and CMP.    In the end of July 2018, patient called  reporting worsening significant leg cramping, and getting worse recently and has been taking 6 tablets of Advil with no significant improvement.  We suspect it might be caused by Sprycel for her CML.  We discussed with patient, and decreased to half dose at 50 mg daily.  Patient reports that she is improved leg cramping since dose reduction.    Per medical records this patient had emergency room visit again on 8/18/2018.  Patient reports she had significant abdomen/pelvic pain at a time associate with nausea.  Laboratory study showed significantly elevated WBC at 25,900 including neutrophils 24,400, with elevated hemoglobin 15.8 and platelets 146,000.     She had a thorough investigation, including CT scan for abdomen pelvis which was unremarkable.  She then had ultrasound for the pelvis and it was also unremarkable.  She had ultrasound for the gallbladder examination on the same day and was unremarkable.  She had a normal CMP except of marginally elevated glucose at 112.  Her urinalysis showed only marginally increased WBC 3-5/HPF.  She was negative for yeast infection, negative for Chlamydia trichomonas and Neisseria gonorrhea.  Patient was prescribed Flagyl and doxycycline and discharged to home.      lab study on 4/30/2019 reported normal iron saturation 47% and elevated ferritin 507.1 ng/mL with free iron 184 and TIBC 388.  hemoglobin is normal at 13.7 and platelets 186,000. normal WBC at 5080 including ANC 3300, lymphocytes 930 and monocytes 630. Labs reported significantly elevated ALT at 655,  and alkaline phosphatase 234 but normal total bilirubin 0.9. She had normal renal function creatinine 0.78. Normal electrolytes.     On 04/30/2019, I searched Up-to-Date and suspected that this patient had drug-induced hepatitis from Diflucan or with combination of Diflucan with Sprycel. This patient had been on Sprycel for years and had no problem with abnormal liver panel previously. It seems Diflucan inhibits  CY moderately, which likely interferes with the metabolism of Sprycel. I instructed the patient to hold her Sprycel for now and we will repeat her liver panel every 2 weeks for reassessment.    Sprycel treatment continued as of 2019 upon completion of MEVYRET.    Laboratory study performed on 2019 showed normal CBC and CMP. BCR/ABL by RT-PCR detected BCR/ABL1 Major. HCV RNA by PCR showed HCV not detected.    Laboratory studies 2019 report her hemoglobin is normal at 15.2 and platelets 146,000. normal WBC at 7650 including ANC 5190, lymphocytes 1600 and monocytes 630.    Recent laboratory studies confirmed to be no detectable virus on 2019.    U/S Liver performed on 10/24/2019 reported negative hepatic ultrasound exam with no focal liver lesion, negative gallbladder examination with no cholelithiasis or bile duct dilation noted, however borderline splenomegaly was noted.     Laboratory study performed on 10/29/2019, reported a completely normal CBC, CMP. Normal iron saturation and still slightly elevated ferritin 325 ng/dL  BCR/ABL1 MAJOR (p210) and BCR/ABL1 MINOR (p190) were not detected.     Laboratory studies 2020, show hemoglobin 14.3 MCV 92.6 platelets 180,000 and WBC 6570 including neutrophils 4400.  She also has completely normal CMP.  Iron studies reported ferritin 273, iron saturation 11%, hemoglobin 14.3 WBC 6570 and platelets 180,000.  BCR-ABL by RT-PCR with 0%.     MEDICATIONS: The current medication list was reviewed with the patient and updated in the EMR this date per the medical assistant. Medication dosages and frequencies were confirmed to be accurate.        Allergies   Allergen Reactions   • Sulfa Antibiotics Unknown - Low Severity     Childhood reaction     SOCIAL HISTORY: . She smoked cigarettes previously, quit in 2006 with 8-pack-year history. Social drinker, maybe once a month. No illegal drug use. No risk for HIV except tattoos.  Hairstylist.  "      FAMILY HISTORY: Maternal grandmother had esophageal/stomach adenocarcinoma diagnosed at age of 72 and  of disease progress at age of 73. The patient' s mother has hypertension but otherwise no family history of malignancy, especially no leukemia.        I have reviewed the patient's medical history in detail and updated the computerized patient record.    Review of Systems   Constitutional: Positive for fatigue. Negative for appetite change, chills, diaphoresis, fever and unexpected weight change.   HENT:   Negative for mouth sores and sore throat.    Eyes: Negative for icterus.   Respiratory: Negative for cough and shortness of breath.    Cardiovascular: Negative for chest pain, leg swelling and palpitations.   Gastrointestinal: Negative for abdominal pain, blood in stool, constipation, diarrhea and nausea.   Endocrine: Negative for hot flashes.   Genitourinary: Negative for dysuria and hematuria.    Musculoskeletal: Negative for arthralgias and myalgias.   Skin: Negative for itching and rash.   Neurological: Negative for dizziness and headaches.   Hematological: Negative for adenopathy. Does not bruise/bleed easily.   Psychiatric/Behavioral: Negative for confusion. The patient is not nervous/anxious.      VITAL SIGNS:   Vitals:    20 1131   BP: 114/73   Pulse: 54   Resp: 16   Temp: 98.1 °F (36.7 °C)   SpO2: 99%   Weight: 74.2 kg (163 lb 9.6 oz)   Height: 171 cm (67.32\")   PainSc: 0-No pain   ECOG 0        PHYSICAL EXAMINATION: This is telemedicine by video visit, limited to visual examination only.  CONSTITUTIONAL:  Vital signs reviewed.  Well-developed, well-nourished, no distress, looks comfortable.  PSYCHIATRIC:  Normal judgment and insight.  Normal mood and affect.    LABORATORY DATA:    Lab Results   Component Value Date    GLUCOSE 84 2020    BUN 14 2020    CREATININE 0.74 2020    EGFRIFNONA 89 2020    BCR 18.9 2020    K 4.1 2020    CO2 25.9 2020    " CALCIUM 9.9 04/24/2020    ALBUMIN 4.70 04/24/2020    AST 17 04/24/2020    ALT 11 04/24/2020      Lab Results   Component Value Date    NEUTROABS 3.82 04/24/2020        Results from last 7 days   Lab Units 04/24/20  1027   WBC 10*3/mm3 5.74   NEUTROS ABS 10*3/mm3 3.82   HEMOGLOBIN g/dL 14.8   HEMATOCRIT % 45.2   PLATELETS 10*3/mm3 161     Lab Results   Component Value Date    IRON 63 04/24/2020    TIBC 300 04/24/2020    FERRITIN 262.40 (H) 04/24/2020   Iron saturation 24% 4/24/2020      Results from last 7 days   Lab Units 04/24/20  1027   SODIUM mmol/L 138   POTASSIUM mmol/L 4.1   CHLORIDE mmol/L 99   CO2 mmol/L 25.9   BUN mg/dL 14   CREATININE mg/dL 0.74   CALCIUM mg/dL 9.9   ALBUMIN g/dL 4.70   BILIRUBIN mg/dL 0.4   ALK PHOS U/L 60   ALT (SGPT) U/L 11   AST (SGOT) U/L 17   GLUCOSE mg/dL 84   FERRITIN ng/mL 262.40*   IRON mcg/dL 63   TIBC mcg/dL 300         ASSESSMENT:    1. Chronic myelogenous leukemia, chronic phase with excellent response to Sprycel and complete molecular response in August 2014. However she had interuption of treatment in early part of 2016, because of insurance issues and miscommunication, she stopped the medicine without telling us, and laboratory test on 03/22/2016 reported disease relapse with increased BCR/ABL product at 12.626%. subsequently she was restarted back on Sprycel, and responded well.  Since June 2017, she has completed molecular response as tested every 3 months.  She has been compliant with treatment.   · In the end of July 2018, she reported worsening significant cramping involving her legs, so we decreased her Sprycel to 50 mg daily starting early August.  She's been having less problem since that time.  She was tested negative for BCR-ABL on 8/31/2018.    · Patient had a negative BCR-ABL by RT-PCR in end of January 2019 and also on 3/29/2019.   · Patient was later found having significantly elevated liver function panel.  Sprycel was on hold and she was subsequently found  having acute hepatitis C infection.    · I discussed with Dr. Karyn Mendieta recently, we'll hold her Sprycel for now until she finishes hepatitis C treatment.   · She was started on hepatitis C treatment on 4/18/2019 and finished an 8-week course.  · On 07/02/2019 patient's labs showed BCR-ABL Major (p210) detected by RT-PCR at 0.0141%. BCR/ABL1 MINOR (p190) was not detected. HCV was not detected.  · Sprycel treatment was resumed as of 07/02/2019 upon completion of MEVYRET.  · Laboratory study performed, 10/29/2019, reported normal CBC. BCR/ABL1 MAJOR (p210) and BCR/ABL1 MINOR (p190) were not detected.    · BCR-ABL by RT-PCR on 1/24/2020 was negative.     · Patient will need to continue on sprycel treatment.  Lab results pending on 4/24/2020.    2.  Hepatitis C infection starting early 2019 evidenced by his severely elevated liver function panel.  Patient was started on treatment for hepatitis C on 4/18/2019 for 8-week course.  · We repeated hepatitis C RNA by PCR on 7/2/2019, HCV not detected.  · Repeat laboratory study on 9/11/2019 showed negative for HCV by PCR.  · U/S Liver performed on 10/24/2019 reported negative hepatic ultrasound exam with no focal liver lesion, negative gallbladder examination with no cholelithiasis or bile duct dilation noted, however borderline splenomegaly was noted.   · Patient will continue follow-up with Dr. Mendieta.      3. Recurrent Iron deficiency anemia.    · She was treated again with Feraheme October 2017.   Iron deficiency thought to be related to ulcer identified on EGD, being treated with Carafate.  She also had heavy menses.  · She had recurrent iron deficiency again and was given Feraheme 2 doses in March 2018.    · Subsequently recurrent iron deficiency in July 2018, she was was switched to Venofer 3 doses total 900 mg.   · Patient had a simple hysterectomy in October 2018.  · Laboratory study showed supratherapeutic ferritin 458 and iron saturation 40% on 4/30/2019.    · Laboratory study performed 10/29/2019, showed normal iron saturation and still slightly elevated ferritin 325 ng/dL.  · Normal iron studies including ferritin 262 and iron saturation 21% on 4/24/2020.  No evidence of recurrent iron deficiency.     4.  Significant fatigue in the past month as reported on 4/24/2020.  Iron study showed appropriate ferritin and iron saturation.  Normal thyroid profile on 3/12/2020.  Exact etiology of her fatigue is not clear.  I asked patient to try oral vitamin B12 and folic acid to see if it eases symptoms.      PLAN:    1. Patient to continue Sprycel treatment 50 mg daily.    2. Pending results for BCR/ABL by RT-PCR.   3. Start over-the-counter vitamin B12 at 1000 mcg daily and folic acid 1 mg daily.  4. We will arrange patient to follow up with me in 3 months with labs -  CBC, CMP, ferritin, iron profile, and BCR/ABL by RT-PCR.     This patient is on high risk medication and needs close monitoring.     This is a video visit to discuss her recent laboratory study results and treatment plan.  Due to the pandemic coronavirus infection, to decrease the risk for exposure, we arranged this video conference and the patient is agreeable.    Isabel Castle MD PhD   4/24/2020        cc:   ISHAAN REGAN M.D.     Karyn Mendieta M.D.

## 2020-04-26 NOTE — TELEPHONE ENCOUNTER
----- Message from Marylin Hatch sent at 1/18/2019 10:40 AM EST -----  Regarding: feeling really really tired  [phone]  Can hardly get of bed.  
ATTEMPTED TO RTN CALL TO PT. CALLED NUMBER LISTED IN PT'S CHART NOT ONE ATTACHED TO THIS MESSAGE AS THAT IS NOT THE RIGHT NUMBER. NO ANSWER AND VM FULL SO COULD NOT L/M.   
no

## 2020-05-05 LAB — REF LAB TEST METHOD: NORMAL

## 2020-07-10 ENCOUNTER — LAB (OUTPATIENT)
Dept: LAB | Facility: HOSPITAL | Age: 35
End: 2020-07-10

## 2020-07-10 ENCOUNTER — OFFICE VISIT (OUTPATIENT)
Dept: ONCOLOGY | Facility: CLINIC | Age: 35
End: 2020-07-10

## 2020-07-10 VITALS
DIASTOLIC BLOOD PRESSURE: 60 MMHG | SYSTOLIC BLOOD PRESSURE: 102 MMHG | OXYGEN SATURATION: 99 % | RESPIRATION RATE: 16 BRPM | HEART RATE: 60 BPM | HEIGHT: 68 IN | WEIGHT: 157.7 LBS | BODY MASS INDEX: 23.9 KG/M2 | TEMPERATURE: 98.4 F

## 2020-07-10 DIAGNOSIS — D50.0 IRON DEFICIENCY ANEMIA DUE TO CHRONIC BLOOD LOSS: ICD-10-CM

## 2020-07-10 DIAGNOSIS — C92.10 CML (CHRONIC MYELOID LEUKEMIA) (HCC): Primary | ICD-10-CM

## 2020-07-10 DIAGNOSIS — C92.10 CML (CHRONIC MYELOID LEUKEMIA) (HCC): ICD-10-CM

## 2020-07-10 LAB
ALBUMIN SERPL-MCNC: 4.6 G/DL (ref 3.5–5.2)
ALBUMIN/GLOB SERPL: 1.6 G/DL (ref 1.1–2.4)
ALP SERPL-CCNC: 63 U/L (ref 38–116)
ALT SERPL W P-5'-P-CCNC: 17 U/L (ref 0–33)
ANION GAP SERPL CALCULATED.3IONS-SCNC: 12.2 MMOL/L (ref 5–15)
AST SERPL-CCNC: 19 U/L (ref 0–32)
BASOPHILS # BLD AUTO: 0.03 10*3/MM3 (ref 0–0.2)
BASOPHILS NFR BLD AUTO: 0.3 % (ref 0–1.5)
BILIRUB SERPL-MCNC: 0.3 MG/DL (ref 0.2–1.2)
BUN SERPL-MCNC: 21 MG/DL (ref 6–20)
BUN/CREAT SERPL: 28.4 (ref 7.3–30)
CALCIUM SPEC-SCNC: 9.7 MG/DL (ref 8.5–10.2)
CHLORIDE SERPL-SCNC: 102 MMOL/L (ref 98–107)
CO2 SERPL-SCNC: 25.8 MMOL/L (ref 22–29)
CREAT SERPL-MCNC: 0.74 MG/DL (ref 0.6–1.1)
DEPRECATED RDW RBC AUTO: 42.4 FL (ref 37–54)
EOSINOPHIL # BLD AUTO: 0.11 10*3/MM3 (ref 0–0.4)
EOSINOPHIL NFR BLD AUTO: 1.3 % (ref 0.3–6.2)
ERYTHROCYTE [DISTWIDTH] IN BLOOD BY AUTOMATED COUNT: 12.7 % (ref 12.3–15.4)
FERRITIN SERPL-MCNC: 185.2 NG/ML (ref 11–207)
GFR SERPL CREATININE-BSD FRML MDRD: 89 ML/MIN/1.73
GLOBULIN UR ELPH-MCNC: 2.8 GM/DL (ref 1.8–3.5)
GLUCOSE SERPL-MCNC: 87 MG/DL (ref 74–124)
HCT VFR BLD AUTO: 43.7 % (ref 34–46.6)
HGB BLD-MCNC: 14.5 G/DL (ref 12–15.9)
IMM GRANULOCYTES # BLD AUTO: 0.03 10*3/MM3 (ref 0–0.05)
IMM GRANULOCYTES NFR BLD AUTO: 0.3 % (ref 0–0.5)
IRON 24H UR-MRATE: 37 MCG/DL (ref 37–145)
IRON SATN MFR SERPL: 13 % (ref 14–48)
LYMPHOCYTES # BLD AUTO: 1.42 10*3/MM3 (ref 0.7–3.1)
LYMPHOCYTES NFR BLD AUTO: 16.3 % (ref 19.6–45.3)
MCH RBC QN AUTO: 30.3 PG (ref 26.6–33)
MCHC RBC AUTO-ENTMCNC: 33.2 G/DL (ref 31.5–35.7)
MCV RBC AUTO: 91.4 FL (ref 79–97)
MONOCYTES # BLD AUTO: 0.74 10*3/MM3 (ref 0.1–0.9)
MONOCYTES NFR BLD AUTO: 8.5 % (ref 5–12)
NEUTROPHILS NFR BLD AUTO: 6.37 10*3/MM3 (ref 1.7–7)
NEUTROPHILS NFR BLD AUTO: 73.3 % (ref 42.7–76)
NRBC BLD AUTO-RTO: 0 /100 WBC (ref 0–0.2)
PLATELET # BLD AUTO: 173 10*3/MM3 (ref 140–450)
PMV BLD AUTO: 10.6 FL (ref 6–12)
POTASSIUM SERPL-SCNC: 3.8 MMOL/L (ref 3.5–4.7)
PROT SERPL-MCNC: 7.4 G/DL (ref 6.3–8)
RBC # BLD AUTO: 4.78 10*6/MM3 (ref 3.77–5.28)
SODIUM SERPL-SCNC: 140 MMOL/L (ref 134–145)
TIBC SERPL-MCNC: 286 MCG/DL (ref 249–505)
TRANSFERRIN SERPL-MCNC: 204 MG/DL (ref 200–360)
WBC # BLD AUTO: 8.7 10*3/MM3 (ref 3.4–10.8)

## 2020-07-10 PROCEDURE — 85025 COMPLETE CBC W/AUTO DIFF WBC: CPT

## 2020-07-10 PROCEDURE — 83540 ASSAY OF IRON: CPT

## 2020-07-10 PROCEDURE — 82728 ASSAY OF FERRITIN: CPT

## 2020-07-10 PROCEDURE — 99214 OFFICE O/P EST MOD 30 MIN: CPT | Performed by: INTERNAL MEDICINE

## 2020-07-10 PROCEDURE — 84466 ASSAY OF TRANSFERRIN: CPT

## 2020-07-10 PROCEDURE — 36415 COLL VENOUS BLD VENIPUNCTURE: CPT

## 2020-07-10 PROCEDURE — 80053 COMPREHEN METABOLIC PANEL: CPT

## 2020-07-10 NOTE — PROGRESS NOTES
REASONS FOR FOLLOWUP:    1. Chronic myelogenous leukemia with splenomegaly, chronic phase, positive [place] chromosome, no mutation at kinase domain.    2. Patient was started on hydroxyurea temporarily on 10/23/2013 with significant drop of WBC counts.    3. Patient started on Sprycel on 12/02/2013. Peripheral blood tested with 3.4% of cells positive for BCR-ABL translocation, tested 02/17/2014.    4. The patient had CCyR 6 months into treatment as tested on 05/15/2014.    5. Complete molecular response as tested 08/08/14 with negative product of BCR/ABL.    6. There was interruption of her treatment due to insurance coverage and misunderstanding from patient's part, she had a relapse of disease with BCR/ABL product at 12.626% in March 2016, and she restarted back on Sprycel, with good response as tested on 08/31/2016 with BCR/ABL at 0.294%.  7. Recurrent iron deficiency anemia not responding to oral iron supplementation.  She also had significant constipation associated with oral iron. Patient was given Feraheme treatment 2 doses in September 2016 and repeated in January 2017.  Repeated IV iron therapy Venofer in July 2018 due to recurrent iron deficiency.  8. Since June 2017, patient has been persistently tested negative for BCR/ABL by RT-PCR every 3 month period.    9. Patient reported worsening leg cramping in end of July 2018.  Sprycel was decreased to 50 mg daily.  Laboratory studies on 8/31/2018, on 1/30/2019 and on 3/29/2019 were negative for BCR-ABL by RT-PCR.   10. Patient was diagnosed with acute hepatitis C in early 2019.  Patient was treated by Dr. Karyn Mendieta.  Sprycel was on hold during her treatment for hepatitis C to avoid drug interaction and side effects (probably had drug related hepatitis in March 2019 possible due to Diflucan in combination with Sprycel).   11. Lab study on 7/2/2019 reported no detectable hepatitis C viral infection.  She was weakly positive for BCR-ABL by PCR.  Sprycel 50  mg daily was restarted.    12. On 10/29/2019 BCR/ABL1 MAJOR (p210) and BCR/ABL1 MINOR (p190) were not detected.   13. BCR-ABL by RT-PCR with 0% 1/24/2020 and also on 4/24/2020.         HISTORY OF PRESENT ILLNESS: The patient is a 35 y.o.  female who presents today for 3 month follow-up with lab review.     Patient reports that she is doing fine.  Patient reports oral vitamin B12 significantly improved her energy level since April 2020, no longer has severe fatigue.  Excellent performance status ECOG 0.  No significant fatigue, no leg cramping.  She has been tolerating Sprycel 50 mg daily.  She has no complaints at this time.     Laboratory studies today, 7/10/2020, show completely normal CBC and CMP.  Pending results for BCR-ABL by RT-PCR.    She had excellent iron study on 4/24/2020, with ferritin 262, iron saturation 21%.  She was negative for BCR-ABL by RT-PCR on the same day.    Past Medical History:   Diagnosis Date   • Anemia in neoplastic disease    • Asthma     childhood   • Chiari I malformation (CMS/HCC)     eval by Dr Moore, NS 2016   • Chronic myelogenous leukemia (CMS/HCC)     remission, Dr Castle follows   • Chronic pain    • CML (chronic myelocytic leukemia) (CMS/HCC)    • Cystitis    • Depression    • GERD (gastroesophageal reflux disease)     ulcer   • H/O Iron deficiency anemia    • H/O Lower extremity pain     Resolved.   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      *  Hysterectomy in October 2018      *  Acute hepatitis C in early 2019    OB/GYN HISTORY: Menarche age 9. G5, P4, 1 miscarriage of the third pregnancy. First pregnancy at age 17. Patient underwent partial  hysterectomy in 2018.       HEMATOLOGIC/ONCOLOGIC HISTORY: History from previous dates can be found in the separate document.        Laboratory results on 09/23/2015 showed normalization of hemoglobin 12.2, but still has macrocytosis, MCV 73.3. She has normal platelets and WBC at 9400. Serum ferritin < 5 ng/ml, iron sats 6.3%, iron 29, TIBC 455 mcg/ml. Still has severe iron deficiency, needs to continue oral iron therapy. The patient restarted taking oral iron supplementation which was probably stopped in the end of November 2015.        Laboratory study on 03/22/2016 reported normal WBC 8450, neutrophils 6100, lymphs is 1400 and monocytes 550. Serum iron was 26, TIBC 469 on saturation 6% and ferritin less than 5 NG/ML. Chemistry lab reported normal renal function with a creatinine 0.69, normal liver function panel, total protein 7.5 and albumin 4.2, normal electrolytes. Patient was restarted back on oral on sedimentation twice a day with good tolerance.      Patient continues take Lortab as needed for left leg cramping. Urine drug test on 08/05/2016 was completely negative, and repeated study on August 26 was positive for opiate, negative for other recreational drugs.      Repeat laboratory study on 08/31/2016 reported iron 21, ferritin less than 5, iron saturation 5%, TIBC 462. Hemoglobin was 11.5 MCV 78.1 MCHC 30.4. Platelets 163,000. Total WBC 8870 including neutrophils 6400 and lymphocytes 1500. BCR/ABL was 0.294% by RT-PCR method.       Patient was given IV Feraheme treatment in September 2016, with 2 doses. Repeated laboratory study on 10/06/2016 reported significantly improved and normalized ferritin 269, iron 80, TIBC 365 and iron saturation 22%. Her hemoglobin was 12.2, and MCV 80.8.      Patient reported she was seen by Dr. Fam in 10/2016 and was started on half tablets of Topamax. I reviewed Dr. Fam’s clinic note and telephone conversation record. The patient reports she has no recurrence  of migraine headache. However, she is very tearful today, reporting that she has thoughts of not taking any medications. She did assure me that she has been taking the medication up to this point. 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 The patient denies suicide ideation. When she was initially diagnosed of CML back in 2014, she had depression and I started the patient on Prozac. The patient reported initially it helped her, however, she no longer feels the effect of Prozac. She feels depressed. The patient reports she has no personal hobby. She goes to work and goes home, taking care of her kids. She does not enjoy life as she used to.      Laboratory study on December 29, 2016 reported at ferritin 19.9, iron 33, TIBC 347 iron saturation 10%.  Hemoglobin was 13, normal WBC and platelets.  Unremarkable CMP.  Patient was given 2 doses of Feraheme treatment in early January 2017.      Cytogenetic study on March 14, 2017 reported a BCR-ABL products at 0.098%, showed further improved residual disease.  There is also reported a ferritin 103.7, iron 79, TIBC 336 and iron saturation 24%.  Hemoglobin was 13.5, MCV 92.3, platelets 199,000 WBC 7000.  She had a completely normal CMP.       Repeated laboratory study on June 20, 2017 reported at nondetectable BCR-ABL product.  Ferritin was 45, iron 35 TIBC 333 and iron saturation 11%.  Had a normal CBC and CMP.    In the end of July 2018, patient called reporting worsening significant leg cramping, and getting worse recently and has been taking 6 tablets of Advil with no significant improvement.  We suspect it might be caused by Sprycel for her CML.  We discussed with patient, and decreased to half dose at 50 mg daily.  Patient reports that she is improved leg cramping since dose reduction.    Per medical records this patient had emergency room visit again on 8/18/2018.   Patient reports she had significant abdomen/pelvic pain at a time associate with nausea.  Laboratory study showed significantly elevated WBC at 25,900 including neutrophils 24,400, with elevated hemoglobin 15.8 and platelets 146,000.     She had a thorough investigation, including CT scan for abdomen pelvis which was unremarkable.  She then had ultrasound for the pelvis and it was also unremarkable.  She had ultrasound for the gallbladder examination on the same day and was unremarkable.  She had a normal CMP except of marginally elevated glucose at 112.  Her urinalysis showed only marginally increased WBC 3-5/HPF.  She was negative for yeast infection, negative for Chlamydia trichomonas and Neisseria gonorrhea.  Patient was prescribed Flagyl and doxycycline and discharged to home.      lab study on 2019 reported normal iron saturation 47% and elevated ferritin 507.1 ng/mL with free iron 184 and TIBC 388.  hemoglobin is normal at 13.7 and platelets 186,000. normal WBC at 5080 including ANC 3300, lymphocytes 930 and monocytes 630. Labs reported significantly elevated ALT at 655,  and alkaline phosphatase 234 but normal total bilirubin 0.9. She had normal renal function creatinine 0.78. Normal electrolytes.     On 2019, I searched Up-to-Date and suspected that this patient had drug-induced hepatitis from Diflucan or with combination of Diflucan with Sprycel. This patient had been on Sprycel for years and had no problem with abnormal liver panel previously. It seems Diflucan inhibits CY moderately, which likely interferes with the metabolism of Sprycel. I instructed the patient to hold her Sprycel for now and we will repeat her liver panel every 2 weeks for reassessment.    [facility]    Laboratory study performed on 2019 showed normal CBC and CMP. BCR/ABL by RT-PCR detected BCR/ABL1 Major. HCV RNA by PCR showed HCV not  detected.    Laboratory studies 7/31/2019 report her hemoglobin is normal at 15.2 and platelets 146,000. normal WBC at 7650 including ANC 5190, lymphocytes 1600 and monocytes 630.    Recent laboratory studies confirmed to be no detectable virus on 9/11/2019.    U/S Liver performed on 10/24/2019 reported negative hepatic ultrasound exam with no focal liver lesion, negative gallbladder examination with no cholelithiasis or bile duct dilation noted, however borderline splenomegaly was noted.     Laboratory study performed on 10/29/2019, reported a completely normal CBC, CMP. Normal iron saturation and still slightly elevated ferritin 325 ng/dL  BCR/ABL1 MAJOR (p210) and BCR/ABL1 MINOR (p190) were not detected.     Laboratory studies 1/24/2020, show hemoglobin 14.3 MCV 92.6 platelets 180,000 and WBC 6570 including neutrophils 4400.  She also has completely normal CMP.  Iron studies reported ferritin 273, iron saturation 11%, hemoglobin 14.3 WBC 6570 and platelets 180,000.  BCR-ABL by RT-PCR with 0%.     Patient presented today for 3-month follow-up and lab review.      Due to pandemic coronavirus infection, patient has been staying home with all 4 children.  Patient reports significant fatigue for the past month, similar to the time when she had iron deficiency.  Patient reports prior to that she was having regular exercise and with good energy level.    Patient reports compliant with Sprycel 50 mg daily.  She denies leg cramping.  She has no nausea no vomiting no abdominal pains.  She has good appetite and no diarrhea.  She denies headaches vision changes no dizziness or seizure activity.    Her laboratory study on 3/12/2020 reported marginal iron saturation 15% however had a good ferritin 212.5 ng/mL.  She had hemoglobin 13.1 and normal thyroid profile including TSH 1.20, free T4 at 1.37 ng/dL and a free T3 at 2.97 pg/mL     On 4/24/2020 her iron study showed ferritin 262 and iron saturation 21%.  Hemoglobin is 14.8.   She has normal WBC 5740 including ANC 3820, and a normal platelets 161,000.        MEDICATIONS: The current medication list was reviewed with the patient and updated in the EMR this date per the medical assistant. Medication dosages and frequencies were confirmed to be accurate.        Allergies   Allergen Reactions   • Sulfa Antibiotics Unknown - Low Severity     Childhood reaction     SOCIAL HISTORY: . She smoked cigarettes previously, quit in 2006 with 8-pack-year history. Social drinker, maybe once a month. No illegal drug use. No risk for HIV except tattoos.  Hairstylist.       FAMILY HISTORY: Maternal grandmother had esophageal/stomach adenocarcinoma diagnosed at age of 72 and  of disease progress at age of 73. The patient' s mother has hypertension but otherwise no family history of malignancy, especially no leukemia.        I have reviewed the patient's medical history in detail and updated the computerized patient record.    Review of Systems   Constitutional: Negative for appetite change, chills, diaphoresis, fatigue, fever and unexpected weight change.   HENT:   Negative for mouth sores and sore throat.    Eyes: Negative for icterus.   Respiratory: Negative for cough and shortness of breath.    Cardiovascular: Negative for chest pain, leg swelling and palpitations.   Gastrointestinal: Negative for abdominal pain, blood in stool, constipation, diarrhea and nausea.   Endocrine: Negative for hot flashes.   Genitourinary: Negative for dysuria and hematuria.    Musculoskeletal: Negative for arthralgias, back pain and myalgias.   Skin: Negative for itching and rash.   Neurological: Negative for dizziness, headaches and numbness.   Hematological: Negative for adenopathy. Does not bruise/bleed easily.   Psychiatric/Behavioral: Negative for confusion. The patient is not nervous/anxious.        VITAL SIGNS:   Vitals:    07/10/20 1334   BP: 102/60   Pulse: 60   Resp: 16   Temp: 98.4 °F (36.9 °C)  "  TempSrc: Oral   SpO2: 99%   Weight: 71.5 kg (157 lb 11.2 oz)   Height: 172.7 cm (68\")   PainSc: 0-No pain   ECOG 0        PHYSICAL EXAMINATION:   GENERAL:  Well-developed, well-nourished female in no acute distress.   SKIN:  Warm, dry without rashes, purpura or petechiae.  HEAD:  Normocephalic.  EYES:  Pupils equal, round and reactive to light.  EOMs intact.  Conjunctivae normal.  EARS:  Hearing intact.  NOSE:  Patient wears mask due to the pandemic coronavirus infection.   MOUTH: Same as above.  THROAT: Same as above.  NECK:  Supple with good range of motion; no thyromegaly or masses, no JVD.  LYMPHATICS:  No cervical, supraclavicular adenopathy.  CHEST: Normal respiratory effort.  Lungs clear to auscultation. Good airflow.  CARDIAC:  Regular rate and rhythm without murmurs, rubs or gallops. Normal S1,S2.  ABDOMEN:  Soft, nontender with no organomegaly or masses.  Bowel sounds normal.  EXTREMITIES:  No clubbing, cyanosis or edema.  NEUROLOGICAL:  Cranial Nerves II-XII grossly intact.  No focal neurological deficits.  PSYCHIATRIC:  Normal affect and mood.        LABORATORY DATA:    Lab Results   Component Value Date    GLUCOSE 87 07/10/2020    BUN 21 (H) 07/10/2020    CREATININE 0.74 07/10/2020    EGFRIFNONA 89 07/10/2020    BCR 28.4 07/10/2020    K 3.8 07/10/2020    CO2 25.8 07/10/2020    CALCIUM 9.7 07/10/2020    ALBUMIN 4.60 07/10/2020    AST 19 07/10/2020    ALT 17 07/10/2020       Results from last 7 days   Lab Units 07/10/20  1333   WBC 10*3/mm3 8.70   NEUTROS ABS 10*3/mm3 6.37   HEMOGLOBIN g/dL 14.5   HEMATOCRIT % 43.7   PLATELETS 10*3/mm3 173     Lab Results   Component Value Date    IRON 37 07/10/2020    TIBC 286 07/10/2020    FERRITIN 185.20 07/10/2020   Iron saturation 13% on 7/10/2020.        ASSESSMENT:      1. Chronic myelogenous leukemia, chronic phase with excellent response to Sprycel and complete molecular response in August 2014. However she had interuption of treatment in early part of 2016, " because of insurance issues and miscommunication, she stopped the medicine without telling us, and laboratory test on 03/22/2016 reported disease relapse with increased BCR/ABL product at 12.626%. subsequently she was restarted back on Sprycel, and responded well.  Since June 2017, she has completed molecular response as tested every 3 months.  She has been compliant with treatment.   · In the end of July 2018, she reported worsening significant cramping involving her legs, so we decreased her Sprycel to 50 mg daily starting early August.  She's been having less problem since that time.  She was tested negative for BCR-ABL on 8/31/2018.    · Patient had a negative BCR-ABL by RT-PCR in end of January 2019 and also on 3/29/2019.   · Patient was later found having significantly elevated liver function panel.  Sprycel was on hold and she was subsequently found having acute hepatitis C infection.    · I discussed with Dr. Karny Mendieta recently, we'll hold her Sprycel for now until she finishes hepatitis C treatment.   · She was started on hepatitis C treatment on 4/18/2019 and finished an 8-week course.  · On 07/02/2019 patient's labs showed BCR-ABL Major (p210) detected by RT-PCR at 0.0141%. BCR/ABL1 MINOR (p190) was not detected. HCV was not detected.  · Sprycel treatment was resumed as of 07/02/2019 upon completion of [facility].  · Laboratory study performed, 10/29/2019, reported normal CBC. BCR/ABL1 MAJOR (p210) and BCR/ABL1 MINOR (p190) were not detected.    · BCR-ABL by RT-PCR on 1/24/2020 was negative.     · Lab result for BCR ABL by RT-PCR was also negative on 4/24/2020. Patient will need to continue on sprycel treatment.    · 7/10/2020 patient has normal CBC and CMP.  Tolerating well.  Continue Sprycel at 50 mg daily.    2.  Hepatitis C infection starting early 2019 evidenced by her severely elevated liver function panel.  Patient was started on treatment for hepatitis C on 4/18/2019 for 8-week course.  · We repeated  hepatitis C RNA by PCR on 7/2/2019, HCV not detected.  · Repeat laboratory study on 9/11/2019 showed negative for HCV by PCR.  · U/S Liver performed on 10/24/2019 reported negative hepatic ultrasound exam with no focal liver lesion, negative gallbladder examination with no cholelithiasis or bile duct dilation noted, however borderline splenomegaly was noted.   · Patient will continue follow-up with Dr. Mendieta.  Her recent appointment was canceled due to the pandemic coronavirus infection.      3. Recurrent Iron deficiency anemia.    · She was treated again with Feraheme October 2017.   Iron deficiency thought to be related to ulcer identified on EGD, being treated with Carafate.  She also had heavy menses.  · She had recurrent iron deficiency again and was given Feraheme 2 doses in March 2018.    · Subsequently recurrent iron deficiency in July 2018, she was switched to Venofer 3 doses total 900 mg.   · Patient had a simple hysterectomy in October 2018.  · Laboratory study showed supratherapeutic ferritin 458 and iron saturation 40% on 4/30/2019.   · Laboratory study performed 10/29/2019, showed normal iron saturation and ferritin 325 ng/dL.  · Normal iron studies including ferritin 262 and iron saturation 21% on 4/24/2020.  No evidence of recurrent iron deficiency.   · Lab study today on 7/10/2020 reported ferritin 185, iron saturation 13% and normal hemoglobin 14.5.  She has deteriorating iron studies.  Expecting patient will become iron deficient again in the next 3 to 6 months.  Continue to monitor in 3 months.    4.  Significant fatigue in the past month as reported on 4/24/2020.  Iron study showed appropriate ferritin and iron saturation.  Normal thyroid profile on 3/12/2020.  Exact etiology of her fatigue is not clear.  I asked patient to try oral vitamin B12 and folic acid to see if it eases symptoms.  · Patient reports on 7/10/2020 that she has significant improvement of energy level after starting oral  vitamin B12.  This will be continued.      PLAN:    1. Patient to continue targeted therapy with Sprycel treatment 50 mg daily.    2. Continue oral vitamin B12 at 1000 mcg daily   3. We will arrange patient to follow up with me in 3 months with labs -  CBC, CMP, ferritin, iron profile, and BCR/ABL by RT-PCR.     This patient is on high risk medication and needs close monitoring.     By signing my name here, I Beltran Hines attest that all documentation on 07/10/20 at 14:45 has been prepared under the direction and in the presence of Dr. Bao Castle MD.    I reviewed the note scribed by Beltran Hines and made appropriate corrections.      BAO CASTLE M.D., Ph.D.        CC:   ISHAAN REGAN M.D.     Karyn Mendieta M.D.

## 2020-07-15 LAB — REF LAB TEST METHOD: NORMAL

## 2020-10-09 ENCOUNTER — LAB (OUTPATIENT)
Dept: LAB | Facility: HOSPITAL | Age: 35
End: 2020-10-09

## 2020-10-09 ENCOUNTER — OFFICE VISIT (OUTPATIENT)
Dept: ONCOLOGY | Facility: CLINIC | Age: 35
End: 2020-10-09

## 2020-10-09 VITALS
HEIGHT: 67 IN | TEMPERATURE: 97.8 F | RESPIRATION RATE: 14 BRPM | DIASTOLIC BLOOD PRESSURE: 67 MMHG | BODY MASS INDEX: 22.87 KG/M2 | HEART RATE: 55 BPM | OXYGEN SATURATION: 100 % | WEIGHT: 145.7 LBS | SYSTOLIC BLOOD PRESSURE: 105 MMHG

## 2020-10-09 DIAGNOSIS — D50.0 IRON DEFICIENCY ANEMIA DUE TO CHRONIC BLOOD LOSS: ICD-10-CM

## 2020-10-09 DIAGNOSIS — C92.10 CML (CHRONIC MYELOID LEUKEMIA) (HCC): Primary | ICD-10-CM

## 2020-10-09 DIAGNOSIS — C92.10 CML (CHRONIC MYELOID LEUKEMIA) (HCC): ICD-10-CM

## 2020-10-09 LAB
ALBUMIN SERPL-MCNC: 4.7 G/DL (ref 3.5–5.2)
ALBUMIN/GLOB SERPL: 1.6 G/DL (ref 1.1–2.4)
ALP SERPL-CCNC: 57 U/L (ref 38–116)
ALT SERPL W P-5'-P-CCNC: 9 U/L (ref 0–33)
ANION GAP SERPL CALCULATED.3IONS-SCNC: 8.9 MMOL/L (ref 5–15)
AST SERPL-CCNC: 16 U/L (ref 0–32)
BASOPHILS # BLD AUTO: 0.04 10*3/MM3 (ref 0–0.2)
BASOPHILS NFR BLD AUTO: 0.6 % (ref 0–1.5)
BILIRUB SERPL-MCNC: 0.4 MG/DL (ref 0.2–1.2)
BUN SERPL-MCNC: 9 MG/DL (ref 6–20)
BUN/CREAT SERPL: 11.3 (ref 7.3–30)
CALCIUM SPEC-SCNC: 9.6 MG/DL (ref 8.5–10.2)
CHLORIDE SERPL-SCNC: 103 MMOL/L (ref 98–107)
CO2 SERPL-SCNC: 25.1 MMOL/L (ref 22–29)
CREAT SERPL-MCNC: 0.8 MG/DL (ref 0.6–1.1)
DEPRECATED RDW RBC AUTO: 41.1 FL (ref 37–54)
EOSINOPHIL # BLD AUTO: 0.06 10*3/MM3 (ref 0–0.4)
EOSINOPHIL NFR BLD AUTO: 0.9 % (ref 0.3–6.2)
ERYTHROCYTE [DISTWIDTH] IN BLOOD BY AUTOMATED COUNT: 12.4 % (ref 12.3–15.4)
FERRITIN SERPL-MCNC: 270 NG/ML (ref 11–207)
GFR SERPL CREATININE-BSD FRML MDRD: 81 ML/MIN/1.73
GLOBULIN UR ELPH-MCNC: 2.9 GM/DL (ref 1.8–3.5)
GLUCOSE SERPL-MCNC: 88 MG/DL (ref 74–124)
HCT VFR BLD AUTO: 44.6 % (ref 34–46.6)
HGB BLD-MCNC: 14.9 G/DL (ref 12–15.9)
IMM GRANULOCYTES # BLD AUTO: 0.02 10*3/MM3 (ref 0–0.05)
IMM GRANULOCYTES NFR BLD AUTO: 0.3 % (ref 0–0.5)
IRON 24H UR-MRATE: 36 MCG/DL (ref 37–145)
IRON SATN MFR SERPL: 13 % (ref 14–48)
LYMPHOCYTES # BLD AUTO: 1.6 10*3/MM3 (ref 0.7–3.1)
LYMPHOCYTES NFR BLD AUTO: 23.4 % (ref 19.6–45.3)
MCH RBC QN AUTO: 30.5 PG (ref 26.6–33)
MCHC RBC AUTO-ENTMCNC: 33.4 G/DL (ref 31.5–35.7)
MCV RBC AUTO: 91.2 FL (ref 79–97)
MONOCYTES # BLD AUTO: 0.55 10*3/MM3 (ref 0.1–0.9)
MONOCYTES NFR BLD AUTO: 8 % (ref 5–12)
NEUTROPHILS NFR BLD AUTO: 4.57 10*3/MM3 (ref 1.7–7)
NEUTROPHILS NFR BLD AUTO: 66.8 % (ref 42.7–76)
NRBC BLD AUTO-RTO: 0 /100 WBC (ref 0–0.2)
PLATELET # BLD AUTO: 173 10*3/MM3 (ref 140–450)
PMV BLD AUTO: 10.3 FL (ref 6–12)
POTASSIUM SERPL-SCNC: 4 MMOL/L (ref 3.5–4.7)
PROT SERPL-MCNC: 7.6 G/DL (ref 6.3–8)
RBC # BLD AUTO: 4.89 10*6/MM3 (ref 3.77–5.28)
SODIUM SERPL-SCNC: 137 MMOL/L (ref 134–145)
TIBC SERPL-MCNC: 283 MCG/DL (ref 249–505)
TRANSFERRIN SERPL-MCNC: 202 MG/DL (ref 200–360)
WBC # BLD AUTO: 6.84 10*3/MM3 (ref 3.4–10.8)

## 2020-10-09 PROCEDURE — 83540 ASSAY OF IRON: CPT

## 2020-10-09 PROCEDURE — 84466 ASSAY OF TRANSFERRIN: CPT

## 2020-10-09 PROCEDURE — 99214 OFFICE O/P EST MOD 30 MIN: CPT | Performed by: INTERNAL MEDICINE

## 2020-10-09 PROCEDURE — 36415 COLL VENOUS BLD VENIPUNCTURE: CPT

## 2020-10-09 PROCEDURE — 85025 COMPLETE CBC W/AUTO DIFF WBC: CPT

## 2020-10-09 PROCEDURE — 82728 ASSAY OF FERRITIN: CPT

## 2020-10-09 PROCEDURE — 80053 COMPREHEN METABOLIC PANEL: CPT

## 2020-10-15 ENCOUNTER — TELEMEDICINE (OUTPATIENT)
Dept: FAMILY MEDICINE CLINIC | Facility: TELEHEALTH | Age: 35
End: 2020-10-15

## 2020-10-15 DIAGNOSIS — R35.0 URINARY FREQUENCY: Primary | ICD-10-CM

## 2020-10-15 DIAGNOSIS — R30.0 DYSURIA: ICD-10-CM

## 2020-10-15 PROCEDURE — 99212 OFFICE O/P EST SF 10 MIN: CPT | Performed by: NURSE PRACTITIONER

## 2020-10-15 RX ORDER — PHENAZOPYRIDINE HYDROCHLORIDE 200 MG/1
200 TABLET, FILM COATED ORAL 3 TIMES DAILY PRN
Qty: 6 TABLET | Refills: 0 | Status: SHIPPED | OUTPATIENT
Start: 2020-10-15 | End: 2020-10-17

## 2020-10-15 RX ORDER — FLUCONAZOLE 150 MG/1
150 TABLET ORAL
Qty: 2 TABLET | Refills: 0 | Status: SHIPPED | OUTPATIENT
Start: 2020-10-15 | End: 2020-12-21

## 2020-10-15 RX ORDER — NITROFURANTOIN 25; 75 MG/1; MG/1
100 CAPSULE ORAL 2 TIMES DAILY
Qty: 14 CAPSULE | Refills: 0 | Status: SHIPPED | OUTPATIENT
Start: 2020-10-15 | End: 2020-10-22

## 2020-10-15 NOTE — PROGRESS NOTES
HPI  Hu Houston is a 35 y.o. female  presents with complaint of 2 day history of urinary frequency, burning, urinary urgency. Denies fever, chills, sweats, back pain, abd pain, N/V/D. Gets a UTI occasionally.    Review of Systems    Past Medical History:   Diagnosis Date   • Anemia in neoplastic disease    • Asthma     childhood   • Chiari I malformation (CMS/HCC)     eval by Dr Moore, NS 2016   • Chronic myelogenous leukemia (CMS/HCC)     remission, Dr Castle follows   • Chronic pain    • CML (chronic myelocytic leukemia) (CMS/HCC)    • Cystitis    • Depression    • GERD (gastroesophageal reflux disease)     ulcer   • H/O Iron deficiency anemia    • H/O Lower extremity pain     Resolved.   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   • Pulmonary hypertension (CMS/HCC)    • Seizures (CMS/HCC)     as child after head injry   • Splenomegaly    • Status post D&C    • Vitamin D deficiency        Family History   Problem Relation Age of Onset   • Hyperlipidemia Mother    • Hypertension Mother    • Stomach cancer Maternal Grandmother 72        Adenocarcinoma esophagus and stomach   • Stroke Paternal Grandmother    • Malig Hyperthermia Neg Hx        Social History     Socioeconomic History   • Marital status:      Spouse name: Not on file   • Number of children: Not on file   • Years of education: Not on file   • Highest education level: Not on file   Occupational History   • Occupation:      Employer: [employer]   Tobacco Use   • Smoking status: Former Smoker     Packs/day: 1.00     Years: 8.00     Pack years: 8.00     Types: Cigarettes     Quit date: 2006     Years since quittin.2   • Smokeless tobacco: Never Used   Substance and Sexual Activity   • Alcohol use: Yes     Comment: Social, maybe once every 6 months   • Drug use: Yes     Types: Cocaine(coke)      Comment: prior to 2006   • Sexual activity: Defer     Birth control/protection: Surgical     Comment: Tubal   Social History Narrative    Has tattoos. Studying for her Master's degree.         LMP 10/11/2018 --Hysterectomy    PHYSICAL EXAM  Virtual Visit Physical Exam    Diagnoses and all orders for this visit:    1. Urinary frequency (Primary)  -     fluconazole (Diflucan) 150 MG tablet; Take 1 tablet by mouth Every 72 (Seventy-Two) Hours As Needed (yeast) for up to 2 doses.  Dispense: 2 tablet; Refill: 0  -     nitrofurantoin, macrocrystal-monohydrate, (Macrobid) 100 MG capsule; Take 1 capsule by mouth 2 (Two) Times a Day for 7 days.  Dispense: 14 capsule; Refill: 0  -     phenazopyridine (Pyridium) 200 MG tablet; Take 1 tablet by mouth 3 (Three) Times a Day As Needed for Bladder Spasms for up to 2 days.  Dispense: 6 tablet; Refill: 0    2. Dysuria  -     fluconazole (Diflucan) 150 MG tablet; Take 1 tablet by mouth Every 72 (Seventy-Two) Hours As Needed (yeast) for up to 2 doses.  Dispense: 2 tablet; Refill: 0  -     nitrofurantoin, macrocrystal-monohydrate, (Macrobid) 100 MG capsule; Take 1 capsule by mouth 2 (Two) Times a Day for 7 days.  Dispense: 14 capsule; Refill: 0  -     phenazopyridine (Pyridium) 200 MG tablet; Take 1 tablet by mouth 3 (Three) Times a Day As Needed for Bladder Spasms for up to 2 days.  Dispense: 6 tablet; Refill: 0          FOLLOW-UP  As discussed during visit with Virtual Care, if symptoms worsen or fail to improve, follow-up with PCP/Urgent Care/Emergency Department.    Patient verbalizes understanding of medications, instructions for treatment and follow-up.    Mony Hawthorne, APRN  10/15/2020  19:42 EDT    This visit was performed via Telehealth.  This patient has been instructed to follow-up with their primary care provider if their symptoms worsen or the treatment provided does not resolve their illness.

## 2020-10-15 NOTE — PATIENT INSTRUCTIONS
Urinary Tract Infection, Adult    A urinary tract infection (UTI) is an infection of any part of the urinary tract. The urinary tract includes the kidneys, ureters, bladder, and urethra. These organs make, store, and get rid of urine in the body.  Your health care provider may use other names to describe the infection. An upper UTI affects the ureters and kidneys (pyelonephritis). A lower UTI affects the bladder (cystitis) and urethra (urethritis).  What are the causes?  Most urinary tract infections are caused by bacteria in your genital area, around the entrance to your urinary tract (urethra). These bacteria grow and cause inflammation of your urinary tract.  What increases the risk?  You are more likely to develop this condition if:  · You have a urinary catheter that stays in place (indwelling).  · You are not able to control when you urinate or have a bowel movement (you have incontinence).  · You are female and you:  ? Use a spermicide or diaphragm for birth control.  ? Have low estrogen levels.  ? Are pregnant.  · You have certain genes that increase your risk (genetics).  · You are sexually active.  · You take antibiotic medicines.  · You have a condition that causes your flow of urine to slow down, such as:  ? An enlarged prostate, if you are male.  ? Blockage in your urethra (stricture).  ? A kidney stone.  ? A nerve condition that affects your bladder control (neurogenic bladder).  ? Not getting enough to drink, or not urinating often.  · You have certain medical conditions, such as:  ? Diabetes.  ? A weak disease-fighting system (immunesystem).  ? Sickle cell disease.  ? Gout.  ? Spinal cord injury.  What are the signs or symptoms?  Symptoms of this condition include:  · Needing to urinate right away (urgently).  · Frequent urination or passing small amounts of urine frequently.  · Pain or burning with urination.  · Blood in the urine.  · Urine that smells bad or unusual.  · Trouble urinating.  · Cloudy  urine.  · Vaginal discharge, if you are female.  · Pain in the abdomen or the lower back.  You may also have:  · Vomiting or a decreased appetite.  · Confusion.  · Irritability or tiredness.  · A fever.  · Diarrhea.  The first symptom in older adults may be confusion. In some cases, they may not have any symptoms until the infection has worsened.  How is this diagnosed?  This condition is diagnosed based on your medical history and a physical exam. You may also have other tests, including:  · Urine tests.  · Blood tests.  · Tests for sexually transmitted infections (STIs).  If you have had more than one UTI, a cystoscopy or imaging studies may be done to determine the cause of the infections.  How is this treated?  Treatment for this condition includes:  · Antibiotic medicine.  · Over-the-counter medicines to treat discomfort.  · Drinking enough water to stay hydrated.  If you have frequent infections or have other conditions such as a kidney stone, you may need to see a health care provider who specializes in the urinary tract (urologist).  In rare cases, urinary tract infections can cause sepsis. Sepsis is a life-threatening condition that occurs when the body responds to an infection. Sepsis is treated in the hospital with IV antibiotics, fluids, and other medicines.  Follow these instructions at home:    Medicines  · Take over-the-counter and prescription medicines only as told by your health care provider.  · If you were prescribed an antibiotic medicine, take it as told by your health care provider. Do not stop using the antibiotic even if you start to feel better.  General instructions  · Make sure you:  ? Empty your bladder often and completely. Do not hold urine for long periods of time.  ? Empty your bladder after sex.  ? Wipe from front to back after a bowel movement if you are female. Use each tissue one time when you wipe.  · Drink enough fluid to keep your urine pale yellow.  · Keep all follow-up  visits as told by your health care provider. This is important.  Contact a health care provider if:  · Your symptoms do not get better after 1-2 days.  · Your symptoms go away and then return.  Get help right away if you have:  · Severe pain in your back or your lower abdomen.  · A fever.  · Nausea or vomiting.  Summary  · A urinary tract infection (UTI) is an infection of any part of the urinary tract, which includes the kidneys, ureters, bladder, and urethra.  · Most urinary tract infections are caused by bacteria in your genital area, around the entrance to your urinary tract (urethra).  · Treatment for this condition often includes antibiotic medicines.  · If you were prescribed an antibiotic medicine, take it as told by your health care provider. Do not stop using the antibiotic even if you start to feel better.  · Keep all follow-up visits as told by your health care provider. This is important.  This information is not intended to replace advice given to you by your health care provider. Make sure you discuss any questions you have with your health care provider.  Document Released: 09/27/2006 Document Revised: 12/05/2019 Document Reviewed: 06/27/2019  [employer] Patient Education © 2020 [employer]

## 2020-10-19 LAB — REF LAB TEST METHOD: NORMAL

## 2020-11-09 NOTE — TELEPHONE ENCOUNTER
[facility] refill request rec electronically from [facility]. Per Dr Castle's office note-pt is to continue 50 mg daily.     I have routed the rx to Dr Castle for signature. Once signed it will be escribed to [facility]    Confirmation rec that Dr Castle has signed the [facility] Rx. [facility]

## 2020-11-17 ENCOUNTER — HOSPITAL ENCOUNTER (OUTPATIENT)
Dept: GENERAL RADIOLOGY | Facility: HOSPITAL | Age: 35
Discharge: HOME | End: 2020-11-17
Admitting: INTERNAL MEDICINE

## 2020-11-17 ENCOUNTER — TELEPHONE (OUTPATIENT)
Dept: ONCOLOGY | Facility: CLINIC | Age: 35
End: 2020-11-17

## 2020-11-17 ENCOUNTER — LAB (OUTPATIENT)
Dept: LAB | Facility: HOSPITAL | Age: 35
End: 2020-11-17

## 2020-11-17 DIAGNOSIS — K90.9 MALABSORPTION OF IRON: ICD-10-CM

## 2020-11-17 DIAGNOSIS — R00.2 PALPITATIONS: ICD-10-CM

## 2020-11-17 DIAGNOSIS — K90.9 MALABSORPTION OF IRON: Primary | ICD-10-CM

## 2020-11-17 DIAGNOSIS — R06.09 DYSPNEA ON EXERTION: ICD-10-CM

## 2020-11-17 DIAGNOSIS — D50.0 IRON DEFICIENCY ANEMIA DUE TO CHRONIC BLOOD LOSS: ICD-10-CM

## 2020-11-17 DIAGNOSIS — R06.09 DYSPNEA ON EXERTION: Primary | ICD-10-CM

## 2020-11-17 LAB
ALBUMIN SERPL-MCNC: 4.9 G/DL (ref 3.5–5.2)
ALBUMIN/GLOB SERPL: 1.8 G/DL (ref 1.1–2.4)
ALP SERPL-CCNC: 67 U/L (ref 38–116)
ALT SERPL W P-5'-P-CCNC: 12 U/L (ref 0–33)
ANION GAP SERPL CALCULATED.3IONS-SCNC: 9.6 MMOL/L (ref 5–15)
AST SERPL-CCNC: 15 U/L (ref 0–32)
BASOPHILS # BLD AUTO: 0.04 10*3/MM3 (ref 0–0.2)
BASOPHILS NFR BLD AUTO: 0.6 % (ref 0–1.5)
BILIRUB SERPL-MCNC: 0.2 MG/DL (ref 0.2–1.2)
BUN SERPL-MCNC: 13 MG/DL (ref 6–20)
BUN/CREAT SERPL: 19.1 (ref 7.3–30)
CALCIUM SPEC-SCNC: 9.9 MG/DL (ref 8.5–10.2)
CHLORIDE SERPL-SCNC: 102 MMOL/L (ref 98–107)
CO2 SERPL-SCNC: 27.4 MMOL/L (ref 22–29)
CREAT SERPL-MCNC: 0.68 MG/DL (ref 0.6–1.1)
DEPRECATED RDW RBC AUTO: 40.8 FL (ref 37–54)
EOSINOPHIL # BLD AUTO: 0.14 10*3/MM3 (ref 0–0.4)
EOSINOPHIL NFR BLD AUTO: 2.1 % (ref 0.3–6.2)
ERYTHROCYTE [DISTWIDTH] IN BLOOD BY AUTOMATED COUNT: 12.3 % (ref 12.3–15.4)
FERRITIN SERPL-MCNC: 261.4 NG/ML (ref 11–207)
GFR SERPL CREATININE-BSD FRML MDRD: 98 ML/MIN/1.73
GLOBULIN UR ELPH-MCNC: 2.7 GM/DL (ref 1.8–3.5)
GLUCOSE SERPL-MCNC: 99 MG/DL (ref 74–124)
HCT VFR BLD AUTO: 44.8 % (ref 34–46.6)
HGB BLD-MCNC: 15 G/DL (ref 12–15.9)
IMM GRANULOCYTES # BLD AUTO: 0.02 10*3/MM3 (ref 0–0.05)
IMM GRANULOCYTES NFR BLD AUTO: 0.3 % (ref 0–0.5)
IRON 24H UR-MRATE: 37 MCG/DL (ref 37–145)
IRON SATN MFR SERPL: 12 % (ref 14–48)
LYMPHOCYTES # BLD AUTO: 1.93 10*3/MM3 (ref 0.7–3.1)
LYMPHOCYTES NFR BLD AUTO: 28.9 % (ref 19.6–45.3)
MCH RBC QN AUTO: 30.6 PG (ref 26.6–33)
MCHC RBC AUTO-ENTMCNC: 33.5 G/DL (ref 31.5–35.7)
MCV RBC AUTO: 91.4 FL (ref 79–97)
MONOCYTES # BLD AUTO: 0.57 10*3/MM3 (ref 0.1–0.9)
MONOCYTES NFR BLD AUTO: 8.5 % (ref 5–12)
NEUTROPHILS NFR BLD AUTO: 3.97 10*3/MM3 (ref 1.7–7)
NEUTROPHILS NFR BLD AUTO: 59.6 % (ref 42.7–76)
NRBC BLD AUTO-RTO: 0 /100 WBC (ref 0–0.2)
PLATELET # BLD AUTO: 185 10*3/MM3 (ref 140–450)
PMV BLD AUTO: 10.5 FL (ref 6–12)
POTASSIUM SERPL-SCNC: 4 MMOL/L (ref 3.5–4.7)
PROT SERPL-MCNC: 7.6 G/DL (ref 6.3–8)
RBC # BLD AUTO: 4.9 10*6/MM3 (ref 3.77–5.28)
SODIUM SERPL-SCNC: 139 MMOL/L (ref 134–145)
TIBC SERPL-MCNC: 302 MCG/DL (ref 249–505)
TRANSFERRIN SERPL-MCNC: 216 MG/DL (ref 200–360)
WBC # BLD AUTO: 6.67 10*3/MM3 (ref 3.4–10.8)

## 2020-11-17 PROCEDURE — 82728 ASSAY OF FERRITIN: CPT

## 2020-11-17 PROCEDURE — 71046 X-RAY EXAM CHEST 2 VIEWS: CPT

## 2020-11-17 PROCEDURE — 85025 COMPLETE CBC W/AUTO DIFF WBC: CPT

## 2020-11-17 PROCEDURE — 84466 ASSAY OF TRANSFERRIN: CPT

## 2020-11-17 PROCEDURE — 36415 COLL VENOUS BLD VENIPUNCTURE: CPT

## 2020-11-17 PROCEDURE — 83540 ASSAY OF IRON: CPT

## 2020-11-17 PROCEDURE — 80053 COMPREHEN METABOLIC PANEL: CPT

## 2020-11-18 ENCOUNTER — HOSPITAL ENCOUNTER (OUTPATIENT)
Dept: CARDIOLOGY | Facility: HOSPITAL | Age: 35
Discharge: HOME | End: 2020-11-18
Admitting: INTERNAL MEDICINE

## 2020-11-18 VITALS
WEIGHT: 145 LBS | SYSTOLIC BLOOD PRESSURE: 134 MMHG | HEIGHT: 68 IN | HEART RATE: 62 BPM | BODY MASS INDEX: 21.98 KG/M2 | DIASTOLIC BLOOD PRESSURE: 68 MMHG

## 2020-11-18 DIAGNOSIS — R00.2 PALPITATIONS: ICD-10-CM

## 2020-11-18 DIAGNOSIS — R06.09 DYSPNEA ON EXERTION: ICD-10-CM

## 2020-11-18 LAB
AORTIC ARCH: 2.5 CM
ASCENDING AORTA: 2.4 CM
BH CV ECHO MEAS - ACS: 1.9 CM
BH CV ECHO MEAS - AO ARCH DIAM (PROXIMAL TRANS.): 2.5 CM
BH CV ECHO MEAS - AO MAX PG (FULL): 4.3 MMHG
BH CV ECHO MEAS - AO MAX PG: 9.1 MMHG
BH CV ECHO MEAS - AO MEAN PG (FULL): 2 MMHG
BH CV ECHO MEAS - AO MEAN PG: 5 MMHG
BH CV ECHO MEAS - AO ROOT AREA (BSA CORRECTED): 1.5
BH CV ECHO MEAS - AO ROOT AREA: 5.3 CM^2
BH CV ECHO MEAS - AO ROOT DIAM: 2.6 CM
BH CV ECHO MEAS - AO V2 MAX: 151 CM/SEC
BH CV ECHO MEAS - AO V2 MEAN: 107 CM/SEC
BH CV ECHO MEAS - AO V2 VTI: 32.4 CM
BH CV ECHO MEAS - ASC AORTA: 2.4 CM
BH CV ECHO MEAS - AVA(I,A): 1.5 CM^2
BH CV ECHO MEAS - AVA(I,D): 1.5 CM^2
BH CV ECHO MEAS - AVA(V,A): 1.5 CM^2
BH CV ECHO MEAS - AVA(V,D): 1.5 CM^2
BH CV ECHO MEAS - BSA(HAYCOCK): 1.8 M^2
BH CV ECHO MEAS - BSA: 1.8 M^2
BH CV ECHO MEAS - BZI_BMI: 22 KILOGRAMS/M^2
BH CV ECHO MEAS - BZI_METRIC_HEIGHT: 172.7 CM
BH CV ECHO MEAS - BZI_METRIC_WEIGHT: 65.8 KG
BH CV ECHO MEAS - EDV(MOD-SP2): 82 ML
BH CV ECHO MEAS - EDV(MOD-SP4): 96 ML
BH CV ECHO MEAS - EDV(TEICH): 112.8 ML
BH CV ECHO MEAS - EF(CUBED): 66.6 %
BH CV ECHO MEAS - EF(MOD-BP): 59.3 %
BH CV ECHO MEAS - EF(MOD-SP2): 63.4 %
BH CV ECHO MEAS - EF(MOD-SP4): 56.3 %
BH CV ECHO MEAS - EF(TEICH): 58 %
BH CV ECHO MEAS - EF_3D-VOL: 58 %
BH CV ECHO MEAS - ESV(MOD-SP2): 30 ML
BH CV ECHO MEAS - ESV(MOD-SP4): 42 ML
BH CV ECHO MEAS - ESV(TEICH): 47.4 ML
BH CV ECHO MEAS - FS: 30.6 %
BH CV ECHO MEAS - IVS/LVPW: 0.95
BH CV ECHO MEAS - IVSD: 0.7 CM
BH CV ECHO MEAS - LA 3D VOL INDEX: 33
BH CV ECHO MEAS - LAT PEAK E' VEL: 18.3 CM/SEC
BH CV ECHO MEAS - LV DIASTOLIC VOL/BSA (35-75): 53.8 ML/M^2
BH CV ECHO MEAS - LV MASS(C)D: 114.7 GRAMS
BH CV ECHO MEAS - LV MASS(C)DI: 64.3 GRAMS/M^2
BH CV ECHO MEAS - LV MAX PG: 4.8 MMHG
BH CV ECHO MEAS - LV MEAN PG: 3 MMHG
BH CV ECHO MEAS - LV SYSTOLIC VOL/BSA (12-30): 23.6 ML/M^2
BH CV ECHO MEAS - LV V1 MAX: 110 CM/SEC
BH CV ECHO MEAS - LV V1 MEAN: 78.4 CM/SEC
BH CV ECHO MEAS - LV V1 VTI: 23.8 CM
BH CV ECHO MEAS - LVIDD: 4.9 CM
BH CV ECHO MEAS - LVIDS: 3.4 CM
BH CV ECHO MEAS - LVLD AP2: 8.2 CM
BH CV ECHO MEAS - LVLD AP4: 8 CM
BH CV ECHO MEAS - LVLS AP2: 6.7 CM
BH CV ECHO MEAS - LVLS AP4: 7 CM
BH CV ECHO MEAS - LVOT AREA (M): 2 CM^2
BH CV ECHO MEAS - LVOT AREA: 2 CM^2
BH CV ECHO MEAS - LVOT DIAM: 1.6 CM
BH CV ECHO MEAS - LVPWD: 0.74 CM
BH CV ECHO MEAS - MED PEAK E' VEL: 11.4 CM/SEC
BH CV ECHO MEAS - MV A DUR: 0.11 SEC
BH CV ECHO MEAS - MV A MAX VEL: 40.3 CM/SEC
BH CV ECHO MEAS - MV DEC SLOPE: 400 CM/SEC^2
BH CV ECHO MEAS - MV DEC TIME: 0.18 SEC
BH CV ECHO MEAS - MV E MAX VEL: 79.7 CM/SEC
BH CV ECHO MEAS - MV E/A: 2
BH CV ECHO MEAS - MV MAX PG: 3.1 MMHG
BH CV ECHO MEAS - MV MEAN PG: 1 MMHG
BH CV ECHO MEAS - MV P1/2T MAX VEL: 97.7 CM/SEC
BH CV ECHO MEAS - MV P1/2T: 71.5 MSEC
BH CV ECHO MEAS - MV V2 MAX: 87.5 CM/SEC
BH CV ECHO MEAS - MV V2 MEAN: 46.3 CM/SEC
BH CV ECHO MEAS - MV V2 VTI: 32.5 CM
BH CV ECHO MEAS - MVA P1/2T LCG: 2.3 CM^2
BH CV ECHO MEAS - MVA(P1/2T): 3.1 CM^2
BH CV ECHO MEAS - MVA(VTI): 1.5 CM^2
BH CV ECHO MEAS - PA MAX PG (FULL): 3.9 MMHG
BH CV ECHO MEAS - PA MAX PG: 4.8 MMHG
BH CV ECHO MEAS - PA V2 MAX: 110 CM/SEC
BH CV ECHO MEAS - PULM A REVS DUR: 0.1 SEC
BH CV ECHO MEAS - PULM A REVS VEL: 23.1 CM/SEC
BH CV ECHO MEAS - PULM DIAS VEL: 54 CM/SEC
BH CV ECHO MEAS - PULM S/D: 0.82
BH CV ECHO MEAS - PULM SYS VEL: 44.1 CM/SEC
BH CV ECHO MEAS - PVA(V,A): 1.9 CM^2
BH CV ECHO MEAS - PVA(V,D): 1.9 CM^2
BH CV ECHO MEAS - QP/QS: 0.91
BH CV ECHO MEAS - RAP SYSTOLE: 8 MMHG
BH CV ECHO MEAS - RV MAX PG: 0.96 MMHG
BH CV ECHO MEAS - RV MEAN PG: 0 MMHG
BH CV ECHO MEAS - RV V1 MAX: 49 CM/SEC
BH CV ECHO MEAS - RV V1 MEAN: 30.7 CM/SEC
BH CV ECHO MEAS - RV V1 VTI: 10.5 CM
BH CV ECHO MEAS - RVOT AREA: 4.2 CM^2
BH CV ECHO MEAS - RVOT DIAM: 2.3 CM
BH CV ECHO MEAS - RVSP: 22 MMHG
BH CV ECHO MEAS - SI(AO): 96.5 ML/M^2
BH CV ECHO MEAS - SI(CUBED): 43.9 ML/M^2
BH CV ECHO MEAS - SI(LVOT): 26.8 ML/M^2
BH CV ECHO MEAS - SI(MOD-SP2): 29.2 ML/M^2
BH CV ECHO MEAS - SI(MOD-SP4): 30.3 ML/M^2
BH CV ECHO MEAS - SI(TEICH): 36.7 ML/M^2
BH CV ECHO MEAS - SUP REN AO DIAM: 1.9 CM
BH CV ECHO MEAS - SV(AO): 172 ML
BH CV ECHO MEAS - SV(CUBED): 78.3 ML
BH CV ECHO MEAS - SV(LVOT): 47.9 ML
BH CV ECHO MEAS - SV(MOD-SP2): 52 ML
BH CV ECHO MEAS - SV(MOD-SP4): 54 ML
BH CV ECHO MEAS - SV(RVOT): 43.6 ML
BH CV ECHO MEAS - SV(TEICH): 65.4 ML
BH CV ECHO MEAS - TAPSE (>1.6): 2 CM
BH CV ECHO MEAS - TR MAX VEL: 190 CM/SEC
BH CV ECHO MEASUREMENTS AVERAGE E/E' RATIO: 5.37
BH CV XLRA - RV BASE: 3 CM
BH CV XLRA - RV LENGTH: 7.5 CM
BH CV XLRA - RV MID: 2 CM
BH CV XLRA - TDI S': 13.9 CM/SEC
LEFT ATRIUM VOLUME INDEX: 21 ML/M2
LEFT ATRIUM VOLUME: 36 CM3
LV EF 2D ECHO EST: 59 %
MAXIMAL PREDICTED HEART RATE: 184 BPM
SINUS: 2.4 CM
STJ: 2.5 CM
STRESS TARGET HR: 156 BPM

## 2020-11-18 PROCEDURE — 93356 MYOCRD STRAIN IMG SPCKL TRCK: CPT

## 2020-11-18 PROCEDURE — 93356 MYOCRD STRAIN IMG SPCKL TRCK: CPT | Performed by: INTERNAL MEDICINE

## 2020-11-18 PROCEDURE — 93306 TTE W/DOPPLER COMPLETE: CPT | Performed by: INTERNAL MEDICINE

## 2020-11-18 PROCEDURE — 93306 TTE W/DOPPLER COMPLETE: CPT

## 2020-11-20 ENCOUNTER — APPOINTMENT (OUTPATIENT)
Dept: LAB | Facility: HOSPITAL | Age: 35
End: 2020-11-20

## 2020-11-20 ENCOUNTER — OFFICE VISIT (OUTPATIENT)
Dept: ONCOLOGY | Facility: CLINIC | Age: 35
End: 2020-11-20

## 2020-11-20 DIAGNOSIS — R07.89 SENSATION OF CHEST TIGHTNESS: ICD-10-CM

## 2020-11-20 DIAGNOSIS — R00.2 PALPITATION: ICD-10-CM

## 2020-11-20 DIAGNOSIS — R06.09 DYSPNEA ON EXERTION: ICD-10-CM

## 2020-11-20 DIAGNOSIS — C92.10 CML (CHRONIC MYELOID LEUKEMIA) (HCC): Primary | ICD-10-CM

## 2020-11-20 PROCEDURE — 99214 OFFICE O/P EST MOD 30 MIN: CPT | Performed by: INTERNAL MEDICINE

## 2020-11-20 NOTE — PROGRESS NOTES
REASONS FOR FOLLOWUP:    1. Chronic myelogenous leukemia with splenomegaly, chronic phase, positive [place] chromosome, no mutation at kinase domain diagnosed in November 2020.    · Patient was started on hydroxyurea temporarily on 10/23/2013 with significant drop of WBC counts.    · Patient started on Sprycel on 12/02/2013. Peripheral blood tested with 3.4% of cells positive for BCR-ABL translocation, tested 02/17/2014.    · The patient had CCyR 6 months into treatment as tested on 05/15/2014.    · Complete molecular response as tested 08/08/14 with negative product of BCR/ABL.   · There was interruption of her treatment due to insurance coverage and misunderstanding from patient's part, she had a relapse of disease with BCR/ABL product at 12.626% in March 2016, and she restarted back on Sprycel.   · Good response as tested on 08/31/2016 with BCR/ABL at 0.294%.    · Since June 2017, patient has been persistently tested negative for BCR/ABL by RT-PCR every 3 month period.     · Patient reported worsening leg cramping in end of July 2018.  Sprycel was decreased to 50 mg daily.  Laboratory studies on 8/31/2018, on 1/30/2019 and on 3/29/2019 were negative for BCR-ABL by RT-PCR.    · On 10/29/2019 BCR/ABL1 MAJOR (p210) and BCR/ABL1 MINOR (p190) were not detected.    · BCR-ABL by RT-PCR with 0% on 1/24/2020 and also on 4/24/2020.  · Patient was tested negative on 7/10/2020.    2. Recurrent iron deficiency anemia not responding to oral iron supplementation.  She also had significant constipation associated with oral iron.   · Patient was given Feraheme treatment 2 doses in September 2016 and repeated in January 2017.    · Repeated IV Venofer in July 2018 due to recurrent iron deficiency.   3. Patient was diagnosed with acute hepatitis C in early 2019.  Patient was treated by Dr. Karyn Mendieta.  Sprycel was on hold during her treatment for hepatitis C to avoid drug interaction and side effects (probably had drug related  hepatitis in March 2019 possible due to Diflucan in combination with Sprycel).   · Lab study on 7/2/2019 reported no detectable hepatitis C viral infection.  She was weakly positive for BCR-ABL by PCR.  Sprycel 50 mg daily was restarted.           HISTORY OF PRESENT ILLNESS: The patient is a 35 y.o.  female who presents today for telemedicine evaluation.     We saw her recently on 10/9/2020 for routine follow-up for her CML.  She was tolerating Sprycel.  Physical examination laboratory studies were unremarkable.  No detectable BCR-ABL by RT-PCR.  We continued her Sprycel at that time.     On 11/17/2020, patient called and reported chest tightness, palpitation and dyspnea new onset in November 2020.  Patient reports this happened recently.  She has chest tightness, and associated tachycardia/palpitation.  Patient reports this is unpredictable, can be on and off.  She noticed that one day she was cooking meal for her family, and noted sudden onset palpitation and chest tightness.  She reports unable to induce purposefully.  She also has exertional dyspnea.  Patient reports 11/17/2020, we asked patient to hold Sprycel.  We suspect the patient may have pleural effusion or cardiac dysfunction secondary to Sprycel, we requested chest CT, echocardiogram study and also laboratory study for evaluation.     Patient is here to discuss the study results.    Negative chest x-ray on 11/17/2020.     Laboratory study on 11/17/2020 reported stable chronic condition with a ferritin 261 and iron saturation 12%, normal hemoglobin 15.0, unable to explain her dyspnea.     Echocardiogram study on 11/18/2020 reported normal results.     Patient is evaluated today on 11/20/2020, she reports somewhat improved symptoms since she stopped Sprycel for the past 3 days.  She denies extremity edema.  She denies fever sweating or chills.          Past Medical History:   Diagnosis Date   • Anemia in neoplastic disease    • Asthma     childhood   •  Chiari I malformation (CMS/HCC)     eval by Dr Moore, [place] 2016   • Chronic myelogenous leukemia (CMS/HCC)     remission, Dr Castle follows   • Chronic pain    • CML (chronic myelocytic leukemia) (CMS/HCC)    • Cystitis    • Depression    • GERD (gastroesophageal reflux disease)     ulcer   • H/O Iron deficiency anemia    • H/O Lower extremity pain     Resolved.   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      *  Hysterectomy in October 2018      *  Acute hepatitis C in early 2019    OB/GYN HISTORY: Menarche age 9. G5, P4, 1 miscarriage of the third pregnancy. First pregnancy at age 17. Patient underwent partial hysterectomy in 2018.       HEMATOLOGIC/ONCOLOGIC HISTORY: History from previous dates can be found in the separate document.        Laboratory results on 09/23/2015 showed normalization of hemoglobin 12.2, but still has macrocytosis, MCV 73.3. She has normal platelets and WBC at 9400. Serum ferritin < 5 ng/ml, iron sats 6.3%, iron 29, TIBC 455 mcg/ml. Still has severe iron deficiency, needs to continue oral iron therapy. The patient restarted taking oral iron supplementation which was probably stopped in the end of November 2015.        Laboratory study on 03/22/2016 reported normal WBC 8450, neutrophils 6100, lymphs is 1400 and monocytes 550. Serum iron was 26, TIBC 469 on saturation 6% and ferritin less than 5 NG/ML. Chemistry lab reported normal renal function with a creatinine 0.69, normal liver function panel, total protein 7.5 and albumin 4.2, normal electrolytes. Patient was restarted back on oral on sedimentation twice a day with good tolerance.      Patient continues take Lortab as  needed for left leg cramping. Urine drug test on 08/05/2016 was completely negative, and repeated study on August 26 was positive for opiate, negative for other recreational drugs.      Repeat laboratory study on 08/31/2016 reported iron 21, ferritin less than 5, iron saturation 5%, TIBC 462. Hemoglobin was 11.5 MCV 78.1 MCHC 30.4. Platelets 163,000. Total WBC 8870 including neutrophils 6400 and lymphocytes 1500. BCR/ABL was 0.294% by RT-PCR method.       Patient was given IV Feraheme treatment in September 2016, with 2 doses. Repeated laboratory study on 10/06/2016 reported significantly improved and normalized ferritin 269, iron 80, TIBC 365 and iron saturation 22%. Her hemoglobin was 12.2, and MCV 80.8.      Patient reported she was seen by Dr. Fam in 10/2016 and was started on half tablets of Topamax. I reviewed Dr. Fam’s clinic note and telephone conversation record. The patient reports she has no recurrence of migraine headache. However, she is very tearful today, reporting that she has thoughts of not taking any medications. She did assure me that she has been taking the medication up to this point. 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 The patient denies suicide ideation. When she was initially diagnosed of CML back in 2014, she had depression and I started the patient on Prozac. The patient reported initially it helped her, however, she no longer feels the effect of Prozac. She feels depressed. The patient reports she has no personal hobby. She goes to work and goes home, taking care of her kids. She does not enjoy life as she used to.      Laboratory study on December 29, 2016 reported at ferritin 19.9, iron 33, TIBC 347 iron saturation 10%.  Hemoglobin was 13, normal WBC and platelets.  Unremarkable CMP.  Patient was given 2 doses of Feraheme treatment in early January 2017.       Cytogenetic study on March 14, 2017 reported a BCR-ABL products at 0.098%, showed further improved residual disease.  There is also reported a ferritin 103.7, iron 79, TIBC 336 and iron saturation 24%.  Hemoglobin was 13.5, MCV 92.3, platelets 199,000 WBC 7000.  She had a completely normal CMP.       Repeated laboratory study on June 20, 2017 reported at nondetectable BCR-ABL product.  Ferritin was 45, iron 35 TIBC 333 and iron saturation 11%.  Had a normal CBC and CMP.    In the end of July 2018, patient called reporting worsening significant leg cramping, and getting worse recently and has been taking 6 tablets of Advil with no significant improvement.  We suspect it might be caused by Sprycel for her CML.  We discussed with patient, and decreased to half dose at 50 mg daily.  Patient reports that she is improved leg cramping since dose reduction.    Per medical records this patient had emergency room visit again on 8/18/2018.  Patient reports she had significant abdomen/pelvic pain at a time associate with nausea.  Laboratory study showed significantly elevated WBC at 25,900 including neutrophils 24,400, with elevated hemoglobin 15.8 and platelets 146,000.     She had a thorough investigation, including CT scan for abdomen pelvis which was unremarkable.  She then had ultrasound for the pelvis and it was also unremarkable.  She had ultrasound for the gallbladder examination on the same day and was unremarkable.  She had a normal CMP except of marginally elevated glucose at 112.  Her urinalysis showed only marginally increased WBC 3-5/HPF.  She was negative for yeast infection, negative for Chlamydia trichomonas and Neisseria gonorrhea.  Patient was prescribed Flagyl and doxycycline and discharged to home.      lab study on 4/30/2019 reported normal iron saturation 47% and elevated ferritin 507.1 ng/mL with free iron 184 and TIBC 388.  hemoglobin is normal at 13.7 and platelets 186,000. normal WBC at 5080  including ANC 3300, lymphocytes 930 and monocytes 630. Labs reported significantly elevated ALT at 655,  and alkaline phosphatase 234 but normal total bilirubin 0.9. She had normal renal function creatinine 0.78. Normal electrolytes.     On 2019, I searched Up-to-Date and suspected that this patient had drug-induced hepatitis from Diflucan or with combination of Diflucan with Sprycel. This patient had been on Sprycel for years and had no problem with abnormal liver panel previously. It seems Diflucan inhibits CY moderately, which likely interferes with the metabolism of Sprycel. I instructed the patient to hold her Sprycel for now and we will repeat her liver panel every 2 weeks for reassessment.    Sprycel treatment continued as of 2019 upon completion of MEVYRET.    Laboratory study performed on 2019 showed normal CBC and CMP. BCR/ABL by RT-PCR detected BCR/ABL1 Major. HCV RNA by PCR showed HCV not detected.    Laboratory studies 2019 report her hemoglobin is normal at 15.2 and platelets 146,000. normal WBC at 7650 including ANC 5190, lymphocytes 1600 and monocytes 630.    Recent laboratory studies confirmed to be no detectable virus on 2019.    U/S Liver performed on 10/24/2019 reported negative hepatic ultrasound exam with no focal liver lesion, negative gallbladder examination with no cholelithiasis or bile duct dilation noted, however borderline splenomegaly was noted.     Laboratory study performed on 10/29/2019, reported a completely normal CBC, CMP. Normal iron saturation and still slightly elevated ferritin 325 ng/dL  BCR/ABL1 MAJOR (p210) and BCR/ABL1 MINOR (p190) were not detected.     Laboratory studies 2020, show hemoglobin 14.3 MCV 92.6 platelets 180,000 and WBC 6570 including neutrophils 4400.  She also has completely normal CMP.  Iron studies reported ferritin 273, iron saturation 11%, hemoglobin 14.3 WBC 6570 and platelets 180,000.  BCR-ABL by RT-PCR with  0%.     Patient presented today for 3-month follow-up and lab review.      Due to pandemic coronavirus infection, patient has been staying home with all 4 children.  Patient reports significant fatigue for the past month, similar to the time when she had iron deficiency.  Patient reports prior to that she was having regular exercise and with good energy level.    Patient reports compliant with Sprycel 50 mg daily.  She denies leg cramping.  She has no nausea no vomiting no abdominal pains.  She has good appetite and no diarrhea.  She denies headaches vision changes no dizziness or seizure activity.    Her laboratory study on 3/12/2020 reported marginal iron saturation 15% however had a good ferritin 212.5 ng/mL.  She had hemoglobin 13.1 and normal thyroid profile including TSH 1.20, free T4 at 1.37 ng/dL and a free T3 at 2.97 pg/mL     On 4/24/2020 her iron study showed ferritin 262 and iron saturation 21%.  Hemoglobin is 14.8.  She has normal WBC 5740 including ANC 3820, and normal platelets 161,000.   She was negative for BCR-ABL by RT-PCR on the same day.    Laboratory studies, 7/10/2020, show completely normal CBC and CMP.  Negative for BCR-ABL by RT-PCR.  Iron studies showed ferritin 185, iron saturation 13% free iron 37 TIBC 286.    Patient has completed normal CBC 10/9/2020.  Iron studies reported ferritin 2070, free iron 13%.  She also has completed normal CMP.  She was tested negative for BCR-ABL by RT-PCR method.  Sprycel 50 mg daily was continued.       MEDICATIONS: The current medication list was reviewed with the patient and updated in the EMR this date per the medical assistant. Medication dosages and frequencies were confirmed to be accurate.        Allergies   Allergen Reactions   • Sulfa Antibiotics Unknown - Low Severity     Childhood reaction     SOCIAL HISTORY: . She smoked cigarettes previously, quit in January 2006 with 8-pack-year history. Social drinker, maybe once a month. No illegal  drug use. No risk for HIV except tattoos.  Hairstylist.       FAMILY HISTORY: Maternal grandmother had esophageal/stomach adenocarcinoma diagnosed at age of 72 and  of disease progress at age of 73. The patient' s mother has hypertension but otherwise no family history of malignancy, especially no leukemia.        I have reviewed the patient's medical history in detail and updated the computerized patient record.    Review of Systems   Constitutional: Negative for appetite change, chills, diaphoresis, fatigue, fever and unexpected weight change.   HENT:   Negative for mouth sores and sore throat.    Eyes: Negative for eye problems and icterus.   Respiratory: Positive for chest tightness and shortness of breath. Negative for cough.    Cardiovascular: Positive for palpitations. Negative for chest pain and leg swelling.        Chest tightness   Gastrointestinal: Negative for abdominal pain, blood in stool, diarrhea, nausea and vomiting.   Endocrine: Negative for hot flashes.   Genitourinary: Negative for dysuria and hematuria.    Musculoskeletal: Negative for arthralgias, back pain and myalgias.   Skin: Negative for itching and rash.   Neurological: Negative for dizziness, headaches, light-headedness and numbness.   Hematological: Negative for adenopathy. Does not bruise/bleed easily.   Psychiatric/Behavioral: Negative for confusion. The patient is not nervous/anxious.        VITAL SIGNS:   There were no vitals filed for this visit.        PHYSICAL EXAMINATION: No physical examination due to telemedicine.      LABORATORY DATA:      Results from last 7 days   Lab Units 20  1611   WBC 10*3/mm3 6.67   NEUTROS ABS 10*3/mm3 3.97   HEMOGLOBIN g/dL 15.0   HEMATOCRIT % 44.8   PLATELETS 10*3/mm3 185     Lab Results   Component Value Date    IRON 37 2020    TIBC 302 2020    FERRITIN 261.40 (H) 2020   Iron saturation 12% on 2020.    Glucose   Date Value Ref Range Status   2020 99 74 - 124  mg/dL Final     BUN   Date Value Ref Range Status   11/17/2020 13 6 - 20 mg/dL Final     Creatinine   Date Value Ref Range Status   11/17/2020 0.68 0.60 - 1.10 mg/dL Final     Sodium   Date Value Ref Range Status   11/17/2020 139 134 - 145 mmol/L Final     Potassium   Date Value Ref Range Status   11/17/2020 4.0 3.5 - 4.7 mmol/L Final     Chloride   Date Value Ref Range Status   11/17/2020 102 98 - 107 mmol/L Final     CO2   Date Value Ref Range Status   11/17/2020 27.4 22.0 - 29.0 mmol/L Final     Calcium   Date Value Ref Range Status   11/17/2020 9.9 8.5 - 10.2 mg/dL Final     Total Protein   Date Value Ref Range Status   11/17/2020 7.6 6.3 - 8.0 g/dL Final     Albumin   Date Value Ref Range Status   11/17/2020 4.90 3.50 - 5.20 g/dL Final     ALT (SGPT)   Date Value Ref Range Status   11/17/2020 12 0 - 33 U/L Final     AST (SGOT)   Date Value Ref Range Status   11/17/2020 15 0 - 32 U/L Final     Alkaline Phosphatase   Date Value Ref Range Status   11/17/2020 67 38 - 116 U/L Final     Total Bilirubin   Date Value Ref Range Status   11/17/2020 0.2 0.2 - 1.2 mg/dL Final     eGFR Non  Amer   Date Value Ref Range Status   11/17/2020 98 >60 mL/min/1.73 Final     BUN/Creatinine Ratio   Date Value Ref Range Status   11/17/2020 19.1 7.3 - 30.0 Final     Anion Gap   Date Value Ref Range Status   11/17/2020 9.6 5.0 - 15.0 mmol/L Final       IMAGING STUDY:   1.  Echocardiogram 11/18/2020  Interpretation Summary    · Calculated left ventricular EF = 59.3% Calculated left ventricular 3D EF = 58% Estimated left ventricular EF = 59% Estimated left ventricular EF was in agreement with the calculated left ventricular EF. Left ventricular systolic function is normal. Normal global longitudinal LV strain (GLS) = -22.1%. Left ventricle strain data was reviewed by the physician and found to be accurate. Normal left ventricular cavity size and wall thickness noted. All left ventricular wall segments contract normally. Left  ventricular diastolic function was normal.  · Trace aortic valve regurgitation is present.  · Trace mitral valve regurgitation is present.  · Trace tricuspid valve regurgitation is present. Estimated right ventricular systolic pressure from tricuspid regurgitation is normal (<35 mmHg). Calculated right ventricular systolic pressure from tricuspid regurgitation is 22 mmHg.     2. XR CHEST PA AND LATERAL-11/17/2020.     Examination: PA AND LATERAL CHEST     HISTORY: Shortness of breath     COMPARISON: None     FINDINGS: The lungs are normal in volume and clear. Heart size normal.  The cardiomediastinal silhouette is normal in appearance. Chest wall is  within normal limits.     IMPRESSION:  Negative chest.        ASSESSMENT:      1. Chronic myelogenous leukemia, chronic phase with excellent response to Sprycel and complete molecular response in August 2014. However she had interuption of treatment in early part of 2016, because of insurance issues and miscommunication, she stopped the medicine without telling us, and laboratory test on 03/22/2016 reported disease relapse with increased BCR/ABL product at 12.626%. subsequently she was restarted back on Sprycel, and responded well.  Since June 2017, she has completed molecular response as tested every 3 months.  She has been compliant with treatment.   · In the end of July 2018, she reported worsening significant cramping involving her legs, so we decreased her Sprycel to 50 mg daily starting early August.  She's been having less problem since that time.  She was tested negative for BCR-ABL on 8/31/2018.    · Patient had a negative BCR-ABL by RT-PCR in end of January 2019 and also on 3/29/2019.   · Patient was later found having significantly elevated liver function panel.  Sprycel was on hold and she was subsequently found having acute hepatitis C infection.    · I discussed with Dr. Karyn Mendieta recently, we'll hold her Sprycel for now until she finishes hepatitis C  treatment.   · She was started on hepatitis C treatment on 4/18/2019 and finished an 8-week course.  · On 07/02/2019 patient's labs showed BCR-ABL Major (p210) detected by RT-PCR at 0.0141%. BCR/ABL1 MINOR (p190) was not detected. HCV was not detected.  · Sprycel treatment was resumed as of 07/02/2019 upon completion of MEVYRET.  · Laboratory study performed, 10/29/2019, reported normal CBC. BCR/ABL1 MAJOR (p210) and BCR/ABL1 MINOR (p190) were not detected.    · BCR-ABL by RT-PCR on 1/24/2020 was negative.     · Lab result for BCR ABL by RT-PCR was also negative on 4/24/2020. Patient will need to continue on sprycel treatment.    · 7/10/2020 patient has normal CBC and CMP.  Negative RT-PCR for BCR ABL.  Tolerating well.  Continue Sprycel at 50 mg daily.  · On 10/9/2020, completely normal CBC with good tolerance.  BCR-ABL was tested negative by RT-PCR method.  Continue Sprycel.   · Patient reports 11/17/2020 chest tightness in palpitation, and exertion dyspnea progressively getting worse in the past week.  We will asked patient to hold Sprycel.  Requested chest x-ray and also echocardiogram study as well as laboratory study including iron panel for reassessment.       2. Recurrent Iron deficiency anemia.    · She was treated again with Feraheme October 2017.   Iron deficiency thought to be related to ulcer identified on EGD, being treated with Carafate.  She also had heavy menses.  · She had recurrent iron deficiency again and was given Feraheme 2 doses in March 2018.    · Subsequently recurrent iron deficiency in July 2018, she was switched to Venofer 3 doses total 900 mg.   · Patient had a simple hysterectomy in October 2018.  · Laboratory study showed supratherapeutic ferritin 458 and iron saturation 40% on 4/30/2019.   · Laboratory study performed 10/29/2019, showed normal iron saturation and ferritin 325 ng/dL.  · Normal iron studies including ferritin 262 and iron saturation 21% on 4/24/2020.  No evidence of  recurrent iron deficiency.   · Lab study on 7/10/2020 reported ferritin 185, iron saturation 13% and normal hemoglobin 14.5.  She has deteriorating iron studies.   Continue to monitor in 3 months.  · Labs on 10/9/2020 reported ferritin 270, iron saturation 13% and hemoglobin 14.9.    3.  Significant fatigue in the past month as reported on 4/24/2020.  Iron study showed appropriate ferritin and iron saturation.  Normal thyroid profile on 3/12/2020.  Exact etiology of her fatigue is not clear.  I asked patient to try oral vitamin B12 and folic acid to see if it eases symptoms.  · Patient reports on 7/10/2020 that she has significant improvement of energy level after starting oral vitamin B12.  This will be continued.     *Chest tightness, palpitation in the dyspnea new onset in November 2020.  Patient reports this happened recently, and that usually unpredictable, she has chest tightness, and associated tachycardia/palpitation.  Patient reports this is unpredictable, can be on and off.  She noticed 1 day she was cooking meal for her family, and noted several palpitation and chest tightness.  She reports unable to induce purposefully.  She also has exertional dyspnea.  Patient reports 11/17/2020, we asked patient to hold Sprycel.  We requested chest CT, echocardiogram study and also laboratory study for evaluation.  · Negative chest x-ray on 11/17/2020.   · Laboratory study on 11/17/2020 reported stable chronic condition with a ferritin 261 and iron saturation 12%, normal hemoglobin 15.0, unable to explain her dyspnea.   · Echocardiogram study on 11/18/2020 reported normal results.   · Patient is evaluated 11/20/2020, she reports somewhat improved symptoms, since she stopped Sprycel for the past 3 days.  She denies extremity edema.  She denies fever sweating or chills.  We recommend patient to continue to hold Sprycel for another 2 weeks.  We also recommended patient to be tested for COVID-19.        PLAN:    1. I  advised the patient to get tested for COVID-19.    2. Hold Sprycel treatment for now.    3. I asked patient to keep notes, to document the episodes of chest tightness, palpitation, and the activity associated with that.  Patient voiced understanding.   4. Continue oral vitamin B12 at 1000 mcg daily.   5. Arrange follow-up with me by telemedicine in 2 weeks.    You have chosen to receive care through a telephone visit today. Do you consent to use a telephone visit for your medical care today? Yes     This visit has been rescheduled as a phone visit to comply with patient safety concerns in accordance with CDC recommendations. Total time of discussion was 15 minutes.      BAO SLADE M.D., Ph.D.    11/20/2020.    CC:   ISHAAN REGAN M.D.     Karyn Mendieta M.D.

## 2020-12-04 ENCOUNTER — TELEMEDICINE (OUTPATIENT)
Dept: ONCOLOGY | Facility: CLINIC | Age: 35
End: 2020-12-04

## 2020-12-04 ENCOUNTER — LAB (OUTPATIENT)
Dept: LAB | Facility: HOSPITAL | Age: 35
End: 2020-12-04

## 2020-12-04 DIAGNOSIS — C92.10 CML (CHRONIC MYELOID LEUKEMIA) (HCC): ICD-10-CM

## 2020-12-04 DIAGNOSIS — R00.2 PALPITATION: Primary | ICD-10-CM

## 2020-12-04 LAB
T3FREE SERPL-MCNC: 3.05 PG/ML (ref 2–4.4)
T4 FREE SERPL-MCNC: 1.25 NG/DL (ref 0.93–1.7)
TSH SERPL DL<=0.05 MIU/L-ACNC: 1.56 UIU/ML (ref 0.27–4.2)

## 2020-12-04 PROCEDURE — 36415 COLL VENOUS BLD VENIPUNCTURE: CPT | Performed by: INTERNAL MEDICINE

## 2020-12-04 PROCEDURE — 84439 ASSAY OF FREE THYROXINE: CPT | Performed by: INTERNAL MEDICINE

## 2020-12-04 PROCEDURE — 84443 ASSAY THYROID STIM HORMONE: CPT | Performed by: INTERNAL MEDICINE

## 2020-12-04 PROCEDURE — 84481 FREE ASSAY (FT-3): CPT | Performed by: INTERNAL MEDICINE

## 2020-12-04 PROCEDURE — 99213 OFFICE O/P EST LOW 20 MIN: CPT | Performed by: INTERNAL MEDICINE

## 2020-12-04 NOTE — PROGRESS NOTES
REASONS FOR FOLLOWUP:    1. 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    · Patient was started on hydroxyurea temporarily on 10/23/2013 with significant drop of WBC counts.    · Patient started on Sprycel on 12/02/2013. Peripheral blood tested with 3.4% of cells positive for BCR-ABL translocation, tested 02/17/2014.    · The patient had CCyR 6 months into treatment as tested on 05/15/2014.    · Complete molecular response as tested 08/08/14 with negative product of BCR/ABL.   · There was interruption of her treatment due to insurance coverage and misunderstanding from patient's part, she had a relapse of disease with BCR/ABL product at 12.626% in March 2016, and she restarted back on Sprycel.   · Good response as tested on 08/31/2016 with BCR/ABL at 0.294%.    · Since June 2017, patient has been persistently tested negative for BCR/ABL by RT-PCR every 3 month period.     · Patient reported worsening leg cramping in end of July 2018.  Sprycel was decreased to 50 mg daily.  Laboratory studies on 8/31/2018, on 1/30/2019 and on 3/29/2019 were negative for BCR-ABL by RT-PCR.    · Sprycel was on hold during treatment for acute hepatitis C in early part of 2019.  It was resumed in July 2019.  She was weakly positive for BCR-ABL.  · On 10/29/2019 BCR/ABL1 MAJOR (p210) and BCR/ABL1 MINOR (p190) were not detected.    · BCR-ABL by RT-PCR with 0% on 1/24/2020 and also on 4/24/2020.  · Patient was tested negative on 7/10/2020.  Negative on 10/9/2020.    · Because of palpitation, Sprycel was on hold since 11/17/2020.     2. Recurrent iron deficiency anemia not responding to oral iron supplementation.  She also had significant constipation associated with oral iron.   · Patient was given Feraheme treatment 2 doses in September 2016 and repeated in January 2017.    · Repeated IV Venofer in July 2018 due to recurrent iron  deficiency.           HISTORY OF PRESENT ILLNESS: The patient is a 35 y.o.  female who presents today for telemedicine evaluation.     I had a evaluation recently on 11/20/2020, when she was complaining of new onset palpitation and chest tightness.  It was oftentimes but not predictable, it seems related to activity but not always.  She also had mild exertional dyspnea.  We instructed patient to hold Sprycel at that time.    Patient reports she overall felt better since that time, nevertheless she still had 4 episodes of palpitation during this past 2 weeks.  She denies fever sweating chills, no nausea no vomiting.  No other signs of illness.  She denies lower extremity edema.      She denies bad temper.  She stays at home, denies risk for COVID-19 exposure, except that her eldest daughter goes to visiting grandma once a week.      She does report recently, her oldest daughter's grandmother is filing for lawsuit, try to take custody of her eldest daughter.     Negative chest x-ray on 11/17/2020.     Laboratory study on 11/17/2020 reported stable chronic condition with a ferritin 261 and iron saturation 12%, normal hemoglobin 15.0, unable to explain her dyspnea.     Echocardiogram study on 11/18/2020 reported normal results.         Past Medical History:   Diagnosis Date   • Anemia in neoplastic disease    • Asthma     childhood   • Chiari I malformation (CMS/HCC)     eval by Dr Moore, [place] 2016   • Chronic myelogenous leukemia (CMS/HCC)     remission, Dr Castle follows   • Chronic pain    • CML (chronic myelocytic leukemia) (CMS/HCC)    • Cystitis    • Depression    • GERD (gastroesophageal reflux disease)     ulcer   • H/O Iron deficiency anemia    • H/O Lower extremity pain     Resolved.   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      *  Hysterectomy in October 2018      *  Acute hepatitis C in early 2019, treated successfully by Dr. Kayrn Mendieta.     OB/GYN HISTORY: Menarche age 9. G5, P4, 1 miscarriage of the third pregnancy. First pregnancy at age 17. Patient underwent partial hysterectomy in 2018.       HEMATOLOGIC/ONCOLOGIC HISTORY: History from previous dates can be found in the separate document.        Laboratory results on 09/23/2015 showed normalization of hemoglobin 12.2, but still has macrocytosis, MCV 73.3. She has normal platelets and WBC at 9400. Serum ferritin < 5 ng/ml, iron sats 6.3%, iron 29, TIBC 455 mcg/ml. Still has severe iron deficiency, needs to continue oral iron therapy. The patient restarted taking oral iron supplementation which was probably stopped in the end of November 2015.        Laboratory study on 03/22/2016 reported normal WBC 8450, neutrophils 6100, lymphs is 1400 and monocytes 550. Serum iron was 26, TIBC 469 on saturation 6% and ferritin less than 5 NG/ML. Chemistry lab reported normal renal function with a creatinine 0.69, normal liver function panel, total protein 7.5 and albumin 4.2, normal electrolytes. Patient was restarted back on oral on sedimentation twice a day with good tolerance.      Patient continues take Lortab as needed for left leg cramping. Urine drug test on 08/05/2016 was completely negative, and repeated study on August 26 was positive for opiate, negative for other recreational drugs.      Repeat laboratory study on 08/31/2016 reported iron 21, ferritin less than 5, iron saturation 5%, TIBC 462. Hemoglobin was 11.5 MCV 78.1 MCHC 30.4. Platelets 163,000. Total WBC 8870 including neutrophils 6400 and lymphocytes 1500. BCR/ABL was 0.294% by RT-PCR method.       Patient was given IV Feraheme treatment in September 2016, with 2 doses. Repeated laboratory  study on 10/06/2016 reported significantly improved and normalized ferritin 269, iron 80, TIBC 365 and iron saturation 22%. Her hemoglobin was 12.2, and MCV 80.8.      Patient reported she was seen by Dr. Fam in 10/2016 and was started on half tablets of Topamax. I reviewed Dr. Fam’s clinic note and telephone conversation record. The patient reports she has no recurrence of migraine headache. However, she is very tearful today, reporting that she has thoughts of not taking any medications. She did assure me that she has been taking the medication up to this point. 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 The patient denies suicide ideation. When she was initially diagnosed of CML back in 2014, she had depression and I started the patient on Prozac. The patient reported initially it helped her, however, she no longer feels the effect of Prozac. She feels depressed. The patient reports she has no personal hobby. She goes to work and goes home, taking care of her kids. She does not enjoy life as she used to.      Laboratory study on December 29, 2016 reported at ferritin 19.9, iron 33, TIBC 347 iron saturation 10%.  Hemoglobin was 13, normal WBC and platelets.  Unremarkable CMP.  Patient was given 2 doses of Feraheme treatment in early January 2017.      Cytogenetic study on March 14, 2017 reported a BCR-ABL products at 0.098%, showed further improved residual disease.  There is also reported a ferritin 103.7, iron 79, TIBC 336 and iron saturation 24%.  Hemoglobin was 13.5, MCV 92.3, platelets 199,000 WBC 7000.  She had a completely normal CMP.       Repeated laboratory study on June 20, 2017 reported at nondetectable BCR-ABL product.  Ferritin was 45, iron 35 TIBC 333 and iron saturation 11%.  Had a normal CBC and CMP.    In the end of July 2018, patient called reporting worsening significant leg  cramping, and getting worse recently and has been taking 6 tablets of Advil with no significant improvement.  We suspect it might be caused by Sprycel for her CML.  We discussed with patient, and decreased to half dose at 50 mg daily.  Patient reports that she is improved leg cramping since dose reduction.    Per medical records this patient had emergency room visit again on 2018.  Patient reports she had significant abdomen/pelvic pain at a time associate with nausea.  Laboratory study showed significantly elevated WBC at 25,900 including neutrophils 24,400, with elevated hemoglobin 15.8 and platelets 146,000.     She had a thorough investigation, including CT scan for abdomen pelvis which was unremarkable.  She then had ultrasound for the pelvis and it was also unremarkable.  She had ultrasound for the gallbladder examination on the same day and was unremarkable.  She had a normal CMP except of marginally elevated glucose at 112.  Her urinalysis showed only marginally increased WBC 3-5/HPF.  She was negative for yeast infection, negative for Chlamydia trichomonas and Neisseria gonorrhea.  Patient was prescribed Flagyl and doxycycline and discharged to home.      lab study on 2019 reported normal iron saturation 47% and elevated ferritin 507.1 ng/mL with free iron 184 and TIBC 388.  hemoglobin is normal at 13.7 and platelets 186,000. normal WBC at 5080 including ANC 3300, lymphocytes 930 and monocytes 630. Labs reported significantly elevated ALT at 655,  and alkaline phosphatase 234 but normal total bilirubin 0.9. She had normal renal function creatinine 0.78. Normal electrolytes.     On 2019, I searched Up-to-Date and suspected that this patient had drug-induced hepatitis from Diflucan or with combination of Diflucan with Sprycel. This patient had been on Sprycel for years and had no problem with abnormal liver panel previously. It seems Diflucan inhibits CY moderately, which likely  interferes with the metabolism of Sprycel. I instructed the patient to hold her Sprycel for now.    Sprycel treatment resumed as of 07/02/2019 upon completion of MEVYRET for her hepatitis C.    Laboratory study performed on 07/02/2019 showed normal CBC and CMP. BCR/ABL by RT-PCR detected BCR/ABL1 Major. HCV RNA by PCR showed HCV not detected.    Laboratory studies 7/31/2019 report her hemoglobin is normal at 15.2 and platelets 146,000. normal WBC at 7650 including ANC 5190, lymphocytes 1600 and monocytes 630.    Recent laboratory studies confirmed to be no detectable virus on 9/11/2019.    U/S Liver performed on 10/24/2019 reported negative hepatic ultrasound exam with no focal liver lesion, negative gallbladder examination with no cholelithiasis or bile duct dilation noted, however borderline splenomegaly was noted.     Laboratory study performed on 10/29/2019, reported a completely normal CBC, CMP. Normal iron saturation and still slightly elevated ferritin 325 ng/dL  BCR/ABL1 MAJOR (p210) and BCR/ABL1 MINOR (p190) were not detected.     Laboratory studies 1/24/2020, show hemoglobin 14.3 MCV 92.6 platelets 180,000 and WBC 6570 including neutrophils 4400.  She also has completely normal CMP.  Iron studies reported ferritin 273, iron saturation 11%, hemoglobin 14.3 WBC 6570 and platelets 180,000.  BCR-ABL by RT-PCR with 0%.     Patient presented today for 3-month follow-up and lab review.      Due to pandemic coronavirus infection, patient has been staying home with all 4 children.  Patient reports significant fatigue for the past month, similar to the time when she had iron deficiency.  Patient reports prior to that she was having regular exercise and with good energy level.    Patient reports compliant with Sprycel 50 mg daily.  She denies leg cramping.  She has no nausea no vomiting no abdominal pains.  She has good appetite and no diarrhea.  She denies headaches vision changes no dizziness or seizure  activity.    Her laboratory study on 3/12/2020 reported marginal iron saturation 15% however had a good ferritin 212.5 ng/mL.  She had hemoglobin 13.1 and normal thyroid profile including TSH 1.20, free T4 at 1.37 ng/dL and a free T3 at 2.97 pg/mL     On 4/24/2020 her iron study showed ferritin 262 and iron saturation 21%.  Hemoglobin is 14.8.  She has normal WBC 5740 including ANC 3820, and normal platelets 161,000.   She was negative for BCR-ABL by RT-PCR on the same day.    Laboratory studies, 7/10/2020, show completely normal CBC and CMP.  Negative for BCR-ABL by RT-PCR.  Iron studies showed ferritin 185, iron saturation 13% free iron 37 TIBC 286.    Patient has completed normal CBC 10/9/2020.  Iron studies reported ferritin 2070, free iron 13%.  She also has completed normal CMP.  She was tested negative for BCR-ABL by RT-PCR method.  Sprycel 50 mg daily was continued.     We saw her recently on 10/9/2020 for routine follow-up for her CML.  She was tolerating Sprycel.  Physical examination laboratory studies were unremarkable.  No detectable BCR-ABL by RT-PCR.  We continued her Sprycel at that time.     On 11/17/2020, patient called and reported chest tightness, palpitation and dyspnea new onset in November 2020.  Patient reports this happened recently.  She has chest tightness, and associated tachycardia/palpitation.  Patient reports this is unpredictable, can be on and off.  She noticed that one day she was cooking meal for her family, and noted sudden onset palpitation and chest tightness.  She reports unable to induce purposefully.  She also has exertional dyspnea.  Patient reports 11/17/2020, we asked patient to hold Sprycel.  We suspect the patient may have pleural effusion or cardiac dysfunction secondary to Sprycel, we requested chest CT, echocardiogram study and also laboratory study for evaluation.     Negative chest x-ray on 11/17/2020.     Laboratory study on 11/17/2020 reported stable chronic  condition with a ferritin 261 and iron saturation 12%, normal hemoglobin 15.0, unable to explain her dyspnea.     Echocardiogram study on 2020 reported normal results.         MEDICATIONS: The current medication list was reviewed with the patient and updated in the EMR this date per the medical assistant. Medication dosages and frequencies were confirmed to be accurate.        Allergies   Allergen Reactions   • Sulfa Antibiotics Unknown - Low Severity     Childhood reaction     SOCIAL HISTORY: . She smoked cigarettes previously, quit in 2006 with 8-pack-year history. Social drinker, maybe once a month. No illegal drug use. No risk for HIV except tattoos.  Hairstylist.       FAMILY HISTORY: Maternal grandmother had esophageal/stomach adenocarcinoma diagnosed at age of 72 and  of disease progress at age of 73. The patient' s mother has hypertension but otherwise no family history of malignancy, especially no leukemia.        I have reviewed the patient's medical history in detail and updated the computerized patient record.    Review of Systems   Constitutional: Negative for appetite change, chills, diaphoresis, fatigue, fever and unexpected weight change.   HENT:   Negative for mouth sores and sore throat.    Eyes: Negative for eye problems and icterus.   Respiratory: Positive for chest tightness and shortness of breath. Negative for cough.    Cardiovascular: Positive for palpitations. Negative for chest pain and leg swelling.        Chest tightness   Gastrointestinal: Negative for abdominal pain, blood in stool, diarrhea, nausea and vomiting.   Endocrine: Negative for hot flashes.   Genitourinary: Negative for dysuria and hematuria.    Musculoskeletal: Negative for arthralgias, back pain and myalgias.   Skin: Negative for itching and rash.   Neurological: Negative for dizziness, headaches, light-headedness and numbness.   Hematological: Negative for adenopathy. Does not bruise/bleed easily.    Psychiatric/Behavioral: Negative for confusion. The patient is not nervous/anxious.        VITAL SIGNS:   There were no vitals filed for this visit.        PHYSICAL EXAMINATION: No physical examination due to telemedicine.      LABORATORY DATA:          Lab Results   Component Value Date    IRON 37 11/17/2020    TIBC 302 11/17/2020    FERRITIN 261.40 (H) 11/17/2020   Iron saturation 12% on 11/17/2020.    Glucose   Date Value Ref Range Status   11/17/2020 99 74 - 124 mg/dL Final     BUN   Date Value Ref Range Status   11/17/2020 13 6 - 20 mg/dL Final     Creatinine   Date Value Ref Range Status   11/17/2020 0.68 0.60 - 1.10 mg/dL Final     Sodium   Date Value Ref Range Status   11/17/2020 139 134 - 145 mmol/L Final     Potassium   Date Value Ref Range Status   11/17/2020 4.0 3.5 - 4.7 mmol/L Final     Chloride   Date Value Ref Range Status   11/17/2020 102 98 - 107 mmol/L Final     CO2   Date Value Ref Range Status   11/17/2020 27.4 22.0 - 29.0 mmol/L Final     Calcium   Date Value Ref Range Status   11/17/2020 9.9 8.5 - 10.2 mg/dL Final     Total Protein   Date Value Ref Range Status   11/17/2020 7.6 6.3 - 8.0 g/dL Final     Albumin   Date Value Ref Range Status   11/17/2020 4.90 3.50 - 5.20 g/dL Final     ALT (SGPT)   Date Value Ref Range Status   11/17/2020 12 0 - 33 U/L Final     AST (SGOT)   Date Value Ref Range Status   11/17/2020 15 0 - 32 U/L Final     Alkaline Phosphatase   Date Value Ref Range Status   11/17/2020 67 38 - 116 U/L Final     Total Bilirubin   Date Value Ref Range Status   11/17/2020 0.2 0.2 - 1.2 mg/dL Final     eGFR Non  Amer   Date Value Ref Range Status   11/17/2020 98 >60 mL/min/1.73 Final     BUN/Creatinine Ratio   Date Value Ref Range Status   11/17/2020 19.1 7.3 - 30.0 Final     Anion Gap   Date Value Ref Range Status   11/17/2020 9.6 5.0 - 15.0 mmol/L Final       IMAGING STUDY:   1.  Echocardiogram 11/18/2020  Interpretation Summary    · Calculated left ventricular EF = 59.3%  Calculated left ventricular 3D EF = 58% Estimated left ventricular EF = 59% Estimated left ventricular EF was in agreement with the calculated left ventricular EF. Left ventricular systolic function is normal. Normal global longitudinal LV strain (GLS) = -22.1%. Left ventricle strain data was reviewed by the physician and found to be accurate. Normal left ventricular cavity size and wall thickness noted. All left ventricular wall segments contract normally. Left ventricular diastolic function was normal.  · Trace aortic valve regurgitation is present.  · Trace mitral valve regurgitation is present.  · Trace tricuspid valve regurgitation is present. Estimated right ventricular systolic pressure from tricuspid regurgitation is normal (<35 mmHg). Calculated right ventricular systolic pressure from tricuspid regurgitation is 22 mmHg.     2. XR CHEST PA AND LATERAL-11/17/2020.     Examination: PA AND LATERAL CHEST     HISTORY: Shortness of breath     COMPARISON: None     FINDINGS: The lungs are normal in volume and clear. Heart size normal.  The cardiomediastinal silhouette is normal in appearance. Chest wall is  within normal limits.     IMPRESSION:  Negative chest.        ASSESSMENT:      1. Chronic myelogenous leukemia, chronic phase with excellent response to Sprycel and complete molecular response in August 2014. However she had interuption of treatment in early part of 2016, because of insurance issues and miscommunication, she stopped the medicine without telling us, and laboratory test on 03/22/2016 reported disease relapse with increased BCR/ABL product at 12.626%. subsequently she was restarted back on Sprycel, and responded well.  Since June 2017, she has completed molecular response as tested every 3 months.  She has been compliant with treatment.   · In the end of July 2018, she reported worsening significant cramping involving her legs, so we decreased her Sprycel to 50 mg daily starting early August.   She's been having less problem since that time.  She was tested negative for BCR-ABL on 8/31/2018.    · Patient had a negative BCR-ABL by RT-PCR in end of January 2019 and also on 3/29/2019.   · Patient was later found having significantly elevated liver function panel.  Sprycel was on hold and she was subsequently found having acute hepatitis C infection.    · I discussed with Dr. Karyn Mendieta recently, we'll hold her Sprycel for now until she finishes hepatitis C treatment.   · She was started on hepatitis C treatment on 4/18/2019 and finished an 8-week course.  · On 07/02/2019 patient's labs showed BCR-ABL Major (p210) detected by RT-PCR at 0.0141%. BCR/ABL1 MINOR (p190) was not detected. HCV was not detected.  · Sprycel treatment was resumed as of 07/02/2019 upon completion of MEVYRET.  · Laboratory study performed, 10/29/2019, reported normal CBC. BCR/ABL1 MAJOR (p210) and BCR/ABL1 MINOR (p190) were not detected.    · BCR-ABL by RT-PCR on 1/24/2020 was negative.     · Lab result for BCR ABL by RT-PCR was also negative on 4/24/2020. Patient will need to continue on sprycel treatment.    · 7/10/2020 patient has normal CBC and CMP.  Negative RT-PCR for BCR ABL.  Tolerating well.  Continue Sprycel at 50 mg daily.  · On 10/9/2020, completely normal CBC with good tolerance.  BCR-ABL was tested negative by RT-PCR method.  Continue Sprycel.   · Patient reports 11/17/2020 chest tightness in palpitation, and exertion dyspnea progressively getting worse in the past week.  We will asked patient to hold Sprycel.  Requested chest x-ray and also echocardiogram study as well as laboratory study including iron panel for reassessment.       2. Recurrent Iron deficiency anemia.    · She was treated again with Feraheme October 2017.   Iron deficiency thought to be related to ulcer identified on EGD, being treated with Carafate.  She also had heavy menses.  · She had recurrent iron deficiency again and was given Feraheme 2 doses in  March 2018.    · Subsequently recurrent iron deficiency in July 2018, she was switched to Venofer 3 doses total 900 mg.   · Patient had a simple hysterectomy in October 2018.  · Laboratory study showed supratherapeutic ferritin 458 and iron saturation 40% on 4/30/2019.   · Laboratory study performed 10/29/2019, showed normal iron saturation and ferritin 325 ng/dL.  · Normal iron studies including ferritin 262 and iron saturation 21% on 4/24/2020.  No evidence of recurrent iron deficiency.   · Lab study on 7/10/2020 reported ferritin 185, iron saturation 13% and normal hemoglobin 14.5.  She has deteriorating iron studies.   Continue to monitor in 3 months.  · Labs on 10/9/2020 reported ferritin 270, iron saturation 13% and hemoglobin 14.9.    3.  Significant fatigue in the past month as reported on 4/24/2020.  Iron study showed appropriate ferritin and iron saturation.  Normal thyroid profile on 3/12/2020.  Exact etiology of her fatigue is not clear.  I asked patient to try oral vitamin B12 and folic acid to see if it eases symptoms.  · Patient reports on 7/10/2020 that she has significant improvement of energy level after starting oral vitamin B12.  This will be continued.     *Chest tightness, palpitation in the dyspnea new onset in November 2020.  Patient reports this happened recently, and that usually unpredictable, she has chest tightness, and associated tachycardia/palpitation.  Patient reports this is unpredictable, can be on and off.  She noticed 1 day she was cooking meal for her family, and noted several palpitation and chest tightness.  She reports unable to induce purposefully.  She also has exertional dyspnea.  Patient reports 11/17/2020, we asked patient to hold Sprycel.  We requested chest CT, echocardiogram study and also laboratory study for evaluation.  · Negative chest x-ray on 11/17/2020.   · Laboratory study on 11/17/2020 reported stable chronic condition with a ferritin 261 and iron saturation  12%, normal hemoglobin 15.0, unable to explain her dyspnea.   · Echocardiogram study on 11/18/2020 reported normal results.   · Patient is evaluated 11/20/2020, she reports somewhat improved symptoms, since she stopped Sprycel for the past 3 days.  She denies extremity edema.  She denies fever sweating or chills.  We recommend patient to continue to hold Sprycel for another 2 weeks.  We also recommended patient to be tested for COVID-19.    · Reevaluation on 12/4/2020, patient reports that she did not get a Covid test.  She denies other illness such as fever sweating nausea vomiting, diarrhea, change of taste, cough etc.  Discussed with patient, will check a thyroid panel.  If negative, will likely refer patient to see cardiologist for evaluation of hypertension.  ·        PLAN:    1. Obtain laboratory studies for TSH, free T3 and free T4 for further evaluation of palpitation.  We will follow results, if negative will send the patient to cardiologist for evaluation.  2. Hold Sprycel treatment for now.    3. I asked patient to keep notes, to document the episodes of chest tightness, palpitation, and the activity associated with that.  Patient voiced understanding.   4. Continue oral vitamin B12 at 1000 mcg daily.   5. Arrange follow-up with me by telemedicine in 4 weeks.    This is a video visit for reevaluation.  Due to the pandemic coronavirus infection, to decrease the risk for exposure, we arranged this video conference and the patient is agreeable.      Addendum:  Component      Latest Ref Rng & Units 12/4/2020   TSH Baseline      0.270 - 4.200 uIU/mL 1.560   Free T4      0.93 - 1.70 ng/dL 1.25   T3, Free      2.00 - 4.40 pg/mL 3.05     Patient is completely normal thyroid panel today.  I called and spoke to patient this evening, as scheduled continue to hold Sprycel for now.  I am referring her to cardiologist for evaluation.  Patient voiced understanding.         BAO SLADE M.D., Ph.D.    12/4/2020      CC:   ISHAAN REGAN M.D.     Karyn Mendieta M.D.    Tracie Hairston MD

## 2020-12-07 ENCOUNTER — TELEPHONE (OUTPATIENT)
Dept: ONCOLOGY | Facility: CLINIC | Age: 35
End: 2020-12-07

## 2020-12-08 ENCOUNTER — TELEPHONE (OUTPATIENT)
Dept: CARDIOLOGY | Facility: CLINIC | Age: 35
End: 2020-12-08

## 2020-12-08 NOTE — TELEPHONE ENCOUNTER
Dr. Castle's office called to schedule new patient appointment, it was scheduled with Dr. Tim for 12/16 as they wanted ASAP. They mentioned Dr. Castle had spoken to you about seeing her is it okay to keep with [facility] or would you like to see her?

## 2020-12-11 ENCOUNTER — TELEPHONE (OUTPATIENT)
Dept: CARDIOLOGY | Facility: CLINIC | Age: 35
End: 2020-12-11

## 2020-12-14 ENCOUNTER — TELEPHONE (OUTPATIENT)
Dept: ONCOLOGY | Facility: CLINIC | Age: 35
End: 2020-12-14

## 2020-12-21 ENCOUNTER — OFFICE VISIT (OUTPATIENT)
Dept: CARDIOLOGY | Facility: CLINIC | Age: 35
End: 2020-12-21

## 2020-12-21 VITALS
DIASTOLIC BLOOD PRESSURE: 78 MMHG | HEIGHT: 68 IN | BODY MASS INDEX: 22.64 KG/M2 | WEIGHT: 149.4 LBS | SYSTOLIC BLOOD PRESSURE: 110 MMHG | HEART RATE: 56 BPM

## 2020-12-21 DIAGNOSIS — R07.9 CHEST PAIN, UNSPECIFIED TYPE: ICD-10-CM

## 2020-12-21 DIAGNOSIS — R00.2 PALPITATIONS: Primary | ICD-10-CM

## 2020-12-21 PROCEDURE — 99204 OFFICE O/P NEW MOD 45 MIN: CPT | Performed by: INTERNAL MEDICINE

## 2020-12-21 PROCEDURE — 93000 ELECTROCARDIOGRAM COMPLETE: CPT | Performed by: INTERNAL MEDICINE

## 2020-12-21 NOTE — PROGRESS NOTES
[facility] New Patient Office Note     Encounter Date:20  Patient:Hu Houston  :1985  MRN:[id number]    Referring Provider: Isabel Castle MD PhD    Consulted for: Palpitations    Chief Complaint:   Chief Complaint   Patient presents with   • Palpitations     History of Presenting Illness:      Ms. Houston is a 35 y.o. woman with past medical history notable for CML on chronic chemotherapy as well as exercise-induced asthma presents her office for initial evaluation regarding symptoms of palpitations and chest discomfort.  Patient states that since  she has been having episodes of palpitations.  These typically occur mainly throughout the day.  They last for minutes.  Did feel like her heart was beating out of her chest.  She had associated chest tightness and shortness of breath.  These typically would come and go with no clear alleviating or exacerbating factors.  She typically would stop and have to rest to take deep breaths before she would start to feel better.  She did talk with her oncologist regarding this and her chemotherapy was stopped.  Her echocardiogram demonstrated normal cardiac function and structure with no evidence of pulmonary hypertension which could be a side effect from the chemotherapy that she is having.  After stopping her chemotherapy her palpitations did improve.  She no longer has racing heart rates but unfortunately still has episodes of chest tightness and shortness of breath.  These are described as a pressure or sharp sensation located in the center of the chest.  Pain is nonradiating.  Again is random in onset and offset.  It lasts for minutes.  And does have associated shortness of breath.  Outside of this patient remains very active.  She actually has been exercising and running more.  She denies any limitations in her exercise capacity or ability to do activities.      Review of Systems:  Review of Systems   Constitution: Negative.   HENT:  Negative.    Eyes: Negative.    Cardiovascular: Positive for chest pain and palpitations.   Respiratory: Positive for shortness of breath.    Endocrine: Negative.    Hematologic/Lymphatic: Negative.    Skin: Negative.    Musculoskeletal: Negative.    Gastrointestinal: Negative.    Genitourinary: Negative.    Neurological: Negative.    Psychiatric/Behavioral: Negative.    Allergic/Immunologic: Negative.        Prior to Admission medications    Medication Sig Start Date End Date Taking? Authorizing Provider   albuterol sulfate  (90 Base) MCG/ACT inhaler  8/31/19  Yes Faiza Cruz MD   B Complex-C (SUPER B COMPLEX PO) Take  by mouth.   Yes Provider, MD Faiza   dasatinib (Sprycel) 50 MG chemo tablet TAKE 1 TABLET BY MOUTH ONCE DAILY AT THE SAME TIME. MAY TAKE WITH OR WITHOUT FOOD. SWALLOW WHOLE. AVOID GRAPEFRUIT PRODUCTS. 11/9/20  Yes Isabel Castle MD PhD   POTASSIUM PO Take  by mouth.   Yes ProviderFaiza MD   fluconazole (Diflucan) 150 MG tablet Take 1 tablet by mouth Every 72 (Seventy-Two) Hours As Needed (yeast) for up to 2 doses. 10/15/20 12/21/20  Mony Hawthorne APRN       Allergies   Allergen Reactions   • Sulfa Antibiotics Unknown - Low Severity     Childhood reaction       Past Medical History:   Diagnosis Date   • Anemia in neoplastic disease    • Asthma     childhood   • Chiari I malformation (CMS/HCC)     eval by Dr Moore, NS 2016   • Chronic myelogenous leukemia (CMS/HCC)     remission, Dr Castle follows   • Chronic pain    • CML (chronic myelocytic leukemia) (CMS/HCC)    • Cystitis    • Depression    • GERD (gastroesophageal reflux disease)     ulcer   • H/O Iron deficiency anemia    • H/O Lower extremity pain     Resolved.   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   • Pulmonary hypertension (CMS/HCC)    • Seizures (CMS/HCC)     as child  after head injry   • Splenomegaly    • Status post D&C    • Vitamin D deficiency        Past Surgical History:   Procedure Laterality Date   • BONE MARROW BIOPSY     • D&C HYSTEROSCOPY N/A 10/1/2018    Procedure: DILATATION AND CURETTAGE HYSTEROSCOPY;  Surgeon: Beck Cornejo MD;  Location: [facility] OR;  Service: Obstetrics/Gynecology   • D&C HYSTEROSCOPY ENDOMETRIAL ABLATION N/A 2018    Procedure: Hysteroscopy NovaSure ablation;  Surgeon: Beck Cornejo MD;  Location: [facility] OR;  Service: Obstetrics/Gynecology   • ENDOSCOPY N/A 2017    Procedure: ESOPHAGOGASTRODUODENOSCOPY with biopsies;  Surgeon: Sophie Trejo MD;  Location: [facility] OR;  Service:    • GYNECOLOGY EXAM UNDER ANESTHESIA      IUD removal   • TOTAL LAPAROSCOPIC HYSTERECTOMY Bilateral 10/29/2018    Procedure: TOTAL LAPAROSCOPIC HYSTERECTOMY, bilateral salpingectomy;  Surgeon: Beck Cornejo MD;  Location: [facility] OR;  Service: Obstetrics/Gynecology   • TUBAL COAGULATION LAPAROSCOPIC Bilateral 2018    Procedure: TUBAL COAGULATION LAPAROSCOPIC;  Surgeon: Beck Cornejo MD;  Location: [facility] OR;  Service: Obstetrics/Gynecology       Social History     Socioeconomic History   • Marital status:      Spouse name: Not on file   • Number of children: Not on file   • Years of education: Not on file   • Highest education level: Not on file   Occupational History   • Occupation:      Employer: [employer]   Tobacco Use   • Smoking status: Former Smoker     Packs/day: 1.00     Years: 8.00     Pack years: 8.00     Types: Cigarettes     Quit date: 2006     Years since quittin.4   • Smokeless tobacco: Never Used   Substance and Sexual Activity   • Alcohol use: Yes     Comment: Social, maybe once every 6 months/no caffeine use   • Drug use: Not Currently     Types: Cocaine(coke)     Comment: prior to    • Sexual activity: Defer     Birth control/protection: Surgical     " Comment: Tubal   Social History Narrative    Has tattoos. Studying for her Master's degree.       Family History   Problem Relation Age of Onset   • Hyperlipidemia Mother    • Hypertension Mother    • Stomach cancer Maternal Grandmother 72        Adenocarcinoma esophagus and stomach   • Stroke Paternal Grandmother    • Malig Hyperthermia Neg Hx        The following portions of the patient's history were reviewed and updated as appropriate: allergies, current medications, past family history, past medical history, past social history, past surgical history and problem list.       Objective:       Vitals:    12/21/20 1042   BP: 110/78   BP Location: Left arm   Patient Position: Sitting   Pulse: 56   Weight: 67.8 kg (149 lb 6.4 oz)   Height: 172.7 cm (68\")       Physical Exam:  Constitutional: Well appearing, well developed, no acute distress   HENT: Oropharynx clear and membrane moist  Eyes: Normal conjunctiva, no sclera icterus.  Neck: Supple, no carotid bruit bilaterally.  Cardiovascular: Regular rate and rhythm, No Murmur, No bilateral lower extremity edema.  Pulmonary: Normal respiratory effort, normal lung sounds, no wheezing.  Abdominal: Soft, nontender, no hepatosplenomegaly, liver is non-pulsatile.  Neurological: Alert and orient x 3.   Skin: Warm, dry, no ecchymosis, no rash.  Psych: Appropriate mood and affect. Normal judgment and insight.      Lab Results   Component Value Date    GLUCOSE 99 11/17/2020    BUN 13 11/17/2020    CREATININE 0.68 11/17/2020    EGFRIFNONA 98 11/17/2020    BCR 19.1 11/17/2020    K 4.0 11/17/2020    CO2 27.4 11/17/2020    CALCIUM 9.9 11/17/2020    ALBUMIN 4.90 11/17/2020    AST 15 11/17/2020    ALT 12 11/17/2020       Lab Results   Component Value Date    WBC 6.67 11/17/2020    HGB 15.0 11/17/2020    HCT 44.8 11/17/2020    MCV 91.4 11/17/2020     11/17/2020       Lab Results   Component Value Date    CKTOTAL 84 01/16/2014    TROPONINT <0.010 06/10/2016       No results " found for: CHOL, CHLPL  No results found for: TRIG  No results found for: HDL  No results found for: LDL, LDLDIRECT    Lab Results   Component Value Date    TSH 1.560 12/04/2020         ECG 12 Lead    Date/Time: 12/21/2020 11:16 AM  Performed by: Luis Alfredo Flowers MD  Authorized by: Luis Alfredo Flowers MD   Comparison: compared with previous ECG from 10/18/2020  Similar to previous ECG  Rhythm: sinus rhythm    Clinical impression: normal ECG        Echocardiogram 11/18/2020:  · Calculated left ventricular EF = 59.3% Calculated left ventricular 3D EF = 58% Estimated left ventricular EF = 59% Estimated left ventricular EF was in agreement with the calculated left ventricular EF. Left ventricular systolic function is normal. Normal global longitudinal LV strain (GLS) = -22.1%. Left ventricle strain data was reviewed by the physician and found to be accurate. Normal left ventricular cavity size and wall thickness noted. All left ventricular wall segments contract normally. Left ventricular diastolic function was normal.  · Trace aortic valve regurgitation is present.  · Trace mitral valve regurgitation is present.  · Trace tricuspid valve regurgitation is present. Estimated right ventricular systolic pressure from tricuspid regurgitation is normal (<35 mmHg). Calculated right ventricular systolic pressure from tricuspid regurgitation is 22 mmHg.          Assessment:          Diagnosis Plan   1. Palpitations  Cardiac Event Monitor   2. Chest pain, unspecified type  Treadmill Stress Test          Plan:       Ms. Houston is a 35 y.o. woman with past medical history notable for CML on chronic chemotherapy as well as exercise-induced asthma presents her office for initial evaluation regarding symptoms of palpitations and chest discomfort.  Overall her symptoms are somewhat hard to pin down.  They are improved after stopping her chemotherapy which is somewhat encouraging but does need guidance as far as considering  restarting chemotherapy.  Fortunately her echocardiogram demonstrated normal cardiac structure as well as no evidence of pulmonary arterial hypertension which is a known complication of her Sprycel.  Nonetheless I would recommend better understanding of what her symptoms may be contributed to.  I have ordered a 2-week event monitor to see what may be underlying heart rhythm may be contributing to her symptoms.  Finally I would also recommend an exercise treadmill to make sure that we are not missing any underlying exercise-induced arrhythmias and to better understand her functional capacity for further guidance of chemotherapy.  Would not recommend initiating any medical therapy at this time as her heart rate is already on the slower side and that might make her symptoms even worse.  We will decide if medical therapy or further testing is needed based upon monitor and stress test results.    Palpitations:  · Follow-up 2-week event monitor  · Follow-up stress test    Chest pain:  · Somewhat atypical but will get further assessment of cardiovascular endurance with exercise treadmill to help guide consideration of restarting chemotherapy      Follow-up:  6 weeks      Thank you for allowing me to participate in the care of Hu Houston. Feel free to contact me directly with any further questions or concerns.    Luis Alfredo Flowers MD  [facility]  12/21/20  11:19 EST

## 2021-01-06 ENCOUNTER — OFFICE VISIT (OUTPATIENT)
Dept: ONCOLOGY | Facility: CLINIC | Age: 36
End: 2021-01-06

## 2021-01-06 ENCOUNTER — LAB (OUTPATIENT)
Dept: LAB | Facility: HOSPITAL | Age: 36
End: 2021-01-06

## 2021-01-06 VITALS
RESPIRATION RATE: 19 BRPM | BODY MASS INDEX: 22.42 KG/M2 | HEIGHT: 68 IN | OXYGEN SATURATION: 99 % | DIASTOLIC BLOOD PRESSURE: 69 MMHG | WEIGHT: 147.9 LBS | TEMPERATURE: 97.7 F | SYSTOLIC BLOOD PRESSURE: 101 MMHG | HEART RATE: 71 BPM

## 2021-01-06 DIAGNOSIS — D50.0 IRON DEFICIENCY ANEMIA DUE TO CHRONIC BLOOD LOSS: ICD-10-CM

## 2021-01-06 DIAGNOSIS — C92.10 CML (CHRONIC MYELOID LEUKEMIA) (HCC): ICD-10-CM

## 2021-01-06 DIAGNOSIS — K90.9 MALABSORPTION OF IRON: ICD-10-CM

## 2021-01-06 DIAGNOSIS — C92.10 CML (CHRONIC MYELOID LEUKEMIA) (HCC): Primary | ICD-10-CM

## 2021-01-06 LAB
ALBUMIN SERPL-MCNC: 4.8 G/DL (ref 3.5–5.2)
ALBUMIN/GLOB SERPL: 1.5 G/DL (ref 1.1–2.4)
ALP SERPL-CCNC: 63 U/L (ref 38–116)
ALT SERPL W P-5'-P-CCNC: 7 U/L (ref 0–33)
ANION GAP SERPL CALCULATED.3IONS-SCNC: 10.5 MMOL/L (ref 5–15)
AST SERPL-CCNC: 14 U/L (ref 0–32)
BASOPHILS # BLD AUTO: 0.03 10*3/MM3 (ref 0–0.2)
BASOPHILS NFR BLD AUTO: 0.3 % (ref 0–1.5)
BILIRUB SERPL-MCNC: 0.4 MG/DL (ref 0.2–1.2)
BUN SERPL-MCNC: 14 MG/DL (ref 6–20)
BUN/CREAT SERPL: 16.9 (ref 7.3–30)
CALCIUM SPEC-SCNC: 9.8 MG/DL (ref 8.5–10.2)
CHLORIDE SERPL-SCNC: 99 MMOL/L (ref 98–107)
CO2 SERPL-SCNC: 28.5 MMOL/L (ref 22–29)
CREAT SERPL-MCNC: 0.83 MG/DL (ref 0.6–1.1)
DEPRECATED RDW RBC AUTO: 37.9 FL (ref 37–54)
EOSINOPHIL # BLD AUTO: 0.1 10*3/MM3 (ref 0–0.4)
EOSINOPHIL NFR BLD AUTO: 1.1 % (ref 0.3–6.2)
ERYTHROCYTE [DISTWIDTH] IN BLOOD BY AUTOMATED COUNT: 11.8 % (ref 12.3–15.4)
FERRITIN SERPL-MCNC: 293.2 NG/ML (ref 11–207)
GFR SERPL CREATININE-BSD FRML MDRD: 78 ML/MIN/1.73
GLOBULIN UR ELPH-MCNC: 3.1 GM/DL (ref 1.8–3.5)
GLUCOSE SERPL-MCNC: 73 MG/DL (ref 74–124)
HCT VFR BLD AUTO: 47.1 % (ref 34–46.6)
HGB BLD-MCNC: 15.9 G/DL (ref 12–15.9)
IMM GRANULOCYTES # BLD AUTO: 0.04 10*3/MM3 (ref 0–0.05)
IMM GRANULOCYTES NFR BLD AUTO: 0.4 % (ref 0–0.5)
IRON 24H UR-MRATE: 47 MCG/DL (ref 37–145)
IRON SATN MFR SERPL: 14 % (ref 14–48)
LYMPHOCYTES # BLD AUTO: 1.35 10*3/MM3 (ref 0.7–3.1)
LYMPHOCYTES NFR BLD AUTO: 14.4 % (ref 19.6–45.3)
MCH RBC QN AUTO: 30 PG (ref 26.6–33)
MCHC RBC AUTO-ENTMCNC: 33.8 G/DL (ref 31.5–35.7)
MCV RBC AUTO: 88.9 FL (ref 79–97)
MONOCYTES # BLD AUTO: 0.71 10*3/MM3 (ref 0.1–0.9)
MONOCYTES NFR BLD AUTO: 7.6 % (ref 5–12)
NEUTROPHILS NFR BLD AUTO: 7.13 10*3/MM3 (ref 1.7–7)
NEUTROPHILS NFR BLD AUTO: 76.2 % (ref 42.7–76)
NRBC BLD AUTO-RTO: 0 /100 WBC (ref 0–0.2)
PLATELET # BLD AUTO: 181 10*3/MM3 (ref 140–450)
PMV BLD AUTO: 9.9 FL (ref 6–12)
POTASSIUM SERPL-SCNC: 4.2 MMOL/L (ref 3.5–4.7)
PROT SERPL-MCNC: 7.9 G/DL (ref 6.3–8)
RBC # BLD AUTO: 5.3 10*6/MM3 (ref 3.77–5.28)
SODIUM SERPL-SCNC: 138 MMOL/L (ref 134–145)
TIBC SERPL-MCNC: 326 MCG/DL (ref 249–505)
TRANSFERRIN SERPL-MCNC: 233 MG/DL (ref 200–360)
WBC # BLD AUTO: 9.36 10*3/MM3 (ref 3.4–10.8)

## 2021-01-06 PROCEDURE — 83540 ASSAY OF IRON: CPT

## 2021-01-06 PROCEDURE — 80053 COMPREHEN METABOLIC PANEL: CPT

## 2021-01-06 PROCEDURE — 82728 ASSAY OF FERRITIN: CPT

## 2021-01-06 PROCEDURE — 85025 COMPLETE CBC W/AUTO DIFF WBC: CPT

## 2021-01-06 PROCEDURE — 84466 ASSAY OF TRANSFERRIN: CPT

## 2021-01-06 PROCEDURE — 99213 OFFICE O/P EST LOW 20 MIN: CPT | Performed by: INTERNAL MEDICINE

## 2021-01-06 PROCEDURE — 36415 COLL VENOUS BLD VENIPUNCTURE: CPT

## 2021-01-06 NOTE — PROGRESS NOTES
REASONS FOR FOLLOWUP:    1. Chronic myelogenous leukemia with splenomegaly, chronic phase, positive [place] chromosome, no mutation at kinase domain diagnosed in November 2020.    · Patient was started on hydroxyurea temporarily on 10/23/2013 with significant drop of WBC counts.    · Patient started on Sprycel on 12/02/2013. Peripheral blood tested with 3.4% of cells positive for BCR-ABL translocation, tested 02/17/2014.    · The patient had CCyR 6 months into treatment as tested on 05/15/2014.    · Complete molecular response as tested 08/08/14 with negative product of BCR/ABL.   · There was interruption of her treatment due to insurance coverage and misunderstanding from patient's part, she had a relapse of disease with BCR/ABL product at 12.626% in March 2016, and she restarted back on Sprycel.   · Good response as tested on 08/31/2016 with BCR/ABL at 0.294%.    · Since June 2017, patient has been persistently tested negative for BCR/ABL by RT-PCR every 3 month period.     · Patient reported worsening leg cramping in end of July 2018.  Sprycel was decreased to 50 mg daily.  Laboratory studies on 8/31/2018, on 1/30/2019 and on 3/29/2019 were negative for BCR-ABL by RT-PCR.    · Sprycel was on hold during treatment for acute hepatitis C in early part of 2019.  It was resumed in July 2019.  She was weakly positive for BCR-ABL.  · On 10/29/2019 BCR/ABL1 MAJOR (p210) and BCR/ABL1 MINOR (p190) were not detected.    · BCR-ABL by RT-PCR with 0% on 1/24/2020 and also on 4/24/2020.  · Patient was tested negative on 7/10/2020.  Negative on 10/9/2020.    · Because of palpitation, Sprycel was on hold since 11/17/2020.     2. Recurrent iron deficiency anemia not responding to oral iron supplementation.  She also had significant constipation associated with oral iron.   · Patient was given Feraheme treatment 2 doses in September 2016 and repeated in January 2017.    · Repeated IV Venofer in July 2018 due to recurrent iron  deficiency.           HISTORY OF PRESENT ILLNESS: The patient is a 35 y.o.  female who presents today for reevaluation.    We have been withholding her Sprycel in middle November 2020 because of palpitation, and the refer patient to cardiology service.  Patient was seen by Dr. Flowers and had unremarkable echocardiogram study on 11/18/2020.    Patient reports she will have outpatient cardiac monitor for 14 days.  She reports no fever sweating chills.  She reports her dyspnea additionally slightly better since she has been off Sprycel November 2020.      She denies fever sweating chills, no nausea no vomiting.  No other signs of illness.  She denies lower extremity edema.      She stays at home, denies risk for COVID-19 exposure, except that her eldest daughter goes to [facility] once a week.      Negative chest x-ray on 11/17/2020.     Laboratory study on 11/17/2020 reported stable chronic condition with ferritin 261 and iron saturation 12%, normal hemoglobin 15.0, unable to explain her dyspnea.     Echocardiogram study on 11/18/2020 reported normal results.     Laboratory study today on 1/6/2021 reported hemoglobin 15.9 hematocrit 47.1%, platelets 181,000 WBC 9360 including ANC 7130 lymphocytes 1350. Iron study reported ferritin 293 and iron saturation 14% with free iron 47 and a TIBC 326.  Chemistry lab reported unremarkable CMP including renal function and liver function panel.      Past Medical History:   Diagnosis Date   • Anemia in neoplastic disease    • Asthma     childhood   • Chiari I malformation (CMS/HCC)     eval by Dr Moore, NS 2016   • Chronic myelogenous leukemia (CMS/HCC)     remission, Dr Castle follows   • Chronic pain    • CML (chronic myelocytic leukemia) (CMS/HCC)    • Cystitis    • Depression    • GERD (gastroesophageal reflux disease)     ulcer   • H/O Iron deficiency anemia    • H/O Lower extremity pain     Resolved.   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      *  Hysterectomy in October 2018      *  Acute hepatitis C in early 2019, treated successfully by Dr. Karyn Mendieta.     OB/GYN HISTORY: Menarche age 9. G5, P4, 1 miscarriage of the third pregnancy. First pregnancy at age 17. Patient underwent partial hysterectomy in 2018.       HEMATOLOGIC/ONCOLOGIC HISTORY: History from previous dates can be found in the separate document.        Laboratory results on 09/23/2015 showed normalization of hemoglobin 12.2, but still has macrocytosis, MCV 73.3. She has normal platelets and WBC at 9400. Serum ferritin < 5 ng/ml, iron sats 6.3%, iron 29, TIBC 455 mcg/ml. Still has severe iron deficiency, needs to continue oral iron therapy. The patient restarted taking oral iron supplementation which was probably stopped in the end of November 2015.        Laboratory study on 03/22/2016 reported normal WBC 8450, neutrophils 6100, lymphs is 1400 and monocytes 550. Serum iron was 26, TIBC 469 on saturation 6% and ferritin less than 5 NG/ML. Chemistry lab reported normal renal function with a creatinine 0.69, normal liver function panel, total protein 7.5 and albumin 4.2, normal electrolytes. Patient was restarted back on oral on sedimentation twice a day with good tolerance.      Patient continues take Lortab as needed for left leg cramping. Urine drug test on 08/05/2016 was completely negative, and repeated study on August 26 was positive for opiate, negative for other recreational drugs.      Repeat laboratory study on 08/31/2016 reported iron 21, ferritin less than 5, iron saturation 5%, TIBC 462. Hemoglobin was 11.5 MCV 78.1 MCHC 30.4. Platelets 163,000. Total WBC 8870 including  neutrophils 6400 and lymphocytes 1500. BCR/ABL was 0.294% by RT-PCR method.       Patient was given IV Feraheme treatment in September 2016, with 2 doses. Repeated laboratory study on 10/06/2016 reported significantly improved and normalized ferritin 269, iron 80, TIBC 365 and iron saturation 22%. Her hemoglobin was 12.2, and MCV 80.8.      Patient reported she was seen by Dr. Fam in 10/2016 and was started on half tablets of Topamax. I reviewed Dr. Fam’s clinic note and telephone conversation record. The patient reports she has no recurrence of migraine headache. However, she is very tearful today, reporting that she has thoughts of not taking any medications. She did assure me that she has been taking the medication up to this point. 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 The patient denies suicide ideation. When she was initially diagnosed of CML back in 2014, she had depression and I started the patient on Prozac. The patient reported initially it helped her, however, she no longer feels the effect of Prozac. She feels depressed. The patient reports she has no personal hobby. She goes to work and goes home, taking care of her kids. She does not enjoy life as she used to.      Laboratory study on December 29, 2016 reported at ferritin 19.9, iron 33, TIBC 347 iron saturation 10%.  Hemoglobin was 13, normal WBC and platelets.  Unremarkable CMP.  Patient was given 2 doses of Feraheme treatment in early January 2017.      Cytogenetic study on March 14, 2017 reported a BCR-ABL products at 0.098%, showed further improved residual disease.  There is also reported a ferritin 103.7, iron 79, TIBC 336 and iron saturation 24%.  Hemoglobin was 13.5, MCV 92.3, platelets 199,000 WBC 7000.  She had a completely normal CMP.       Repeated laboratory study on June 20, 2017 reported at nondetectable BCR-ABL  product.  Ferritin was 45, iron 35 TIBC 333 and iron saturation 11%.  Had a normal CBC and CMP.    In the end of July 2018, patient called reporting worsening significant leg cramping, and getting worse recently and has been taking 6 tablets of Advil with no significant improvement.  We suspect it might be caused by Sprycel for her CML.  We discussed with patient, and decreased to half dose at 50 mg daily.  Patient reports that she is improved leg cramping since dose reduction.    Per medical records this patient had emergency room visit again on 8/18/2018.  Patient reports she had significant abdomen/pelvic pain at a time associate with nausea.  Laboratory study showed significantly elevated WBC at 25,900 including neutrophils 24,400, with elevated hemoglobin 15.8 and platelets 146,000.     She had a thorough investigation, including CT scan for abdomen pelvis which was unremarkable.  She then had ultrasound for the pelvis and it was also unremarkable.  She had ultrasound for the gallbladder examination on the same day and was unremarkable.  She had a normal CMP except of marginally elevated glucose at 112.  Her urinalysis showed only marginally increased WBC 3-5/HPF.  She was negative for yeast infection, negative for Chlamydia trichomonas and Neisseria gonorrhea.  Patient was prescribed Flagyl and doxycycline and discharged to home.      lab study on 4/30/2019 reported normal iron saturation 47% and elevated ferritin 507.1 ng/mL with free iron 184 and TIBC 388.  hemoglobin is normal at 13.7 and platelets 186,000. normal WBC at 5080 including ANC 3300, lymphocytes 930 and monocytes 630. Labs reported significantly elevated ALT at 655,  and alkaline phosphatase 234 but normal total bilirubin 0.9. She had normal renal function creatinine 0.78. Normal electrolytes.     On 04/30/2019, I searched Up-to-Date and suspected that this patient had drug-induced hepatitis from Diflucan or with combination of Diflucan  with Sprycel. This patient had been on Sprycel for years and had no problem with abnormal liver panel previously. It seems Diflucan inhibits CY moderately, which likely interferes with the metabolism of Sprycel. I instructed the patient to hold her Sprycel for now.    Sprycel treatment resumed as of 2019 upon completion of MEVYRET for her hepatitis C.    Laboratory study performed on 2019 showed normal CBC and CMP. BCR/ABL by RT-PCR detected BCR/ABL1 Major. HCV RNA by PCR showed HCV not detected.    Laboratory studies 2019 report her hemoglobin is normal at 15.2 and platelets 146,000. normal WBC at 7650 including ANC 5190, lymphocytes 1600 and monocytes 630.    Recent laboratory studies confirmed to be no detectable virus on 2019.    /S Liver performed on 10/24/2019 reported negative hepatic ultrasound exam with no focal liver lesion, negative gallbladder examination with no cholelithiasis or bile duct dilation noted, however borderline splenomegaly was noted.     Laboratory study performed on 10/29/2019, reported a completely normal CBC, CMP. Normal iron saturation and still slightly elevated ferritin 325 ng/dL  BCR/ABL1 MAJOR (p210) and BCR/ABL1 MINOR (p190) were not detected.     Laboratory studies 2020, show hemoglobin 14.3 MCV 92.6 platelets 180,000 and WBC 6570 including neutrophils 4400.  She also has completely normal CMP.  Iron studies reported ferritin 273, iron saturation 11%, hemoglobin 14.3 WBC 6570 and platelets 180,000.  BCR-ABL by RT-PCR with 0%.     Patient presented today for 3-month follow-up and lab review.      Due to pandemic coronavirus infection, patient has been staying home with all 4 children.  Patient reports significant fatigue for the past month, similar to the time when she had iron deficiency.  Patient reports prior to that she was having regular exercise and with good energy level.    Patient reports compliant with Sprycel 50 mg daily.  She denies leg  cramping.  She has no nausea no vomiting no abdominal pains.  She has good appetite and no diarrhea.  She denies headaches vision changes no dizziness or seizure activity.    Her laboratory study on 3/12/2020 reported marginal iron saturation 15% however had a good ferritin 212.5 ng/mL.  She had hemoglobin 13.1 and normal thyroid profile including TSH 1.20, free T4 at 1.37 ng/dL and a free T3 at 2.97 pg/mL     On 4/24/2020 her iron study showed ferritin 262 and iron saturation 21%.  Hemoglobin is 14.8.  She has normal WBC 5740 including ANC 3820, and normal platelets 161,000.   She was negative for BCR-ABL by RT-PCR on the same day.    Laboratory studies, 7/10/2020, show completely normal CBC and CMP.  Negative for BCR-ABL by RT-PCR.  Iron studies showed ferritin 185, iron saturation 13% free iron 37 TIBC 286.    Patient has completed normal CBC 10/9/2020.  Iron studies reported ferritin 2070, free iron 13%.  She also has completed normal CMP.  She was tested negative for BCR-ABL by RT-PCR method.  Sprycel 50 mg daily was continued.     We saw her recently on 10/9/2020 for routine follow-up for her CML.  She was tolerating Sprycel.  Physical examination laboratory studies were unremarkable.  No detectable BCR-ABL by RT-PCR.  We continued her Sprycel at that time.     On 11/17/2020, patient called and reported chest tightness, palpitation and dyspnea new onset in November 2020.  Patient reports this happened recently.  She has chest tightness, and associated tachycardia/palpitation.  Patient reports this is unpredictable, can be on and off.  She noticed that one day she was cooking meal for her family, and noted sudden onset palpitation and chest tightness.  She reports unable to induce purposefully.  She also has exertional dyspnea.  Patient reports 11/17/2020, we asked patient to hold Sprycel.  We suspect the patient may have pleural effusion or cardiac dysfunction secondary to Sprycel, we requested chest CT,  echocardiogram study and also laboratory study for evaluation.     Negative chest x-ray on 2020.     Laboratory study on 2020 reported stable chronic condition with a ferritin 261 and iron saturation 12%, normal hemoglobin 15.0, unable to explain her dyspnea.     Echocardiogram study on 2020 reported normal results.         MEDICATIONS: The current medication list was reviewed with the patient and updated in the EMR this date per the medical assistant. Medication dosages and frequencies were confirmed to be accurate.        Allergies   Allergen Reactions   • Sulfa Antibiotics Unknown - Low Severity     Childhood reaction     SOCIAL HISTORY: . She smoked cigarettes previously, quit in 2006 with 8-pack-year history. Social drinker, maybe once a month. No illegal drug use. No risk for HIV except tattoos.  Hairstylist.       FAMILY HISTORY: Maternal grandmother had esophageal/stomach adenocarcinoma diagnosed at age of 72 and  of disease progress at age of 73. The patient' s mother has hypertension but otherwise no family history of malignancy, especially no leukemia.        I have reviewed the patient's medical history in detail and updated the computerized patient record.    Review of Systems   Constitutional: Negative for appetite change, chills, diaphoresis, fatigue, fever and unexpected weight change.   HENT:   Negative for mouth sores and sore throat.    Eyes: Negative for eye problems and icterus.   Respiratory: Negative for chest tightness, cough and shortness of breath (This improved significantly).    Cardiovascular: Negative for chest pain, leg swelling and palpitations (This has improved since of Sprycel).        Chest tightness   Gastrointestinal: Negative for abdominal pain, blood in stool, diarrhea and nausea.   Endocrine: Negative for hot flashes.   Genitourinary: Negative for dysuria and hematuria.    Musculoskeletal: Negative for arthralgias, back pain and myalgias.  "  Skin: Negative for itching and rash.   Neurological: Negative for dizziness, headaches and numbness.   Hematological: Negative for adenopathy. Does not bruise/bleed easily.   Psychiatric/Behavioral: Negative for confusion. The patient is not nervous/anxious.        VITAL SIGNS:   Vitals:    01/06/21 1305   BP: 101/69   Pulse: 71   Resp: 19   Temp: 97.7 °F (36.5 °C)   TempSrc: Temporal   SpO2: 99%   Weight: 67.1 kg (147 lb 14.4 oz)   Height: 172.7 cm (67.99\")   PainSc: 0-No pain           PHYSICAL EXAMINATION:   GENERAL:  Well-developed, well-nourished  female in no acute distress.   SKIN:  Warm, dry without rashes, purpura or petechiae.  HEAD:  Normocephalic.  EYES:  Pupils equal, round.  Conjunctivae normal.  NOSE:  Patient wears mask due to the pandemic coronavirus infection.   NECK:  Supple; no thyromegaly or masses.  LYMPHATICS:  No cervical, supraclavicular adenopathy.  CHEST: Normal respiratory effort.  Lungs clear to auscultation. Good airflow.  CARDIAC:  Regular rate and rhythm without murmurs. Normal S1,S2.  ABDOMEN:  Soft, nontender with no organomegaly or masses.  Bowel sounds normal.  EXTREMITIES:  No clubbing, cyanosis or edema.  NEUROLOGICAL:  Cranial Nerves II-XII grossly intact.   PSYCHIATRIC:  Normal affect and mood.      LABORATORY DATA:      Results from last 7 days   Lab Units 01/06/21  1240   WBC 10*3/mm3 9.36   NEUTROS ABS 10*3/mm3 7.13*   HEMOGLOBIN g/dL 15.9   HEMATOCRIT % 47.1*   PLATELETS 10*3/mm3 181     Lab Results   Component Value Date    IRON 37 11/17/2020    TIBC 302 11/17/2020    FERRITIN 261.40 (H) 11/17/2020   Iron saturation 12% on 11/17/2020.    Glucose   Date Value Ref Range Status   11/17/2020 99 74 - 124 mg/dL Final     BUN   Date Value Ref Range Status   11/17/2020 13 6 - 20 mg/dL Final     Creatinine   Date Value Ref Range Status   11/17/2020 0.68 0.60 - 1.10 mg/dL Final     Sodium   Date Value Ref Range Status   11/17/2020 139 134 - 145 mmol/L Final     Potassium "   Date Value Ref Range Status   11/17/2020 4.0 3.5 - 4.7 mmol/L Final     Chloride   Date Value Ref Range Status   11/17/2020 102 98 - 107 mmol/L Final     CO2   Date Value Ref Range Status   11/17/2020 27.4 22.0 - 29.0 mmol/L Final     Calcium   Date Value Ref Range Status   11/17/2020 9.9 8.5 - 10.2 mg/dL Final     Total Protein   Date Value Ref Range Status   11/17/2020 7.6 6.3 - 8.0 g/dL Final     Albumin   Date Value Ref Range Status   11/17/2020 4.90 3.50 - 5.20 g/dL Final     ALT (SGPT)   Date Value Ref Range Status   11/17/2020 12 0 - 33 U/L Final     AST (SGOT)   Date Value Ref Range Status   11/17/2020 15 0 - 32 U/L Final     Alkaline Phosphatase   Date Value Ref Range Status   11/17/2020 67 38 - 116 U/L Final     Total Bilirubin   Date Value Ref Range Status   11/17/2020 0.2 0.2 - 1.2 mg/dL Final     eGFR Non  Amer   Date Value Ref Range Status   11/17/2020 98 >60 mL/min/1.73 Final     BUN/Creatinine Ratio   Date Value Ref Range Status   11/17/2020 19.1 7.3 - 30.0 Final     Anion Gap   Date Value Ref Range Status   11/17/2020 9.6 5.0 - 15.0 mmol/L Final       IMAGING STUDY:       ASSESSMENT:      1. Chronic myelogenous leukemia, chronic phase with excellent response to Sprycel and complete molecular response in August 2014. However she had interuption of treatment in early part of 2016, because of insurance issues and miscommunication, she stopped the medicine without telling us, and laboratory test on 03/22/2016 reported disease relapse with increased BCR/ABL product at 12.626%. subsequently she was restarted back on Sprycel, and responded well.  Since June 2017, she has completed molecular response as tested every 3 months.  She has been compliant with treatment.   · In the end of July 2018, she reported worsening significant cramping involving her legs, so we decreased her Sprycel to 50 mg daily starting early August.  She's been having less problem since that time.  She was tested negative for  BCR-ABL on 8/31/2018.    · Patient had a negative BCR-ABL by RT-PCR in end of January 2019 and also on 3/29/2019.   · Patient was later found having significantly elevated liver function panel.  Sprycel was on hold and she was subsequently found having acute hepatitis C infection.    · I discussed with Dr. Karyn Mendieta recently, we'll hold her Sprycel for now until she finishes hepatitis C treatment.   · She was started on hepatitis C treatment on 4/18/2019 and finished an 8-week course.  · On 07/02/2019 patient's labs showed BCR-ABL Major (p210) detected by RT-PCR at 0.0141%. BCR/ABL1 MINOR (p190) was not detected. HCV was not detected.  · Sprycel treatment was resumed as of 07/02/2019 upon completion of MEVYRET.  · Laboratory study performed, 10/29/2019, reported normal CBC. BCR/ABL1 MAJOR (p210) and BCR/ABL1 MINOR (p190) were not detected.    · BCR-ABL by RT-PCR on 1/24/2020 was negative.     · Lab result for BCR ABL by RT-PCR was also negative on 4/24/2020. Patient will need to continue on sprycel treatment.    · 7/10/2020 patient has normal CBC and CMP.  Negative RT-PCR for BCR ABL.  Tolerating well.  Continue Sprycel at 50 mg daily.  · On 10/9/2020, completely normal CBC with good tolerance.  BCR-ABL was tested negative by RT-PCR method.  Continue Sprycel.   · Patient reports 11/17/2020 chest tightness in palpitation, and exertion dyspnea progressively getting worse in the past week.  We asked patient to hold Sprycel.   · Chest x-ray examination on 11/17/2020 was unremarkable.  Echocardiogram study on 11/18/2020 was also benign.  Patient was seen by cardiologist for further evaluation.    · Laboratory studies on 1/6/2021 reported normal WBC 9360 including ANC 7130 lymphocytes 1350.      2. Recurrent Iron deficiency anemia.    · She was treated again with Feraheme October 2017.   Iron deficiency thought to be related to ulcer identified on EGD, being treated with Carafate.  She also had heavy menses.  · She had  recurrent iron deficiency again and was given Feraheme 2 doses in March 2018.   · Subsequently recurrent iron deficiency in July 2018, she was switched to Venofer 3 doses total 900 mg.   · Patient had a simple hysterectomy in October 2018.  · Laboratory study showed supratherapeutic ferritin 458 and iron saturation 40% on 4/30/2019.   · Laboratory study performed 10/29/2019, showed normal iron saturation and ferritin 325 ng/dL.  · Normal iron studies including ferritin 262 and iron saturation 21% on 4/24/2020.  No evidence of recurrent iron deficiency.   · Lab study on 7/10/2020 reported ferritin 185, iron saturation 13% and normal hemoglobin 14.5.  She has deteriorating iron studies.   Continue to monitor in 3 months.  · Labs on 10/9/2020 reported ferritin 270, iron saturation 13% and hemoglobin 14.9.  · Lab studies on 1/6/2021 reported mild erythrocytosis.  Hemoglobin is 15.9 and hematocrit of 47.1%, with ferritin 293 and iron saturation 14%.  Continue to monitor.    3.  Significant fatigue in the past month as reported on 4/24/2020.  Iron study showed appropriate ferritin and iron saturation.  Normal thyroid profile on 3/12/2020.  Exact etiology of her fatigue is not clear.  I asked patient to try oral vitamin B12 and folic acid to see if it eases symptoms.  · Patient reports on 7/10/2020 that she has significant improvement of energy level after starting oral vitamin B12.  This will be continued.  · Patient had a normal panel for TSH, free T3 and free T4 on 12/4/2020.     *Chest tightness, palpitation in the dyspnea new onset in November 2020.  Patient reports this happened recently, and that usually unpredictable, she has chest tightness, and associated tachycardia/palpitation.  Patient reports this is unpredictable, can be on and off.  She noticed 1 day she was cooking meal for her family, and noted several palpitation and chest tightness.  She reports unable to induce purposefully.  She also has exertional  dyspnea.  Patient reports 11/17/2020, we asked patient to hold Sprycel.  We requested chest CT, echocardiogram study and also laboratory study for evaluation.  · Negative chest x-ray on 11/17/2020.   · Laboratory study on 11/17/2020 reported stable chronic condition with a ferritin 261 and iron saturation 12%, normal hemoglobin 15.0, unable to explain her dyspnea.   · Echocardiogram study on 11/18/2020 reported normal results.   · Patient is evaluated 11/20/2020, she reports somewhat improved symptoms, since she stopped Sprycel for the past 3 days.  She denies extremity edema.  She denies fever sweating or chills.  We recommend patient to continue to hold Sprycel for another 2 weeks.  We also recommended patient to be tested for COVID-19.    · Reevaluation on 12/4/2020, patient reports that she did not get a Covid test.  She denies other illness such as fever sweating nausea vomiting, diarrhea, change of taste, cough etc.  Discussed with patient, will check a thyroid panel, which turned out to be normal on 12/4/2020.    · Patient was referred to cardiologist for evaluation of hypertension.  She was seen by Dr. Flowers on 12/21/2020 and the case waiting for further evaluation.  ·        PLAN:    1. Continue to hold Sprycel treatment for now.   2. Continue to follow up with cardiologist Dr. Flowers for further evaluation.  3. Pending study result today for BCR-ABL by RT-PCR.   4. No need for IV iron therapy for now.  Iron study will be repeated every 3 months.  5. Continue oral vitamin B12 at 1000 mcg daily.   6. Arrange follow-up with me in 4 weeks with CBC check.      BAO SLADE M.D., Ph.D.    1/6/2021     Addendum:  BCR-ABL by RT-PCR was complete negative, as reported on 1/12/2021.    BAO SLADE M.D., Ph.D.        CC:   ISHAAN REGAN M.D.     Karyn Mendieta M.D.    Luis Alfredo Flowers M.D.

## 2021-01-08 ENCOUNTER — DOCUMENTATION (OUTPATIENT)
Dept: ONCOLOGY | Facility: CLINIC | Age: 36
End: 2021-01-08

## 2021-01-08 NOTE — PROGRESS NOTES
Fax rec from [facility] stating [employer] has started a PA for pts Sprycel.     I have completed the request and submitted to [employer].    Waiting for a decision.

## 2021-01-12 ENCOUNTER — HOSPITAL ENCOUNTER (OUTPATIENT)
Dept: CARDIOLOGY | Facility: HOSPITAL | Age: 36
Discharge: HOME | End: 2021-01-12
Admitting: INTERNAL MEDICINE

## 2021-01-12 DIAGNOSIS — R07.9 CHEST PAIN, UNSPECIFIED TYPE: ICD-10-CM

## 2021-01-12 LAB
BH CV STRESS BP STAGE 1: NORMAL
BH CV STRESS BP STAGE 2: NORMAL
BH CV STRESS BP STAGE 3: NORMAL
BH CV STRESS DURATION MIN STAGE 1: 3
BH CV STRESS DURATION MIN STAGE 2: 3
BH CV STRESS DURATION MIN STAGE 3: 3
BH CV STRESS DURATION MIN STAGE 4: 1
BH CV STRESS DURATION SEC STAGE 1: 0
BH CV STRESS DURATION SEC STAGE 2: 0
BH CV STRESS DURATION SEC STAGE 3: 0
BH CV STRESS DURATION SEC STAGE 4: 30
BH CV STRESS GRADE STAGE 1: 10
BH CV STRESS GRADE STAGE 2: 12
BH CV STRESS GRADE STAGE 3: 14
BH CV STRESS GRADE STAGE 4: 16
BH CV STRESS HR STAGE 1: 94
BH CV STRESS HR STAGE 2: 114
BH CV STRESS HR STAGE 3: 158
BH CV STRESS HR STAGE 4: 171
BH CV STRESS METS STAGE 1: 5
BH CV STRESS METS STAGE 2: 7.5
BH CV STRESS METS STAGE 3: 10
BH CV STRESS METS STAGE 4: 13.5
BH CV STRESS PROTOCOL 1: NORMAL
BH CV STRESS RECOVERY BP: NORMAL MMHG
BH CV STRESS RECOVERY HR: 98 BPM
BH CV STRESS SPEED STAGE 1: 1.7
BH CV STRESS SPEED STAGE 2: 2.5
BH CV STRESS SPEED STAGE 3: 3.4
BH CV STRESS SPEED STAGE 4: 4.2
BH CV STRESS STAGE 1: 1
BH CV STRESS STAGE 2: 2
BH CV STRESS STAGE 3: 3
BH CV STRESS STAGE 4: 4
MAXIMAL PREDICTED HEART RATE: 184 BPM
PERCENT MAX PREDICTED HR: 92.93 %
STRESS BASELINE BP: NORMAL MMHG
STRESS BASELINE HR: 70 BPM
STRESS PERCENT HR: 109 %
STRESS POST ESTIMATED WORKLOAD: 12 METS
STRESS POST EXERCISE DUR MIN: 10 MIN
STRESS POST EXERCISE DUR SEC: 30 SEC
STRESS POST PEAK BP: NORMAL MMHG
STRESS POST PEAK HR: 171 BPM
STRESS TARGET HR: 156 BPM

## 2021-01-12 PROCEDURE — 93017 CV STRESS TEST TRACING ONLY: CPT

## 2021-01-12 PROCEDURE — 93016 CV STRESS TEST SUPVJ ONLY: CPT | Performed by: INTERNAL MEDICINE

## 2021-01-12 PROCEDURE — 93018 CV STRESS TEST I&R ONLY: CPT | Performed by: INTERNAL MEDICINE

## 2021-01-13 LAB — REF LAB TEST METHOD: NORMAL

## 2021-01-14 ENCOUNTER — TELEPHONE (OUTPATIENT)
Dept: CARDIOLOGY | Facility: CLINIC | Age: 36
End: 2021-01-14

## 2021-01-14 NOTE — TELEPHONE ENCOUNTER
Have tried to call and leave message with patient regarding normal stress test results but unfortunately voicemail is either full or not set up and cannot accept messages at this time.  We will try and call back once we get event monitor results.

## 2021-02-04 ENCOUNTER — OFFICE VISIT (OUTPATIENT)
Dept: CARDIOLOGY | Facility: CLINIC | Age: 36
End: 2021-02-04

## 2021-02-04 VITALS
BODY MASS INDEX: 22.85 KG/M2 | WEIGHT: 150.8 LBS | DIASTOLIC BLOOD PRESSURE: 64 MMHG | SYSTOLIC BLOOD PRESSURE: 100 MMHG | HEART RATE: 57 BPM | HEIGHT: 68 IN

## 2021-02-04 DIAGNOSIS — R00.2 PALPITATION: Primary | ICD-10-CM

## 2021-02-04 DIAGNOSIS — R07.89 SENSATION OF CHEST TIGHTNESS: ICD-10-CM

## 2021-02-04 PROCEDURE — 99214 OFFICE O/P EST MOD 30 MIN: CPT | Performed by: INTERNAL MEDICINE

## 2021-02-04 PROCEDURE — 93000 ELECTROCARDIOGRAM COMPLETE: CPT | Performed by: INTERNAL MEDICINE

## 2021-02-04 NOTE — PROGRESS NOTES
[facility] Follow Up Office Note     Encounter Date:21  Patient:Hu Houston  :1985  MRN:[id number]      Chief Complaint: Follow-up palpitations  Chief Complaint   Patient presents with   • Palpitations     6 week f/u     History of Presenting Illness:      Ms. Houston is a 35 y.o. woman with past medical history notable for CML on chronic chemotherapy as well as exercise-induced asthma presents her office for scheduled follow-up regarding palpitations and chest pain. In general since her last visit with us 6 weeks ago she had no further episodes of palpitations or chest pain in the setting of stopping her chemotherapy. She did have a work-up including a stress test and 2-week event monitor both of which were essentially normal. She is seeing her oncologist next week and plans are to likely start chemotherapy back assuming that she is cleared from our standpoint.      Review of Systems:  Review of Systems   Constitution: Negative.   HENT: Negative.    Eyes: Negative.    Cardiovascular: Negative.    Respiratory: Negative.    Endocrine: Negative.    Hematologic/Lymphatic: Negative.    Skin: Negative.    Musculoskeletal: Negative.    Gastrointestinal: Negative.    Genitourinary: Negative.    Neurological: Negative.    Psychiatric/Behavioral: Negative.    Allergic/Immunologic: Negative.      Current Outpatient Medications on File Prior to Visit   Medication Sig Dispense Refill   • albuterol sulfate  (90 Base) MCG/ACT inhaler      • B Complex-C (SUPER B COMPLEX PO) Take  by mouth.     • dasatinib (Sprycel) 50 MG chemo tablet TAKE 1 TABLET BY MOUTH ONCE DAILY AT THE SAME TIME. MAY TAKE WITH OR WITHOUT FOOD. SWALLOW WHOLE. AVOID GRAPEFRUIT PRODUCTS. 30 tablet 6   • POTASSIUM PO Take  by mouth.       No current facility-administered medications on file prior to visit.          Allergies   Allergen Reactions   • Sulfa Antibiotics Unknown - Low Severity     Childhood reaction       Past  Medical History:   Diagnosis Date   • Anemia in neoplastic disease    • Asthma     childhood   • Chiari I malformation (CMS/HCC)     eval by Dr Moore, NS 2016   • Chronic myelogenous leukemia (CMS/HCC)     remission, Dr Castle follows   • Chronic pain    • CML (chronic myelocytic leukemia) (CMS/HCC)    • Cystitis    • Depression    • GERD (gastroesophageal reflux disease)     ulcer   • H/O Iron deficiency anemia    • H/O Lower extremity pain     Resolved.   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   • Pulmonary hypertension (CMS/HCC)    • Seizures (CMS/HCC)     as child after head injry   • Splenomegaly    • Status post D&C    • Vitamin D deficiency        Past Surgical History:   Procedure Laterality Date   • BONE MARROW BIOPSY  2013   • D&C HYSTEROSCOPY N/A 10/1/2018    Procedure: DILATATION AND CURETTAGE HYSTEROSCOPY;  Surgeon: Beck Cornejo MD;  Location: [facility] OR;  Service: Obstetrics/Gynecology   • D&C HYSTEROSCOPY ENDOMETRIAL ABLATION N/A 4/9/2018    Procedure: Hysteroscopy NovaSure ablation;  Surgeon: Beck Corenjo MD;  Location: [facility] OR;  Service: Obstetrics/Gynecology   • ENDOSCOPY N/A 8/4/2017    Procedure: ESOPHAGOGASTRODUODENOSCOPY with biopsies;  Surgeon: Sophie Trejo MD;  Location: [facility] OR;  Service:    • GYNECOLOGY EXAM UNDER ANESTHESIA      IUD removal   • TOTAL LAPAROSCOPIC HYSTERECTOMY Bilateral 10/29/2018    Procedure: TOTAL LAPAROSCOPIC HYSTERECTOMY, bilateral salpingectomy;  Surgeon: Beck Cornejo MD;  Location: [facility] OR;  Service: Obstetrics/Gynecology   • TUBAL COAGULATION LAPAROSCOPIC Bilateral 4/9/2018    Procedure: TUBAL COAGULATION LAPAROSCOPIC;  Surgeon: Beck Cornejo MD;  Location: [facility] OR;  Service: Obstetrics/Gynecology       Social History     Socioeconomic History   • Marital status:      Spouse  "name: Not on file   • Number of children: Not on file   • Years of education: Not on file   • Highest education level: Not on file   Occupational History   • Occupation:      Employer: [employer]   Tobacco Use   • Smoking status: Former Smoker     Packs/day: 1.00     Years: 8.00     Pack years: 8.00     Types: Cigarettes     Quit date: 2006     Years since quittin.6   • Smokeless tobacco: Never Used   Substance and Sexual Activity   • Alcohol use: Yes     Comment: Social, maybe once every 6 months/no caffeine use   • Drug use: Not Currently     Types: Cocaine(coke)     Comment: prior to    • Sexual activity: Defer     Birth control/protection: Surgical     Comment: Tubal   Social History Narrative    Has tattoos. Studying for her Master's degree.       Family History   Problem Relation Age of Onset   • Hyperlipidemia Mother    • Hypertension Mother    • Stomach cancer Maternal Grandmother 72        Adenocarcinoma esophagus and stomach   • Stroke Paternal Grandmother    • Malig Hyperthermia Neg Hx        The following portions of the patient's history were reviewed and updated as appropriate: allergies, current medications, past family history, past medical history, past social history, past surgical history and problem list.       Objective:       Vitals:    21 1108   BP: 100/64   BP Location: Left arm   Patient Position: Sitting   Pulse: 57   Weight: 68.4 kg (150 lb 12.8 oz)   Height: 172.7 cm (67.99\")       Physical Exam:  Constitutional: Well appearing, well developed, no acute distress   HENT: Oropharynx clear and membrane moist  Eyes: Normal conjunctiva, no sclera icterus.  Neck: Supple, no carotid bruit bilaterally.  Cardiovascular: Regular rate and rhythm, No Murmur, No bilateral lower extremity edema.  Pulmonary: Normal respiratory effort, normal lung sounds, no wheezing.  Abdominal: Soft, nontender, no hepatosplenomegaly, liver is non-pulsatile.  Neurological: " Alert and orient x 3.   Skin: Warm, dry, no ecchymosis, no rash.  Psych: Appropriate mood and affect. Normal judgment and insight.      Lab Results   Component Value Date    GLUCOSE 73 (L) 01/06/2021    BUN 14 01/06/2021    CREATININE 0.83 01/06/2021    EGFRIFNONA 78 01/06/2021    BCR 16.9 01/06/2021    K 4.2 01/06/2021    CO2 28.5 01/06/2021    CALCIUM 9.8 01/06/2021    ALBUMIN 4.80 01/06/2021    AST 14 01/06/2021    ALT 7 01/06/2021       Lab Results   Component Value Date    WBC 9.36 01/06/2021    HGB 15.9 01/06/2021    HCT 47.1 (H) 01/06/2021    MCV 88.9 01/06/2021     01/06/2021       Lab Results   Component Value Date    CKTOTAL 84 01/16/2014    TROPONINT <0.010 06/10/2016       No results found for: CHOL, CHLPL  No results found for: TRIG  No results found for: HDL  No results found for: LDL, LDLDIRECT    Lab Results   Component Value Date    TSH 1.560 12/04/2020         ECG 12 Lead    Date/Time: 2/4/2021 11:37 AM  Performed by: Luis Alfredo Flowers MD  Authorized by: Luis Alfredo Flowers MD   Comparison: compared with previous ECG from 12/21/2020  Similar to previous ECG  Rhythm: sinus rhythm  Ectopy: atrial premature contractions    Clinical impression: normal ECG and non-specific ECG        2-week event monitor 2/3/2021 with tracings reviewed by myself:  · A normal monitor study.  · Underlying heart rhythm was sinus rhythm with normal amount of supraventricular and ventricular ectopy. No significant arrhythmias noted during study  · No diary of medicine or symptoms noted during study    Treadmill stress test with tracings reviewed by myself:  · Baseline ECG of normal sinus rhythm noted. Short WV interval.  · No ECG evidence of myocardial ischemia.Negative clinical evidence of myocardial ischemia. Findings consistent with a normal ECG stress test    Echocardiogram 11/18/2020:  · Calculated left ventricular EF = 59.3% Calculated left ventricular 3D EF = 58% Estimated left ventricular EF = 59% Estimated  left ventricular EF was in agreement with the calculated left ventricular EF. Left ventricular systolic function is normal. Normal global longitudinal LV strain (GLS) = -22.1%. Left ventricle strain data was reviewed by the physician and found to be accurate. Normal left ventricular cavity size and wall thickness noted. All left ventricular wall segments contract normally. Left ventricular diastolic function was normal.  · Trace aortic valve regurgitation is present.  · Trace mitral valve regurgitation is present.  · Trace tricuspid valve regurgitation is present. Estimated right ventricular systolic pressure from tricuspid regurgitation is normal (<35 mmHg). Calculated right ventricular systolic pressure from tricuspid regurgitation is 22 mmHg.          Assessment:          Diagnosis Plan   1. Palpitation  ECG 12 Lead   2. Sensation of chest tightness            Plan:       Ms. Houston is a 35 y.o. woman with past medical history notable for CML on chronic chemotherapy as well as exercise-induced asthma presents for scheduled follow-up regarding her chest pains and palpitations. Overall her symptoms have improved. She has had an extensive work-up including echocardiogram with normal strain imaging, exercise treadmill stress test with no significant ischemic changes or arrhythmias noted, and 2-week event monitor also demonstrated no significant arrhythmias.     Overall I think she is cleared from a cardiac standpoint to restart chemotherapy. Obviously if she were to have any recurrent symptoms I would recommend repeating a monitor at that time but would hold off on starting any empiric therapy or any repeat testing at this time. We'll be happy to see her again if any new issues arise or any recurrent issues with palpitations.    Palpitations:  · Normal TSH and K  · Normal echocardiogram  · Normal Event monitor    Chest pain:  · Normal treadmill stress test    CML:  · Cleared to restart chemotherapy from a cardiac  perspective       Follow-up:  Follow up as needed for any new issues.        Thank you for allowing me to participate in the care of Hu Houston. Feel free to contact me directly with any further questions or concerns.    Luis Alfredo Flowers MD  [facility]  02/04/21  11:39 EST

## 2021-02-07 ENCOUNTER — TELEMEDICINE (OUTPATIENT)
Dept: FAMILY MEDICINE CLINIC | Facility: TELEHEALTH | Age: 36
End: 2021-02-07

## 2021-02-07 DIAGNOSIS — T36.95XA ANTIBIOTIC-INDUCED YEAST INFECTION: ICD-10-CM

## 2021-02-07 DIAGNOSIS — B37.9 ANTIBIOTIC-INDUCED YEAST INFECTION: ICD-10-CM

## 2021-02-07 DIAGNOSIS — R68.84 JAW PAIN: Primary | ICD-10-CM

## 2021-02-07 PROCEDURE — 99213 OFFICE O/P EST LOW 20 MIN: CPT | Performed by: NURSE PRACTITIONER

## 2021-02-07 RX ORDER — FLUCONAZOLE 150 MG/1
TABLET ORAL
Qty: 2 TABLET | Refills: 0 | Status: SHIPPED | OUTPATIENT
Start: 2021-02-07 | End: 2021-05-04

## 2021-02-07 RX ORDER — AMOXICILLIN AND CLAVULANATE POTASSIUM 875; 125 MG/1; MG/1
1 TABLET, FILM COATED ORAL 2 TIMES DAILY
Qty: 14 TABLET | Refills: 0 | Status: SHIPPED | OUTPATIENT
Start: 2021-02-07 | End: 2021-02-14

## 2021-02-07 NOTE — PATIENT INSTRUCTIONS
Call for appointment with dentist on Monday morning. Tylenol or ibuprofen for discomfort. May use 1/2 teaspoon salt with warm water to gargle and swish the mouth with. If no improvement or if symptoms worsen, follow up with PCP, dentist, Urgent Care or ED.    Dental Pain  Dental pain may be caused by many things, including:  · Tooth decay (cavities or caries).  · Infection.  · The inner part of the tooth being filled with pus (abscess).  · Injury.  Sometimes the cause of pain is unknown.  Your pain can vary. It may be mild or severe. You may have it all the time, or it may occur only when you are:  · Chewing.  · Exposed to hot or cold temperature.  · Eating or drinking sugary foods or beverages, such as soda or candy.  Follow these instructions at home:  Medicines  · Take over-the-counter and prescription medicines only as told by your doctor.  · If you were prescribed an antibiotic medicine, take it as told by your doctor. Do not stop taking the medicine even if you start to feel better.  Eating and drinking  · Do not eat foods or drinks that cause you pain. These include:  ? Very hot or very cold foods or drinks.  ? Sweet or sugary foods or drinks.  Managing pain and swelling    · Gargle with a salt-water mixture 3-4 times a day. To make this, dissolve ½-1 tsp of salt in 1 cup of warm water.  · If told, put ice on the painful area of your face:  ? Put ice in a plastic bag.  ? Place a towel between your skin and the bag.  ? Leave the ice on for 20 minutes, 2-3 times a day.  Brushing your teeth  · Brush your teeth twice a day using a fluoride toothpaste.  · Floss your teeth once a day.  · Use a toothpaste made for sensitive teeth as told by your doctor.  · Use a soft toothbrush.  General instructions  · Do not apply heat to the outside of your face.  · Watch your dental pain. Let your doctor know if there are any changes.  · Keep all follow-up visits as told by your doctor. This is important.  Contact a doctor  if:  · Your pain is not relieved by medicines.  · You have new symptoms.  · Your symptoms get worse.  Get help right away if:  · You cannot open your mouth.  · You are having trouble breathing or swallowing.  · You have a fever.  · Your face, neck, or jaw is swollen.  Summary  · Dental pain may be caused by many things, including tooth decay, injury, or infection. In some cases, the cause is not known.  · Your pain may be mild or severe. You may have pain all the time, or you may have it only when you eat or drink.  · Take over-the-counter and prescription medicines only as told by your doctor.  · Watch your dental pain for any changes. Let your doctor know if symptoms get worse.  This information is not intended to replace advice given to you by your health care provider. Make sure you discuss any questions you have with your health care provider.  Document Revised: 04/14/2020 Document Reviewed: 12/31/2018  Elsevier Patient Education © 2020 [employer]

## 2021-02-07 NOTE — PROGRESS NOTES
Chief Complaint  Jaw Pain (Right side)    Subjective          Hu Houston presents to [facility] for   Right jaw pain felt to be caused by chipped tooth. Mild pain, swelling of gum and tender lymph node are present. She denies fever, chills. She has CML, in remission, on chemo pill.     Jaw Pain  This is a new problem. The current episode started in the past 7 days. The problem occurs constantly. The problem has been gradually worsening. Associated symptoms include swollen glands (on the right ). Pertinent negatives include no chills, fever, headaches or sore throat. The symptoms are aggravated by eating and drinking. Treatments tried: mouthwash. The treatment provided no relief.       Objective   Vital Signs:   There were no vitals taken for this visit.    Physical Exam  HENT:      Head:      Jaw: Tenderness present. No pain on movement.      Salivary Glands: Right salivary gland is not diffusely enlarged or tender.        Comments: No obvious redness of the gum     Mouth/Throat:      Mouth: Mucous membranes are moist.   Pulmonary:      Effort: Pulmonary effort is normal.   Neurological:      Mental Status: She is alert.        Result Review :       Data reviewed: recent cardiology note and oncology note          Assessment and Plan    Problem List Items Addressed This Visit     None      Visit Diagnoses     Jaw pain    -  Primary    Relevant Medications    amoxicillin-clavulanate (AUGMENTIN) 875-125 MG per tablet    Antibiotic-induced yeast infection        Relevant Medications    fluconazole (Diflucan) 150 MG tablet        Call for appointment with dentist on Monday morning. Tylenol or ibuprofen for discomfort. May use 1/2 teaspoon salt with warm water to gargle and swish the mouth with. If no improvement or if symptoms worsen, follow up with PCP, dentist, Urgent Care or ED.      Follow Up   No follow-ups on file.  Patient was given instructions and counseling regarding her  condition or for health maintenance advice. Please see specific information pulled into the AVS if appropriate.

## 2021-02-09 ENCOUNTER — TELEMEDICINE (OUTPATIENT)
Dept: ONCOLOGY | Facility: CLINIC | Age: 36
End: 2021-02-09

## 2021-02-09 ENCOUNTER — APPOINTMENT (OUTPATIENT)
Dept: LAB | Facility: HOSPITAL | Age: 36
End: 2021-02-09

## 2021-02-09 DIAGNOSIS — D50.0 IRON DEFICIENCY ANEMIA DUE TO CHRONIC BLOOD LOSS: ICD-10-CM

## 2021-02-09 DIAGNOSIS — R53.82 CHRONIC FATIGUE: ICD-10-CM

## 2021-02-09 DIAGNOSIS — C92.10 CML (CHRONIC MYELOID LEUKEMIA) (HCC): Primary | ICD-10-CM

## 2021-02-09 PROCEDURE — 99214 OFFICE O/P EST MOD 30 MIN: CPT | Performed by: INTERNAL MEDICINE

## 2021-02-09 NOTE — PROGRESS NOTES
REASONS FOR FOLLOWUP:    1. 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    · Patient was started on hydroxyurea temporarily on 10/23/2013 with significant drop of WBC counts.    · Patient started on Sprycel on 12/02/2013. Peripheral blood tested with 3.4% of cells positive for BCR-ABL translocation, tested 02/17/2014.    · The patient had CCyR 6 months into treatment as tested on 05/15/2014.    · Complete molecular response as tested 08/08/14 with negative product of BCR/ABL.   · There was interruption of her treatment due to insurance coverage and misunderstanding from patient's part, she had a relapse of disease with BCR/ABL product at 12.626% in March 2016, and she restarted back on Sprycel.   · Good response as tested on 08/31/2016 with BCR/ABL at 0.294%.    · Since June 2017, patient has been persistently tested negative for BCR/ABL by RT-PCR every 3 month period.     · Patient reported worsening leg cramping in end of July 2018.  Sprycel was decreased to 50 mg daily.  Laboratory studies on 8/31/2018, on 1/30/2019 and on 3/29/2019 were negative for BCR-ABL by RT-PCR.    · Sprycel was on hold during treatment for acute hepatitis C in early part of 2019.  It was resumed in July 2019.  She was weakly positive for BCR-ABL.  · On 10/29/2019 BCR/ABL1 MAJOR (p210) and BCR/ABL1 MINOR (p190) were not detected.    · BCR-ABL by RT-PCR with 0% on 1/24/2020 and also on 4/24/2020.  · Patient was tested negative on 7/10/2020.  Negative on 10/9/2020.    · Because of palpitation, Sprycel was on hold since 11/17/2020.     · BCR-ABL by RT-PCR was complete negative, as reported on 1/12/2021.   2. Recurrent iron deficiency anemia not responding to oral iron supplementation.  She also had significant constipation associated with oral iron.   · Patient was given Feraheme treatment 2 doses in September 2016 and repeated in  January 2017.   · Repeated IV Venofer in July 2018 due to recurrent iron deficiency.           HISTORY OF PRESENT ILLNESS: The patient is a 35 y.o.  female who presents today for reevaluation.  Due to severe weather, patient requested this rescheduled as a telemedicine by video conference.    Patient reports he was recently seen by cardiologist Dr. Flowers last week on 2/4/2021.  Patient had thorough cardiology evaluation and there was no apparent abnormalities.    Patient reports that she has no recurrent dyspnea, palpitation.  She has back to baseline condition.  Her performance status ECOG 0.    We have been withholding her Sprycel in middle November 2020 because of palpitation, and referred patient to cardiology service.        Fortunately, her peripheral blood BCR-ABL by RT-PCR was complete negative, as reported on 1/12/2021.     She stays at home, denies risk for COVID-19 exposure, except that her eldest daughter [facility].          Past Medical History:   Diagnosis Date   • Anemia in neoplastic disease    • Asthma     childhood   • Chiari I malformation (CMS/HCC)     eval by Dr Moore, NS 2016   • Chronic myelogenous leukemia (CMS/HCC)     remission, Dr Castle follows   • Chronic pain    • CML (chronic myelocytic leukemia) (CMS/HCC)    • Cystitis    • Depression    • GERD (gastroesophageal reflux disease)     ulcer   • H/O Iron deficiency anemia    • H/O Lower extremity pain     Resolved.   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      *  Hysterectomy in October 2018      *  Acute hepatitis C in early 2019, treated successfully by Dr. Karyn Mendieta.     OB/GYN HISTORY:  Menarche age 9. G5, P4, 1 miscarriage of the third pregnancy. First pregnancy at age 17. Patient underwent partial hysterectomy in 2018.       HEMATOLOGIC/ONCOLOGIC HISTORY: History from previous dates can be found in the separate document.        Laboratory results on 09/23/2015 showed normalization of hemoglobin 12.2, but still has macrocytosis, MCV 73.3. She has normal platelets and WBC at 9400. Serum ferritin < 5 ng/ml, iron sats 6.3%, iron 29, TIBC 455 mcg/ml. Still has severe iron deficiency, needs to continue oral iron therapy. The patient restarted taking oral iron supplementation which was probably stopped in the end of November 2015.        Laboratory study on 03/22/2016 reported normal WBC 8450, neutrophils 6100, lymphs is 1400 and monocytes 550. Serum iron was 26, TIBC 469 on saturation 6% and ferritin less than 5 NG/ML. Chemistry lab reported normal renal function with a creatinine 0.69, normal liver function panel, total protein 7.5 and albumin 4.2, normal electrolytes. Patient was restarted back on oral on sedimentation twice a day with good tolerance.      Patient continues take Lortab as needed for left leg cramping. Urine drug test on 08/05/2016 was completely negative, and repeated study on August 26 was positive for opiate, negative for other recreational drugs.      Repeat laboratory study on 08/31/2016 reported iron 21, ferritin less than 5, iron saturation 5%, TIBC 462. Hemoglobin was 11.5 MCV 78.1 MCHC 30.4. Platelets 163,000. Total WBC 8870 including neutrophils 6400 and lymphocytes 1500. BCR/ABL was 0.294% by RT-PCR method.       Patient was given IV Feraheme treatment in September 2016, with 2 doses. Repeated laboratory study on 10/06/2016 reported significantly improved and normalized ferritin 269, iron 80, TIBC 365 and iron saturation 22%. Her hemoglobin was 12.2, and MCV 80.8.      Patient reported she was seen by Dr. Fam in 10/2016 and was started on half tablets of Topamax. I  reviewed Dr. Fam’s clinic note and telephone conversation record. The patient reports she has no recurrence of migraine headache. However, she is very tearful today, reporting that she has thoughts of not taking any medications. She did assure me that she has been taking the medication up to this point. 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 The patient denies suicide ideation. When she was initially diagnosed of CML back in 2014, she had depression and I started the patient on Prozac. The patient reported initially it helped her, however, she no longer feels the effect of Prozac. She feels depressed. The patient reports she has no personal hobby. She goes to work and goes home, taking care of her kids. She does not enjoy life as she used to.      Laboratory study on December 29, 2016 reported at ferritin 19.9, iron 33, TIBC 347 iron saturation 10%.  Hemoglobin was 13, normal WBC and platelets.  Unremarkable CMP.  Patient was given 2 doses of Feraheme treatment in early January 2017.      Cytogenetic study on March 14, 2017 reported a BCR-ABL products at 0.098%, showed further improved residual disease.  There is also reported a ferritin 103.7, iron 79, TIBC 336 and iron saturation 24%.  Hemoglobin was 13.5, MCV 92.3, platelets 199,000 WBC 7000.  She had a completely normal CMP.       Repeated laboratory study on June 20, 2017 reported at nondetectable BCR-ABL product.  Ferritin was 45, iron 35 TIBC 333 and iron saturation 11%.  Had a normal CBC and CMP.    In the end of July 2018, patient called reporting worsening significant leg cramping, and getting worse recently and has been taking 6 tablets of Advil with no significant improvement.  We suspect it might be caused by Sprycel for her CML.  We discussed with patient, and decreased to half dose at 50 mg daily.  Patient reports that she is improved leg  cramping since dose reduction.    Per medical records this patient had emergency room visit again on 2018.  Patient reports she had significant abdomen/pelvic pain at a time associate with nausea.  Laboratory study showed significantly elevated WBC at 25,900 including neutrophils 24,400, with elevated hemoglobin 15.8 and platelets 146,000.     She had a thorough investigation, including CT scan for abdomen pelvis which was unremarkable.  She then had ultrasound for the pelvis and it was also unremarkable.  She had ultrasound for the gallbladder examination on the same day and was unremarkable.  She had a normal CMP except of marginally elevated glucose at 112.  Her urinalysis showed only marginally increased WBC 3-5/HPF.  She was negative for yeast infection, negative for Chlamydia trichomonas and Neisseria gonorrhea.  Patient was prescribed Flagyl and doxycycline and discharged to home.      lab study on 2019 reported normal iron saturation 47% and elevated ferritin 507.1 ng/mL with free iron 184 and TIBC 388.  hemoglobin is normal at 13.7 and platelets 186,000. normal WBC at 5080 including ANC 3300, lymphocytes 930 and monocytes 630. Labs reported significantly elevated ALT at 655,  and alkaline phosphatase 234 but normal total bilirubin 0.9. She had normal renal function creatinine 0.78. Normal electrolytes.     On 2019, I searched Up-to-Date and suspected that this patient had drug-induced hepatitis from Diflucan or with combination of Diflucan with Sprycel. This patient had been on Sprycel for years and had no problem with abnormal liver panel previously. It seems Diflucan inhibits CY moderately, which likely interferes with the metabolism of Sprycel. I instructed the patient to hold her Sprycel for now.    Sprycel treatment resumed as of 2019 upon completion of MEVYRET for her hepatitis C.    Laboratory study performed on 2019 showed normal CBC and CMP. BCR/ABL by RT-PCR  detected BCR/ABL1 Major. HCV RNA by PCR showed HCV not detected.    Laboratory studies 7/31/2019 report her hemoglobin is normal at 15.2 and platelets 146,000. normal WBC at 7650 including ANC 5190, lymphocytes 1600 and monocytes 630.    Recent laboratory studies confirmed to be no detectable virus on 9/11/2019.    U/S Liver performed on 10/24/2019 reported negative hepatic ultrasound exam with no focal liver lesion, negative gallbladder examination with no cholelithiasis or bile duct dilation noted, however borderline splenomegaly was noted.     Laboratory study performed on 10/29/2019, reported a completely normal CBC, CMP. Normal iron saturation and still slightly elevated ferritin 325 ng/dL  BCR/ABL1 MAJOR (p210) and BCR/ABL1 MINOR (p190) were not detected.     Laboratory studies 1/24/2020, show hemoglobin 14.3 MCV 92.6 platelets 180,000 and WBC 6570 including neutrophils 4400.  She also has completely normal CMP.  Iron studies reported ferritin 273, iron saturation 11%, hemoglobin 14.3 WBC 6570 and platelets 180,000.  BCR-ABL by RT-PCR with 0%.     Patient presented today for 3-month follow-up and lab review.      Due to pandemic coronavirus infection, patient has been staying home with all 4 children.  Patient reports significant fatigue for the past month, similar to the time when she had iron deficiency.  Patient reports prior to that she was having regular exercise and with good energy level.    Patient reports compliant with Sprycel 50 mg daily.  She denies leg cramping.  She has no nausea no vomiting no abdominal pains.  She has good appetite and no diarrhea.  She denies headaches vision changes no dizziness or seizure activity.    Her laboratory study on 3/12/2020 reported marginal iron saturation 15% however had a good ferritin 212.5 ng/mL.  She had hemoglobin 13.1 and normal thyroid profile including TSH 1.20, free T4 at 1.37 ng/dL and a free T3 at 2.97 pg/mL     On 4/24/2020 her iron study showed  ferritin 262 and iron saturation 21%.  Hemoglobin is 14.8.  She has normal WBC 5740 including ANC 3820, and normal platelets 161,000.   She was negative for BCR-ABL by RT-PCR on the same day.    Laboratory studies, 7/10/2020, show completely normal CBC and CMP.  Negative for BCR-ABL by RT-PCR.  Iron studies showed ferritin 185, iron saturation 13% free iron 37 TIBC 286.    Patient has completed normal CBC 10/9/2020.  Iron studies reported ferritin 2070, free iron 13%.  She also has completed normal CMP.  She was tested negative for BCR-ABL by RT-PCR method.  Sprycel 50 mg daily was continued.     We saw her recently on 10/9/2020 for routine follow-up for her CML.  She was tolerating Sprycel.  Physical examination laboratory studies were unremarkable.  No detectable BCR-ABL by RT-PCR.  We continued her Sprycel at that time.     On 11/17/2020, patient called and reported chest tightness, palpitation and dyspnea new onset in November 2020.  Patient reports this happened recently.  She has chest tightness, and associated tachycardia/palpitation.  Patient reports this is unpredictable, can be on and off.  She noticed that one day she was cooking meal for her family, and noted sudden onset palpitation and chest tightness.  She reports unable to induce purposefully.  She also has exertional dyspnea.  Patient reports 11/17/2020, we asked patient to hold Sprycel.  We suspect the patient may have pleural effusion or cardiac dysfunction secondary to Sprycel, we requested chest CT, echocardiogram study and also laboratory study for evaluation.     Negative chest x-ray on 11/17/2020.     Laboratory study on 11/17/2020 reported stable chronic condition with a ferritin 261 and iron saturation 12%, normal hemoglobin 15.0, unable to explain her dyspnea.     Echocardiogram study on 11/18/2020 reported normal results.     Laboratory study on 1/6/2021 reported hemoglobin 15.9 hematocrit 47.1%, platelets 181,000 WBC 9360 including ANC  7130 lymphocytes 1350. Iron study reported ferritin 293 and iron saturation 14% with free iron 47 and a TIBC 326.  Chemistry lab reported unremarkable CMP including renal function and liver function panel.      MEDICATIONS: The current medication list was reviewed with the patient and updated in the EMR this date per the medical assistant. Medication dosages and frequencies were confirmed to be accurate.        Allergies   Allergen Reactions   • Sulfa Antibiotics Unknown - Low Severity     Childhood reaction     SOCIAL HISTORY: . She smoked cigarettes previously, quit in 2006 with 8-pack-year history. Social drinker, maybe once a month. No illegal drug use. No risk for HIV except tattoos.  Hairstylist.       FAMILY HISTORY: Maternal grandmother had esophageal/stomach adenocarcinoma diagnosed at age of 72 and  of disease progress at age of 73. The patient' s mother has hypertension but otherwise no family history of malignancy, especially no leukemia.        I have reviewed the patient's medical history in detail and updated the computerized patient record.    Review of Systems   Constitutional: Negative for appetite change, fatigue, fever and unexpected weight change.   HENT:   Negative for mouth sores, sore throat and trouble swallowing.    Eyes: Negative for eye problems and icterus.   Respiratory: Negative for cough and shortness of breath.    Cardiovascular: Negative for chest pain, leg swelling and palpitations (This has improved since of Sprycel).        Chest tightness   Gastrointestinal: Negative for abdominal pain, blood in stool, diarrhea and nausea.   Endocrine: Negative for hot flashes.   Genitourinary: Negative for dysuria and hematuria.    Musculoskeletal: Negative for arthralgias, back pain and myalgias.   Skin: Negative for itching and rash.   Neurological: Negative for dizziness, headaches and numbness.   Hematological: Negative for adenopathy. Does not bruise/bleed easily.    Psychiatric/Behavioral: Negative for sleep disturbance. The patient is not nervous/anxious.        VITAL SIGNS:   There were no vitals filed for this visit.        PHYSICAL EXAMINATION: No physical examination since this is telemedicine visit.    LABORATORY DATA:    Lab Results   Component Value Date    WBC 9.36 01/06/2021    HGB 15.9 01/06/2021    HCT 47.1 (H) 01/06/2021    MCV 88.9 01/06/2021     01/06/2021     Lab Results   Component Value Date    NEUTROABS 7.13 (H) 01/06/2021         Lab Results   Component Value Date    IRON 47 01/06/2021    TIBC 326 01/06/2021    FERRITIN 293.20 (H) 01/06/2021   Iron saturation 14% on 1/6/2021       Glucose   Date Value Ref Range Status   01/06/2021 73 (L) 74 - 124 mg/dL Final     BUN   Date Value Ref Range Status   01/06/2021 14 6 - 20 mg/dL Final     Creatinine   Date Value Ref Range Status   01/06/2021 0.83 0.60 - 1.10 mg/dL Final     Sodium   Date Value Ref Range Status   01/06/2021 138 134 - 145 mmol/L Final     Potassium   Date Value Ref Range Status   01/06/2021 4.2 3.5 - 4.7 mmol/L Final     Chloride   Date Value Ref Range Status   01/06/2021 99 98 - 107 mmol/L Final     CO2   Date Value Ref Range Status   01/06/2021 28.5 22.0 - 29.0 mmol/L Final     Calcium   Date Value Ref Range Status   01/06/2021 9.8 8.5 - 10.2 mg/dL Final     Total Protein   Date Value Ref Range Status   01/06/2021 7.9 6.3 - 8.0 g/dL Final     Albumin   Date Value Ref Range Status   01/06/2021 4.80 3.50 - 5.20 g/dL Final     ALT (SGPT)   Date Value Ref Range Status   01/06/2021 7 0 - 33 U/L Final     AST (SGOT)   Date Value Ref Range Status   01/06/2021 14 0 - 32 U/L Final     Alkaline Phosphatase   Date Value Ref Range Status   01/06/2021 63 38 - 116 U/L Final     Total Bilirubin   Date Value Ref Range Status   01/06/2021 0.4 0.2 - 1.2 mg/dL Final     eGFR Non  Amer   Date Value Ref Range Status   01/06/2021 78 >60 mL/min/1.73 Final     BUN/Creatinine Ratio   Date Value Ref Range  Status   01/06/2021 16.9 7.3 - 30.0 Final     Anion Gap   Date Value Ref Range Status   01/06/2021 10.5 5.0 - 15.0 mmol/L Final       IMAGING STUDY:       ASSESSMENT:      1. Chronic myelogenous leukemia, chronic phase with excellent response to Sprycel and complete molecular response in August 2014. However she had interuption of treatment in early part of 2016, because of insurance issues and miscommunication, she stopped the medicine without telling us, and laboratory test on 03/22/2016 reported disease relapse with increased BCR/ABL product at 12.626%. subsequently she was restarted back on Sprycel, and responded well.  Since June 2017, she has completed molecular response as tested every 3 months.  She has been compliant with treatment.   · In the end of July 2018, she reported worsening significant cramping involving her legs, so we decreased her Sprycel to 50 mg daily starting early August.  She's been having less problem since that time.  She was tested negative for BCR-ABL on 8/31/2018.    · Patient had a negative BCR-ABL by RT-PCR in end of January 2019 and also on 3/29/2019.   · Patient was later found having significantly elevated liver function panel.  Sprycel was on hold and she was subsequently found having acute hepatitis C infection.    · I discussed with Dr. Karyn Mendieta, we'll hold her Sprycel for now until she finishes hepatitis C treatment.   · She was started on hepatitis C treatment on 4/18/2019 and finished an 8-week course.  · On 07/02/2019 patient's labs showed BCR-ABL Major (p210) detected by RT-PCR at 0.0141%. BCR/ABL1 Minor (p190) was not detected. HCV was not detected.  · Sprycel treatment was resumed as of 07/02/2019 upon completion of MEVYRET.  · Laboratory study on 10/29/2019 reported normal CBC. BCR/ABL1 MAJOR (p210) and BCR/ABL1 MINOR (p190) were not detected.    · BCR-ABL by RT-PCR on 1/24/2020 was negative.     · Lab result for BCR ABL by RT-PCR was also negative on 4/24/2020.  Patient will need to continue on sprycel treatment.    · 7/10/2020 patient has normal CBC and CMP.  Negative RT-PCR for BCR ABL.  Tolerating well.  Continue Sprycel at 50 mg daily.  · On 10/9/2020, completely normal CBC with good tolerance.  BCR-ABL was tested negative by RT-PCR method.  Continue Sprycel.   · Patient reports 11/17/2020 chest tightness in palpitation, and exertion dyspnea progressively getting worse in the past week.  We asked patient to hold Sprycel.   · Chest x-ray examination on 11/17/2020 was unremarkable.  Echocardiogram study on 11/18/2020 was also benign.  Patient was seen by cardiologist for further evaluation.    · Laboratory studies on 1/6/2021 reported normal WBC 9360 including ANC 7130 lymphocytes 1350. BCR-ABL by RT-PCR was complete negative, as reported on 1/12/2021.   · Patient had a negative cardiac work-up.  She also has resolution of symptoms.  Discussed with patient on 2/9/2021, we recommended resumption of Sprycel 50 mg daily.  Patient is agreeable.      2. Recurrent Iron deficiency anemia.    · She was treated again with Feraheme October 2017.   Iron deficiency thought to be related to ulcer identified on EGD, being treated with Carafate.  She also had heavy menses.  · She had recurrent iron deficiency again and was given Feraheme 2 doses in March 2018.   · Subsequently recurrent iron deficiency in July 2018, she was switched to Venofer 3 doses total 900 mg.   · Patient had a simple hysterectomy in October 2018.  · Laboratory study showed supratherapeutic ferritin 458 and iron saturation 40% on 4/30/2019.   · Laboratory study performed 10/29/2019, showed normal iron saturation and ferritin 325 ng/dL.  · Normal iron studies including ferritin 262 and iron saturation 21% on 4/24/2020.  No evidence of recurrent iron deficiency.   · Lab study on 7/10/2020 reported ferritin 185, iron saturation 13% and normal hemoglobin 14.5.  She has deteriorating iron studies.   Continue to monitor  in 3 months.  · Labs on 10/9/2020 reported ferritin 270, iron saturation 13% and hemoglobin 14.9.  · Lab studies on 1/6/2021 reported mild erythrocytosis.  Hemoglobin is 15.9 and hematocrit of 47.1%, with ferritin 293 and iron saturation 14%.  Continue to monitor.    3.  Significant fatigue in the past month as reported on 4/24/2020.  Iron study showed appropriate ferritin and iron saturation.  Normal thyroid profile on 3/12/2020.  Exact etiology of her fatigue is not clear.  I asked patient to try oral vitamin B12 and folic acid to see if it eases symptoms.  · Patient reports on 7/10/2020 that she has significant improvement of energy level after starting oral vitamin B12.  This will be continued.  · Patient had a normal panel for TSH, free T3 and free T4 on 12/4/2020.     *Chest tightness, palpitation in the dyspnea new onset in November 2020.  Patient reports this happened recently, and that usually unpredictable, she has chest tightness, and associated tachycardia/palpitation.  Patient reports this is unpredictable, can be on and off.  She noticed 1 day she was cooking meal for her family, and noted several palpitation and chest tightness.  She reports unable to induce purposefully.  She also has exertional dyspnea.  Patient reports 11/17/2020, we asked patient to hold Sprycel.  We requested chest CT, echocardiogram study and also laboratory study for evaluation.  · Negative chest x-ray on 11/17/2020.   · Laboratory study on 11/17/2020 reported stable chronic condition with a ferritin 261 and iron saturation 12%, normal hemoglobin 15.0, unable to explain her dyspnea.   · Echocardiogram study on 11/18/2020 reported normal results.   · Patient is evaluated 11/20/2020, she reports somewhat improved symptoms, since she stopped Sprycel for the past 3 days.  She denies extremity edema.  She denies fever sweating or chills.  We recommend patient to continue to hold Sprycel for another 2 weeks.  We also recommended  patient to be tested for COVID-19.    · Reevaluation on 12/4/2020, patient reports that she did not get a Covid test.  She denies other illness such as fever sweating nausea vomiting, diarrhea, change of taste, cough etc.  Discussed with patient, will check a thyroid panel, which turned out to be normal on 12/4/2020.    · Patient was referred to cardiologist for evaluation of hypertension.  She was seen by Dr. Flowers on 12/21/2020 and the case waiting for further evaluation.  · Patient had a negative cardiac work-up.  Her symptom has resolved.       PLAN:    1. Restart Sprycel treatment 50 mg daily.   2. Continue oral vitamin B12 at 1000 mcg daily.   3. Arrange follow-up with me in 4 weeks with CBC and CMP check.  4. I asked patient to call if she has recurrent symptoms with palpitations and dyspnea.  Patient voiced understanding.    You have chosen to receive care through a telehealth visit.  Do you consent to use a video/audio connection for your medical care today?   YES       BAO SLADE M.D., Ph.D.    2/9/2021       CC:   ISHAAN REGAN M.D.     Karyn Mendieta M.D.    Luis Alfredo Flowers M.D.

## 2021-02-14 PROBLEM — K71.6 DRUG-INDUCED HEPATITIS: Status: RESOLVED | Noted: 2019-01-30 | Resolved: 2021-02-14

## 2021-02-14 PROBLEM — T50.905A DRUG-INDUCED HEPATITIS: Status: RESOLVED | Noted: 2019-01-30 | Resolved: 2021-02-14

## 2021-03-10 ENCOUNTER — APPOINTMENT (OUTPATIENT)
Dept: LAB | Facility: HOSPITAL | Age: 36
End: 2021-03-10

## 2021-03-16 ENCOUNTER — LAB (OUTPATIENT)
Dept: LAB | Facility: HOSPITAL | Age: 36
End: 2021-03-16

## 2021-03-16 ENCOUNTER — OFFICE VISIT (OUTPATIENT)
Dept: ONCOLOGY | Facility: CLINIC | Age: 36
End: 2021-03-16

## 2021-03-16 VITALS
TEMPERATURE: 98.2 F | HEIGHT: 68 IN | RESPIRATION RATE: 14 BRPM | OXYGEN SATURATION: 99 % | HEART RATE: 70 BPM | DIASTOLIC BLOOD PRESSURE: 46 MMHG | SYSTOLIC BLOOD PRESSURE: 113 MMHG | BODY MASS INDEX: 22.35 KG/M2 | WEIGHT: 147.5 LBS

## 2021-03-16 DIAGNOSIS — C92.10 CML (CHRONIC MYELOID LEUKEMIA) (HCC): Primary | ICD-10-CM

## 2021-03-16 DIAGNOSIS — C92.10 CML (CHRONIC MYELOID LEUKEMIA) (HCC): ICD-10-CM

## 2021-03-16 DIAGNOSIS — D50.0 IRON DEFICIENCY ANEMIA DUE TO CHRONIC BLOOD LOSS: ICD-10-CM

## 2021-03-16 DIAGNOSIS — R00.2 PALPITATION: ICD-10-CM

## 2021-03-16 LAB
ALBUMIN SERPL-MCNC: 4.9 G/DL (ref 3.5–5.2)
ALBUMIN/GLOB SERPL: 1.9 G/DL
ALP SERPL-CCNC: 58 U/L (ref 39–117)
ALT SERPL W P-5'-P-CCNC: 9 U/L (ref 1–33)
ANION GAP SERPL CALCULATED.3IONS-SCNC: 8 MMOL/L (ref 5–15)
AST SERPL-CCNC: 14 U/L (ref 1–32)
BASOPHILS # BLD AUTO: 0.04 10*3/MM3 (ref 0–0.2)
BASOPHILS NFR BLD AUTO: 0.6 % (ref 0–1.5)
BILIRUB SERPL-MCNC: 0.3 MG/DL (ref 0–1.2)
BUN SERPL-MCNC: 15 MG/DL (ref 6–20)
BUN/CREAT SERPL: 18.8 (ref 7–25)
CALCIUM SPEC-SCNC: 9.6 MG/DL (ref 8.6–10.5)
CHLORIDE SERPL-SCNC: 103 MMOL/L (ref 98–107)
CO2 SERPL-SCNC: 27 MMOL/L (ref 22–29)
CREAT SERPL-MCNC: 0.8 MG/DL (ref 0.57–1)
DEPRECATED RDW RBC AUTO: 39.3 FL (ref 37–54)
EOSINOPHIL # BLD AUTO: 0.2 10*3/MM3 (ref 0–0.4)
EOSINOPHIL NFR BLD AUTO: 3.1 % (ref 0.3–6.2)
ERYTHROCYTE [DISTWIDTH] IN BLOOD BY AUTOMATED COUNT: 12.1 % (ref 12.3–15.4)
GFR SERPL CREATININE-BSD FRML MDRD: 81 ML/MIN/1.73
GLOBULIN UR ELPH-MCNC: 2.6 GM/DL
GLUCOSE SERPL-MCNC: 90 MG/DL (ref 65–99)
HCT VFR BLD AUTO: 42.5 % (ref 34–46.6)
HGB BLD-MCNC: 14.4 G/DL (ref 12–15.9)
IMM GRANULOCYTES # BLD AUTO: 0.01 10*3/MM3 (ref 0–0.05)
IMM GRANULOCYTES NFR BLD AUTO: 0.2 % (ref 0–0.5)
LYMPHOCYTES # BLD AUTO: 1.8 10*3/MM3 (ref 0.7–3.1)
LYMPHOCYTES NFR BLD AUTO: 28.1 % (ref 19.6–45.3)
MCH RBC QN AUTO: 30.1 PG (ref 26.6–33)
MCHC RBC AUTO-ENTMCNC: 33.9 G/DL (ref 31.5–35.7)
MCV RBC AUTO: 88.7 FL (ref 79–97)
MONOCYTES # BLD AUTO: 0.58 10*3/MM3 (ref 0.1–0.9)
MONOCYTES NFR BLD AUTO: 9.1 % (ref 5–12)
NEUTROPHILS NFR BLD AUTO: 3.77 10*3/MM3 (ref 1.7–7)
NEUTROPHILS NFR BLD AUTO: 58.9 % (ref 42.7–76)
NRBC BLD AUTO-RTO: 0 /100 WBC (ref 0–0.2)
PLATELET # BLD AUTO: 172 10*3/MM3 (ref 140–450)
PMV BLD AUTO: 10 FL (ref 6–12)
POTASSIUM SERPL-SCNC: 3.9 MMOL/L (ref 3.5–5.2)
PROT SERPL-MCNC: 7.5 G/DL (ref 6–8.5)
RBC # BLD AUTO: 4.79 10*6/MM3 (ref 3.77–5.28)
SODIUM SERPL-SCNC: 138 MMOL/L (ref 136–145)
WBC # BLD AUTO: 6.4 10*3/MM3 (ref 3.4–10.8)

## 2021-03-16 PROCEDURE — 36415 COLL VENOUS BLD VENIPUNCTURE: CPT

## 2021-03-16 PROCEDURE — 99213 OFFICE O/P EST LOW 20 MIN: CPT | Performed by: INTERNAL MEDICINE

## 2021-03-16 PROCEDURE — 85025 COMPLETE CBC W/AUTO DIFF WBC: CPT

## 2021-03-16 PROCEDURE — 80053 COMPREHEN METABOLIC PANEL: CPT | Performed by: INTERNAL MEDICINE

## 2021-03-17 ENCOUNTER — TELEPHONE (OUTPATIENT)
Dept: ONCOLOGY | Facility: CLINIC | Age: 36
End: 2021-03-17

## 2021-03-18 ENCOUNTER — TELEPHONE (OUTPATIENT)
Dept: ONCOLOGY | Facility: CLINIC | Age: 36
End: 2021-03-18

## 2021-03-18 ENCOUNTER — DOCUMENTATION (OUTPATIENT)
Dept: ONCOLOGY | Facility: CLINIC | Age: 36
End: 2021-03-18

## 2021-03-18 NOTE — TELEPHONE ENCOUNTER
Spoke with patient to get clarification on what she was needing. She stated that she is currently in a custody ocampo with her daughters grandmother. The father has recently passed and the grandmother is seeking full custody claiming that she does not do anything with her daughter like take her out. 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 I  Reviewed this with Dr. Castle and we will be writing a letter from our office about her diagnosis and mediation management and scanning it to her email.

## 2021-03-24 ENCOUNTER — TELEPHONE (OUTPATIENT)
Dept: ONCOLOGY | Facility: CLINIC | Age: 36
End: 2021-03-24

## 2021-03-24 NOTE — TELEPHONE ENCOUNTER
Spoke with patient to let her know she is ok to get the covid vaccine. She is not on any active iv chemo. Patient v/u.

## 2021-03-24 NOTE — PROGRESS NOTES
REASONS FOR FOLLOWUP:    1. Chronic myelogenous leukemia with splenomegaly, chronic phase, positive [place] chromosome, no mutation at kinase domain diagnosed in November 2020.    · Patient was started on hydroxyurea temporarily on 10/23/2013 with significant drop of WBC counts.    · Patient started on Sprycel on 12/02/2013. Peripheral blood tested with 3.4% of cells positive for BCR-ABL translocation, tested 02/17/2014.    · The patient had CCyR 6 months into treatment as tested on 05/15/2014.    · Complete molecular response as tested 08/08/14 with negative product of BCR/ABL.   · There was interruption of her treatment due to insurance coverage and misunderstanding from patient's part, she had a relapse of disease with BCR/ABL product at 12.626% in March 2016, and she restarted back on Sprycel.   · Good response as tested on 08/31/2016 with BCR/ABL at 0.294%.    · Since June 2017, patient has been persistently tested negative for BCR/ABL by RT-PCR every 3 month period.     · Patient reported worsening leg cramping in end of July 2018.  Sprycel was decreased to 50 mg daily.  Laboratory studies on 8/31/2018, on 1/30/2019 and on 3/29/2019 were negative for BCR-ABL by RT-PCR.    · Sprycel was on hold during treatment for acute hepatitis C in early part of 2019.  It was resumed in July 2019.  She was weakly positive for BCR-ABL.  · On 10/29/2019 BCR/ABL1 MAJOR (p210) and BCR/ABL1 MINOR (p190) were not detected.    · BCR-ABL by RT-PCR with 0% on 1/24/2020 and also on 4/24/2020.  · Patient was tested negative on 7/10/2020.  Negative on 10/9/2020.    · Because of palpitation, Sprycel was on hold since 11/17/2020.     · BCR-ABL by RT-PCR was complete negative as reported on 1/12/2021.   · Sprycel was resumed on 2/9/2020 at 50 mg daily.  2. Recurrent iron deficiency anemia not responding to oral iron supplementation.  She also had significant constipation associated with oral iron.   · Patient was given Feraheme  treatment 2 doses in September 2016 and repeated in January 2017 and again in October 2017 and March 2018.   · IV Venofer total 900 mg in July 2018 due to recurrent iron deficiency.           HISTORY OF PRESENT ILLNESS: The patient is a 36 y.o.  female who presents today for reevaluation to assess her tolerance to Sprycel.  She resumed treatment Sprycel 50 mg daily on 2/9/2021.    Patient reports no recurrent palpitation or chest discomfort. Patient reports that she has no recurrent dyspnea.  Her performance status ECOG 0.    She stays at home, denies risk for COVID-19 exposure, except that her eldest daughter goes to [facility] once a week.      Laboratory studies today on 3/16/2021 reported normal CBC including hemoglobin 14.4 MCV 88.7, platelets 173,000, and WBC 6400 including ANC 3770 lymphocytes 1800.  Chemistry lab reported normal CMP.      Past Medical History:   Diagnosis Date   • Anemia in neoplastic disease    • Asthma     childhood   • Chiari I malformation (CMS/HCC)     eval by Dr Moore, NS 2016   • Chronic myelogenous leukemia (CMS/HCC)     remission, Dr Castle follows   • Chronic pain    • CML (chronic myelocytic leukemia) (CMS/HCC)    • Cystitis    • Depression    • GERD (gastroesophageal reflux disease)     ulcer   • H/O Iron deficiency anemia    • H/O Lower extremity pain     Resolved.   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      *  Hysterectomy in October 2018      *  Acute hepatitis C in early 2019, treated successfully by Dr. Karyn Mendieta.     OB/GYN HISTORY: Menarche age 9. G5, P4, 1 miscarriage of the third pregnancy. First pregnancy at age 17. Patient underwent partial  hysterectomy in 2018.       HEMATOLOGIC/ONCOLOGIC HISTORY: History from previous dates can be found in the separate document.        Laboratory results on 09/23/2015 showed normalization of hemoglobin 12.2, but still has macrocytosis, MCV 73.3. She has normal platelets and WBC at 9400. Serum ferritin < 5 ng/ml, iron sats 6.3%, iron 29, TIBC 455 mcg/ml. Still has severe iron deficiency, needs to continue oral iron therapy. The patient restarted taking oral iron supplementation which was probably stopped in the end of November 2015.        Laboratory study on 03/22/2016 reported normal WBC 8450, neutrophils 6100, lymphs is 1400 and monocytes 550. Serum iron was 26, TIBC 469 on saturation 6% and ferritin less than 5 NG/ML. Chemistry lab reported normal renal function with a creatinine 0.69, normal liver function panel, total protein 7.5 and albumin 4.2, normal electrolytes. Patient was restarted back on oral on sedimentation twice a day with good tolerance.      Patient continues take Lortab as needed for left leg cramping. Urine drug test on 08/05/2016 was completely negative, and repeated study on August 26 was positive for opiate, negative for other recreational drugs.      Repeat laboratory study on 08/31/2016 reported iron 21, ferritin less than 5, iron saturation 5%, TIBC 462. Hemoglobin was 11.5 MCV 78.1 MCHC 30.4. Platelets 163,000. Total WBC 8870 including neutrophils 6400 and lymphocytes 1500. BCR/ABL was 0.294% by RT-PCR method.       Patient was given IV Feraheme treatment in September 2016, with 2 doses. Repeated laboratory study on 10/06/2016 reported significantly improved and normalized ferritin 269, iron 80, TIBC 365 and iron saturation 22%. Her hemoglobin was 12.2, and MCV 80.8.      Patient reported she was seen by Dr. Fam in 10/2016 and was started on half tablets of Topamax. I reviewed Dr. Fam’s clinic note and telephone conversation record. The patient reports she has no recurrence  of migraine headache. However, she is very tearful today, reporting that she has thoughts of not taking any medications. She did assure me that she has been taking the medication up to this point. 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 The patient denies suicide ideation. When she was initially diagnosed of CML back in 2014, she had depression and I started the patient on Prozac. The patient reported initially it helped her, however, she no longer feels the effect of Prozac. She feels depressed. The patient reports she has no personal hobby. She goes to work and goes home, taking care of her kids. She does not enjoy life as she used to.      Laboratory study on December 29, 2016 reported at ferritin 19.9, iron 33, TIBC 347 iron saturation 10%.  Hemoglobin was 13, normal WBC and platelets.  Unremarkable CMP.  Patient was given 2 doses of Feraheme treatment in early January 2017.      Cytogenetic study on March 14, 2017 reported a BCR-ABL products at 0.098%, showed further improved residual disease.  There is also reported a ferritin 103.7, iron 79, TIBC 336 and iron saturation 24%.  Hemoglobin was 13.5, MCV 92.3, platelets 199,000 WBC 7000.  She had a completely normal CMP.       Repeated laboratory study on June 20, 2017 reported at nondetectable BCR-ABL product.  Ferritin was 45, iron 35 TIBC 333 and iron saturation 11%.  Had a normal CBC and CMP.    In the end of July 2018, patient called reporting worsening significant leg cramping, and getting worse recently and has been taking 6 tablets of Advil with no significant improvement.  We suspect it might be caused by Sprycel for her CML.  We discussed with patient, and decreased to half dose at 50 mg daily.  Patient reports that she is improved leg cramping since dose reduction.    Per medical records this patient had emergency room visit again on 8/18/2018.   Patient reports she had significant abdomen/pelvic pain at a time associate with nausea.  Laboratory study showed significantly elevated WBC at 25,900 including neutrophils 24,400, with elevated hemoglobin 15.8 and platelets 146,000.     She had a thorough investigation, including CT scan for abdomen pelvis which was unremarkable.  She then had ultrasound for the pelvis and it was also unremarkable.  She had ultrasound for the gallbladder examination on the same day and was unremarkable.  She had a normal CMP except of marginally elevated glucose at 112.  Her urinalysis showed only marginally increased WBC 3-5/HPF.  She was negative for yeast infection, negative for Chlamydia trichomonas and Neisseria gonorrhea.  Patient was prescribed Flagyl and doxycycline and discharged to home.      lab study on 2019 reported normal iron saturation 47% and elevated ferritin 507.1 ng/mL with free iron 184 and TIBC 388.  hemoglobin is normal at 13.7 and platelets 186,000. normal WBC at 5080 including ANC 3300, lymphocytes 930 and monocytes 630. Labs reported significantly elevated ALT at 655,  and alkaline phosphatase 234 but normal total bilirubin 0.9. She had normal renal function creatinine 0.78. Normal electrolytes.     On 2019, I searched Up-to-Date and suspected that this patient had drug-induced hepatitis from Diflucan or with combination of Diflucan with Sprycel. This patient had been on Sprycel for years and had no problem with abnormal liver panel previously. It seems Diflucan inhibits CY moderately, which likely interferes with the metabolism of Sprycel. I instructed the patient to hold her Sprycel for now.    Sprycel treatment resumed as of 2019 upon completion of MEVYRET for her hepatitis C.    Laboratory study performed on 2019 showed normal CBC and CMP. BCR/ABL by RT-PCR detected BCR/ABL1 Major. HCV RNA by PCR showed HCV not detected.    Laboratory studies 2019 report her  hemoglobin is normal at 15.2 and platelets 146,000. normal WBC at 7650 including ANC 5190, lymphocytes 1600 and monocytes 630.    Recent laboratory studies confirmed to be no detectable virus on 9/11/2019.    U/S Liver performed on 10/24/2019 reported negative hepatic ultrasound exam with no focal liver lesion, negative gallbladder examination with no cholelithiasis or bile duct dilation noted, however borderline splenomegaly was noted.     Laboratory study performed on 10/29/2019, reported a completely normal CBC, CMP. Normal iron saturation and still slightly elevated ferritin 325 ng/dL  BCR/ABL1 MAJOR (p210) and BCR/ABL1 MINOR (p190) were not detected.     Laboratory studies 1/24/2020, show hemoglobin 14.3 MCV 92.6 platelets 180,000 and WBC 6570 including neutrophils 4400.  She also has completely normal CMP.  Iron studies reported ferritin 273, iron saturation 11%, hemoglobin 14.3 WBC 6570 and platelets 180,000.  BCR-ABL by RT-PCR with 0%.     Patient presented today for 3-month follow-up and lab review.      Due to pandemic coronavirus infection, patient has been staying home with all 4 children.  Patient reports significant fatigue for the past month, similar to the time when she had iron deficiency.  Patient reports prior to that she was having regular exercise and with good energy level.    Patient reports compliant with Sprycel 50 mg daily.  She denies leg cramping.  She has no nausea no vomiting no abdominal pains.  She has good appetite and no diarrhea.  She denies headaches vision changes no dizziness or seizure activity.    Her laboratory study on 3/12/2020 reported marginal iron saturation 15% however had a good ferritin 212.5 ng/mL.  She had hemoglobin 13.1 and normal thyroid profile including TSH 1.20, free T4 at 1.37 ng/dL and a free T3 at 2.97 pg/mL     On 4/24/2020 her iron study showed ferritin 262 and iron saturation 21%.  Hemoglobin is 14.8.  She has normal WBC 5740 including ANC 3820, and  normal platelets 161,000.   She was negative for BCR-ABL by RT-PCR on the same day.    Laboratory studies, 7/10/2020, show completely normal CBC and CMP.  Negative for BCR-ABL by RT-PCR.  Iron studies showed ferritin 185, iron saturation 13% free iron 37 TIBC 286.    Patient has completed normal CBC 10/9/2020.  Iron studies reported ferritin 2070, free iron 13%.  She also has completed normal CMP.  She was tested negative for BCR-ABL by RT-PCR method.  Sprycel 50 mg daily was continued.     We saw her recently on 10/9/2020 for routine follow-up for her CML.  She was tolerating Sprycel.  Physical examination laboratory studies were unremarkable.  No detectable BCR-ABL by RT-PCR.  We continued her Sprycel at that time.     On 11/17/2020, patient called and reported chest tightness, palpitation and dyspnea new onset in November 2020.  Patient reports this happened recently.  She has chest tightness, and associated tachycardia/palpitation.  Patient reports this is unpredictable, can be on and off.  She noticed that one day she was cooking meal for her family, and noted sudden onset palpitation and chest tightness.  She reports unable to induce purposefully.  She also has exertional dyspnea.  Patient reports 11/17/2020, we asked patient to hold Sprycel.  We suspect the patient may have pleural effusion or cardiac dysfunction secondary to Sprycel, we requested chest CT, echocardiogram study and also laboratory study for evaluation.     Negative chest x-ray on 11/17/2020.     Laboratory study on 11/17/2020 reported stable chronic condition with a ferritin 261 and iron saturation 12%, normal hemoglobin 15.0, unable to explain her dyspnea.     Echocardiogram study on 11/18/2020 reported normal results.     Laboratory study on 1/6/2021 reported hemoglobin 15.9 hematocrit 47.1%, platelets 181,000 WBC 9360 including ANC 7130 lymphocytes 1350. Iron study reported ferritin 293 and iron saturation 14% with free iron 47 and a TIBC  326.  Chemistry lab reported unremarkable CMP including renal function and liver function panel.      Patient reports she was seen by cardiologist Dr. Flowers on 2021.  Patient had thorough cardiology evaluation and there was no apparent abnormalities.    We have been withholding her Sprycel in middle 2020 because of palpitation, and referred patient to cardiology service.        Fortunately, her peripheral blood BCR-ABL by RT-PCR was complete negative, as reported on 2021.     Sprycel was resumed on 2021.      MEDICATIONS: The current medication list was reviewed with the patient and updated in the EMR this date per the medical assistant. Medication dosages and frequencies were confirmed to be accurate.        Allergies   Allergen Reactions   • Sulfa Antibiotics Unknown - Low Severity     Childhood reaction     SOCIAL HISTORY: . She smoked cigarettes previously, quit in 2006 with 8-pack-year history. Social drinker, maybe once a month. No illegal drug use. No risk for HIV except tattoos.  Hairstylist.       FAMILY HISTORY: Maternal grandmother had esophageal/stomach adenocarcinoma diagnosed at age of 72 and  of cancer at age of 73. The patient' s mother has hypertension but otherwise healthy.  No other family history of malignancy, especially no leukemia.        I have reviewed the patient's medical history in detail and updated the computerized patient record.    Review of Systems   Constitutional: Negative for appetite change, diaphoresis, fatigue, fever and unexpected weight change.   HENT:   Negative for mouth sores and trouble swallowing.    Eyes: Negative for eye problems and icterus.   Respiratory: Negative for chest tightness, cough and shortness of breath.    Cardiovascular: Negative for chest pain, leg swelling and palpitations.   Gastrointestinal: Negative for abdominal pain, blood in stool, diarrhea and nausea.   Endocrine: Negative for hot flashes.  "  Genitourinary: Negative for dysuria and hematuria.    Musculoskeletal: Negative for arthralgias, back pain and myalgias.   Skin: Negative for itching and rash.   Neurological: Negative for dizziness, headaches and numbness.   Hematological: Negative for adenopathy. Does not bruise/bleed easily.   Psychiatric/Behavioral: Negative for sleep disturbance. The patient is not nervous/anxious.        VITAL SIGNS:   Vitals:    03/16/21 1521   BP: 113/46   Pulse: 70   Resp: 14   Temp: 98.2 °F (36.8 °C)   SpO2: 99%   Weight: 66.9 kg (147 lb 8 oz)   Height: 172.7 cm (67.99\")   PainSc: 0-No pain           PHYSICAL EXAMINATION:   GENERAL:  Well-developed, well-nourished in no acute distress.   SKIN:  Warm, dry without rashes, purpura or petechiae.  HEAD:  Normocephalic.  EYES:  Pupils equal, round.  Conjunctivae normal.  NOSE:  Patient wears mask due to the pandemic coronavirus infection.   NECK:  Supple; no thyromegaly or masses.  LYMPHATICS:  No cervical, supraclavicular adenopathy.  CHEST: Normal respiratory effort.  Lungs clear to auscultation. Good airflow.  CARDIAC:  Regular rate and rhythm without murmurs. Normal S1,S2.  ABDOMEN:  Soft, nontender with no organomegaly or masses.  Bowel sounds normal.  EXTREMITIES:  No clubbing, cyanosis or edema.  NEUROLOGICAL:  Cranial Nerves II-XII grossly intact.    PSYCHIATRIC:  Normal affect and mood.        LABORATORY DATA:    Lab Results   Component Value Date    WBC 6.40 03/16/2021    HGB 14.4 03/16/2021    HCT 42.5 03/16/2021    MCV 88.7 03/16/2021     03/16/2021     Lab Results   Component Value Date    NEUTROABS 3.77 03/16/2021         Lab Results   Component Value Date    IRON 47 01/06/2021    TIBC 326 01/06/2021    FERRITIN 293.20 (H) 01/06/2021   Iron saturation 14% on 1/6/2021       Glucose   Date Value Ref Range Status   03/16/2021 90 65 - 99 mg/dL Final     BUN   Date Value Ref Range Status   03/16/2021 15 6 - 20 mg/dL Final     Creatinine   Date Value Ref Range " Status   03/16/2021 0.80 0.57 - 1.00 mg/dL Final     Sodium   Date Value Ref Range Status   03/16/2021 138 136 - 145 mmol/L Final     Potassium   Date Value Ref Range Status   03/16/2021 3.9 3.5 - 5.2 mmol/L Final     Chloride   Date Value Ref Range Status   03/16/2021 103 98 - 107 mmol/L Final     CO2   Date Value Ref Range Status   03/16/2021 27.0 22.0 - 29.0 mmol/L Final     Calcium   Date Value Ref Range Status   03/16/2021 9.6 8.6 - 10.5 mg/dL Final     Total Protein   Date Value Ref Range Status   03/16/2021 7.5 6.0 - 8.5 g/dL Final     Albumin   Date Value Ref Range Status   03/16/2021 4.90 3.50 - 5.20 g/dL Final     ALT (SGPT)   Date Value Ref Range Status   03/16/2021 9 1 - 33 U/L Final     AST (SGOT)   Date Value Ref Range Status   03/16/2021 14 1 - 32 U/L Final     Alkaline Phosphatase   Date Value Ref Range Status   03/16/2021 58 39 - 117 U/L Final     Total Bilirubin   Date Value Ref Range Status   03/16/2021 0.3 0.0 - 1.2 mg/dL Final     eGFR Non  Amer   Date Value Ref Range Status   03/16/2021 81 >60 mL/min/1.73 Final     BUN/Creatinine Ratio   Date Value Ref Range Status   03/16/2021 18.8 7.0 - 25.0 Final     Anion Gap   Date Value Ref Range Status   03/16/2021 8.0 5.0 - 15.0 mmol/L Final       IMAGING STUDY:       ASSESSMENT:      1. Chronic myelogenous leukemia, chronic phase with excellent response to Sprycel and complete molecular response in August 2014. However she had interuption of treatment in early part of 2016, because of insurance issues and miscommunication, she stopped the medicine without telling us, and laboratory test on 03/22/2016 reported disease relapse with increased BCR/ABL product at 12.626%. subsequently she was restarted back on Sprycel, and responded well.  Since June 2017, she has completed molecular response as tested every 3 months.  She has been compliant with treatment.   · In the end of July 2018, she reported worsening significant cramping involving her legs,  so we decreased her Sprycel to 50 mg daily starting early August.  She's been having less problem since that time.  She was tested negative for BCR-ABL on 8/31/2018.    · Patient had a negative BCR-ABL by RT-PCR in end of January 2019 and also on 3/29/2019.   · Patient was later found having significantly elevated liver function panel.  Sprycel was on hold and she was subsequently found having acute hepatitis C infection.    · I discussed with Dr. Karyn Mendieta, we'll hold her Sprycel for now until she finishes hepatitis C treatment.   · She was started on hepatitis C treatment on 4/18/2019 and finished an 8-week course.  · On 07/02/2019 patient's labs showed BCR-ABL Major (p210) detected by RT-PCR at 0.0141%. BCR/ABL1 Minor (p190) was not detected. HCV was not detected.  · Sprycel treatment was resumed as of 07/02/2019 upon completion of MEVYRET.  · Laboratory study on 10/29/2019 reported normal CBC. BCR/ABL1 MAJOR (p210) and BCR/ABL1 MINOR (p190) were not detected.    · BCR-ABL by RT-PCR on 1/24/2020 was negative.     · Lab result for BCR ABL by RT-PCR was also negative on 4/24/2020. Patient will need to continue on sprycel treatment.    · 7/10/2020 patient has normal CBC and CMP.  Negative RT-PCR for BCR ABL.  Tolerating well.  Continue Sprycel at 50 mg daily.  · On 10/9/2020, completely normal CBC with good tolerance.  BCR-ABL was tested negative by RT-PCR method.  Continue Sprycel.   · Patient reports 11/17/2020 chest tightness in palpitation, and exertion dyspnea progressively getting worse in the past week.  We asked patient to hold Sprycel.   · Chest x-ray examination on 11/17/2020 was unremarkable.  Echocardiogram study on 11/18/2020 was also benign.  Patient was seen by cardiologist for further evaluation.    · Laboratory studies on 1/6/2021 reported normal WBC 9360 including ANC 7130 lymphocytes 1350. BCR-ABL by RT-PCR was complete negative, as reported on 1/12/2021.   · Patient had negative cardiac work-up.   She also has resolution of symptoms.  Discussed with patient on 2/9/2021, we recommended resumption of Sprycel 50 mg daily.  Patient is agreeable.  · On 3/16/2021, patient reports tolerating Sprycel, no recurrent symptoms of dyspnea, chest tightness or palpitation.  Continue Sprycel 50 mg daily.      2. Recurrent Iron deficiency anemia.    · She was treated again with Feraheme October 2017.   Iron deficiency thought to be related to ulcer identified on EGD, being treated with Carafate.  She also had heavy menses.  · She had recurrent iron deficiency again and was given Feraheme 2 doses in March 2018.   · Subsequently recurrent iron deficiency in July 2018, she was switched to Venofer 3 doses total 900 mg.   · Patient had simple hysterectomy in October 2018.  · Laboratory study showed supratherapeutic ferritin 458 and iron saturation 40% on 4/30/2019.   · Laboratory study performed 10/29/2019, showed normal iron saturation and ferritin 325 ng/dL.  · Normal iron studies including ferritin 262 and iron saturation 21% on 4/24/2020.  No evidence of recurrent iron deficiency.   · Lab study on 7/10/2020 reported ferritin 185, iron saturation 13% and normal hemoglobin 14.5.  She has deteriorating iron studies.   Continue to monitor in 3 months.  · Labs on 10/9/2020 reported ferritin 270, iron saturation 13% and hemoglobin 14.9.  · Lab studies on 1/6/2021 reported mild erythrocytosis.  Hemoglobin is 15.9 and hematocrit of 47.1%, with ferritin 293 and iron saturation 14%.    · Normal hemoglobin 14.4 on 3/16/2021.  Continue to monitor.    3.  Significant fatigue in the past month as reported on 4/24/2020.  Iron study showed appropriate ferritin and iron saturation.  Normal thyroid profile on 3/12/2020.  Exact etiology of her fatigue is not clear.  I asked patient to try oral vitamin B12 and folic acid to see if it eases symptoms.  · Patient reports on 7/10/2020 that she has significant improvement of energy level after  starting oral vitamin B12.  This will be continued.  · Patient had a normal panel for TSH, free T3 and free T4 on 12/4/2020.     *Chest tightness, palpitation in the dyspnea new onset in November 2020.  Patient reports this happened recently, and that usually unpredictable, she has chest tightness, and associated tachycardia/palpitation.  Patient reports this is unpredictable, can be on and off.  She noticed 1 day she was cooking meal for her family, and noted several palpitation and chest tightness.  She reports unable to induce purposefully.  She also has exertional dyspnea.  Patient reports 11/17/2020, we asked patient to hold Sprycel.  We requested chest CT, echocardiogram study and also laboratory study for evaluation.  · Negative chest x-ray on 11/17/2020.   · Laboratory study on 11/17/2020 reported stable chronic condition with a ferritin 261 and iron saturation 12%, normal hemoglobin 15.0, unable to explain her dyspnea.   · Echocardiogram study on 11/18/2020 reported normal results.   · Patient is evaluated 11/20/2020, she reports somewhat improved symptoms, since she stopped Sprycel for the past 3 days.  She denies extremity edema.  She denies fever sweating or chills.  We recommend patient to continue to hold Sprycel for another 2 weeks.  We also recommended patient to be tested for COVID-19.    · Reevaluation on 12/4/2020, patient reports that she did not get a Covid test.  She denies other illness such as fever sweating nausea vomiting, diarrhea, change of taste, cough etc.  Discussed with patient, will check a thyroid panel, which turned out to be normal on 12/4/2020.    · Patient was referred to cardiologist for evaluation of hypertension.  She was seen by Dr. Flowers on 12/21/2020 and the case waiting for further evaluation.  · Patient had a negative cardiac work-up.  Her symptom has resolved.  · Patient was restarted on Sprycel 2/9/2021.  She reports no recurrent symptoms 3/16/2021.       PLAN:     1. Continue Sprycel treatment 50 mg daily.   2. Continue oral vitamin B12 at 1000 mcg daily.   3. Arrange follow-up with me in early May 2021 with CBC, iron studies with ferritin and iron profile, also BCR-ABL by RT-PCR.    4. I asked patient to call if she has recurrent symptoms with palpitations and dyspnea.  Patient voiced understanding.        BAO SLADE M.D., Ph.D.    3/16/2021      CC:  ISHAAN REGAN M.D.    Karyn Mendieta M.D.    Luis Alfredo Flowers M.D.

## 2021-03-24 NOTE — TELEPHONE ENCOUNTER
Wants to make sure with Dr. Castle that it's ok for her to vaccine and does it matter which one she gets.

## 2021-05-04 ENCOUNTER — OFFICE VISIT (OUTPATIENT)
Dept: ONCOLOGY | Facility: CLINIC | Age: 36
End: 2021-05-04

## 2021-05-04 ENCOUNTER — LAB (OUTPATIENT)
Dept: LAB | Facility: HOSPITAL | Age: 36
End: 2021-05-04

## 2021-05-04 VITALS
SYSTOLIC BLOOD PRESSURE: 108 MMHG | HEART RATE: 59 BPM | OXYGEN SATURATION: 98 % | DIASTOLIC BLOOD PRESSURE: 71 MMHG | TEMPERATURE: 97.5 F | RESPIRATION RATE: 14 BRPM | HEIGHT: 68 IN | BODY MASS INDEX: 23.04 KG/M2 | WEIGHT: 152 LBS

## 2021-05-04 DIAGNOSIS — R53.82 CHRONIC FATIGUE: ICD-10-CM

## 2021-05-04 DIAGNOSIS — D50.0 IRON DEFICIENCY ANEMIA DUE TO CHRONIC BLOOD LOSS: ICD-10-CM

## 2021-05-04 DIAGNOSIS — Z79.899 ENCOUNTER FOR LONG-TERM CURRENT USE OF HIGH RISK MEDICATION: ICD-10-CM

## 2021-05-04 DIAGNOSIS — C92.10 CML (CHRONIC MYELOID LEUKEMIA) (HCC): Primary | ICD-10-CM

## 2021-05-04 DIAGNOSIS — C92.10 CML (CHRONIC MYELOID LEUKEMIA) (HCC): ICD-10-CM

## 2021-05-04 LAB
ALBUMIN SERPL-MCNC: 4.6 G/DL (ref 3.5–5.2)
ALBUMIN/GLOB SERPL: 1.6 G/DL (ref 1.1–2.4)
ALP SERPL-CCNC: 64 U/L (ref 38–116)
ALT SERPL W P-5'-P-CCNC: 12 U/L (ref 0–33)
ANION GAP SERPL CALCULATED.3IONS-SCNC: 11.1 MMOL/L (ref 5–15)
AST SERPL-CCNC: 15 U/L (ref 0–32)
BASOPHILS # BLD AUTO: 0.03 10*3/MM3 (ref 0–0.2)
BASOPHILS NFR BLD AUTO: 0.5 % (ref 0–1.5)
BILIRUB SERPL-MCNC: 0.2 MG/DL (ref 0.2–1.2)
BUN SERPL-MCNC: 13 MG/DL (ref 6–20)
BUN/CREAT SERPL: 18.1 (ref 7.3–30)
CALCIUM SPEC-SCNC: 9.4 MG/DL (ref 8.5–10.2)
CHLORIDE SERPL-SCNC: 104 MMOL/L (ref 98–107)
CO2 SERPL-SCNC: 24.9 MMOL/L (ref 22–29)
CREAT SERPL-MCNC: 0.72 MG/DL (ref 0.6–1.1)
DEPRECATED RDW RBC AUTO: 41.8 FL (ref 37–54)
EOSINOPHIL # BLD AUTO: 0.08 10*3/MM3 (ref 0–0.4)
EOSINOPHIL NFR BLD AUTO: 1.4 % (ref 0.3–6.2)
ERYTHROCYTE [DISTWIDTH] IN BLOOD BY AUTOMATED COUNT: 12.8 % (ref 12.3–15.4)
FERRITIN SERPL-MCNC: 219.8 NG/ML (ref 11–207)
GFR SERPL CREATININE-BSD FRML MDRD: 91 ML/MIN/1.73
GLOBULIN UR ELPH-MCNC: 2.8 GM/DL (ref 1.8–3.5)
GLUCOSE SERPL-MCNC: 94 MG/DL (ref 74–124)
HCT VFR BLD AUTO: 40.2 % (ref 34–46.6)
HGB BLD-MCNC: 13.7 G/DL (ref 12–15.9)
IMM GRANULOCYTES # BLD AUTO: 0.04 10*3/MM3 (ref 0–0.05)
IMM GRANULOCYTES NFR BLD AUTO: 0.7 % (ref 0–0.5)
IRON 24H UR-MRATE: 45 MCG/DL (ref 37–145)
IRON SATN MFR SERPL: 16 % (ref 14–48)
LYMPHOCYTES # BLD AUTO: 1.29 10*3/MM3 (ref 0.7–3.1)
LYMPHOCYTES NFR BLD AUTO: 22.1 % (ref 19.6–45.3)
MCH RBC QN AUTO: 30.3 PG (ref 26.6–33)
MCHC RBC AUTO-ENTMCNC: 34.1 G/DL (ref 31.5–35.7)
MCV RBC AUTO: 88.9 FL (ref 79–97)
MONOCYTES # BLD AUTO: 0.47 10*3/MM3 (ref 0.1–0.9)
MONOCYTES NFR BLD AUTO: 8.1 % (ref 5–12)
NEUTROPHILS NFR BLD AUTO: 3.92 10*3/MM3 (ref 1.7–7)
NEUTROPHILS NFR BLD AUTO: 67.2 % (ref 42.7–76)
NRBC BLD AUTO-RTO: 0 /100 WBC (ref 0–0.2)
PLATELET # BLD AUTO: 166 10*3/MM3 (ref 140–450)
PMV BLD AUTO: 9.9 FL (ref 6–12)
POTASSIUM SERPL-SCNC: 4 MMOL/L (ref 3.5–4.7)
PROT SERPL-MCNC: 7.4 G/DL (ref 6.3–8)
RBC # BLD AUTO: 4.52 10*6/MM3 (ref 3.77–5.28)
SODIUM SERPL-SCNC: 140 MMOL/L (ref 134–145)
TIBC SERPL-MCNC: 288 MCG/DL (ref 249–505)
TRANSFERRIN SERPL-MCNC: 206 MG/DL (ref 200–360)
WBC # BLD AUTO: 5.83 10*3/MM3 (ref 3.4–10.8)

## 2021-05-04 PROCEDURE — 99213 OFFICE O/P EST LOW 20 MIN: CPT | Performed by: INTERNAL MEDICINE

## 2021-05-04 PROCEDURE — 80053 COMPREHEN METABOLIC PANEL: CPT

## 2021-05-04 PROCEDURE — 82728 ASSAY OF FERRITIN: CPT

## 2021-05-04 PROCEDURE — 83540 ASSAY OF IRON: CPT

## 2021-05-04 PROCEDURE — 84466 ASSAY OF TRANSFERRIN: CPT

## 2021-05-04 PROCEDURE — 85025 COMPLETE CBC W/AUTO DIFF WBC: CPT

## 2021-05-04 PROCEDURE — 36415 COLL VENOUS BLD VENIPUNCTURE: CPT | Performed by: INTERNAL MEDICINE

## 2021-05-12 LAB — REF LAB TEST METHOD: NORMAL

## 2021-06-15 ENCOUNTER — TELEMEDICINE (OUTPATIENT)
Dept: FAMILY MEDICINE CLINIC | Facility: TELEHEALTH | Age: 36
End: 2021-06-15

## 2021-06-15 DIAGNOSIS — L25.9 CONTACT DERMATITIS, UNSPECIFIED CONTACT DERMATITIS TYPE, UNSPECIFIED TRIGGER: ICD-10-CM
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
## 2021-06-15 PROCEDURE — 99213 OFFICE O/P EST LOW 20 MIN: CPT | Performed by: NURSE PRACTITIONER

## 2021-06-15 RX ORDER — LORATADINE 10 MG/1
10 TABLET ORAL DAILY PRN
Qty: 30 TABLET | Refills: 0 | Status: SHIPPED | OUTPATIENT
Start: 2021-06-15 | End: 2022-04-12

## 2021-06-15 RX ORDER — METHYLPREDNISOLONE 4 MG/1
TABLET ORAL
Qty: 21 TABLET | Refills: 0 | Status: SHIPPED | OUTPATIENT
Start: 2021-06-15 | End: 2021-10-05

## 2021-06-15 NOTE — PROGRESS NOTES
"HPI  Hu Houston is a 36 y.o. female  presents with complaint of rash on bilateral thighs. Started with a mosquito bite on left thigh almost two weeks ago, which has now formed a red line about 4\" long and is raised. The right thigh then developed red spots which she thinks were mosquito bites also and has now developed several red raise lines about 1.5\" long each (five of these).    Has used anti-itch cream OTC and claritin which mildly decreases itching.     Review of Systems   Skin: Positive for rash.   All other systems reviewed and are negative.      Past Medical History:   Diagnosis Date   • Anemia in neoplastic disease    • Asthma     childhood   • Chiari I malformation (CMS/HCC)     eval by Dr Moore, NS 2016   • Chronic myelogenous leukemia (CMS/HCC)     remission, Dr Castle follows   • Chronic pain    • CML (chronic myelocytic leukemia) (CMS/HCC)    • Cystitis    • Depression    • GERD (gastroesophageal reflux disease)     ulcer   • H/O Iron deficiency anemia    • H/O Lower extremity pain     Resolved.   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   • Pulmonary hypertension (CMS/HCC)    • Seizures (CMS/HCC)     as child after head injry   • Splenomegaly    • Status post D&C    • Vitamin D deficiency        Family History   Problem Relation Age of Onset   • Hyperlipidemia Mother    • Hypertension Mother    • Stomach cancer Maternal Grandmother 72        Adenocarcinoma esophagus and stomach   • Stroke Paternal Grandmother    • Malig Hyperthermia Neg Hx        Social History     Socioeconomic History   • Marital status:      Spouse name: Not on file   • Number of children: Not on file   • Years of education: Not on file   • Highest education level: Not on file   Tobacco Use   • Smoking status: Former Smoker     Packs/day: 1.00     Years: 8.00     Pack years: 8.00     Types: " Cigarettes     Quit date: 2006     Years since quittin.9   • Smokeless tobacco: Never Used   Substance and Sexual Activity   • Alcohol use: Yes     Comment: Social, maybe once every 6 months/no caffeine use   • Drug use: Not Currently     Types: Cocaine(coke)     Comment: prior to    • Sexual activity: Defer     Birth control/protection: Surgical     Comment: Tubal         LMP 10/11/2018     PHYSICAL EXAM  Physical Exam   Constitutional: She appears well-developed and well-nourished.   HENT:   Head: Normocephalic.   Nose: Nose normal.   Neck: Neck normal appearance.  Pulmonary/Chest: Effort normal.   Musculoskeletal:        Legs:    Neurological: She is alert.   Psychiatric: She has a normal mood and affect. Her speech is normal.       Diagnoses and all orders for this visit:    1. Bug bite, initial encounter (Primary)    2. Contact dermatitis, unspecified contact dermatitis type, unspecified trigger  -     loratadine (Claritin) 10 MG tablet; Take 1 tablet by mouth Daily As Needed for Allergies (or rash).  Dispense: 30 tablet; Refill: 0  -     methylPREDNISolone (MEDROL) 4 MG dose pack; Take as directed on package instructions.  Dispense: 21 tablet; Refill: 0  -     triamcinolone (KENALOG) 0.1 % ointment; Apply  topically to the appropriate area as directed 2 (Two) Times a Day As Needed for Rash for up to 7 days. Bilateral legs  Dispense: 80 g; Refill: 0  -     mupirocin (Bactroban) 2 % ointment; Apply  topically to the appropriate area as directed 3 (Three) Times a Day for 5 days. Bilateral legs  Dispense: 30 g; Refill: 0    **Discussed that appearance is similar to poison ivy but she denies any exposure. If not improving in the next 2-3 days, or worsens, make in-person appointment. She verbalized understanding.      FOLLOW-UP  As discussed during visit with [facility], if symptoms worsen or fail to improve, follow-up with PCP/Urgent Care/Emergency Department.    Patient verbalizes understanding of  medications, instructions for treatment and follow-up.    Mony Trammyla Hawthorne, FELIPA  06/15/2021  14:13 EDT    This visit was performed via Telehealth.  This patient has been instructed to follow-up with their primary care provider if their symptoms worsen or the treatment provided does not resolve their illness.

## 2021-06-15 NOTE — PATIENT INSTRUCTIONS
Insect Bite, Adult  An insect bite can make your skin red, itchy, and swollen. An insect bite is different from an insect sting, which happens when an insect injects poison (venom) into the skin.  Some insects can spread disease to people through a bite. However, most insect bites do not lead to disease and are not serious.  What are the causes?  Insects may bite for a variety of reasons, including:  · Hunger.  · To defend themselves.  Insects that bite include:  · Spiders.  · Mosquitoes.  · Ticks.  · Fleas.  · Ants.  · Flies.  · Kissing bugs.  · Chiggers.  What are the signs or symptoms?  Symptoms of this condition include:  · Itching or pain in the bite area.  · Redness and swelling in the bite area.  · An open wound (skin ulcer).  In many cases, symptoms last for 2-4 days.  In rare cases, a person may have a severe allergic reaction (anaphylactic reaction) to a bite. Symptoms of an anaphylactic reaction may include:  · Feeling warm in the face (flushed). This may include redness.  · Itchy, red, swollen areas of skin (hives).  · Swelling of the eyes, lips, face, mouth, tongue, or throat.  · Difficulty breathing, speaking, or swallowing.  · Noisy breathing (wheezing).  · Dizziness or light-headedness.  · Fainting.  · Pain or cramping in the abdomen.  · Vomiting.  · Diarrhea.  How is this diagnosed?  This condition is usually diagnosed based on symptoms and a physical exam.  How is this treated?  Treatment is usually not needed. Symptoms often go away on their own. When treatment is recommended, it may involve:  · Applying a cream or lotion to the bite area. This treatment helps with itching.  · Taking an antibiotic medicine. This treatment is needed if the bite area gets infected.  · Getting a tetanus shot, if you are not up to date on this vaccine.  · Applying ice to the affected area.  · Allergy medicines called antihistamines. This treatment may be needed if you develop itching or an allergic reaction to the  "insect bite.  · Giving yourself an epinephrine injection if you have an anaphylactic reaction to a bite. To give the injection, you will use what is commonly called an auto-injector \"pen\" (pre-filled automatic epinephrine injection device). Your health care provider will teach you how to use an auto-injector pen.  Follow these instructions at home:  Bite area care    · Do not scratch the bite area.  · Keep the bite area clean and dry. Wash it every day with soap and water as told by your health care provider.  · Check the bite area every day for signs of infection. Check for:  ? Redness, swelling, or pain.  ? Fluid or blood.  ? Warmth.  ? Pus or a bad smell.  Managing pain, itching, and swelling    · You may apply cortisone cream, calamine lotion, or a paste made of baking soda and water to the bite area as told by your health care provider.  · If directed, put ice on the bite area.  ? Put ice in a plastic bag.  ? Place a towel between your skin and the bag.  ? Leave the ice on for 20 minutes, 2-3 times a day.  General instructions  · Apply or take over-the-counter and prescription medicines only as told by your health care provider.  · If you were prescribed an antibiotic medicine, take or apply it as told by your health care provider. Do not stop using the antibiotic even if your condition improves.  · Keep all follow-up visits as told by your health care provider. This is important.  How is this prevented?  To help reduce your risk of insect bites:  · When you are outdoors, wear clothing that covers your arms and legs. This is especially important in the early morning and evening.  · Use insect repellent. The best insect repellents contain DEET, picaridin, oil of lemon eucalyptus (OLE), or [id number].  · Consider spraying your clothing with a pesticide called permethrin. Permethrin helps prevent insect bites. It works for several weeks and for up to 5-6 clothing washes. Do not apply permethrin directly to the " skin.  · If your home windows do not have screens, consider installing them.  · If you will be sleeping in an area where there are mosquitoes, consider covering your sleeping area with a mosquito net.  Contact a health care provider if:  · You have redness, swelling, or pain in the bite area.  · You have fluid or blood coming from the bite area.  · The bite area feels warm to the touch.  · You have pus or a bad smell coming from the bite area.  · You have a fever.  Get help right away if:  · You have joint pain.  · You have a rash.  · You feel unusually tired or sleepy.  · You have neck pain.  · You have a headache.  · You have unusual weakness.  · You develop symptoms of an anaphylactic reaction. These may include:  ? Flushed skin.  ? Hives.  ? Swelling of the eyes, lips, face, mouth, tongue, or throat.  ? Difficulty breathing, speaking, or swallowing.  ? Wheezing.  ? Dizziness or light-headedness.  ? Fainting.  ? Pain or cramping in the abdomen.  ? Vomiting.  ? Diarrhea.  These symptoms may represent a serious problem that is an emergency. Do not wait to see if the symptoms will go away. Do the following right away:  · Use the auto-injector pen as you have been instructed.  · Get medical help. Call your local emergency services (911 in the [place] Do not drive yourself to the hospital.  Summary  · An insect bite can make your skin red, itchy, and swollen.  · Treatment is usually not needed. Symptoms often go away on their own. When treatment is recommended, it may involve taking medicine, applying medicine to the area, or applying ice.  · Apply or take over-the-counter and prescription medicines only as told by your health care provider.  · Use insect repellent to help prevent insect bites.  · Contact a health care provider if you have any signs of infection in the bite area.  This information is not intended to replace advice given to you by your health care provider. Make sure you discuss any questions you have  with your health care provider.  Document Revised: 06/28/2019 Document Reviewed: 06/28/2019  [employer] Patient Education © 2021 [employer]  Contact Dermatitis  Dermatitis is redness, soreness, and swelling (inflammation) of the skin. Contact dermatitis is a reaction to certain substances that touch the skin. Many different substances can cause contact dermatitis. There are two types of contact dermatitis:  · Irritant contact dermatitis. This type is caused by something that irritates your skin, such as having dry hands from washing them too often with soap. This type does not require previous exposure to the substance for a reaction to occur. This is the most common type.  · Allergic contact dermatitis. This type is caused by a substance that you are allergic to, such as poison ivy. This type occurs when you have been exposed to the substance (allergen) and develop a sensitivity to it. Dermatitis may develop soon after your first exposure to the allergen, or it may not develop until the next time you are exposed and every time thereafter.  What are the causes?  Irritant contact dermatitis is most commonly caused by exposure to:  · Makeup.  · Soaps.  · Detergents.  · Bleaches.  · Acids.  · Metal salts, such as nickel.  Allergic contact dermatitis is most commonly caused by exposure to:  · Poisonous plants.  · Chemicals.  · Jewelry.  · Latex.  · Medicines.  · Preservatives in products, such as clothing.  What increases the risk?  You are more likely to develop this condition if you have:  · A job that exposes you to irritants or allergens.  · Certain medical conditions, such as asthma or eczema.  What are the signs or symptoms?  Symptoms of this condition may occur on your body anywhere the irritant has touched you or is touched by you.  · Symptoms include:  ? Dryness or flaking.  ? Redness.  ? Cracks.  ? Itching.  ? Pain or a burning feeling.  ? Blisters.  ? Drainage of small amounts of blood or clear fluid from  skin cracks.  With allergic contact dermatitis, there may also be swelling in areas such as the eyelids, mouth, or genitals.  How is this diagnosed?  This condition is diagnosed with a medical history and physical exam.  · A patch skin test may be performed to help determine the cause.  · If the condition is related to your job, you may need to see an occupational medicine specialist.  How is this treated?  This condition is treated by checking for the cause of the reaction and protecting your skin from further contact. Treatment may also include:  · Steroid creams or ointments. Oral steroid medicines may be needed in more severe cases.  · Antibiotic medicines or antibacterial ointments, if a skin infection is present.  · Antihistamine lotion or an antihistamine taken by mouth to ease itching.  · A bandage (dressing).  Follow these instructions at home:  Skin care  · Moisturize your skin as needed.  · Apply cool compresses to the affected areas.  · Try applying baking soda paste to your skin. Stir water into baking soda until it reaches a paste-like consistency.  · Do not scratch your skin, and avoid friction to the affected area.  · Avoid the use of soaps, perfumes, and dyes.  Medicines  · Take or apply over-the-counter and prescription medicines only as told by your health care provider.  · If you were prescribed an antibiotic medicine, take or apply the antibiotic as told by your health care provider. Do not stop using the antibiotic even if your condition improves.  Bathing  · Try taking a bath with:  ? Epsom salts. Follow the instructions on the packaging. You can get these at your local pharmacy or grocery store.  ? Baking soda. Pour a small amount into the bath as directed by your health care provider.  ? Colloidal oatmeal. Follow the instructions on the packaging. You can get this at your local pharmacy or grocery store.  · Bathe less frequently, such as every other day.  · Bathe in lukewarm water. Avoid  using hot water.  Bandage care  · If you were given a bandage (dressing), change it as told by your health care provider.  · Wash your hands with soap and water before and after you change your dressing. If soap and water are not available, use hand .  General instructions  · Avoid the substance that caused your reaction. If you do not know what caused it, keep a journal to try to track what caused it. Write down:  ? What you eat.  ? What cosmetic products you use.  ? What you drink.  ? What you wear in the affected area. This includes jewelry.  · Check the affected areas every day for signs of infection. Check for:  ? More redness, swelling, or pain.  ? More fluid or blood.  ? Warmth.  ? Pus or a bad smell.  · Keep all follow-up visits as told by your health care provider. This is important.  Contact a health care provider if:  · Your condition does not improve with treatment.  · Your condition gets worse.  · You have signs of infection such as swelling, tenderness, redness, soreness, or warmth in the affected area.  · You have a fever.  · You have new symptoms.  Get help right away if:  · You have a severe headache, neck pain, or neck stiffness.  · You vomit.  · You feel very sleepy.  · You notice red streaks coming from the affected area.  · Your bone or joint underneath the affected area becomes painful after the skin has healed.  · The affected area turns darker.  · You have difficulty breathing.  Summary  · Dermatitis is redness, soreness, and swelling (inflammation) of the skin. Contact dermatitis is a reaction to certain substances that touch the skin.  · Symptoms of this condition may occur on your body anywhere the irritant has touched you or is touched by you.  · This condition is treated by figuring out what caused the reaction and protecting your skin from further contact. Treatment may also include medicines and skin care.  · Avoid the substance that caused your reaction. If you do not know  what caused it, keep a journal to try to track what caused it.  · Contact a health care provider if your condition gets worse or you have signs of infection such as swelling, tenderness, redness, soreness, or warmth in the affected area.  This information is not intended to replace advice given to you by your health care provider. Make sure you discuss any questions you have with your health care provider.  Document Revised: 04/08/2020 Document Reviewed: 07/03/2019  [employer] Patient Education © 2021 [employer]

## 2021-07-22 ENCOUNTER — MEDICATION THERAPY MANAGEMENT (OUTPATIENT)
Dept: PHARMACY | Facility: HOSPITAL | Age: 36
End: 2021-07-22

## 2021-07-22 NOTE — PROGRESS NOTES
MTM encounter (adherence/adverse effects) -- Sprycel    Ms. Houston was doing well when I spoke with her today. Her adherence and administration of the Sprycel seems appropriate. A new RX refill was sent in for her today. She reports some leg pain and headaches that she associates with the Sprycel. Dr. Castle is aware of these and reduced her dose which has allowed for some improvement. She also takes Ibuprofen with some relief. She does not endorse any other side effects. She has not had any recent medication changes had no questions for me to day.    Thanks,  June Houston, Pharmacy Intern

## 2021-07-22 NOTE — TELEPHONE ENCOUNTER
Per last MD dictation, Ms. Houston is to continue current dose of dasatinib. Refill routed to MD for co-signature.

## 2021-08-07 ENCOUNTER — TELEMEDICINE (OUTPATIENT)
Dept: FAMILY MEDICINE CLINIC | Facility: TELEHEALTH | Age: 36
End: 2021-08-07

## 2021-08-07 VITALS — HEIGHT: 68 IN | BODY MASS INDEX: 22.73 KG/M2 | WEIGHT: 150 LBS

## 2021-08-07 DIAGNOSIS — J03.90 TONSILLITIS: Primary | ICD-10-CM

## 2021-08-07 PROCEDURE — 99213 OFFICE O/P EST LOW 20 MIN: CPT | Performed by: NURSE PRACTITIONER

## 2021-08-07 RX ORDER — FLUCONAZOLE 150 MG/1
TABLET ORAL
Qty: 3 TABLET | Refills: 0 | Status: SHIPPED | OUTPATIENT
Start: 2021-08-07 | End: 2021-10-05

## 2021-08-07 RX ORDER — CEFDINIR 300 MG/1
300 CAPSULE ORAL 2 TIMES DAILY
Qty: 20 CAPSULE | Refills: 0 | Status: SHIPPED | OUTPATIENT
Start: 2021-08-07 | End: 2021-10-05

## 2021-08-07 NOTE — PROGRESS NOTES
"CHIEF COMPLAINT  No chief complaint on file.        HPI  Hu Houston is a 36 y.o. female  presents with complaint of    Review of Systems    Past Medical History:   Diagnosis Date   • Anemia in neoplastic disease    • Asthma     childhood   • Chiari I malformation (CMS/HCC)     eval by Dr Moore, [place] 2016   • Chronic myelogenous leukemia (CMS/HCC)     remission, Dr Castle follows   • Chronic pain    • CML (chronic myelocytic leukemia) (CMS/HCC)    • Cystitis    • Depression    • GERD (gastroesophageal reflux disease)     ulcer   • H/O Iron deficiency anemia    • H/O Lower extremity pain     Resolved.   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   • Pulmonary hypertension (CMS/HCC)    • Seizures (CMS/HCC)     as child after head injry   • Splenomegaly    • Status post D&C    • Vitamin D deficiency        Family History   Problem Relation Age of Onset   • Hyperlipidemia Mother    • Hypertension Mother    • Stomach cancer Maternal Grandmother 72        Adenocarcinoma esophagus and stomach   • Stroke Paternal Grandmother    • Malig Hyperthermia Neg Hx        Social History     Socioeconomic History   • Marital status:      Spouse name: Not on file   • Number of children: Not on file   • Years of education: Not on file   • Highest education level: Not on file   Tobacco Use   • Smoking status: Former Smoker     Packs/day: 1.00     Years: 8.00     Pack years: 8.00     Types: Cigarettes     Quit date: 7/4/2006     Years since quitting: 15.1   • Smokeless tobacco: Never Used   Substance and Sexual Activity   • Alcohol use: Yes     Comment: Social, maybe once every 6 months/no caffeine use   • Drug use: Not Currently     Types: Cocaine(coke)     Comment: prior to 2006   • Sexual activity: Defer     Birth control/protection: Surgical     Comment: Tubal         Ht 172.7 cm (68\")   Wt 68 kg (150 " lb)   LMP 10/11/2018   BMI 22.81 kg/m²     PHYSICAL EXAM  Virtual Visit Physical Exam    Results for orders placed or performed in visit on 05/04/21   BCR / ABL Qual By RT-PCR    Specimen: Blood   Result Value Ref Range    Reference Lab Report BCR/ABL by PCR        Diagnoses and all orders for this visit:    1. Tonsillitis (Primary)    Other orders  -     cefdinir (OMNICEF) 300 MG capsule; Take 1 capsule by mouth 2 (Two) Times a Day.  Dispense: 20 capsule; Refill: 0  -     fluconazole (Diflucan) 150 MG tablet; 150 mg tabs on days #1, 4 and 7 post antibiotics  Dispense: 3 tablet; Refill: 0          FOLLOW-UP  As discussed during visit with PCP/Virtual Care if no improvement or Urgent Care/Emergency Department if worsening of symptoms    Patient verbalizes understanding of medication dosage, comfort measures, instructions for treatment and follow-up.    Atiya Acevedo, FELIPA  08/07/2021  16:40 EDT    This visit was performed via Telehealth.  This patient has been instructed to follow-up with their primary care provider if their symptoms worsen or the treatment provided does not resolve their illness.      COVID-19  if at any time you experience fever AND/OR cough AND/OR shortness of breath AND/OR loss of sense of taste or smell you are being advised to go to nearest urgent care or emergency department for evaluation AND/OR testing  COVID-19  if at any time you experience fever AND/OR cough AND/OR shortness of breath AND/OR loss of sense of taste or smell you are being advised to go to nearest urgent care or emergency department for evaluation AND/OR testing

## 2021-08-07 NOTE — PROGRESS NOTES
CHIEF COMPLAINT  Cc:sore throat    HPI  Hu Houston is a 36 y.o. female  presents with complaint of a sore throat. She believes that she has strep throat. She was tested and positive for strep a couple of weeks ago at which time she also had a strep rash..She was treated with amoxicillin. She reports that her  tested positive for strep throat today and that she did not change out her toothbrush. Her symptoms now include a sore throat, swollen glands and a rash on her chest.    Review of Systems   Constitutional: Negative for fever.   HENT: Positive for ear pain (feel stopped up) and sore throat. Negative for congestion.    Respiratory: Negative for cough, shortness of breath and wheezing.    Cardiovascular: Negative for chest pain.   Gastrointestinal: Negative for diarrhea, nausea and vomiting.   Musculoskeletal: Negative for myalgias.   Skin: Positive for rash (rash on chest like last time she had strep).   Neurological: Positive for headaches (mild).       Past Medical History:   Diagnosis Date   • Anemia in neoplastic disease    • Asthma     childhood   • Chiari I malformation (CMS/HCC)     eval by Dr Moore, NS 2016   • Chronic myelogenous leukemia (CMS/HCC)     remission, Dr Castle follows   • Chronic pain    • CML (chronic myelocytic leukemia) (CMS/HCC)    • Cystitis    • Depression    • GERD (gastroesophageal reflux disease)     ulcer   • H/O Iron deficiency anemia    • H/O Lower extremity pain     Resolved.   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   • Pulmonary hypertension (CMS/HCC)    • Seizures (CMS/HCC)     as child after head injry   • Splenomegaly    • Status post D&C    • Vitamin D deficiency        Family History   Problem Relation Age of Onset   • Hyperlipidemia Mother    • Hypertension Mother    • Stomach cancer Maternal Grandmother 72        Adenocarcinoma  "esophagus and stomach   • Stroke Paternal Grandmother    • Malig Hyperthermia Neg Hx        Social History     Socioeconomic History   • Marital status:      Spouse name: Not on file   • Number of children: Not on file   • Years of education: Not on file   • Highest education level: Not on file   Tobacco Use   • Smoking status: Former Smoker     Packs/day: 1.00     Years: 8.00     Pack years: 8.00     Types: Cigarettes     Quit date: 7/4/2006     Years since quitting: 15.1   • Smokeless tobacco: Never Used   Substance and Sexual Activity   • Alcohol use: Yes     Comment: Social, maybe once every 6 months/no caffeine use   • Drug use: Not Currently     Types: Cocaine(coke)     Comment: prior to 2006   • Sexual activity: Defer     Birth control/protection: Surgical     Comment: Tubal         Ht 172.7 cm (68\")   Wt 68 kg (150 lb)   LMP 10/11/2018   BMI 22.81 kg/m²     PHYSICAL EXAM  Physical Exam   Constitutional: She is oriented to person, place, and time. She appears well-developed and well-nourished.   HENT:   Head: Normocephalic and atraumatic.   Right Ear: Hearing and external ear normal.   Left Ear: Hearing and external ear normal.   Nose: Nose normal.   Mouth/Throat: Mucous membranes are erythematous.   Eyes: Lids are normal. Right eye exhibits no discharge and no exudate. Left eye exhibits no discharge and no exudate. Right conjunctiva is not injected. Left conjunctiva is not injected.   Pulmonary/Chest: No accessory muscle usage. No tachypnea and no bradypnea.  No respiratory distress.No use of oxygen by nasal cannulaNo use of oxygen by mask noted.  Abdominal: Abdomen appears normal.   Lymphadenopathy:        Right cervical: Anterior cervical (patient directed exam) adenopathy present.        Left cervical: Anterior cervical (patient directed exam) adenopathy present.   Neurological: She is alert and oriented to person, place, and time. No cranial nerve deficit.   Skin: Her skin appears " normal.  Psychiatric: She has a normal mood and affect. Her speech is normal and behavior is normal. Judgment and thought content normal.       Results for orders placed or performed in visit on 05/04/21   BCR / ABL Qual By RT-PCR    Specimen: Blood   Result Value Ref Range    Reference Lab Report BCR/ABL by PCR        Diagnoses and all orders for this visit:    1. Tonsillitis (Primary)    Other orders  -     cefdinir (OMNICEF) 300 MG capsule; Take 1 capsule by mouth 2 (Two) Times a Day.  Dispense: 20 capsule; Refill: 0  -     fluconazole (Diflucan) 150 MG tablet; 150 mg tabs on days #1, 4 and 7 post antibiotics  Dispense: 3 tablet; Refill: 0    Probiotics for one month related to taking antibiotics. The pharmacist can help you with this if needed. Do nott mendel within two hours of antibiotic.  If you still get a yeast infection despite taking the probiotics as advised you may take the fluconazole as directed  May alternate tylenol and ibuprofen for pain or fever    FOLLOW-UP  If symptoms worsen or persist follow up with PCP or Urgent Care for in person evaluation    COVID-19  if at any time you experience fever AND/OR cough AND/OR shortness of breath AND/OR loss of sense of taste or smell you are being advised to go to nearest urgent care or emergency department for evaluation AND/OR testing    Patient verbalizes understanding of medication dosage, comfort measures, instructions for treatment and follow-up.    FELIPA Pardo  08/07/2021  16:50 EDT    This visit was performed via Telehealth.  This patient has been instructed to follow-up with their primary care provider if their symptoms worsen or the treatment provided does not resolve their illness.

## 2021-09-17 ENCOUNTER — TELEPHONE (OUTPATIENT)
Dept: ONCOLOGY | Facility: CLINIC | Age: 36
End: 2021-09-17

## 2021-09-17 NOTE — TELEPHONE ENCOUNTER
Caller: BOB    Relationship to patient: SELF    Best call back number: [phone]    Patient is needing: R/S 7- LAB AND F/U APPT WITH DR. SLADE

## 2021-10-01 PROBLEM — T45.4X5S ADVERSE EFFECT OF IRON AND ITS COMPOUNDS, SEQUELA: Status: ACTIVE | Noted: 2018-07-16

## 2021-10-05 ENCOUNTER — OFFICE VISIT (OUTPATIENT)
Dept: ONCOLOGY | Facility: CLINIC | Age: 36
End: 2021-10-05

## 2021-10-05 ENCOUNTER — LAB (OUTPATIENT)
Dept: LAB | Facility: HOSPITAL | Age: 36
End: 2021-10-05

## 2021-10-05 VITALS
BODY MASS INDEX: 24.78 KG/M2 | TEMPERATURE: 97.5 F | SYSTOLIC BLOOD PRESSURE: 113 MMHG | WEIGHT: 163.5 LBS | DIASTOLIC BLOOD PRESSURE: 69 MMHG | RESPIRATION RATE: 18 BRPM | HEART RATE: 69 BPM | HEIGHT: 68 IN | OXYGEN SATURATION: 98 %

## 2021-10-05 DIAGNOSIS — C92.10 CML (CHRONIC MYELOID LEUKEMIA) (HCC): Primary | ICD-10-CM

## 2021-10-05 DIAGNOSIS — D50.0 IRON DEFICIENCY ANEMIA DUE TO CHRONIC BLOOD LOSS: ICD-10-CM

## 2021-10-05 DIAGNOSIS — C92.10 CML (CHRONIC MYELOID LEUKEMIA) (HCC): ICD-10-CM

## 2021-10-05 LAB
ALBUMIN SERPL-MCNC: 4.8 G/DL (ref 3.5–5.2)
ALBUMIN/GLOB SERPL: 1.8 G/DL (ref 1.1–2.4)
ALP SERPL-CCNC: 59 U/L (ref 38–116)
ALT SERPL W P-5'-P-CCNC: 11 U/L (ref 0–33)
ANION GAP SERPL CALCULATED.3IONS-SCNC: 10 MMOL/L (ref 5–15)
AST SERPL-CCNC: 16 U/L (ref 0–32)
BASOPHILS # BLD AUTO: 0.04 10*3/MM3 (ref 0–0.2)
BASOPHILS NFR BLD AUTO: 0.7 % (ref 0–1.5)
BILIRUB SERPL-MCNC: 0.4 MG/DL (ref 0.2–1.2)
BUN SERPL-MCNC: 11 MG/DL (ref 6–20)
BUN/CREAT SERPL: 14.1 (ref 7.3–30)
CALCIUM SPEC-SCNC: 9.6 MG/DL (ref 8.5–10.2)
CHLORIDE SERPL-SCNC: 103 MMOL/L (ref 98–107)
CO2 SERPL-SCNC: 27 MMOL/L (ref 22–29)
CREAT SERPL-MCNC: 0.78 MG/DL (ref 0.6–1.1)
DEPRECATED RDW RBC AUTO: 40.6 FL (ref 37–54)
EOSINOPHIL # BLD AUTO: 0.09 10*3/MM3 (ref 0–0.4)
EOSINOPHIL NFR BLD AUTO: 1.6 % (ref 0.3–6.2)
ERYTHROCYTE [DISTWIDTH] IN BLOOD BY AUTOMATED COUNT: 12.3 % (ref 12.3–15.4)
FERRITIN SERPL-MCNC: 250.8 NG/ML (ref 11–207)
GFR SERPL CREATININE-BSD FRML MDRD: 83 ML/MIN/1.73
GLOBULIN UR ELPH-MCNC: 2.7 GM/DL (ref 1.8–3.5)
GLUCOSE SERPL-MCNC: 68 MG/DL (ref 74–124)
HCT VFR BLD AUTO: 43.4 % (ref 34–46.6)
HGB BLD-MCNC: 14.4 G/DL (ref 12–15.9)
IMM GRANULOCYTES # BLD AUTO: 0.02 10*3/MM3 (ref 0–0.05)
IMM GRANULOCYTES NFR BLD AUTO: 0.4 % (ref 0–0.5)
IRON 24H UR-MRATE: 63 MCG/DL (ref 37–145)
IRON SATN MFR SERPL: 21 % (ref 14–48)
LYMPHOCYTES # BLD AUTO: 1.17 10*3/MM3 (ref 0.7–3.1)
LYMPHOCYTES NFR BLD AUTO: 20.6 % (ref 19.6–45.3)
MCH RBC QN AUTO: 30 PG (ref 26.6–33)
MCHC RBC AUTO-ENTMCNC: 33.2 G/DL (ref 31.5–35.7)
MCV RBC AUTO: 90.4 FL (ref 79–97)
MONOCYTES # BLD AUTO: 0.54 10*3/MM3 (ref 0.1–0.9)
MONOCYTES NFR BLD AUTO: 9.5 % (ref 5–12)
NEUTROPHILS NFR BLD AUTO: 3.82 10*3/MM3 (ref 1.7–7)
NEUTROPHILS NFR BLD AUTO: 67.2 % (ref 42.7–76)
NRBC BLD AUTO-RTO: 0 /100 WBC (ref 0–0.2)
PLATELET # BLD AUTO: 168 10*3/MM3 (ref 140–450)
PMV BLD AUTO: 10.2 FL (ref 6–12)
POTASSIUM SERPL-SCNC: 4 MMOL/L (ref 3.5–4.7)
PROT SERPL-MCNC: 7.5 G/DL (ref 6.3–8)
RBC # BLD AUTO: 4.8 10*6/MM3 (ref 3.77–5.28)
SODIUM SERPL-SCNC: 140 MMOL/L (ref 134–145)
TIBC SERPL-MCNC: 301 MCG/DL (ref 249–505)
TRANSFERRIN SERPL-MCNC: 215 MG/DL (ref 200–360)
WBC # BLD AUTO: 5.68 10*3/MM3 (ref 3.4–10.8)

## 2021-10-05 PROCEDURE — 83540 ASSAY OF IRON: CPT | Performed by: INTERNAL MEDICINE

## 2021-10-05 PROCEDURE — 36415 COLL VENOUS BLD VENIPUNCTURE: CPT

## 2021-10-05 PROCEDURE — 80053 COMPREHEN METABOLIC PANEL: CPT

## 2021-10-05 PROCEDURE — 99214 OFFICE O/P EST MOD 30 MIN: CPT | Performed by: INTERNAL MEDICINE

## 2021-10-05 PROCEDURE — 84466 ASSAY OF TRANSFERRIN: CPT | Performed by: INTERNAL MEDICINE

## 2021-10-05 PROCEDURE — 85025 COMPLETE CBC W/AUTO DIFF WBC: CPT

## 2021-10-05 PROCEDURE — 82728 ASSAY OF FERRITIN: CPT | Performed by: INTERNAL MEDICINE

## 2021-10-05 RX ORDER — ASCORBIC ACID 500 MG
500 TABLET ORAL DAILY
COMMUNITY

## 2021-10-05 NOTE — PROGRESS NOTES
REASONS FOR FOLLOWUP:    1. 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    · Patient was started on hydroxyurea temporarily on 10/23/2013 with significant drop of WBC counts.    · Patient started on Sprycel on 12/02/2013. Peripheral blood tested with 3.4% of cells positive for BCR-ABL translocation, tested 02/17/2014.    · The patient had CCyR 6 months into treatment as tested on 05/15/2014.    · Complete molecular response as tested 08/08/14 with negative product of BCR/ABL.   · There was interruption of her treatment due to insurance coverage and misunderstanding from patient's part, she had a relapse of disease with BCR/ABL product at 12.626% in March 2016, and she restarted back on Sprycel.   · Good response as tested on 08/31/2016 with BCR/ABL at 0.294%.    · Since June 2017, patient has been persistently tested negative for BCR/ABL by RT-PCR every 3 month period.     · Patient reported worsening leg cramping in end of July 2018.  Sprycel was decreased to 50 mg daily.  Laboratory studies on 8/31/2018, on 1/30/2019 and on 3/29/2019 were negative for BCR-ABL by RT-PCR.    · Sprycel was on hold during treatment for acute hepatitis C in early part of 2019.  It was resumed in July 2019.  She was weakly positive for BCR-ABL.  · On 10/29/2019 BCR/ABL1 MAJOR (p210) and BCR/ABL1 MINOR (p190) were not detected.    · BCR-ABL by RT-PCR with 0% on 1/24/2020 and also on 4/24/2020.  · Patient was tested negative on 7/10/2020.  Negative on 10/9/2020.    · Because of palpitation, Sprycel was on hold since 11/17/2020.     · BCR-ABL by RT-PCR was complete negative as reported on 1/12/2021 and negative on 5/4/2021.   · Sprycel was resumed on 2/9/2020 at 50 mg daily.  2. Recurrent iron deficiency anemia not responding to oral iron supplementation.  She also had significant constipation associated with oral iron.   · Patient  was given Feraheme treatment 2 doses in September 2016 and repeated in January 2017 and again in October 2017 and March 2018.   · IV Venofer total 900 mg in July 2018 due to recurrent iron deficiency.           HISTORY OF PRESENT ILLNESS: The patient is a 36 y.o.  female who presents today for 5-month reevaluation to assess her tolerance and response to Sprycel.  She resumed treatment Sprycel 50 mg daily on 2/9/2021.    This visit is delayed by 2-month, since patient thought her insurance policy was discontinued however when she found that her insurance is still valid, she comes in today for reassessment.    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     Patient reports she is doing well tolerating Sprycel treatment.  She denies chest pain no dyspnea, no leg edema or cramping.  Has good performance status ECOG 0.  No nausea no vomiting.    Laboratory study today on 10/5/2021 reported complete normal CBC.  Pending study results for CMP and iron studies and also BCR-ABL by RT-PCR.          Past Medical History:   Diagnosis Date   • Anemia in neoplastic disease    • Asthma     childhood   • Chiari I malformation (HCC)     eval by Dr Moore, NS 2016   • Chronic myelogenous leukemia (HCC)     remission, Dr Castle follows   • Chronic pain    • CML (chronic myelocytic leukemia) (HCC)    • Cystitis    • Depression    • GERD (gastroesophageal reflux disease)     ulcer   • H/O Iron deficiency anemia    • H/O Lower extremity pain     Resolved.   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      *  Hysterectomy in October 2018      *  Acute hepatitis C in  early 2019, treated successfully by Dr. Karyn Mendieta.     OB/GYN HISTORY: Menarche age 9. G5, P4, 1 miscarriage of the third pregnancy. First pregnancy at age 17. Patient underwent partial hysterectomy in 2018.       HEMATOLOGIC/ONCOLOGIC HISTORY: History from previous dates can be found in the separate document.        Laboratory results on 09/23/2015 showed normalization of hemoglobin 12.2, but still has macrocytosis, MCV 73.3. She has normal platelets and WBC at 9400. Serum ferritin < 5 ng/ml, iron sats 6.3%, iron 29, TIBC 455 mcg/ml. Still has severe iron deficiency, needs to continue oral iron therapy. The patient restarted taking oral iron supplementation which was probably stopped in the end of November 2015.        Laboratory study on 03/22/2016 reported normal WBC 8450, neutrophils 6100, lymphs is 1400 and monocytes 550. Serum iron was 26, TIBC 469 on saturation 6% and ferritin less than 5 NG/ML. Chemistry lab reported normal renal function with a creatinine 0.69, normal liver function panel, total protein 7.5 and albumin 4.2, normal electrolytes. Patient was restarted back on oral on sedimentation twice a day with good tolerance.      Patient continues take Lortab as needed for left leg cramping. Urine drug test on 08/05/2016 was completely negative, and repeated study on August 26 was positive for opiate, negative for other recreational drugs.      Repeat laboratory study on 08/31/2016 reported iron 21, ferritin less than 5, iron saturation 5%, TIBC 462. Hemoglobin was 11.5 MCV 78.1 MCHC 30.4. Platelets 163,000. Total WBC 8870 including neutrophils 6400 and lymphocytes 1500. BCR/ABL was 0.294% by RT-PCR method.       Patient was given IV Feraheme treatment in September 2016, with 2 doses. Repeated laboratory study on 10/06/2016 reported significantly improved and normalized ferritin 269, iron 80, TIBC 365 and iron saturation 22%. Her hemoglobin was 12.2, and MCV 80.8.      Patient reported she was seen by  Dr. Fam in 10/2016 and was started on half tablets of Topamax. I reviewed Dr. Fam’s clinic note and telephone conversation record. The patient reports she has no recurrence of migraine headache. However, she is very tearful today, reporting that she has thoughts of not taking any medications. She did assure me that she has been taking the medication up to this point. 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 The patient denies suicide ideation. When she was initially diagnosed of CML back in 2014, she had depression and I started the patient on Prozac. The patient reported initially it helped her, however, she no longer feels the effect of Prozac. She feels depressed. The patient reports she has no personal hobby. She goes to work and goes home, taking care of her kids. She does not enjoy life as she used to.      Laboratory study on December 29, 2016 reported at ferritin 19.9, iron 33, TIBC 347 iron saturation 10%.  Hemoglobin was 13, normal WBC and platelets.  Unremarkable CMP.  Patient was given 2 doses of Feraheme treatment in early January 2017.      Cytogenetic study on March 14, 2017 reported a BCR-ABL products at 0.098%, showed further improved residual disease.  There is also reported a ferritin 103.7, iron 79, TIBC 336 and iron saturation 24%.  Hemoglobin was 13.5, MCV 92.3, platelets 199,000 WBC 7000.  She had a completely normal CMP.       Repeated laboratory study on June 20, 2017 reported at nondetectable BCR-ABL product.  Ferritin was 45, iron 35 TIBC 333 and iron saturation 11%.  Had a normal CBC and CMP.    In the end of July 2018, patient called reporting worsening significant leg cramping, and getting worse recently and has been taking 6 tablets of Advil with no significant improvement.  We suspect it might be caused by Sprycel for her CML.  We discussed with patient, and decreased  to half dose at 50 mg daily.  Patient reports that she is improved leg cramping since dose reduction.    Per medical records this patient had emergency room visit again on 2018.  Patient reports she had significant abdomen/pelvic pain at a time associate with nausea.  Laboratory study showed significantly elevated WBC at 25,900 including neutrophils 24,400, with elevated hemoglobin 15.8 and platelets 146,000.     She had a thorough investigation, including CT scan for abdomen pelvis which was unremarkable.  She then had ultrasound for the pelvis and it was also unremarkable.  She had ultrasound for the gallbladder examination on the same day and was unremarkable.  She had a normal CMP except of marginally elevated glucose at 112.  Her urinalysis showed only marginally increased WBC 3-5/HPF.  She was negative for yeast infection, negative for Chlamydia trichomonas and Neisseria gonorrhea.  Patient was prescribed Flagyl and doxycycline and discharged to home.      lab study on 2019 reported normal iron saturation 47% and elevated ferritin 507.1 ng/mL with free iron 184 and TIBC 388.  hemoglobin is normal at 13.7 and platelets 186,000. normal WBC at 5080 including ANC 3300, lymphocytes 930 and monocytes 630. Labs reported significantly elevated ALT at 655,  and alkaline phosphatase 234 but normal total bilirubin 0.9. She had normal renal function creatinine 0.78. Normal electrolytes.     On 2019, I searched Up-to-Date and suspected that this patient had drug-induced hepatitis from Diflucan or with combination of Diflucan with Sprycel. This patient had been on Sprycel for years and had no problem with abnormal liver panel previously. It seems Diflucan inhibits CY moderately, which likely interferes with the metabolism of Sprycel. I instructed the patient to hold her Sprycel for now.    Sprycel treatment resumed as of 2019 upon completion of MEVYRET for her hepatitis C.    Laboratory  study performed on 07/02/2019 showed normal CBC and CMP. BCR/ABL by RT-PCR detected BCR/ABL1 Major. HCV RNA by PCR showed HCV not detected.    Laboratory studies 7/31/2019 report her hemoglobin is normal at 15.2 and platelets 146,000. normal WBC at 7650 including ANC 5190, lymphocytes 1600 and monocytes 630.    Recent laboratory studies confirmed to be no detectable virus on 9/11/2019.    U/S Liver performed on 10/24/2019 reported negative hepatic ultrasound exam with no focal liver lesion, negative gallbladder examination with no cholelithiasis or bile duct dilation noted, however borderline splenomegaly was noted.     Laboratory study performed on 10/29/2019, reported a completely normal CBC, CMP. Normal iron saturation and still slightly elevated ferritin 325 ng/dL  BCR/ABL1 MAJOR (p210) and BCR/ABL1 MINOR (p190) were not detected.     Laboratory studies 1/24/2020, show hemoglobin 14.3 MCV 92.6 platelets 180,000 and WBC 6570 including neutrophils 4400.  She also has completely normal CMP.  Iron studies reported ferritin 273, iron saturation 11%, hemoglobin 14.3 WBC 6570 and platelets 180,000.  BCR-ABL by RT-PCR with 0%.     Patient presented today for 3-month follow-up and lab review.      Due to pandemic coronavirus infection, patient has been staying home with all 4 children.  Patient reports significant fatigue for the past month, similar to the time when she had iron deficiency.  Patient reports prior to that she was having regular exercise and with good energy level.    Patient reports compliant with Sprycel 50 mg daily.  She denies leg cramping.  She has no nausea no vomiting no abdominal pains.  She has good appetite and no diarrhea.  She denies headaches vision changes no dizziness or seizure activity.    Her laboratory study on 3/12/2020 reported marginal iron saturation 15% however had a good ferritin 212.5 ng/mL.  She had hemoglobin 13.1 and normal thyroid profile including TSH 1.20, free T4 at 1.37  ng/dL and a free T3 at 2.97 pg/mL     On 4/24/2020 her iron study showed ferritin 262 and iron saturation 21%.  Hemoglobin is 14.8.  She has normal WBC 5740 including ANC 3820, and normal platelets 161,000.   She was negative for BCR-ABL by RT-PCR on the same day.    Laboratory studies, 7/10/2020, show completely normal CBC and CMP.  Negative for BCR-ABL by RT-PCR.  Iron studies showed ferritin 185, iron saturation 13% free iron 37 TIBC 286.    Patient has completed normal CBC 10/9/2020.  Iron studies reported ferritin 2070, free iron 13%.  She also has completed normal CMP.  She was tested negative for BCR-ABL by RT-PCR method.  Sprycel 50 mg daily was continued.     We saw her recently on 10/9/2020 for routine follow-up for her CML.  She was tolerating Sprycel.  Physical examination laboratory studies were unremarkable.  No detectable BCR-ABL by RT-PCR.  We continued her Sprycel at that time.     On 11/17/2020, patient called and reported chest tightness, palpitation and dyspnea new onset in November 2020.  Patient reports this happened recently.  She has chest tightness, and associated tachycardia/palpitation.  Patient reports this is unpredictable, can be on and off.  She noticed that one day she was cooking meal for her family, and noted sudden onset palpitation and chest tightness.  She reports unable to induce purposefully.  She also has exertional dyspnea.  Patient reports 11/17/2020, we asked patient to hold Sprycel.  We suspect the patient may have pleural effusion or cardiac dysfunction secondary to Sprycel, we requested chest CT, echocardiogram study and also laboratory study for evaluation.     Negative chest x-ray on 11/17/2020.     Laboratory study on 11/17/2020 reported stable chronic condition with a ferritin 261 and iron saturation 12%, normal hemoglobin 15.0, unable to explain her dyspnea.     Echocardiogram study on 11/18/2020 reported normal results.     Laboratory study on 1/6/2021 reported  hemoglobin 15.9 hematocrit 47.1%, platelets 181,000 WBC 9360 including ANC 7130 lymphocytes 1350. Iron study reported ferritin 293 and iron saturation 14% with free iron 47 and a TIBC 326.  Chemistry lab reported unremarkable CMP including renal function and liver function panel.      Patient reports she was seen by cardiologist Dr. Flowers on 2021.  Patient had thorough cardiology evaluation and there was no apparent abnormalities.    We have been withholding her Sprycel in middle 2020 because of palpitation, and referred patient to cardiology service.        Fortunately, her peripheral blood BCR-ABL by RT-PCR was complete negative, as reported on 2021.     Sprycel was resumed on 2021.      Laboratory studies on 3/16/2021 reported normal CBC including hemoglobin 14.4 MCV 88.7, platelets 173,000, and WBC 6400 including ANC 3770 lymphocytes 1800.  Chemistry lab reported normal CMP.     Laboratory studies today on 2021 reported completely normal CBC and CMP.  Iron study also normal with ferritin 219 and iron saturation 16%.  Negative study for BCR-ABL by RT-PCR.       MEDICATIONS: The current medication list was reviewed with the patient and updated in the EMR this date per the medical assistant. Medication dosages and frequencies were confirmed to be accurate.        Allergies   Allergen Reactions   • Sulfa Antibiotics Unknown - Low Severity     Childhood reaction     SOCIAL HISTORY: . She smoked cigarettes previously, quit in 2006 with 8-pack-year history. Social drinker, maybe once a month. No illegal drug use. No risk for HIV except tattoos.  Hairstylist.       FAMILY HISTORY: Maternal grandmother had esophageal/stomach adenocarcinoma diagnosed at age of 72 and  of cancer at age of 73. The patient' s mother has hypertension but otherwise healthy.  No other family history of malignancy, especially no leukemia.          VITAL SIGNS:   Vitals:    10/05/21 1059   BP:  "113/69   Pulse: 69   Resp: 18   Temp: 97.5 °F (36.4 °C)   TempSrc: Temporal   SpO2: 98%   Weight: 74.2 kg (163 lb 8 oz)   Height: 172.7 cm (67.99\")   PainSc: 0-No pain   ECOG 0          PHYSICAL EXAMINATION:    GENERAL:  Well-developed, well-nourished female in no acute distress.   SKIN:  Warm, dry without rashes, purpura or petechiae.  HEENT:  Normocephalic.  Wearing mask.   LYMPHATICS:  No cervical, supraclavicular adenopathy.  CHEST: Normal respiratory effort.  Lungs clear to auscultation. Good airflow.  CARDIAC:  Regular rate and rhythm without murmurs. Normal S1,S2.  ABDOMEN:  Soft, nontender with no organomegaly or masses.  Bowel sounds normal.  EXTREMITIES:  No clubbing, cyanosis or edema.  NEUROLOGICAL:  Grossly intact.  No focal neurological deficits.  PSYCHIATRIC:  Normal affect and mood.          LABORATORY DATA:    Lab Results   Component Value Date    WBC 5.68 10/05/2021    HGB 14.4 10/05/2021    HCT 43.4 10/05/2021    MCV 90.4 10/05/2021     10/05/2021     Lab Results   Component Value Date    NEUTROABS 3.82 10/05/2021     Lab Results   Component Value Date    IRON 45 05/04/2021    TIBC 288 05/04/2021    FERRITIN 219.80 (H) 05/04/2021   Iron saturation 16% on 5/4/2021.         Glucose   Date Value Ref Range Status   05/04/2021 94 74 - 124 mg/dL Final     BUN   Date Value Ref Range Status   05/04/2021 13 6 - 20 mg/dL Final     Creatinine   Date Value Ref Range Status   05/04/2021 0.72 0.60 - 1.10 mg/dL Final     Sodium   Date Value Ref Range Status   05/04/2021 140 134 - 145 mmol/L Final     Potassium   Date Value Ref Range Status   05/04/2021 4.0 3.5 - 4.7 mmol/L Final     Chloride   Date Value Ref Range Status   05/04/2021 104 98 - 107 mmol/L Final     CO2   Date Value Ref Range Status   05/04/2021 24.9 22.0 - 29.0 mmol/L Final     Calcium   Date Value Ref Range Status   05/04/2021 9.4 8.5 - 10.2 mg/dL Final     Total Protein   Date Value Ref Range Status   05/04/2021 7.4 6.3 - 8.0 g/dL Final "     Albumin   Date Value Ref Range Status   05/04/2021 4.60 3.50 - 5.20 g/dL Final     ALT (SGPT)   Date Value Ref Range Status   05/04/2021 12 0 - 33 U/L Final     AST (SGOT)   Date Value Ref Range Status   05/04/2021 15 0 - 32 U/L Final     Alkaline Phosphatase   Date Value Ref Range Status   05/04/2021 64 38 - 116 U/L Final     Total Bilirubin   Date Value Ref Range Status   05/04/2021 0.2 0.2 - 1.2 mg/dL Final     eGFR Non  Amer   Date Value Ref Range Status   05/04/2021 91 >60 mL/min/1.73 Final     BUN/Creatinine Ratio   Date Value Ref Range Status   05/04/2021 18.1 7.3 - 30.0 Final     Anion Gap   Date Value Ref Range Status   05/04/2021 11.1 5.0 - 15.0 mmol/L Final       IMAGING STUDY:       ASSESSMENT:      1. Chronic myelogenous leukemia, chronic phase with excellent response to Sprycel and complete molecular response in August 2014. However she had interuption of treatment in early part of 2016, because of insurance issues and miscommunication, she stopped the medicine without telling us, and laboratory test on 03/22/2016 reported disease relapse with increased BCR/ABL product at 12.626%. subsequently she was restarted back on Sprycel, and responded well.  Since June 2017, she has completed molecular response as tested every 3 months.  She has been compliant with treatment.   · In the end of July 2018, she reported worsening significant cramping involving her legs, so we decreased her Sprycel to 50 mg daily starting early August.  She's been having less problem since that time.  She was tested negative for BCR-ABL on 8/31/2018.    · Patient had a negative BCR-ABL by RT-PCR in end of January 2019 and also on 3/29/2019.   · Patient was later found having significantly elevated liver function panel.  Sprycel was on hold and she was subsequently found having acute hepatitis C infection.    · I discussed with Dr. Karyn Mendieta, we'll hold her Sprycel for now until she finishes hepatitis C treatment.   · She  was started on hepatitis C treatment on 4/18/2019 and finished an 8-week course.  · On 07/02/2019 patient's labs showed BCR-ABL Major (p210) detected by RT-PCR at 0.0141%. BCR/ABL1 Minor (p190) was not detected. HCV was not detected.  · Sprycel treatment was resumed as of 07/02/2019 upon completion of MEVYRET.  · Laboratory study on 10/29/2019 reported normal CBC. BCR/ABL1 MAJOR (p210) and BCR/ABL1 MINOR (p190) were not detected.    · BCR-ABL by RT-PCR on 1/24/2020 was negative.     · Lab result for BCR ABL by RT-PCR was also negative on 4/24/2020. Patient will need to continue on sprycel treatment.    · 7/10/2020 patient has normal CBC and CMP.  Negative RT-PCR for BCR ABL.  Tolerating well.  Continue Sprycel at 50 mg daily.  · On 10/9/2020, completely normal CBC with good tolerance.  BCR-ABL was tested negative by RT-PCR method.  Continue Sprycel.   · Patient reports 11/17/2020 chest tightness in palpitation, and exertion dyspnea progressively getting worse in the past week.  We asked patient to hold Sprycel.   · Chest x-ray examination on 11/17/2020 was unremarkable.  Echocardiogram study on 11/18/2020 was also benign.  Patient was seen by cardiologist for further evaluation.    · Laboratory studies on 1/6/2021 reported normal WBC 9360 including ANC 7130 lymphocytes 1350. BCR-ABL by RT-PCR was complete negative, as reported on 1/12/2021.   · Patient had negative cardiac work-up.  She also has resolution of symptoms.  Discussed with patient on 2/9/2021, we recommended resumption of Sprycel 50 mg daily.  Patient is agreeable.  · On 3/16/2021, patient reports tolerating Sprycel, no recurrent symptoms of dyspnea, chest tightness or palpitation.  Continue Sprycel 50 mg daily.  · On 5/4/2021, patient has normal CBC and CMP.  Negative results for BCR-ABL by RT-PCR for P210 and P190 as reported on 5/12/2021.    · On 10/5/2021 patient presented for reevaluation.  She reports tolerating Sprycel.  No specific side effects  reported.  Maintains normal CBC.  We will continue treatment for now.      2. Recurrent Iron deficiency anemia.    · She was treated again with Feraheme October 2017.   Iron deficiency thought to be related to ulcer identified on EGD, being treated with Carafate.  She also had heavy menses.  · She had recurrent iron deficiency again and was given Feraheme 2 doses in March 2018.   · Subsequently recurrent iron deficiency in July 2018, she was switched to Venofer 3 doses total 900 mg.   · Patient had simple hysterectomy in October 2018.  · Laboratory study showed supratherapeutic ferritin 458 and iron saturation 40% on 4/30/2019.   · Laboratory study performed 10/29/2019, showed normal iron saturation and ferritin 325 ng/dL.  · Normal iron studies including ferritin 262 and iron saturation 21% on 4/24/2020.  No evidence of recurrent iron deficiency.   · Lab study on 7/10/2020 reported ferritin 185, iron saturation 13% and normal hemoglobin 14.5.  She has deteriorating iron studies.   Continue to monitor in 3 months.  · Labs on 10/9/2020 reported ferritin 270, iron saturation 13% and hemoglobin 14.9.  · Lab studies on 1/6/2021 reported mild erythrocytosis.  Hemoglobin is 15.9 and hematocrit of 47.1%, with ferritin 293 and iron saturation 14%.    · Normal hemoglobin 14.4 on 3/16/2021.    · On 5/4/2021 lab study reported hemoglobin 13.7, ferritin 219 and iron saturation 16%.    · On 10/5/2021, patient maintains normal hemoglobin.  Pending iron studies results.  Continue to monitor.       *Chest tightness, palpitation and dyspnea new onset in November 2020.  Patient reports this happened recently, and that usually unpredictable, she has chest tightness, and associated tachycardia/palpitation.  Patient reports this is unpredictable, can be on and off.  She noticed 1 day she was cooking meal for her family, and noted several palpitation and chest tightness.  She reports unable to induce purposefully.  She also has  exertional dyspnea.  Patient reports 11/17/2020, we asked patient to hold Sprycel.  We requested chest CT, echocardiogram study and also laboratory study for evaluation.  · Negative chest x-ray on 11/17/2020.   · Laboratory study on 11/17/2020 reported stable chronic condition with a ferritin 261 and iron saturation 12%, normal hemoglobin 15.0, unable to explain her dyspnea.   · Echocardiogram study on 11/18/2020 reported normal results.   · Patient is evaluated 11/20/2020, she reports somewhat improved symptoms, since she stopped Sprycel for the past 3 days.  She denies extremity edema.  She denies fever sweating or chills.  We recommend patient to continue to hold Sprycel for another 2 weeks.  We also recommended patient to be tested for COVID-19.    · Reevaluation on 12/4/2020, patient reports that she did not get a Covid test.  She denies other illness such as fever sweating nausea vomiting, diarrhea, change of taste, cough etc.  Discussed with patient, will check a thyroid panel, which turned out to be normal on 12/4/2020.    · Patient was referred to cardiologist for evaluation of hypertension.  She was seen by Dr. Flowers on 12/21/2020 and the case waiting for further evaluation.  · Patient had a negative cardiac work-up.  Her symptom has resolved.  · Patient was restarted on Sprycel 2/9/2021.  She reports no recurrent symptoms 3/16/2021.  · On 5/4/2021, patient reports no recurrent symptoms.  · On 10/5/2021 patient reports no dyspnea no chest pain or discomfort.        *COVID-19 vaccination.  · Got 2 doses of the Moderna vaccine.    · Today on 10/5/2021, I discussed with patient, and I encouraged patient to get booster dose since she is immunosuppressed due to treatment for her leukemia.         PLAN:    1. Continue Sprycel treatment 50 mg daily.   2. Pending results today for CMP, iron studies and a BCR-ABL by RT-PCR.   3. Continue oral vitamin B12 at 1000 mcg daily.   4. Obtain boost dose of COVID-19  vaccine.   5. Arrange follow-up with me in 3 months with CBC, CMP, also BCR-ABL by RT-PCR.    6. I asked patient to call if she has recurrent symptoms with palpitations and dyspnea.  Patient voiced understanding.    This patient is on high risk medication and needs close monitoring.     Addendum:    Lab Results   Component Value Date    GLUCOSE 68 (L) 10/05/2021    BUN 11 10/05/2021    CREATININE 0.78 10/05/2021    EGFRIFNONA 83 10/05/2021    BCR 14.1 10/05/2021    K 4.0 10/05/2021    CO2 27.0 10/05/2021    CALCIUM 9.6 10/05/2021    ALBUMIN 4.80 10/05/2021    AST 16 10/05/2021    ALT 11 10/05/2021     Lab Results   Component Value Date    IRON 63 10/05/2021    TIBC 301 10/05/2021    FERRITIN 250.80 (H) 10/05/2021   Iron saturation 21% 10/5/2021.     No evidence of iron deficiency.  Patient has normal CMP except marginally low glucose level.     Pending level for BCR-ABL by RT-PCR.       BAO SLADE M.D., Ph.D.    10/5/2021.        CC:  ISHAAN REGAN M.D.    Karyn Mendieta M.D.    Luis Alfredo Flowers M.D.

## 2021-10-13 LAB — REF LAB TEST METHOD: NORMAL

## 2021-10-20 ENCOUNTER — MEDICATION THERAPY MANAGEMENT (OUTPATIENT)
Dept: PHARMACY | Facility: HOSPITAL | Age: 36
End: 2021-10-20

## 2021-10-20 NOTE — PROGRESS NOTES
MTM telephone follow up- Sprycel    No answer today.  direct line [phone].     Thanks,   Joshua LockeD

## 2021-10-21 ENCOUNTER — MEDICATION THERAPY MANAGEMENT (OUTPATIENT)
Dept: PHARMACY | Facility: HOSPITAL | Age: 36
End: 2021-10-21

## 2021-10-21 NOTE — PROGRESS NOTES
MTM telephone follow up- dasatinib    Hu is doing well today. She continues on 50 mg daily of dasatinib. Her main complaint with this medication is pain and fatigue; this has improved on the lower dose compared to the 100 mg dose. Medication administration and adherence seem appropriate; she missed one dose this past month due to forgetfulness. Hu does not take any other regular medications. She has no additional questions or concerns for me today.     Thanks,   Joana Abdullahi, JoshuaD

## 2021-11-03 ENCOUNTER — SPECIALTY PHARMACY (OUTPATIENT)
Dept: PHARMACY | Facility: HOSPITAL | Age: 36
End: 2021-11-03

## 2021-11-03 NOTE — PROGRESS NOTES
Pt enrolled into the SP Program for Sprycel. Pt fills through an outside pharmacy due to insurance restrictions.       Preeti Riso  Specialty Pharmacy Technician

## 2021-11-12 ENCOUNTER — HOSPITAL ENCOUNTER (OUTPATIENT)
Dept: GENERAL RADIOLOGY | Facility: HOSPITAL | Age: 36
Discharge: HOME | End: 2021-11-12
Admitting: NURSE PRACTITIONER

## 2021-11-12 DIAGNOSIS — M79.641 RIGHT HAND PAIN: ICD-10-CM

## 2021-11-12 PROCEDURE — 73120 X-RAY EXAM OF HAND: CPT

## 2021-11-22 ENCOUNTER — TELEMEDICINE (OUTPATIENT)
Dept: FAMILY MEDICINE CLINIC | Facility: TELEHEALTH | Age: 36
End: 2021-11-22

## 2021-11-22 DIAGNOSIS — J02.9 PHARYNGITIS, UNSPECIFIED ETIOLOGY: Primary | ICD-10-CM

## 2021-11-22 PROCEDURE — 99213 OFFICE O/P EST LOW 20 MIN: CPT | Performed by: NURSE PRACTITIONER

## 2021-11-22 RX ORDER — AMOXICILLIN 875 MG/1
875 TABLET, COATED ORAL 2 TIMES DAILY
Qty: 20 TABLET | Refills: 0 | Status: SHIPPED | OUTPATIENT
Start: 2021-11-22 | End: 2021-12-28

## 2021-11-22 NOTE — PROGRESS NOTES
You have chosen to receive care through a telehealth visit.  Do you consent to use a video/audio connection for your medical care today? Yes     CHIEF COMPLAINT  Chief Complaint   Patient presents with   • Sore Throat   • Adenopathy         HPI  Hu Houston is a 36 y.o. female  presents with complaint of sore throat and lymph node swelling in her cervical neck area bilaterally  present for 2 days. She reports her children were both diagnoses with strep throat last week and now she has a severe sore throat. She denies headache, GI symptoms or fever. She is taking OTC pain medication. She reports no cough, ear pain or sinus congestion.     Review of Systems   Constitutional: Negative for fatigue and fever.   HENT: Positive for sore throat. Negative for congestion, ear pain, rhinorrhea, sinus pressure and sinus pain.    Respiratory: Negative.    Cardiovascular: Negative.    Gastrointestinal: Negative.    Skin: Negative.    Allergic/Immunologic: Negative.    Neurological: Negative.    Hematological: Positive for adenopathy.   Psychiatric/Behavioral: Negative.        Past Medical History:   Diagnosis Date   • Anemia in neoplastic disease    • Asthma     childhood   • Chiari I malformation (HCC)     eval by Dr Moore, NS 2016   • Chronic myelogenous leukemia (HCC)     remission, Dr Castle follows   • Chronic pain    • CML (chronic myelocytic leukemia) (HCC)    • Cystitis    • Depression    • GERD (gastroesophageal reflux disease)     ulcer   • H/O Iron deficiency anemia    • H/O Lower extremity pain     Resolved.   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   • Pulmonary hypertension (HCC)    • Seizures (HCC)     as child after head injry   • Splenomegaly    • Status post D&C    • Vitamin D deficiency        Family History   Problem Relation Age of Onset   • Hyperlipidemia Mother    • Hypertension  Mother    • Stomach cancer Maternal Grandmother 72        Adenocarcinoma esophagus and stomach   • Stroke Paternal Grandmother    • Malig Hyperthermia Neg Hx        Social History     Socioeconomic History   • Marital status:    Tobacco Use   • Smoking status: Former Smoker     Packs/day: 1.00     Years: 8.00     Pack years: 8.00     Types: Cigarettes     Quit date: 7/4/2006     Years since quitting: 15.3   • Smokeless tobacco: Never Used   Vaping Use   • Vaping Use: Never used   Substance and Sexual Activity   • Alcohol use: Yes     Comment: Social, maybe once every 6 months/no caffeine use   • Drug use: Not Currently     Types: Cocaine(coke)     Comment: prior to 2006   • Sexual activity: Defer     Birth control/protection: Surgical     Comment: Tubal         LMP 10/11/2018     PHYSICAL EXAM  Physical Exam   Constitutional: She is oriented to person, place, and time. She appears well-developed and well-nourished. She does not have a sickly appearance. She does not appear ill. No distress.   HENT:   Head: Normocephalic and atraumatic.   Right Ear: Hearing and external ear normal.   Left Ear: Hearing and external ear normal.   Mouth/Throat: Mouth/Lips are normal.  Unable to visualize throat   Pulmonary/Chest: Effort normal.  No respiratory distress.  Neurological: She is alert and oriented to person, place, and time.   Psychiatric: She has a normal mood and affect.   Vitals reviewed.      Results for orders placed or performed in visit on 10/05/21   Ferritin    Specimen: Blood   Result Value Ref Range    Ferritin 250.80 (H) 11.00 - 207.00 ng/mL   Iron Profile    Specimen: Blood   Result Value Ref Range    Iron 63 37 - 145 mcg/dL    Iron Saturation 21 14 - 48 %    Transferrin 215 200 - 360 mg/dL    TIBC 301 249 - 505 mcg/dL       Diagnoses and all orders for this visit:    1. Pharyngitis, unspecified etiology (Primary)  -     amoxicillin (AMOXIL) 875 MG tablet; Take 1 tablet by mouth 2 (Two) Times a Day.   Dispense: 20 tablet; Refill: 0            Treatment:  · Gargle with a salt-water mixture 3-4 times a day or as needed. To make a salt-water mixture, completely dissolve ½-1 tsp (3-6 g) of salt in 1 cup (237 mL) of warm water.  · Get plenty of rest.  · Stay home from work or school until you have been taking antibiotics for 24 hours.  · Avoid smoking or being around people who smoke.  · Keep all follow-up visits as told by your health care provider. This is important.         Prevention:  · Do not share food, drinking cups, or personal items that could cause the infection to spread to other people.  · Wash your hands well with soap and water, and make sure that all people in your house wash their hands well.  · Have family members tested if they have a sore throat or fever. They may need an antibiotic if they have strep throat.    Follow-up with PCP if:   · The glands in your neck continue to get bigger.  · You develop a rash, cough, or earache.  · You cough up a thick mucus that is green, yellow-brown, or bloody.  · You have pain or discomfort that does not get better with medicine.  · Your symptoms seem to be getting worse and not better.  · You have a fever.    Seek immediate care at Emergency Department:   · You have new symptoms, such as vomiting, severe headache, stiff or painful neck, chest pain, or shortness of breath.  · You have severe throat pain, drooling, or changes in your voice.  · You have swelling of the neck, or the skin on the neck becomes red and tender.  · You have signs of dehydration, such as tiredness (fatigue), dry mouth, and decreased urination.  · You become increasingly sleepy, or you cannot wake up completely.  · Your joints become red or painful.          FOLLOW-UP  As discussed during visit with PCP/[facility] if no improvement or Urgent Care/Emergency Department if worsening of symptoms    Patient verbalizes understanding of medication dosage, comfort measures, instructions for  treatment and follow-up.    Kelsy Fajardo, FELIPA  11/22/2021  12:41 EST    This visit was performed via Telehealth.  This patient has been instructed to follow-up with their primary care provider if their symptoms worsen or the treatment provided does not resolve their illness.

## 2021-11-22 NOTE — PATIENT INSTRUCTIONS
Strep Throat, Adult  Strep throat is an infection of the throat. It is caused by germs (bacteria). Strep throat is common during the cold months of the year. It mostly affects children who are 5-15 years old. However, people of all ages can get it at any time of the year.  When strep throat affects the tonsils, it is called tonsillitis. When it affects the back of the throat, it is called pharyngitis. This infection spreads from person to person through coughing, sneezing, or having close contact.  What are the causes?  This condition is caused by the Streptococcus pyogenes germ.  What increases the risk?  You are more likely to develop this condition if:  · You care for young children. Children are more likely to get strep throat and may spread it to others.  · You go to crowded places. Germs can spread easily in such places.  · You kiss or touch someone who has strep throat.  What are the signs or symptoms?  Symptoms of this condition include:  · Fever or chills.  · Redness, swelling, or pain in the tonsils or throat.  · Pain or trouble when swallowing.  · White or yellow spots on the tonsils or throat.  · Tender glands in the neck and under the jaw.  · Bad breath.  · Red rash all over the body. This is rare.  How is this treated?  This condition may be treated with:  · Medicines that kill germs (antibiotics).  · Medicines that treat pain or fever. These include:  ? Ibuprofen or acetaminophen.  ? Aspirin, only for patients who are over the age of 18.  ? Throat lozenges.  ? Throat sprays.  Follow these instructions at home:  Medicines    · Take over-the-counter and prescription medicines only as told by your doctor.  · Take your antibiotic medicine as told by your doctor. Do not stop taking the antibiotic even if you start to feel better.    Eating and drinking    · If you have trouble swallowing, eat soft foods until your throat feels better.  · Drink enough fluid to keep your pee (urine) pale yellow.  · To help  with pain, you may have:  ? Warm fluids, such as soup and tea.  ? Cold fluids, such as frozen desserts or popsicles.    General instructions  · Rinse your mouth (gargle) with a salt-water mixture 3-4 times a day or as needed. To make a salt-water mixture, dissolve ½-1 tsp (3-6 g) of salt in 1 cup (237 mL) of warm water.  · Rest as much as you can.  · Stay home from work or school until you have been taking antibiotics for 24 hours.  · Avoid smoking or being around people who smoke.  · Keep all follow-up visits as told by your doctor. This is important.  How is this prevented?    · Do not share food, drinking cups, or personal items. They can cause the germs to spread.  · Wash your hands well with soap and water. Make sure that all people in your house wash their hands well.  · Have family members tested if they have a fever or a sore throat. They may need an antibiotic if they have strep throat.    Contact a doctor if:  · You have swelling in your neck that keeps getting bigger.  · You get a rash, cough, or earache.  · You cough up a thick fluid that is green, yellow-brown, or bloody.  · You have pain that does not get better with medicine.  · Your symptoms get worse instead of getting better.  · You have a fever.  Get help right away if:  · You vomit.  · You have a very bad headache.  · Your neck hurts or feels stiff.  · You have chest pain or are short of breath.  · You have drooling, very bad throat pain, or changes in your voice.  · Your neck is swollen, or the skin gets red and tender.  · Your mouth is dry, or you are peeing less than normal.  · You keep feeling more tired or have trouble waking up.  · Your joints are red or painful.  Summary  · Strep throat is an infection of the throat. It is caused by germs (bacteria).  · This infection can spread from person to person through coughing, sneezing, or having close contact.  · Take your medicines, including antibiotics, as told by your doctor. Do not stop  taking the antibiotic even if you start to feel better.  · To prevent the spread of germs, wash your hands well with soap and water. Have others do the same. Do not share food, drinking cups, or personal items.  · Get help right away if you have a bad headache, chest pain, shortness of breath, a stiff or painful neck, or you vomit.  This information is not intended to replace advice given to you by your health care provider. Make sure you discuss any questions you have with your health care provider.  Document Revised: 03/06/2020 Document Reviewed: 03/06/2020  [employer] Patient Education © 2021 [employer]  Pharyngitis    Pharyngitis is a sore throat (pharynx). This is when there is redness, pain, and swelling in your throat. Most of the time, this condition gets better on its own. In some cases, you may need medicine.  Follow these instructions at home:  · Take over-the-counter and prescription medicines only as told by your doctor.  ? If you were prescribed an antibiotic medicine, take it as told by your doctor. Do not stop taking the antibiotic even if you start to feel better.  ? Do not give children aspirin. Aspirin has been linked to Reye syndrome.  · Drink enough water and fluids to keep your pee (urine) clear or pale yellow.  · Get a lot of rest.  · Rinse your mouth (gargle) with a salt-water mixture 3-4 times a day or as needed. To make a salt-water mixture, completely dissolve ½-1 tsp of salt in 1 cup of warm water.  · If your doctor approves, you may use throat lozenges or sprays to soothe your throat.  Contact a doctor if:  · You have large, tender lumps in your neck.  · You have a rash.  · You cough up green, yellow-brown, or bloody spit.  Get help right away if:  · You have a stiff neck.  · You drool or cannot swallow liquids.  · You cannot drink or take medicines without throwing up.  · You have very bad pain that does not go away with medicine.  · You have problems breathing, and it is not from a  stuffy nose.  · You have new pain and swelling in your knees, ankles, wrists, or elbows.  Summary  · Pharyngitis is a sore throat (pharynx). This is when there is redness, pain, and swelling in your throat.  · If you were prescribed an antibiotic medicine, take it as told by your doctor. Do not stop taking the antibiotic even if you start to feel better.  · Most of the time, pharyngitis gets better on its own. Sometimes, you may need medicine.  This information is not intended to replace advice given to you by your health care provider. Make sure you discuss any questions you have with your health care provider.  Document Revised: 11/30/2018 Document Reviewed: 01/23/2018  [employer] Patient Education © 2021 [employer]

## 2021-12-21 ENCOUNTER — LAB (OUTPATIENT)
Dept: LAB | Facility: HOSPITAL | Age: 36
End: 2021-12-21

## 2021-12-21 ENCOUNTER — OFFICE VISIT (OUTPATIENT)
Dept: ONCOLOGY | Facility: CLINIC | Age: 36
End: 2021-12-21

## 2021-12-21 VITALS
HEIGHT: 68 IN | RESPIRATION RATE: 14 BRPM | TEMPERATURE: 97.8 F | DIASTOLIC BLOOD PRESSURE: 69 MMHG | WEIGHT: 172 LBS | OXYGEN SATURATION: 99 % | HEART RATE: 59 BPM | BODY MASS INDEX: 26.07 KG/M2 | SYSTOLIC BLOOD PRESSURE: 103 MMHG

## 2021-12-21 DIAGNOSIS — C92.10 CML (CHRONIC MYELOID LEUKEMIA) (HCC): Primary | ICD-10-CM

## 2021-12-21 DIAGNOSIS — C92.10 CML (CHRONIC MYELOID LEUKEMIA) (HCC): ICD-10-CM

## 2021-12-21 LAB
ALBUMIN SERPL-MCNC: 4.8 G/DL (ref 3.5–5.2)
ALBUMIN/GLOB SERPL: 1.7 G/DL (ref 1.1–2.4)
ALP SERPL-CCNC: 72 U/L (ref 38–116)
ALT SERPL W P-5'-P-CCNC: 12 U/L (ref 0–33)
ANION GAP SERPL CALCULATED.3IONS-SCNC: 10.7 MMOL/L (ref 5–15)
AST SERPL-CCNC: 15 U/L (ref 0–32)
BASOPHILS # BLD AUTO: 0.04 10*3/MM3 (ref 0–0.2)
BASOPHILS NFR BLD AUTO: 0.6 % (ref 0–1.5)
BILIRUB SERPL-MCNC: 0.2 MG/DL (ref 0.2–1.2)
BUN SERPL-MCNC: 8 MG/DL (ref 6–20)
BUN/CREAT SERPL: 10.5 (ref 7.3–30)
CALCIUM SPEC-SCNC: 9.8 MG/DL (ref 8.5–10.2)
CHLORIDE SERPL-SCNC: 102 MMOL/L (ref 98–107)
CO2 SERPL-SCNC: 25.3 MMOL/L (ref 22–29)
CREAT SERPL-MCNC: 0.76 MG/DL (ref 0.6–1.1)
DEPRECATED RDW RBC AUTO: 40.7 FL (ref 37–54)
EOSINOPHIL # BLD AUTO: 0.2 10*3/MM3 (ref 0–0.4)
EOSINOPHIL NFR BLD AUTO: 3.1 % (ref 0.3–6.2)
ERYTHROCYTE [DISTWIDTH] IN BLOOD BY AUTOMATED COUNT: 12.5 % (ref 12.3–15.4)
GFR SERPL CREATININE-BSD FRML MDRD: 86 ML/MIN/1.73
GLOBULIN UR ELPH-MCNC: 2.8 GM/DL (ref 1.8–3.5)
GLUCOSE SERPL-MCNC: 88 MG/DL (ref 74–124)
HCT VFR BLD AUTO: 40.8 % (ref 34–46.6)
HGB BLD-MCNC: 13.9 G/DL (ref 12–15.9)
IMM GRANULOCYTES # BLD AUTO: 0.03 10*3/MM3 (ref 0–0.05)
IMM GRANULOCYTES NFR BLD AUTO: 0.5 % (ref 0–0.5)
LYMPHOCYTES # BLD AUTO: 1.84 10*3/MM3 (ref 0.7–3.1)
LYMPHOCYTES NFR BLD AUTO: 28.3 % (ref 19.6–45.3)
MCH RBC QN AUTO: 30.1 PG (ref 26.6–33)
MCHC RBC AUTO-ENTMCNC: 34.1 G/DL (ref 31.5–35.7)
MCV RBC AUTO: 88.3 FL (ref 79–97)
MONOCYTES # BLD AUTO: 0.58 10*3/MM3 (ref 0.1–0.9)
MONOCYTES NFR BLD AUTO: 8.9 % (ref 5–12)
NEUTROPHILS NFR BLD AUTO: 3.81 10*3/MM3 (ref 1.7–7)
NEUTROPHILS NFR BLD AUTO: 58.6 % (ref 42.7–76)
NRBC BLD AUTO-RTO: 0 /100 WBC (ref 0–0.2)
PLATELET # BLD AUTO: 167 10*3/MM3 (ref 140–450)
PMV BLD AUTO: 10.2 FL (ref 6–12)
POTASSIUM SERPL-SCNC: 3.6 MMOL/L (ref 3.5–4.7)
PROT SERPL-MCNC: 7.6 G/DL (ref 6.3–8)
RBC # BLD AUTO: 4.62 10*6/MM3 (ref 3.77–5.28)
SODIUM SERPL-SCNC: 138 MMOL/L (ref 134–145)
WBC NRBC COR # BLD: 6.5 10*3/MM3 (ref 3.4–10.8)

## 2021-12-21 PROCEDURE — 85025 COMPLETE CBC W/AUTO DIFF WBC: CPT

## 2021-12-21 PROCEDURE — 36415 COLL VENOUS BLD VENIPUNCTURE: CPT

## 2021-12-21 PROCEDURE — 80053 COMPREHEN METABOLIC PANEL: CPT

## 2021-12-21 PROCEDURE — 99214 OFFICE O/P EST MOD 30 MIN: CPT | Performed by: INTERNAL MEDICINE

## 2021-12-30 LAB — REF LAB TEST METHOD: NORMAL

## 2022-01-24 ENCOUNTER — TELEPHONE (OUTPATIENT)
Dept: ONCOLOGY | Facility: CLINIC | Age: 37
End: 2022-01-24

## 2022-01-25 NOTE — TELEPHONE ENCOUNTER
Patient called and stated she tested positive for COVID on 1/24/2022 and wants to know if she needs to HOLD Sprycel. Per Dr Castle patient needs to HOLD Sprycel for 2 weeks.  Patient v/u

## 2022-02-09 ENCOUNTER — TELEPHONE (OUTPATIENT)
Dept: ONCOLOGY | Facility: CLINIC | Age: 37
End: 2022-02-09

## 2022-02-10 ENCOUNTER — SPECIALTY PHARMACY (OUTPATIENT)
Dept: PHARMACY | Facility: HOSPITAL | Age: 37
End: 2022-02-10

## 2022-02-10 NOTE — TELEPHONE ENCOUNTER
"HUB call from pt forwarded to me-she called to say she got a letter stating her chemo will no longer be covered.    I attempted to submit a PA to [facility] through covermymeds and rec a message saying Prior Auth is not available.    I contacted [facility] [phone] and spoke to Amy. I inquired on the Sprycel and the message I rec for the PA. She states the medication has been denied on 2/3. I inquired on this further as the office did not submit any PA prior to today. She proceeded to tell me that the medication is not on the formulary. I inquired on a non-formulary exception as pt has been on this medication for a long time and we cannot change her treatment.    She \"reviewed\" the account and stated the PA can be appealed and the doctor will have to submit notes showing pt is using this for an FDA approved indication. She proceeded to tell me how to submit an appeal-I asked her why we are doing an appeal when we did not submit an initial PA with required diagnosis. I let her know that if this is a non formulary medication-their company should have notified the providers office at the beginning of the year or when the decision was made to make formulary changes. Pt has been on this medication and it has been approved in the past.     I disconnected the call as she was going in circles.    I contacted pt to and asked her to let me know what the letter she rec said. She states that the letter stated they did not rec requested information in a timely manner, they needed an approved indication for the medication.     I asked if she rec a delivery from [facility] in January and she states she rec a 15 day supply x 2. I will check into why this was a split fill.    To keep pt on therapy and not have a lapse in treatment-I have requested a supply from [facility]. Pt is aware of this and consented.  "

## 2022-02-14 ENCOUNTER — DOCUMENTATION (OUTPATIENT)
Dept: PHARMACY | Facility: HOSPITAL | Age: 37
End: 2022-02-14

## 2022-02-14 NOTE — PROGRESS NOTES
Edson wang has been approved from 2/10/22-2/9/23-[employer]       Preeti Rios  Specialty Pharmacy Technician

## 2022-02-24 ENCOUNTER — SPECIALTY PHARMACY (OUTPATIENT)
Dept: PHARMACY | Facility: HOSPITAL | Age: 37
End: 2022-02-24

## 2022-02-28 ENCOUNTER — TELEPHONE (OUTPATIENT)
Dept: ONCOLOGY | Facility: CLINIC | Age: 37
End: 2022-02-28

## 2022-02-28 NOTE — TELEPHONE ENCOUNTER
Caller: Hu Houston    Relationship: Self    Best call back number: [phone]    What is the best time to reach you: ANYTIME     Who are you requesting to speak with (clinical staff, provider,  specific staff member): NURSE     What was the call regarding: PATIENT STATES THAT HER CHEMO MED IS BEING SENT TO OUR OFFICE FROM [facility] IN [place].  SHE WOULD LIKE TO KNOW IF SHE CAN PICK THIS UP TOMORROW, 3/1/22.    Do you require a callback: PLEASE CALL TO ADVISE

## 2022-02-28 NOTE — TELEPHONE ENCOUNTER
Pt called the office asking if she could  her medication that was sent from [facility] on 3/1.    I returned her call and let her know she can come when it is convenient for her and let her know office hours.

## 2022-03-01 ENCOUNTER — TELEPHONE (OUTPATIENT)
Dept: ONCOLOGY | Facility: CLINIC | Age: 37
End: 2022-03-01

## 2022-03-01 DIAGNOSIS — C92.10 CML (CHRONIC MYELOID LEUKEMIA): Primary | ICD-10-CM

## 2022-03-01 DIAGNOSIS — H66.90 EAR INFECTION: ICD-10-CM

## 2022-03-01 NOTE — TELEPHONE ENCOUNTER
Per Dr Castle patient needs to follow up with a PCP due to recurring ear infections. A referral was placed for patient to follow up with a ENT.    Patient v/u        Patient states she has gone to the [facility] 2 times and attempted to go a 3rd time but was told she needed to follow up with her PCP. Patient states Dr Castle told her she did not need a PCP and he would take care of all her needs. Patient is requesting a referral to ENT.  Explained to patient Dr Castle is at the hospital rounding and will speak to him upon his return to [facility] office.    Patient v/u

## 2022-03-02 ENCOUNTER — TELEPHONE (OUTPATIENT)
Dept: ONCOLOGY | Facility: CLINIC | Age: 37
End: 2022-03-02

## 2022-03-02 NOTE — TELEPHONE ENCOUNTER
----- Message from Roxanne Cha RN sent at 3/1/2022  3:01 PM EST -----  Regarding: ENT referral  I have placed a referral for this patient for a ENT due to recurring ear infections reported by patient.  Thanks

## 2022-03-02 NOTE — TELEPHONE ENCOUNTER
Pt has been schedule for her appt date and time with dr martinez - 3/22/22 advised pt to go to their website and fill out the forms for the [facility] ent - her appt is scheduled at [place] at 2:00pm [id number]

## 2022-03-14 ENCOUNTER — DOCUMENTATION (OUTPATIENT)
Dept: PHARMACY | Facility: HOSPITAL | Age: 37
End: 2022-03-14

## 2022-03-14 ENCOUNTER — OFFICE VISIT (OUTPATIENT)
Dept: ONCOLOGY | Facility: CLINIC | Age: 37
End: 2022-03-14

## 2022-03-14 ENCOUNTER — LAB (OUTPATIENT)
Dept: LAB | Facility: HOSPITAL | Age: 37
End: 2022-03-14

## 2022-03-14 VITALS
OXYGEN SATURATION: 97 % | TEMPERATURE: 97.3 F | SYSTOLIC BLOOD PRESSURE: 109 MMHG | WEIGHT: 182.3 LBS | RESPIRATION RATE: 18 BRPM | BODY MASS INDEX: 27.63 KG/M2 | DIASTOLIC BLOOD PRESSURE: 70 MMHG | HEIGHT: 68 IN | HEART RATE: 68 BPM

## 2022-03-14 DIAGNOSIS — C92.10 CML (CHRONIC MYELOID LEUKEMIA): ICD-10-CM

## 2022-03-14 DIAGNOSIS — C92.10 CML (CHRONIC MYELOID LEUKEMIA): Primary | ICD-10-CM

## 2022-03-14 LAB
ALBUMIN SERPL-MCNC: 4.5 G/DL (ref 3.5–5.2)
ALBUMIN/GLOB SERPL: 1.6 G/DL (ref 1.1–2.4)
ALP SERPL-CCNC: 76 U/L (ref 38–116)
ALT SERPL W P-5'-P-CCNC: 15 U/L (ref 0–33)
ANION GAP SERPL CALCULATED.3IONS-SCNC: 9.7 MMOL/L (ref 5–15)
AST SERPL-CCNC: 16 U/L (ref 0–32)
BASOPHILS # BLD AUTO: 0.02 10*3/MM3 (ref 0–0.2)
BASOPHILS NFR BLD AUTO: 0.3 % (ref 0–1.5)
BILIRUB SERPL-MCNC: 0.5 MG/DL (ref 0.2–1.2)
BUN SERPL-MCNC: 11 MG/DL (ref 6–20)
BUN/CREAT SERPL: 15.3 (ref 7.3–30)
CALCIUM SPEC-SCNC: 9.4 MG/DL (ref 8.5–10.2)
CHLORIDE SERPL-SCNC: 105 MMOL/L (ref 98–107)
CO2 SERPL-SCNC: 25.3 MMOL/L (ref 22–29)
CREAT SERPL-MCNC: 0.72 MG/DL (ref 0.6–1.1)
DEPRECATED RDW RBC AUTO: 41.5 FL (ref 37–54)
EGFRCR SERPLBLD CKD-EPI 2021: 109.9 ML/MIN/1.73
EOSINOPHIL # BLD AUTO: 0.05 10*3/MM3 (ref 0–0.4)
EOSINOPHIL NFR BLD AUTO: 0.7 % (ref 0.3–6.2)
ERYTHROCYTE [DISTWIDTH] IN BLOOD BY AUTOMATED COUNT: 12.7 % (ref 12.3–15.4)
GLOBULIN UR ELPH-MCNC: 2.9 GM/DL (ref 1.8–3.5)
GLUCOSE SERPL-MCNC: 85 MG/DL (ref 74–124)
HCT VFR BLD AUTO: 42.9 % (ref 34–46.6)
HGB BLD-MCNC: 14.3 G/DL (ref 12–15.9)
IMM GRANULOCYTES # BLD AUTO: 0.04 10*3/MM3 (ref 0–0.05)
IMM GRANULOCYTES NFR BLD AUTO: 0.6 % (ref 0–0.5)
LYMPHOCYTES # BLD AUTO: 1.17 10*3/MM3 (ref 0.7–3.1)
LYMPHOCYTES NFR BLD AUTO: 17 % (ref 19.6–45.3)
MCH RBC QN AUTO: 30.2 PG (ref 26.6–33)
MCHC RBC AUTO-ENTMCNC: 33.3 G/DL (ref 31.5–35.7)
MCV RBC AUTO: 90.5 FL (ref 79–97)
MONOCYTES # BLD AUTO: 0.6 10*3/MM3 (ref 0.1–0.9)
MONOCYTES NFR BLD AUTO: 8.7 % (ref 5–12)
NEUTROPHILS NFR BLD AUTO: 5.01 10*3/MM3 (ref 1.7–7)
NEUTROPHILS NFR BLD AUTO: 72.7 % (ref 42.7–76)
NRBC BLD AUTO-RTO: 0 /100 WBC (ref 0–0.2)
PLATELET # BLD AUTO: 167 10*3/MM3 (ref 140–450)
PMV BLD AUTO: 10 FL (ref 6–12)
POTASSIUM SERPL-SCNC: 4.1 MMOL/L (ref 3.5–4.7)
PROT SERPL-MCNC: 7.4 G/DL (ref 6.3–8)
RBC # BLD AUTO: 4.74 10*6/MM3 (ref 3.77–5.28)
SODIUM SERPL-SCNC: 140 MMOL/L (ref 134–145)
WBC NRBC COR # BLD: 6.89 10*3/MM3 (ref 3.4–10.8)

## 2022-03-14 PROCEDURE — 80053 COMPREHEN METABOLIC PANEL: CPT

## 2022-03-14 PROCEDURE — 85025 COMPLETE CBC W/AUTO DIFF WBC: CPT

## 2022-03-14 PROCEDURE — 99213 OFFICE O/P EST LOW 20 MIN: CPT | Performed by: INTERNAL MEDICINE

## 2022-03-14 PROCEDURE — 36415 COLL VENOUS BLD VENIPUNCTURE: CPT

## 2022-03-14 NOTE — PROGRESS NOTES
REASONS FOR FOLLOWUP:    1. Chronic myelogenous leukemia with splenomegaly, chronic phase, positive [place] chromosome, no mutation at kinase domain diagnosed in November 2020.   · Patient is on dose reduced Sprycel 50 mg daily with undetectable disease by RT-PCR.      2. Recurrent iron deficiency anemia secondary to menorrhagia, not responding to oral iron supplementation.  She also had significant constipation associated with oral iron.   · Patient was given Feraheme treatment 2 doses in September 2016 and repeated in January 2017 and again in October 2017 and March 2018.   · IV Venofer total 900 mg in July 2018 due to recurrent iron deficiency.           HISTORY OF PRESENT ILLNESS: The patient is a 37 y.o.  female who presents today for 3-month reevaluation to assess her tolerance and response to Sprycel.      Patient reports recently she had pain in her ear, however no fever sweating or chills.  She has no PCP, so went to see her 's PCP, will prescribe patient a course of antibiotics.  Patient reports she finished with resolution of ear pain.    Otherwise patient reports excellent performance status ECOG 0.  Patient has no specific side effects from dose reduced Sprycel 50 mg daily, no nausea no vomiting no headaches no vision changes no cough no hemoptysis no skin rashes no leg cramping.    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     Patient has normal CBC and CMP today on 3/14/2022.  Pending results for BCR-ABL by RT-PCR.         Past Medical History:   Diagnosis Date   • Anemia in neoplastic disease    • Asthma     childhood   • Chiari I malformation (HCC)     eval by Dr Moore, NS 2016   • Chronic myelogenous leukemia (HCC)     remission, Dr Castle follows   • Chronic pain    • CML (chronic myelocytic leukemia) (HCC)    • Cystitis    • Depression    • GERD (gastroesophageal reflux disease)     ulcer   • H/O Iron deficiency anemia    • H/O Lower  extremity pain     Resolved.   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      *  Hysterectomy in October 2018      *  Acute hepatitis C in early 2019, treated successfully by Dr. Karyn Mendieta.     OB/GYN HISTORY: Menarche age 9. G5, P4, 1 miscarriage of the third pregnancy. First pregnancy at age 17. Patient underwent partial hysterectomy in 2018.       HEMATOLOGIC/ONCOLOGIC HISTORY:   * Chronic myelogenous leukemia with splenomegaly, chronic phase, positive [place] chromosome, no mutation at kinase domain diagnosed in November 2020.   · Patient was started on hydroxyurea temporarily on 10/23/2013 with significant drop of WBC counts.    · Patient started on Sprycel on 12/02/2013. Peripheral blood tested with 3.4% of cells positive for BCR-ABL translocation, tested 02/17/2014.    · The patient had CCyR 6 months into treatment as tested on 05/15/2014.    · Complete molecular response as tested 08/08/14 with negative product of BCR/ABL.   · There was interruption of her treatment due to insurance coverage and misunderstanding from patient's part, she had a relapse of disease with BCR/ABL product at 12.626% in March 2016, and she restarted back on Sprycel.   · Good response as tested on 08/31/2016 with BCR/ABL at 0.294%.    · Since June 2017, patient has been persistently tested negative for BCR/ABL by RT-PCR every 3 month period.     · Patient reported worsening leg cramping in end of July 2018.  Sprycel was decreased to 50 mg daily.  Laboratory studies on 8/31/2018, on 1/30/2019 and on 3/29/2019 were negative for BCR-ABL by RT-PCR.    · Sprycel was on hold during treatment for acute hepatitis C in early  part of 2019.  It was resumed in July 2019.  She was weakly positive for BCR-ABL.  · On 10/29/2019 BCR/ABL1 MAJOR (p210) and BCR/ABL1 MINOR (p190) were not detected.    · BCR-ABL by RT-PCR with 0% on 1/24/2020 and also on 4/24/2020.  · Patient was tested negative on 7/10/2020.  Negative on 10/9/2020.    · Because of palpitation, Sprycel was on hold since 11/17/2020.     · BCR-ABL by RT-PCR was complete negative as reported on 1/06/2021.  · Sprycel was resumed on 2/9/2021 at 50 mg daily.  · Continue BCR-ABL by RT-PCR in May 2021 in October 2021.          Laboratory results on 09/23/2015 showed normalization of hemoglobin 12.2, but still has macrocytosis, MCV 73.3. She has normal platelets and WBC at 9400. Serum ferritin < 5 ng/ml, iron sats 6.3%, iron 29, TIBC 455 mcg/ml. Still has severe iron deficiency, needs to continue oral iron therapy. The patient restarted taking oral iron supplementation which was probably stopped in the end of November 2015.        Laboratory study on 03/22/2016 reported normal WBC 8450, neutrophils 6100, lymphs is 1400 and monocytes 550. Serum iron was 26, TIBC 469 on saturation 6% and ferritin less than 5 NG/ML. Chemistry lab reported normal renal function with a creatinine 0.69, normal liver function panel, total protein 7.5 and albumin 4.2, normal electrolytes. Patient was restarted back on oral on sedimentation twice a day with good tolerance.      Patient continues take Lortab as needed for left leg cramping. Urine drug test on 08/05/2016 was completely negative, and repeated study on August 26 was positive for opiate, negative for other recreational drugs.      Repeat laboratory study on 08/31/2016 reported iron 21, ferritin less than 5, iron saturation 5%, TIBC 462. Hemoglobin was 11.5 MCV 78.1 MCHC 30.4. Platelets 163,000. Total WBC 8870 including neutrophils 6400 and lymphocytes 1500. BCR/ABL was 0.294% by RT-PCR method.       Patient was given IV Feraheme treatment in September  2016, with 2 doses. Repeated laboratory study on 10/06/2016 reported significantly improved and normalized ferritin 269, iron 80, TIBC 365 and iron saturation 22%. Her hemoglobin was 12.2, and MCV 80.8.      Patient reported she was seen by Dr. Fam in 10/2016 and was started on half tablets of Topamax. I reviewed Dr. Fam’s clinic note and telephone conversation record. The patient reports she has no recurrence of migraine headache. However, she is very tearful today, reporting that she has thoughts of not taking any medications. She did assure me that she has been taking the medication up to this point. 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 The patient denies suicide ideation. When she was initially diagnosed of CML back in 2014, she had depression and I started the patient on Prozac. The patient reported initially it helped her, however, she no longer feels the effect of Prozac. She feels depressed. The patient reports she has no personal hobby. She goes to work and goes home, taking care of her kids. She does not enjoy life as she used to.      Laboratory study on December 29, 2016 reported at ferritin 19.9, iron 33, TIBC 347 iron saturation 10%.  Hemoglobin was 13, normal WBC and platelets.  Unremarkable CMP.  Patient was given 2 doses of Feraheme treatment in early January 2017.      Cytogenetic study on March 14, 2017 reported a BCR-ABL products at 0.098%, showed further improved residual disease.  There is also reported a ferritin 103.7, iron 79, TIBC 336 and iron saturation 24%.  Hemoglobin was 13.5, MCV 92.3, platelets 199,000 WBC 7000.  She had a completely normal CMP.       Repeated laboratory study on June 20, 2017 reported at nondetectable BCR-ABL product.  Ferritin was 45, iron 35 TIBC 333 and iron saturation 11%.  Had a normal CBC and CMP.    In the end of July 2018, patient called  reporting worsening significant leg cramping, and getting worse recently and has been taking 6 tablets of Advil with no significant improvement.  We suspect it might be caused by Sprycel for her CML.  We discussed with patient, and decreased to half dose at 50 mg daily.  Patient reports that she is improved leg cramping since dose reduction.    Per medical records this patient had emergency room visit again on 8/18/2018.  Patient reports she had significant abdomen/pelvic pain at a time associate with nausea.  Laboratory study showed significantly elevated WBC at 25,900 including neutrophils 24,400, with elevated hemoglobin 15.8 and platelets 146,000.     She had a thorough investigation, including CT scan for abdomen pelvis which was unremarkable.  She then had ultrasound for the pelvis and it was also unremarkable.  She had ultrasound for the gallbladder examination on the same day and was unremarkable.  She had a normal CMP except of marginally elevated glucose at 112.  Her urinalysis showed only marginally increased WBC 3-5/HPF.  She was negative for yeast infection, negative for Chlamydia trichomonas and Neisseria gonorrhea.  Patient was prescribed Flagyl and doxycycline and discharged to home.      lab study on 4/30/2019 reported normal iron saturation 47% and elevated ferritin 507.1 ng/mL with free iron 184 and TIBC 388.  hemoglobin is normal at 13.7 and platelets 186,000. normal WBC at 5080 including ANC 3300, lymphocytes 930 and monocytes 630. Labs reported significantly elevated ALT at 655,  and alkaline phosphatase 234 but normal total bilirubin 0.9. She had normal renal function creatinine 0.78. Normal electrolytes.     On 04/30/2019, I searched Up-to-Date and suspected that this patient had drug-induced hepatitis from Diflucan or with combination of Diflucan with Sprycel. This patient had been on Sprycel for years and had no problem with abnormal liver panel previously. It seems Diflucan inhibits  CY moderately, which likely interferes with the metabolism of Sprycel. I instructed the patient to hold her Sprycel for now.    Sprycel treatment resumed as of 2019 upon completion of MEVYRET for her hepatitis C.    Laboratory study performed on 2019 showed normal CBC and CMP. BCR/ABL by RT-PCR detected BCR/ABL1 Major. HCV RNA by PCR showed HCV not detected.    Laboratory studies 2019 report her hemoglobin is normal at 15.2 and platelets 146,000. normal WBC at 7650 including ANC 5190, lymphocytes 1600 and monocytes 630.    Recent laboratory studies confirmed to be no detectable virus on 2019.    U/S Liver performed on 10/24/2019 reported negative hepatic ultrasound exam with no focal liver lesion, negative gallbladder examination with no cholelithiasis or bile duct dilation noted, however borderline splenomegaly was noted.     Laboratory study performed on 10/29/2019, reported a completely normal CBC, CMP. Normal iron saturation and still slightly elevated ferritin 325 ng/dL  BCR/ABL1 MAJOR (p210) and BCR/ABL1 MINOR (p190) were not detected.     Laboratory studies 2020, show hemoglobin 14.3 MCV 92.6 platelets 180,000 and WBC 6570 including neutrophils 4400.  She also has completely normal CMP.  Iron studies reported ferritin 273, iron saturation 11%, hemoglobin 14.3 WBC 6570 and platelets 180,000.  BCR-ABL by RT-PCR with 0%.     Patient presented today for 3-month follow-up and lab review.      Due to pandemic coronavirus infection, patient has been staying home with all 4 children.  Patient reports significant fatigue for the past month, similar to the time when she had iron deficiency.  Patient reports prior to that she was having regular exercise and with good energy level.    Patient reports compliant with Sprycel 50 mg daily.  She denies leg cramping.  She has no nausea no vomiting no abdominal pains.  She has good appetite and no diarrhea.  She denies headaches vision changes no  dizziness or seizure activity.    Her laboratory study on 3/12/2020 reported marginal iron saturation 15% however had a good ferritin 212.5 ng/mL.  She had hemoglobin 13.1 and normal thyroid profile including TSH 1.20, free T4 at 1.37 ng/dL and a free T3 at 2.97 pg/mL     On 4/24/2020 her iron study showed ferritin 262 and iron saturation 21%.  Hemoglobin is 14.8.  She has normal WBC 5740 including ANC 3820, and normal platelets 161,000.   She was negative for BCR-ABL by RT-PCR on the same day.    Laboratory studies, 7/10/2020, show completely normal CBC and CMP.  Negative for BCR-ABL by RT-PCR.  Iron studies showed ferritin 185, iron saturation 13% free iron 37 TIBC 286.    Patient has completed normal CBC 10/9/2020.  Iron studies reported ferritin 2070, free iron 13%.  She also has completed normal CMP.  She was tested negative for BCR-ABL by RT-PCR method.  Sprycel 50 mg daily was continued.     We saw her recently on 10/9/2020 for routine follow-up for her CML.  She was tolerating Sprycel.  Physical examination laboratory studies were unremarkable.  No detectable BCR-ABL by RT-PCR.  We continued her Sprycel at that time.     On 11/17/2020, patient called and reported chest tightness, palpitation and dyspnea new onset in November 2020.  Patient reports this happened recently.  She has chest tightness, and associated tachycardia/palpitation.  Patient reports this is unpredictable, can be on and off.  She noticed that one day she was cooking meal for her family, and noted sudden onset palpitation and chest tightness.  She reports unable to induce purposefully.  She also has exertional dyspnea.  Patient reports 11/17/2020, we asked patient to hold Sprycel.  We suspect the patient may have pleural effusion or cardiac dysfunction secondary to Sprycel, we requested chest CT, echocardiogram study and also laboratory study for evaluation.     Negative chest x-ray on 11/17/2020.     Laboratory study on 11/17/2020 reported  stable chronic condition with a ferritin 261 and iron saturation 12%, normal hemoglobin 15.0, unable to explain her dyspnea.     Echocardiogram study on 11/18/2020 reported normal results.     Laboratory study on 1/6/2021 reported hemoglobin 15.9 hematocrit 47.1%, platelets 181,000 WBC 9360 including ANC 7130 lymphocytes 1350. Iron study reported ferritin 293 and iron saturation 14% with free iron 47 and a TIBC 326.  Chemistry lab reported unremarkable CMP including renal function and liver function panel.      Patient reports she was seen by cardiologist Dr. Flowers on 2/4/2021.  Patient had thorough cardiology evaluation and there was no apparent abnormalities.    We have been withholding her Sprycel in middle November 2020 because of palpitation, and referred patient to cardiology service.        Fortunately, her peripheral blood BCR-ABL by RT-PCR was complete negative, as reported on 1/12/2021.     Sprycel was resumed on 2/9/2021.      Laboratory studies on 3/16/2021 reported normal CBC including hemoglobin 14.4 MCV 88.7, platelets 173,000, and WBC 6400 including ANC 3770 lymphocytes 1800.  Chemistry lab reported normal CMP.     Laboratory studies on 5/4/2021 reported completely normal CBC and CMP.  Iron study also normal with ferritin 219 and iron saturation 16%.  Negative study for BCR-ABL by RT-PCR.     Laboratory study on 10/5/2021 reported complete normal CBC and CMP.  Iron study reported normal ferritin 250 ng/mL and iron saturation 21% with free iron 63 TIBC 300.  She was also complete negative for BCR-ABL by RT-PCR.      MEDICATIONS: The current medication list was reviewed with the patient and updated in the EMR this date per the medical assistant. Medication dosages and frequencies were confirmed to be accurate.        Allergies   Allergen Reactions   • Sulfa Antibiotics Unknown - Low Severity     Childhood reaction     SOCIAL HISTORY: . She smoked cigarettes previously, quit in January 2006  "with 8-pack-year history. Social drinker, maybe once a month. No illegal drug use. No risk for HIV except tattoos.  Hairstylist.       FAMILY HISTORY: Maternal grandmother had esophageal/stomach adenocarcinoma diagnosed at age of 72 and  of cancer at age of 73. The patient' s mother has hypertension but otherwise healthy.  No other family history of malignancy, especially no leukemia.          VITAL SIGNS:   Vitals:    22 1031   BP: 109/70   Pulse: 68   Resp: 18   Temp: 97.3 °F (36.3 °C)   TempSrc: Temporal   SpO2: 97%   Weight: 82.7 kg (182 lb 4.8 oz)   Height: 172 cm (67.72\")   PainSc: 0-No pain   ECOG 0          PHYSICAL EXAMINATION:    GENERAL:  Well-developed, well-nourished in no acute distress.   SKIN:  Warm, dry without rashes, purpura or petechiae.  HEENT:  Normocephalic.  Wearing mask.   LYMPHATICS:  No cervical, supraclavicular adenopathy.  CHEST: Normal respiratory effort.  Lungs clear to auscultation.   CARDIAC:  Regular rate and rhythm. Normal S1,S2.  ABDOMEN:  Soft, nontender with no organomegaly or masses.  Bowel sounds normal.  EXTREMITIES:  No clubbing, cyanosis or edema.  NEUROLOGICAL:  Grossly intact.    PSYCHIATRIC:  Normal affect and mood.        LABORATORY DATA:    Lab Results   Component Value Date    WBC 6.89 2022    HGB 14.3 2022    HCT 42.9 2022    MCV 90.5 2022     2022     Lab Results   Component Value Date    NEUTROABS 5.01 2022     Glucose   Date Value Ref Range Status   2022 85 74 - 124 mg/dL Final     BUN   Date Value Ref Range Status   2022 11 6 - 20 mg/dL Final     Creatinine   Date Value Ref Range Status   2022 0.72 0.60 - 1.10 mg/dL Final     Sodium   Date Value Ref Range Status   2022 140 134 - 145 mmol/L Final     Potassium   Date Value Ref Range Status   2022 4.1 3.5 - 4.7 mmol/L Final     Chloride   Date Value Ref Range Status   2022 105 98 - 107 mmol/L Final     CO2   Date Value Ref " Range Status   03/14/2022 25.3 22.0 - 29.0 mmol/L Final     Calcium   Date Value Ref Range Status   03/14/2022 9.4 8.5 - 10.2 mg/dL Final     Total Protein   Date Value Ref Range Status   03/14/2022 7.4 6.3 - 8.0 g/dL Final     Albumin   Date Value Ref Range Status   03/14/2022 4.50 3.50 - 5.20 g/dL Final     ALT (SGPT)   Date Value Ref Range Status   03/14/2022 15 0 - 33 U/L Final     AST (SGOT)   Date Value Ref Range Status   03/14/2022 16 0 - 32 U/L Final     Alkaline Phosphatase   Date Value Ref Range Status   03/14/2022 76 38 - 116 U/L Final     Total Bilirubin   Date Value Ref Range Status   03/14/2022 0.5 0.2 - 1.2 mg/dL Final     eGFR Non  Amer   Date Value Ref Range Status   12/21/2021 86 >60 mL/min/1.73 Final     BUN/Creatinine Ratio   Date Value Ref Range Status   03/14/2022 15.3 7.3 - 30.0 Final     Anion Gap   Date Value Ref Range Status   03/14/2022 9.7 5.0 - 15.0 mmol/L Final           ASSESSMENT:      1. Chronic myelogenous leukemia, chronic phase with excellent response to Sprycel and complete molecular response in August 2014. However she had interuption of treatment in early part of 2016, because of insurance issues and miscommunication, she stopped the medicine without telling us, and laboratory test on 03/22/2016 reported disease relapse with increased BCR/ABL product at 12.626%. subsequently she was restarted back on Sprycel, and responded well.  Since June 2017, she has completed molecular response as tested every 3 months.  She has been compliant with treatment.   · In the end of July 2018, she reported worsening significant cramping involving her legs, so we decreased her Sprycel to 50 mg daily starting early August.  She's been having less problem since that time.  She was tested negative for BCR-ABL on 8/31/2018.    · Patient had a negative BCR-ABL by RT-PCR in end of January 2019 and also on 3/29/2019.   · Patient was later found having significantly elevated liver function panel.   Sprycel was on hold and she was subsequently found having acute hepatitis C infection.    · I discussed with Dr. Karyn Mendieta, we'll hold her Sprycel for now until she finishes hepatitis C treatment.   · She was started on hepatitis C treatment on 4/18/2019 and finished an 8-week course.  · On 07/02/2019 patient's labs showed BCR-ABL Major (p210) detected by RT-PCR at 0.0141%. BCR/ABL1 Minor (p190) was not detected. HCV was not detected.  · Sprycel treatment was resumed as of 07/02/2019 upon completion of MEVYRET.  · Laboratory study on 10/29/2019 reported normal CBC. BCR/ABL1 MAJOR (p210) and BCR/ABL1 MINOR (p190) were not detected.    · BCR-ABL by RT-PCR on 1/24/2020 was negative.     · Lab result for BCR ABL by RT-PCR was also negative on 4/24/2020. Patient will need to continue on sprycel treatment.    · 7/10/2020 patient has normal CBC and CMP.  Negative RT-PCR for BCR ABL.  Tolerating well.  Continue Sprycel at 50 mg daily.  · On 10/9/2020, completely normal CBC with good tolerance.  BCR-ABL was tested negative by RT-PCR method.  Continue Sprycel.   · Patient reports 11/17/2020 chest tightness in palpitation, and exertion dyspnea progressively getting worse in the past week.  We asked patient to hold Sprycel.   · Chest x-ray examination on 11/17/2020 was unremarkable.  Echocardiogram study on 11/18/2020 was also benign.  Patient was seen by cardiologist for further evaluation.    · Laboratory studies on 1/6/2021 reported normal WBC 9360 including ANC 7130 lymphocytes 1350. BCR-ABL by RT-PCR was complete negative, as reported on 1/12/2021.   · Patient had negative cardiac work-up.  She also has resolution of symptoms.  Discussed with patient on 2/9/2021, we recommended resumption of Sprycel 50 mg daily.  Patient is agreeable.  · On 3/16/2021, patient reports tolerating Sprycel, no recurrent symptoms of dyspnea, chest tightness or palpitation.  Continue Sprycel 50 mg daily.  · On 5/4/2021, patient has normal CBC  and CMP.  Negative results for BCR-ABL by RT-PCR for P210 and P190 as reported on 5/12/2021.    · On 10/5/2021 patient presented for reevaluation.  She reports tolerating Sprycel.  No specific side effects reported.  Maintains normal CBC and negative BCR-ABL.  We will continue treatment for now.  · On 12/21/2021, patient reports good tolerance to Sprycel.  Normal CBC CMP.  Negative study by peripheral blood RT-PCR.  Continue Sprycel.   · On 3/14/2022, patient reports excellent tolerance to Sprycel at 50 mg daily.  Maintains normal CBC and CMP.      2. Recurrent Iron deficiency anemia.    · She was treated again with Feraheme October 2017.   Iron deficiency thought to be related to ulcer identified on EGD, being treated with Carafate.  She also had heavy menses.  · She had recurrent iron deficiency again and was given Feraheme 2 doses in March 2018.   · Subsequently recurrent iron deficiency in July 2018, she was switched to Venofer 3 doses total 900 mg.   · Patient had simple hysterectomy in October 2018.  · Laboratory study showed supratherapeutic ferritin 458 and iron saturation 40% on 4/30/2019.   · Laboratory study performed 10/29/2019, showed normal iron saturation and ferritin 325 ng/dL.  · Normal iron studies including ferritin 262 and iron saturation 21% on 4/24/2020.  No evidence of recurrent iron deficiency.   · Lab study on 7/10/2020 reported ferritin 185, iron saturation 13% and normal hemoglobin 14.5.  She has deteriorating iron studies.   Continue to monitor in 3 months.  · Labs on 10/9/2020 reported ferritin 270, iron saturation 13% and hemoglobin 14.9.  · Lab studies on 1/6/2021 reported mild erythrocytosis.  Hemoglobin is 15.9 and hematocrit of 47.1%, with ferritin 293 and iron saturation 14%.    · Normal hemoglobin 14.4 on 3/16/2021.    · On 5/4/2021 lab study reported hemoglobin 13.7, ferritin 219 and iron saturation 16%.    · On 10/5/2021, patient maintains normal hemoglobin.  Iron study reported  further improved ferritin 250 ng/mL and iron saturation 21%.  Since her hysterectomy, patient has no recurrence of iron deficiency.  Discussed with patient today, there is no need for routine monitoring of iron study from now.    · On 3/14/2022, normal hemoglobin 14.3.       *Chest tightness, palpitation and dyspnea new onset in November 2020.  Patient reports this happened recently, and that usually unpredictable, she has chest tightness, and associated tachycardia/palpitation.  Patient reports this is unpredictable, can be on and off.  She noticed 1 day she was cooking meal for her family, and noted several palpitation and chest tightness.  She reports unable to induce purposefully.  She also has exertional dyspnea.  Patient reports 11/17/2020, we asked patient to hold Sprycel.  We requested chest CT, echocardiogram study and also laboratory study for evaluation.  · Negative chest x-ray on 11/17/2020.   · Laboratory study on 11/17/2020 reported stable chronic condition with a ferritin 261 and iron saturation 12%, normal hemoglobin 15.0, unable to explain her dyspnea.   · Echocardiogram study on 11/18/2020 reported normal results.   · Patient is evaluated 11/20/2020, she reports somewhat improved symptoms, since she stopped Sprycel for the past 3 days.  She denies extremity edema.  She denies fever sweating or chills.  We recommend patient to continue to hold Sprycel for another 2 weeks.  We also recommended patient to be tested for COVID-19.    · Reevaluation on 12/4/2020, patient reports that she did not get a Covid test.  She denies other illness such as fever sweating nausea vomiting, diarrhea, change of taste, cough etc.  Discussed with patient, will check a thyroid panel, which turned out to be normal on 12/4/2020.    · Patient was referred to cardiologist for evaluation of hypertension.  She was seen by Dr. Flowers on 12/21/2020 and the case waiting for further evaluation.  · Patient had a negative cardiac  work-up.  Her symptom has resolved.  · Patient was restarted on Sprycel 2/9/2021.  She reports no recurrent symptoms 3/16/2021.  · On 5/4/2021, patient reports no recurrent symptoms.  · On 10/5/2021 patient reports no dyspnea no chest pain or discomfort.   · On 12/21/2021, patient reports no recurrent symptoms.  · On 3/14/2022, patient has no chest pain no dyspnea or pressureness on chest.       *COVID-19 vaccination.  · Patient reports receiving none 2 doses of the Moderna vaccine.    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         PLAN:    1. Continue dose reduced Sprycel at 50 mg daily.   2. Pending results today for peripheral blood BCR-ABL by RT-PCR.   3. Continue oral vitamin B12 at 1000 mcg daily.   4. I encourage patient to get boost dose of COVID-19 vaccine.   5. She will come back to see me in 3 months with CBC, CMP, also BCR-ABL by RT-PCR.    6. I asked patient to call if she has recurrent symptoms with palpitations and dyspnea.  Patient voiced understanding.    This patient is on high risk medication and needs close monitoring.       BAO SLADE M.D., Ph.D.    3/14/2022.      CC:      Karyn Mendieta M.D.    Luis Alfredo Flowers M.D.

## 2022-03-20 LAB
INTERPRETATION: NEGATIVE
LAB DIRECTOR NAME PROVIDER: NORMAL
LABORATORY COMMENT REPORT: NORMAL
REF LAB TEST METHOD: NORMAL
T(ABL1,BCR)B2A2/CONTROL BLD/T: NORMAL %
T(ABL1,BCR)B3A2/CONTROL BLD/T: NORMAL %
T(ABL1,BCR)E1A2/CONTROL BLD/T: NORMAL %

## 2022-04-22 ENCOUNTER — SPECIALTY PHARMACY (OUTPATIENT)
Dept: PHARMACY | Facility: HOSPITAL | Age: 37
End: 2022-04-22

## 2022-04-22 NOTE — PROGRESS NOTES
MTM Encounter-Re: Adherence and side effects (Sprycel)    Today's encounter was conducted via telephone.    Medication:  Sprycel 50mg PO daily   - Reason for outreach: Routine medication check-in .  - Administration: Patient is taking every day at the same time .  - Missed doses: Patient reports missing 0 doses in the last 30 days.  - Self-administration: Patient demonstrates ability to self-administer medication. No barriers to adherence identified.   - Diagnosis/Indication: CML. Progress toward achieving therapeutic goals reviewed.   - Patient denies side effects, aside from aches, which is manageable and not bothersome to patient. Advised patient to alert office if this changes.    - Medication availability/affordability: Patient has had no issues obtaining medication from pharmacy.   - Questions/concerns about medications: none       Completed medication reconciliation today to assess for drug interactions.   Reviewed allergies, medical history, labs, quality of life, and medication history with patient.   Patient denies starting or stopping any medications.  Assessed medication list for interactions, no significant drug interactions noted.   Advised pt to call the clinic if any new medications are started so we can assess for drug-drug interactions.     All questions addressed. Patient had no additional concerns for MTM office.     Jenelle Cool, Pharmacy Intern  4/22/2022  10:11 EDT

## 2022-05-23 ENCOUNTER — TELEMEDICINE (OUTPATIENT)
Dept: FAMILY MEDICINE CLINIC | Facility: TELEHEALTH | Age: 37
End: 2022-05-23

## 2022-05-23 DIAGNOSIS — R39.89 SUSPECTED UTI: Primary | ICD-10-CM

## 2022-05-23 PROCEDURE — 99212 OFFICE O/P EST SF 10 MIN: CPT | Performed by: NURSE PRACTITIONER

## 2022-05-23 RX ORDER — NITROFURANTOIN 25; 75 MG/1; MG/1
100 CAPSULE ORAL 2 TIMES DAILY
Qty: 10 CAPSULE | Refills: 0 | Status: SHIPPED | OUTPATIENT
Start: 2022-05-23 | End: 2022-05-28

## 2022-05-23 RX ORDER — PHENAZOPYRIDINE HYDROCHLORIDE 200 MG/1
200 TABLET, FILM COATED ORAL 3 TIMES DAILY PRN
Qty: 6 TABLET | Refills: 0 | Status: SHIPPED | OUTPATIENT
Start: 2022-05-23 | End: 2022-06-08

## 2022-05-23 NOTE — PROGRESS NOTES
Subjective   Hu Houston is a 37 y.o. female.     Her symptoms started this morning with a mild burning with urination which has worsened throughout the day. She also is going to the bathroom frequently but hardly any urine comes out.  Pmh of UTIs usually 2-3 per year. She was on cefdinir 4-6 weeks ago for strep.        The following portions of the patient's history were reviewed and updated as appropriate: allergies, current medications, past family history, past medical history, past social history, past surgical history, and problem list.    Review of Systems   Constitutional: Negative for fever.   Gastrointestinal: Negative for abdominal pain, nausea and vomiting.   Genitourinary: Positive for dysuria and frequency. Negative for hematuria, pelvic pain, vaginal discharge and vaginal pain.   Musculoskeletal: Negative for back pain.       Objective   Physical Exam  Constitutional:       General: She is not in acute distress.     Appearance: She is well-developed. She is not diaphoretic.   Pulmonary:      Effort: Pulmonary effort is normal.   Neurological:      Mental Status: She is alert and oriented to person, place, and time.   Psychiatric:         Behavior: Behavior normal.           Assessment & Plan   Diagnoses and all orders for this visit:    1. Suspected UTI (Primary)  -     nitrofurantoin, macrocrystal-monohydrate, (Macrobid) 100 MG capsule; Take 1 capsule by mouth 2 (Two) Times a Day for 5 days.  Dispense: 10 capsule; Refill: 0  -     phenazopyridine (Pyridium) 200 MG tablet; Take 1 tablet by mouth 3 (Three) Times a Day As Needed for Bladder Spasms.  Dispense: 6 tablet; Refill: 0                 The use of a video visit has been reviewed with the patient and verbal informed consent has been obtained. Myself and Hu Houston participated in this visit. The patient is located in [place], [place]. I am located in [place], [place]. Mychart and Zoom were utilized. I spent 10 minutes in the patient's chart for  this visit.

## 2022-05-23 NOTE — PATIENT INSTRUCTIONS
Take antibiotic as prescribed  If you were prescribed pyridium, take as needed for bladder spasms- it will turn urine orange, this is normal.  Increase fluid intake  Avoid caffeine and carbonation  If you develop fever or mid-back pain, go to ER  If symptoms worsen or do not improve in 48 hours, go to urgent care

## 2022-06-08 ENCOUNTER — LAB (OUTPATIENT)
Dept: LAB | Facility: HOSPITAL | Age: 37
End: 2022-06-08

## 2022-06-08 ENCOUNTER — OFFICE VISIT (OUTPATIENT)
Dept: ONCOLOGY | Facility: CLINIC | Age: 37
End: 2022-06-08

## 2022-06-08 VITALS
RESPIRATION RATE: 16 BRPM | HEIGHT: 68 IN | DIASTOLIC BLOOD PRESSURE: 69 MMHG | HEART RATE: 55 BPM | TEMPERATURE: 97.5 F | OXYGEN SATURATION: 98 % | BODY MASS INDEX: 27.72 KG/M2 | WEIGHT: 182.9 LBS | SYSTOLIC BLOOD PRESSURE: 108 MMHG

## 2022-06-08 DIAGNOSIS — D50.0 IRON DEFICIENCY ANEMIA DUE TO CHRONIC BLOOD LOSS: ICD-10-CM

## 2022-06-08 DIAGNOSIS — C92.10 CML (CHRONIC MYELOID LEUKEMIA): Primary | ICD-10-CM

## 2022-06-08 DIAGNOSIS — C92.10 CML (CHRONIC MYELOID LEUKEMIA): ICD-10-CM

## 2022-06-08 LAB
ALBUMIN SERPL-MCNC: 4.7 G/DL (ref 3.5–5.2)
ALBUMIN/GLOB SERPL: 1.7 G/DL (ref 1.1–2.4)
ALP SERPL-CCNC: 73 U/L (ref 38–116)
ALT SERPL W P-5'-P-CCNC: 11 U/L (ref 0–33)
ANION GAP SERPL CALCULATED.3IONS-SCNC: 13.3 MMOL/L (ref 5–15)
AST SERPL-CCNC: 14 U/L (ref 0–32)
BASOPHILS # BLD AUTO: 0.03 10*3/MM3 (ref 0–0.2)
BASOPHILS NFR BLD AUTO: 0.5 % (ref 0–1.5)
BILIRUB SERPL-MCNC: 0.3 MG/DL (ref 0.2–1.2)
BUN SERPL-MCNC: 12 MG/DL (ref 6–20)
BUN/CREAT SERPL: 14.6 (ref 7.3–30)
CALCIUM SPEC-SCNC: 9.8 MG/DL (ref 8.5–10.2)
CHLORIDE SERPL-SCNC: 104 MMOL/L (ref 98–107)
CO2 SERPL-SCNC: 22.7 MMOL/L (ref 22–29)
CREAT SERPL-MCNC: 0.82 MG/DL (ref 0.6–1.1)
DEPRECATED RDW RBC AUTO: 40.9 FL (ref 37–54)
EGFRCR SERPLBLD CKD-EPI 2021: 94 ML/MIN/1.73
EOSINOPHIL # BLD AUTO: 0.26 10*3/MM3 (ref 0–0.4)
EOSINOPHIL NFR BLD AUTO: 4.7 % (ref 0.3–6.2)
ERYTHROCYTE [DISTWIDTH] IN BLOOD BY AUTOMATED COUNT: 12.4 % (ref 12.3–15.4)
GLOBULIN UR ELPH-MCNC: 2.7 GM/DL (ref 1.8–3.5)
GLUCOSE SERPL-MCNC: 87 MG/DL (ref 74–124)
HCT VFR BLD AUTO: 41.4 % (ref 34–46.6)
HGB BLD-MCNC: 13.6 G/DL (ref 12–15.9)
IMM GRANULOCYTES # BLD AUTO: 0.02 10*3/MM3 (ref 0–0.05)
IMM GRANULOCYTES NFR BLD AUTO: 0.4 % (ref 0–0.5)
LYMPHOCYTES # BLD AUTO: 1.36 10*3/MM3 (ref 0.7–3.1)
LYMPHOCYTES NFR BLD AUTO: 24.6 % (ref 19.6–45.3)
MCH RBC QN AUTO: 29.9 PG (ref 26.6–33)
MCHC RBC AUTO-ENTMCNC: 32.9 G/DL (ref 31.5–35.7)
MCV RBC AUTO: 91 FL (ref 79–97)
MONOCYTES # BLD AUTO: 0.49 10*3/MM3 (ref 0.1–0.9)
MONOCYTES NFR BLD AUTO: 8.9 % (ref 5–12)
NEUTROPHILS NFR BLD AUTO: 3.37 10*3/MM3 (ref 1.7–7)
NEUTROPHILS NFR BLD AUTO: 60.9 % (ref 42.7–76)
NRBC BLD AUTO-RTO: 0 /100 WBC (ref 0–0.2)
PLATELET # BLD AUTO: 191 10*3/MM3 (ref 140–450)
PMV BLD AUTO: 9.9 FL (ref 6–12)
POTASSIUM SERPL-SCNC: 4.2 MMOL/L (ref 3.5–4.7)
PROT SERPL-MCNC: 7.4 G/DL (ref 6.3–8)
RBC # BLD AUTO: 4.55 10*6/MM3 (ref 3.77–5.28)
SODIUM SERPL-SCNC: 140 MMOL/L (ref 134–145)
WBC NRBC COR # BLD: 5.53 10*3/MM3 (ref 3.4–10.8)

## 2022-06-08 PROCEDURE — 36415 COLL VENOUS BLD VENIPUNCTURE: CPT

## 2022-06-08 PROCEDURE — 85025 COMPLETE CBC W/AUTO DIFF WBC: CPT

## 2022-06-08 PROCEDURE — 80053 COMPREHEN METABOLIC PANEL: CPT

## 2022-06-08 PROCEDURE — 99214 OFFICE O/P EST MOD 30 MIN: CPT | Performed by: NURSE PRACTITIONER

## 2022-06-08 NOTE — PROGRESS NOTES
REASONS FOR FOLLOWUP:    1. Chronic myelogenous leukemia with splenomegaly, chronic phase, positive [place] chromosome, no mutation at kinase domain diagnosed in November 2020.   · Patient is on dose reduced Sprycel 50 mg daily with undetectable disease by RT-PCR.      2. Recurrent iron deficiency anemia secondary to menorrhagia, not responding to oral iron supplementation.  She also had significant constipation associated with oral iron.   · Patient was given Feraheme treatment 2 doses in September 2016 and repeated in January 2017 and again in October 2017 and March 2018.   · IV Venofer total 900 mg in July 2018 due to recurrent iron deficiency.           HISTORY OF PRESENT ILLNESS: The patient is a 37 y.o. with the above-mentioned history is here today for lab review and evaluation continuing on Sprycel 50 mg daily.  She is tolerating it quite well and denies any issues with significant side effects.  She denies fevers, chills, night sweats.  No issues with nausea, vomiting, diarrhea.  She has no new problems or concerns today.    Past Medical History:   Diagnosis Date   • Anemia in neoplastic disease    • Asthma     childhood   • Chiari I malformation (HCC)     eval by Dr Moore, NS 2016   • Chronic myelogenous leukemia (HCC)     remission, Dr Castle follows   • Chronic pain    • CML (chronic myelocytic leukemia) (HCC)    • Cystitis    • Depression    • GERD (gastroesophageal reflux disease)     ulcer   • H/O Iron deficiency anemia    • H/O Lower extremity pain     Resolved.   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      *  Hysterectomy in October 2018      *  Acute hepatitis C in early 2019,  treated successfully by Dr. Karyn Mendieta.     OB/GYN HISTORY: Menarche age 9. G5, P4, 1 miscarriage of the third pregnancy. First pregnancy at age 17. Patient underwent partial hysterectomy in 2018.       HEMATOLOGIC/ONCOLOGIC HISTORY:   * Chronic myelogenous leukemia with splenomegaly, chronic phase, positive [place] chromosome, no mutation at kinase domain diagnosed in November 2020.   · Patient was started on hydroxyurea temporarily on 10/23/2013 with significant drop of WBC counts.    · Patient started on Sprycel on 12/02/2013. Peripheral blood tested with 3.4% of cells positive for BCR-ABL translocation, tested 02/17/2014.    · The patient had CCyR 6 months into treatment as tested on 05/15/2014.    · Complete molecular response as tested 08/08/14 with negative product of BCR/ABL.   · There was interruption of her treatment due to insurance coverage and misunderstanding from patient's part, she had a relapse of disease with BCR/ABL product at 12.626% in March 2016, and she restarted back on Sprycel.   · Good response as tested on 08/31/2016 with BCR/ABL at 0.294%.    · Since June 2017, patient has been persistently tested negative for BCR/ABL by RT-PCR every 3 month period.     · Patient reported worsening leg cramping in end of July 2018.  Sprycel was decreased to 50 mg daily.  Laboratory studies on 8/31/2018, on 1/30/2019 and on 3/29/2019 were negative for BCR-ABL by RT-PCR.    · Sprycel was on hold during treatment for acute hepatitis C in early part of 2019.  It was resumed in July 2019.  She was weakly positive for BCR-ABL.  · On 10/29/2019 BCR/ABL1 MAJOR (p210) and BCR/ABL1 MINOR (p190) were not detected.    · BCR-ABL by RT-PCR with 0% on 1/24/2020 and also on 4/24/2020.  · Patient was tested negative on 7/10/2020.  Negative on 10/9/2020.    · Because of palpitation, Sprycel was on hold since 11/17/2020.     · BCR-ABL by RT-PCR was complete negative as reported on 1/06/2021.  · Sprycel was resumed on  2/9/2021 at 50 mg daily.  · Continue BCR-ABL by RT-PCR in May 2021 in October 2021.          Laboratory results on 09/23/2015 showed normalization of hemoglobin 12.2, but still has macrocytosis, MCV 73.3. She has normal platelets and WBC at 9400. Serum ferritin < 5 ng/ml, iron sats 6.3%, iron 29, TIBC 455 mcg/ml. Still has severe iron deficiency, needs to continue oral iron therapy. The patient restarted taking oral iron supplementation which was probably stopped in the end of November 2015.        Laboratory study on 03/22/2016 reported normal WBC 8450, neutrophils 6100, lymphs is 1400 and monocytes 550. Serum iron was 26, TIBC 469 on saturation 6% and ferritin less than 5 NG/ML. Chemistry lab reported normal renal function with a creatinine 0.69, normal liver function panel, total protein 7.5 and albumin 4.2, normal electrolytes. Patient was restarted back on oral on sedimentation twice a day with good tolerance.      Patient continues take Lortab as needed for left leg cramping. Urine drug test on 08/05/2016 was completely negative, and repeated study on August 26 was positive for opiate, negative for other recreational drugs.      Repeat laboratory study on 08/31/2016 reported iron 21, ferritin less than 5, iron saturation 5%, TIBC 462. Hemoglobin was 11.5 MCV 78.1 MCHC 30.4. Platelets 163,000. Total WBC 8870 including neutrophils 6400 and lymphocytes 1500. BCR/ABL was 0.294% by RT-PCR method.       Patient was given IV Feraheme treatment in September 2016, with 2 doses. Repeated laboratory study on 10/06/2016 reported significantly improved and normalized ferritin 269, iron 80, TIBC 365 and iron saturation 22%. Her hemoglobin was 12.2, and MCV 80.8.      Patient reported she was seen by Dr. Fam in 10/2016 and was started on half tablets of Topamax. I reviewed Dr. Fam’s clinic note and telephone conversation record. The patient reports she has no recurrence of migraine headache. However, she is  very tearful today, reporting that she has thoughts of not taking any medications. She did assure me that she has been taking the medication up to this point. 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 The patient denies suicide ideation. When she was initially diagnosed of CML back in 2014, she had depression and I started the patient on Prozac. The patient reported initially it helped her, however, she no longer feels the effect of Prozac. She feels depressed. The patient reports she has no personal hobby. She goes to work and goes home, taking care of her kids. She does not enjoy life as she used to.      Laboratory study on December 29, 2016 reported at ferritin 19.9, iron 33, TIBC 347 iron saturation 10%.  Hemoglobin was 13, normal WBC and platelets.  Unremarkable CMP.  Patient was given 2 doses of Feraheme treatment in early January 2017.      Cytogenetic study on March 14, 2017 reported a BCR-ABL products at 0.098%, showed further improved residual disease.  There is also reported a ferritin 103.7, iron 79, TIBC 336 and iron saturation 24%.  Hemoglobin was 13.5, MCV 92.3, platelets 199,000 WBC 7000.  She had a completely normal CMP.       Repeated laboratory study on June 20, 2017 reported at nondetectable BCR-ABL product.  Ferritin was 45, iron 35 TIBC 333 and iron saturation 11%.  Had a normal CBC and CMP.    In the end of July 2018, patient called reporting worsening significant leg cramping, and getting worse recently and has been taking 6 tablets of Advil with no significant improvement.  We suspect it might be caused by Sprycel for her CML.  We discussed with patient, and decreased to half dose at 50 mg daily.  Patient reports that she is improved leg cramping since dose reduction.    Per medical records this patient had emergency room visit again on 8/18/2018.  Patient reports she had significant  abdomen/pelvic pain at a time associate with nausea.  Laboratory study showed significantly elevated WBC at 25,900 including neutrophils 24,400, with elevated hemoglobin 15.8 and platelets 146,000.     She had a thorough investigation, including CT scan for abdomen pelvis which was unremarkable.  She then had ultrasound for the pelvis and it was also unremarkable.  She had ultrasound for the gallbladder examination on the same day and was unremarkable.  She had a normal CMP except of marginally elevated glucose at 112.  Her urinalysis showed only marginally increased WBC 3-5/HPF.  She was negative for yeast infection, negative for Chlamydia trichomonas and Neisseria gonorrhea.  Patient was prescribed Flagyl and doxycycline and discharged to home.      lab study on 2019 reported normal iron saturation 47% and elevated ferritin 507.1 ng/mL with free iron 184 and TIBC 388.  hemoglobin is normal at 13.7 and platelets 186,000. normal WBC at 5080 including ANC 3300, lymphocytes 930 and monocytes 630. Labs reported significantly elevated ALT at 655,  and alkaline phosphatase 234 but normal total bilirubin 0.9. She had normal renal function creatinine 0.78. Normal electrolytes.     On 2019, I searched Up-to-Date and suspected that this patient had drug-induced hepatitis from Diflucan or with combination of Diflucan with Sprycel. This patient had been on Sprycel for years and had no problem with abnormal liver panel previously. It seems Diflucan inhibits CY moderately, which likely interferes with the metabolism of Sprycel. I instructed the patient to hold her Sprycel for now.    Sprycel treatment resumed as of 2019 upon completion of MEVYRET for her hepatitis C.    Laboratory study performed on 2019 showed normal CBC and CMP. BCR/ABL by RT-PCR detected BCR/ABL1 Major. HCV RNA by PCR showed HCV not detected.    Laboratory studies 2019 report her hemoglobin is normal at 15.2 and  platelets 146,000. normal WBC at 7650 including ANC 5190, lymphocytes 1600 and monocytes 630.    Recent laboratory studies confirmed to be no detectable virus on 9/11/2019.    U/S Liver performed on 10/24/2019 reported negative hepatic ultrasound exam with no focal liver lesion, negative gallbladder examination with no cholelithiasis or bile duct dilation noted, however borderline splenomegaly was noted.     Laboratory study performed on 10/29/2019, reported a completely normal CBC, CMP. Normal iron saturation and still slightly elevated ferritin 325 ng/dL  BCR/ABL1 MAJOR (p210) and BCR/ABL1 MINOR (p190) were not detected.     Laboratory studies 1/24/2020, show hemoglobin 14.3 MCV 92.6 platelets 180,000 and WBC 6570 including neutrophils 4400.  She also has completely normal CMP.  Iron studies reported ferritin 273, iron saturation 11%, hemoglobin 14.3 WBC 6570 and platelets 180,000.  BCR-ABL by RT-PCR with 0%.     Patient presented today for 3-month follow-up and lab review.      Due to pandemic coronavirus infection, patient has been staying home with all 4 children.  Patient reports significant fatigue for the past month, similar to the time when she had iron deficiency.  Patient reports prior to that she was having regular exercise and with good energy level.    Patient reports compliant with Sprycel 50 mg daily.  She denies leg cramping.  She has no nausea no vomiting no abdominal pains.  She has good appetite and no diarrhea.  She denies headaches vision changes no dizziness or seizure activity.    Her laboratory study on 3/12/2020 reported marginal iron saturation 15% however had a good ferritin 212.5 ng/mL.  She had hemoglobin 13.1 and normal thyroid profile including TSH 1.20, free T4 at 1.37 ng/dL and a free T3 at 2.97 pg/mL     On 4/24/2020 her iron study showed ferritin 262 and iron saturation 21%.  Hemoglobin is 14.8.  She has normal WBC 5740 including ANC 3820, and normal platelets 161,000.   She was  negative for BCR-ABL by RT-PCR on the same day.    Laboratory studies, 7/10/2020, show completely normal CBC and CMP.  Negative for BCR-ABL by RT-PCR.  Iron studies showed ferritin 185, iron saturation 13% free iron 37 TIBC 286.    Patient has completed normal CBC 10/9/2020.  Iron studies reported ferritin 2070, free iron 13%.  She also has completed normal CMP.  She was tested negative for BCR-ABL by RT-PCR method.  Sprycel 50 mg daily was continued.     We saw her recently on 10/9/2020 for routine follow-up for her CML.  She was tolerating Sprycel.  Physical examination laboratory studies were unremarkable.  No detectable BCR-ABL by RT-PCR.  We continued her Sprycel at that time.     On 11/17/2020, patient called and reported chest tightness, palpitation and dyspnea new onset in November 2020.  Patient reports this happened recently.  She has chest tightness, and associated tachycardia/palpitation.  Patient reports this is unpredictable, can be on and off.  She noticed that one day she was cooking meal for her family, and noted sudden onset palpitation and chest tightness.  She reports unable to induce purposefully.  She also has exertional dyspnea.  Patient reports 11/17/2020, we asked patient to hold Sprycel.  We suspect the patient may have pleural effusion or cardiac dysfunction secondary to Sprycel, we requested chest CT, echocardiogram study and also laboratory study for evaluation.     Negative chest x-ray on 11/17/2020.     Laboratory study on 11/17/2020 reported stable chronic condition with a ferritin 261 and iron saturation 12%, normal hemoglobin 15.0, unable to explain her dyspnea.     Echocardiogram study on 11/18/2020 reported normal results.     Laboratory study on 1/6/2021 reported hemoglobin 15.9 hematocrit 47.1%, platelets 181,000 WBC 9360 including ANC 7130 lymphocytes 1350. Iron study reported ferritin 293 and iron saturation 14% with free iron 47 and a TIBC 326.  Chemistry lab reported  unremarkable CMP including renal function and liver function panel.      Patient reports she was seen by cardiologist Dr. Flowers on 2021.  Patient had thorough cardiology evaluation and there was no apparent abnormalities.    We have been withholding her Sprycel in middle 2020 because of palpitation, and referred patient to cardiology service.        Fortunately, her peripheral blood BCR-ABL by RT-PCR was complete negative, as reported on 2021.     Sprycel was resumed on 2021.      Laboratory studies on 3/16/2021 reported normal CBC including hemoglobin 14.4 MCV 88.7, platelets 173,000, and WBC 6400 including ANC 3770 lymphocytes 1800.  Chemistry lab reported normal CMP.     Laboratory studies on 2021 reported completely normal CBC and CMP.  Iron study also normal with ferritin 219 and iron saturation 16%.  Negative study for BCR-ABL by RT-PCR.     Laboratory study on 10/5/2021 reported complete normal CBC and CMP.  Iron study reported normal ferritin 250 ng/mL and iron saturation 21% with free iron 63 TIBC 300.  She was also complete negative for BCR-ABL by RT-PCR.      MEDICATIONS: The current medication list was reviewed with the patient and updated in the EMR this date per the medical assistant. Medication dosages and frequencies were confirmed to be accurate.        Allergies   Allergen Reactions   • Sulfa Antibiotics Unknown - Low Severity     Childhood reaction     SOCIAL HISTORY: . She smoked cigarettes previously, quit in 2006 with 8-pack-year history. Social drinker, maybe once a month. No illegal drug use. No risk for HIV except tattoos.  Hairstylist.       FAMILY HISTORY: Maternal grandmother had esophageal/stomach adenocarcinoma diagnosed at age of 72 and  of cancer at age of 73. The patient' s mother has hypertension but otherwise healthy.  No other family history of malignancy, especially no leukemia.          VITAL SIGNS:   Vitals:    22 1021   BP:  "108/69   Pulse: 55   Resp: 16   Temp: 97.5 °F (36.4 °C)   TempSrc: Temporal   SpO2: 98%   Weight: 83 kg (182 lb 14.4 oz)   Height: 172.7 cm (67.99\")   PainSc: 0-No pain   ECOG 0          Physical Exam  Vitals reviewed.   Constitutional:       General: She is not in acute distress.     Appearance: Normal appearance. She is well-developed.   HENT:      Head: Normocephalic and atraumatic.   Eyes:      Pupils: Pupils are equal, round, and reactive to light.   Cardiovascular:      Rate and Rhythm: Normal rate and regular rhythm.      Heart sounds: Normal heart sounds. No murmur heard.  Pulmonary:      Effort: Pulmonary effort is normal. No respiratory distress.      Breath sounds: Normal breath sounds. No wheezing, rhonchi or rales.   Abdominal:      General: Bowel sounds are normal. There is no distension.      Palpations: Abdomen is soft.   Musculoskeletal:         General: Normal range of motion.      Cervical back: Normal range of motion.   Skin:     General: Skin is warm and dry.      Findings: No rash.   Neurological:      Mental Status: She is alert and oriented to person, place, and time.         LABORATORY DATA:    Lab Results   Component Value Date    WBC 5.53 06/08/2022    HGB 13.6 06/08/2022    HCT 41.4 06/08/2022    MCV 91.0 06/08/2022     06/08/2022     Lab Results   Component Value Date    NEUTROABS 3.37 06/08/2022     Glucose   Date Value Ref Range Status   03/14/2022 85 74 - 124 mg/dL Final     BUN   Date Value Ref Range Status   03/14/2022 11 6 - 20 mg/dL Final     Creatinine   Date Value Ref Range Status   03/14/2022 0.72 0.60 - 1.10 mg/dL Final     Sodium   Date Value Ref Range Status   03/14/2022 140 134 - 145 mmol/L Final     Potassium   Date Value Ref Range Status   03/14/2022 4.1 3.5 - 4.7 mmol/L Final     Chloride   Date Value Ref Range Status   03/14/2022 105 98 - 107 mmol/L Final     CO2   Date Value Ref Range Status   03/14/2022 25.3 22.0 - 29.0 mmol/L Final     Calcium   Date Value Ref " Range Status   03/14/2022 9.4 8.5 - 10.2 mg/dL Final     Total Protein   Date Value Ref Range Status   03/14/2022 7.4 6.3 - 8.0 g/dL Final     Albumin   Date Value Ref Range Status   03/14/2022 4.50 3.50 - 5.20 g/dL Final     ALT (SGPT)   Date Value Ref Range Status   03/14/2022 15 0 - 33 U/L Final     AST (SGOT)   Date Value Ref Range Status   03/14/2022 16 0 - 32 U/L Final     Alkaline Phosphatase   Date Value Ref Range Status   03/14/2022 76 38 - 116 U/L Final     Total Bilirubin   Date Value Ref Range Status   03/14/2022 0.5 0.2 - 1.2 mg/dL Final     eGFR Non  Amer   Date Value Ref Range Status   12/21/2021 86 >60 mL/min/1.73 Final     BUN/Creatinine Ratio   Date Value Ref Range Status   03/14/2022 15.3 7.3 - 30.0 Final     Anion Gap   Date Value Ref Range Status   03/14/2022 9.7 5.0 - 15.0 mmol/L Final           ASSESSMENT:      1. Chronic myelogenous leukemia, chronic phase with excellent response to Sprycel and complete molecular response in August 2014. However she had interuption of treatment in early part of 2016, because of insurance issues and miscommunication, she stopped the medicine without telling us, and laboratory test on 03/22/2016 reported disease relapse with increased BCR/ABL product at 12.626%. subsequently she was restarted back on Sprycel, and responded well.  Since June 2017, she has completed molecular response as tested every 3 months.  She has been compliant with treatment.   · In the end of July 2018, she reported worsening significant cramping involving her legs, so we decreased her Sprycel to 50 mg daily starting early August.  She's been having less problem since that time.  She was tested negative for BCR-ABL on 8/31/2018.    · Patient had a negative BCR-ABL by RT-PCR in end of January 2019 and also on 3/29/2019.   · Patient was later found having significantly elevated liver function panel.  Sprycel was on hold and she was subsequently found having acute hepatitis C  infection.    · I discussed with Dr. Karyn Mendieta, we'll hold her Sprycel for now until she finishes hepatitis C treatment.   · She was started on hepatitis C treatment on 4/18/2019 and finished an 8-week course.  · On 07/02/2019 patient's labs showed BCR-ABL Major (p210) detected by RT-PCR at 0.0141%. BCR/ABL1 Minor (p190) was not detected. HCV was not detected.  · Sprycel treatment was resumed as of 07/02/2019 upon completion of MEVYRET.  · Laboratory study on 10/29/2019 reported normal CBC. BCR/ABL1 MAJOR (p210) and BCR/ABL1 MINOR (p190) were not detected.    · BCR-ABL by RT-PCR on 1/24/2020 was negative.     · Lab result for BCR ABL by RT-PCR was also negative on 4/24/2020. Patient will need to continue on sprycel treatment.    · 7/10/2020 patient has normal CBC and CMP.  Negative RT-PCR for BCR ABL.  Tolerating well.  Continue Sprycel at 50 mg daily.  · On 10/9/2020, completely normal CBC with good tolerance.  BCR-ABL was tested negative by RT-PCR method.  Continue Sprycel.   · Patient reports 11/17/2020 chest tightness in palpitation, and exertion dyspnea progressively getting worse in the past week.  We asked patient to hold Sprycel.   · Chest x-ray examination on 11/17/2020 was unremarkable.  Echocardiogram study on 11/18/2020 was also benign.  Patient was seen by cardiologist for further evaluation.    · Laboratory studies on 1/6/2021 reported normal WBC 9360 including ANC 7130 lymphocytes 1350. BCR-ABL by RT-PCR was complete negative, as reported on 1/12/2021.   · Patient had negative cardiac work-up.  She also has resolution of symptoms.  Discussed with patient on 2/9/2021, we recommended resumption of Sprycel 50 mg daily.  Patient is agreeable.  · On 3/16/2021, patient reports tolerating Sprycel, no recurrent symptoms of dyspnea, chest tightness or palpitation.  Continue Sprycel 50 mg daily.  · On 5/4/2021, patient has normal CBC and CMP.  Negative results for BCR-ABL by RT-PCR for P210 and P190 as reported  on 5/12/2021.    · On 10/5/2021 patient presented for reevaluation.  She reports tolerating Sprycel.  No specific side effects reported.  Maintains normal CBC and negative BCR-ABL.  We will continue treatment for now.  · On 12/21/2021, patient reports good tolerance to Sprycel.  Normal CBC CMP.  Negative study by peripheral blood RT-PCR.  Continue Sprycel.   · On 3/14/2022, patient reports excellent tolerance to Sprycel at 50 mg daily.  Maintains normal CBC and CMP.  · 6/8/2022 continues on Sprycel 50 mg daily tolerating well.      2. Recurrent Iron deficiency anemia.    · She was treated again with Feraheme October 2017.   Iron deficiency thought to be related to ulcer identified on EGD, being treated with Carafate.  She also had heavy menses.  · She had recurrent iron deficiency again and was given Feraheme 2 doses in March 2018.   · Subsequently recurrent iron deficiency in July 2018, she was switched to Venofer 3 doses total 900 mg.   · Patient had simple hysterectomy in October 2018.  · Laboratory study showed supratherapeutic ferritin 458 and iron saturation 40% on 4/30/2019.   · Laboratory study performed 10/29/2019, showed normal iron saturation and ferritin 325 ng/dL.  · Normal iron studies including ferritin 262 and iron saturation 21% on 4/24/2020.  No evidence of recurrent iron deficiency.   · Lab study on 7/10/2020 reported ferritin 185, iron saturation 13% and normal hemoglobin 14.5.  She has deteriorating iron studies.   Continue to monitor in 3 months.  · Labs on 10/9/2020 reported ferritin 270, iron saturation 13% and hemoglobin 14.9.  · Lab studies on 1/6/2021 reported mild erythrocytosis.  Hemoglobin is 15.9 and hematocrit of 47.1%, with ferritin 293 and iron saturation 14%.    · Normal hemoglobin 14.4 on 3/16/2021.    · On 5/4/2021 lab study reported hemoglobin 13.7, ferritin 219 and iron saturation 16%.    · On 10/5/2021, patient maintains normal hemoglobin.  Iron study reported further improved  ferritin 250 ng/mL and iron saturation 21%.  Since her hysterectomy, patient has no recurrence of iron deficiency.  Discussed with patient today, there is no need for routine monitoring of iron study from now.    · On 3/14/2022, normal hemoglobin 14.3.  · 6/8/2022 hemoglobin 13.6.       *Chest tightness, palpitation and dyspnea new onset in November 2020.  Patient reports this happened recently, and that usually unpredictable, she has chest tightness, and associated tachycardia/palpitation.  Patient reports this is unpredictable, can be on and off.  She noticed 1 day she was cooking meal for her family, and noted several palpitation and chest tightness.  She reports unable to induce purposefully.  She also has exertional dyspnea.  Patient reports 11/17/2020, we asked patient to hold Sprycel.  We requested chest CT, echocardiogram study and also laboratory study for evaluation.  · Negative chest x-ray on 11/17/2020.   · Laboratory study on 11/17/2020 reported stable chronic condition with a ferritin 261 and iron saturation 12%, normal hemoglobin 15.0, unable to explain her dyspnea.   · Echocardiogram study on 11/18/2020 reported normal results.   · Patient is evaluated 11/20/2020, she reports somewhat improved symptoms, since she stopped Sprycel for the past 3 days.  She denies extremity edema.  She denies fever sweating or chills.  We recommend patient to continue to hold Sprycel for another 2 weeks.  We also recommended patient to be tested for COVID-19.    · Reevaluation on 12/4/2020, patient reports that she did not get a Covid test.  She denies other illness such as fever sweating nausea vomiting, diarrhea, change of taste, cough etc.  Discussed with patient, will check a thyroid panel, which turned out to be normal on 12/4/2020.    · Patient was referred to cardiologist for evaluation of hypertension.  She was seen by Dr. Flowers on 12/21/2020 and the case waiting for further evaluation.  · Patient had a  negative cardiac work-up.  Her symptom has resolved.  · Patient was restarted on Sprycel 2/9/2021.  She reports no recurrent symptoms 3/16/2021.  · On 5/4/2021, patient reports no recurrent symptoms.  · On 10/5/2021 patient reports no dyspnea no chest pain or discomfort.   · On 12/21/2021, patient reports no recurrent symptoms.  · On 3/14/2022, patient has no chest pain no dyspnea or pressureness on chest.  · 6/8/2022 no complaints of this today.       *COVID-19 vaccination.  · Patient reports receiving none 2 doses of the Moderna vaccine.    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         PLAN:    1. Continue Sprycel 50 mg daily.  2. Results pending for peripheral blood BCR-ABL by PT-PCR.  3. Continue oral B12 1000 mcg daily.  4. Return in 3 months for follow-up visit with Dr. Castel with repeat labs including CBC, CMP, BCR-ABL by PT-PCR.  5. Call/return sooner should she develop any new concerns or problems.      Patient continues on high risk medication requiring close monitor for toxicity.    Bita Sousa, APRN  06/08/2022      CC:      Karyn Mendieta M.D.    Luis Alfredo Flowers M.D.

## 2022-09-09 ENCOUNTER — APPOINTMENT (OUTPATIENT)
Dept: LAB | Facility: HOSPITAL | Age: 37
End: 2022-09-09

## 2022-09-19 ENCOUNTER — APPOINTMENT (OUTPATIENT)
Dept: LAB | Facility: HOSPITAL | Age: 37
End: 2022-09-19

## 2022-10-07 ENCOUNTER — OFFICE VISIT (OUTPATIENT)
Dept: ONCOLOGY | Facility: CLINIC | Age: 37
End: 2022-10-07

## 2022-10-07 ENCOUNTER — LAB (OUTPATIENT)
Dept: LAB | Facility: HOSPITAL | Age: 37
End: 2022-10-07

## 2022-10-07 VITALS
HEIGHT: 68 IN | WEIGHT: 184.6 LBS | SYSTOLIC BLOOD PRESSURE: 103 MMHG | BODY MASS INDEX: 27.98 KG/M2 | HEART RATE: 70 BPM | RESPIRATION RATE: 20 BRPM | DIASTOLIC BLOOD PRESSURE: 70 MMHG | OXYGEN SATURATION: 100 % | TEMPERATURE: 97.1 F

## 2022-10-07 DIAGNOSIS — C92.10 CML (CHRONIC MYELOID LEUKEMIA): Primary | ICD-10-CM

## 2022-10-07 DIAGNOSIS — C92.10 CML (CHRONIC MYELOID LEUKEMIA): ICD-10-CM

## 2022-10-07 DIAGNOSIS — D50.0 IRON DEFICIENCY ANEMIA DUE TO CHRONIC BLOOD LOSS: ICD-10-CM

## 2022-10-07 DIAGNOSIS — Z86.2 HISTORY OF IRON DEFICIENCY ANEMIA: ICD-10-CM

## 2022-10-07 LAB
ALBUMIN SERPL-MCNC: 4.6 G/DL (ref 3.5–5.2)
ALBUMIN/GLOB SERPL: 1.5 G/DL (ref 1.1–2.4)
ALP SERPL-CCNC: 86 U/L (ref 38–116)
ALT SERPL W P-5'-P-CCNC: 13 U/L (ref 0–33)
ANION GAP SERPL CALCULATED.3IONS-SCNC: 11.1 MMOL/L (ref 5–15)
AST SERPL-CCNC: 14 U/L (ref 0–32)
BASOPHILS # BLD AUTO: 0.05 10*3/MM3 (ref 0–0.2)
BASOPHILS NFR BLD AUTO: 0.6 % (ref 0–1.5)
BILIRUB SERPL-MCNC: 0.2 MG/DL (ref 0.2–1.2)
BUN SERPL-MCNC: 13 MG/DL (ref 6–20)
BUN/CREAT SERPL: 16.3 (ref 7.3–30)
CALCIUM SPEC-SCNC: 10 MG/DL (ref 8.5–10.2)
CHLORIDE SERPL-SCNC: 102 MMOL/L (ref 98–107)
CO2 SERPL-SCNC: 25.9 MMOL/L (ref 22–29)
CREAT SERPL-MCNC: 0.8 MG/DL (ref 0.6–1.1)
DEPRECATED RDW RBC AUTO: 41.6 FL (ref 37–54)
EGFRCR SERPLBLD CKD-EPI 2021: 96.9 ML/MIN/1.73
EOSINOPHIL # BLD AUTO: 0.28 10*3/MM3 (ref 0–0.4)
EOSINOPHIL NFR BLD AUTO: 3.4 % (ref 0.3–6.2)
ERYTHROCYTE [DISTWIDTH] IN BLOOD BY AUTOMATED COUNT: 12.7 % (ref 12.3–15.4)
GLOBULIN UR ELPH-MCNC: 3 GM/DL (ref 1.8–3.5)
GLUCOSE SERPL-MCNC: 95 MG/DL (ref 74–124)
HCT VFR BLD AUTO: 42 % (ref 34–46.6)
HGB BLD-MCNC: 14.1 G/DL (ref 12–15.9)
IMM GRANULOCYTES # BLD AUTO: 0.05 10*3/MM3 (ref 0–0.05)
IMM GRANULOCYTES NFR BLD AUTO: 0.6 % (ref 0–0.5)
LYMPHOCYTES # BLD AUTO: 1.76 10*3/MM3 (ref 0.7–3.1)
LYMPHOCYTES NFR BLD AUTO: 21.4 % (ref 19.6–45.3)
MCH RBC QN AUTO: 30.2 PG (ref 26.6–33)
MCHC RBC AUTO-ENTMCNC: 33.6 G/DL (ref 31.5–35.7)
MCV RBC AUTO: 89.9 FL (ref 79–97)
MONOCYTES # BLD AUTO: 0.64 10*3/MM3 (ref 0.1–0.9)
MONOCYTES NFR BLD AUTO: 7.8 % (ref 5–12)
NEUTROPHILS NFR BLD AUTO: 5.44 10*3/MM3 (ref 1.7–7)
NEUTROPHILS NFR BLD AUTO: 66.2 % (ref 42.7–76)
NRBC BLD AUTO-RTO: 0 /100 WBC (ref 0–0.2)
PLATELET # BLD AUTO: 203 10*3/MM3 (ref 140–450)
PMV BLD AUTO: 10 FL (ref 6–12)
POTASSIUM SERPL-SCNC: 4 MMOL/L (ref 3.5–4.7)
PROT SERPL-MCNC: 7.6 G/DL (ref 6.3–8)
RBC # BLD AUTO: 4.67 10*6/MM3 (ref 3.77–5.28)
SODIUM SERPL-SCNC: 139 MMOL/L (ref 134–145)
WBC NRBC COR # BLD: 8.22 10*3/MM3 (ref 3.4–10.8)

## 2022-10-07 PROCEDURE — 36415 COLL VENOUS BLD VENIPUNCTURE: CPT

## 2022-10-07 PROCEDURE — 80053 COMPREHEN METABOLIC PANEL: CPT

## 2022-10-07 PROCEDURE — 85025 COMPLETE CBC W/AUTO DIFF WBC: CPT

## 2022-10-07 PROCEDURE — 99214 OFFICE O/P EST MOD 30 MIN: CPT | Performed by: INTERNAL MEDICINE

## 2022-10-07 NOTE — PROGRESS NOTES
REASONS FOR FOLLOWUP:    1. Chronic myelogenous leukemia with splenomegaly, chronic phase, positive [place] chromosome, no mutation at kinase domain diagnosed in November 2013.   · Patient is on dose reduced Sprycel 50 mg daily with undetectable disease by RT-PCR.              HISTORY OF PRESENT ILLNESS: The patient is a 37 y.o. female with the above-mentioned history who is here for 3-month follow-up evaluation.     Patient reports she has been compliant with Sprycel 50 mg daily.  She reports excellent tolerance.  No nausea no vomiting.  No leg cramping.  No skin rashes.  No diarrhea.  Denies fever sweating or chills.    She has no specific complaints.    Most recent laboratory study for BCR-ABL by RT-PCR was negative on 6/8/2022.    Laboratory studies today on 10/7/2022 reported normal CBC and CMP.       Past Medical History:   Diagnosis Date   • Anemia in neoplastic disease    • Asthma     childhood   • Chiari I malformation (HCC)     eval by Dr Moore, NS 2016   • Chronic myelogenous leukemia (HCC)     remission, Dr Castle follows   • Chronic pain    • CML (chronic myelocytic leukemia) (HCC)    • Cystitis    • Depression    • GERD (gastroesophageal reflux disease)     ulcer   • H/O Iron deficiency anemia    • H/O Lower extremity pain     Resolved.   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      *  Hysterectomy in October 2018      *  Acute hepatitis C in early 2019, treated successfully by Dr. Karyn Mendieta.     OB/GYN HISTORY: Menarche age 9. G5, P4, 1 miscarriage of the third pregnancy. First pregnancy at age 17. Patient underwent partial hysterectomy in 2018.       HEMATOLOGIC/ONCOLOGIC HISTORY:   *  Chronic myelogenous leukemia with splenomegaly, chronic phase, positive [place] chromosome, no mutation at kinase domain diagnosed in November 2013.   · Patient was started on hydroxyurea temporarily on 10/23/2013 with significant drop of WBC counts.    · Patient started on Sprycel on 12/02/2013.   · Peripheral blood tested with 3.4% of cells positive for BCR-ABL translocation, tested 02/17/2014.    · The patient had CCyR 6 months into treatment as tested on 05/15/2014.    · Complete molecular response as tested 08/08/14 with negative product of BCR/ABL.   · There was interruption of her treatment due to insurance coverage and misunderstanding from patient's part, she had a relapse of disease with BCR/ABL product at 12.626% in March 2016, and she restarted back on Sprycel.   · Good response as tested on 08/31/2016 with BCR/ABL at 0.294%.    · Since June 2017, patient has been persistently tested negative for BCR/ABL by RT-PCR every 3 month period.     · Patient reported worsening leg cramping in end of July 2018.  Sprycel was decreased to 50 mg daily.  Laboratory studies on 8/31/2018, on 1/30/2019 and on 3/29/2019 were negative for BCR-ABL by RT-PCR.    · Sprycel was on hold during treatment for acute hepatitis C in early part of 2019.  It was resumed in July 2019.  She was weakly positive for BCR-ABL.  · On 10/29/2019 BCR/ABL1 MAJOR (p210) and BCR/ABL1 MINOR (p190) were not detected.    · BCR-ABL by RT-PCR with 0% on 1/24/2020 and also on 4/24/2020.  · Patient was tested negative on 7/10/2020.  Negative on 10/9/2020.    · Because of palpitation, Sprycel was on hold since 11/17/2020.     · BCR-ABL by RT-PCR was complete negative as reported on 1/06/2021.  · Sprycel was resumed on 2/9/2021 at 50 mg daily.  · Continue BCR-ABL by RT-PCR in May 2021 in October 2021.          Laboratory results on 09/23/2015 showed normalization of hemoglobin 12.2, but still has macrocytosis, MCV 73.3. She has normal platelets and  WBC at 9400. Serum ferritin < 5 ng/ml, iron sats 6.3%, iron 29, TIBC 455 mcg/ml. Still has severe iron deficiency, needs to continue oral iron therapy. The patient restarted taking oral iron supplementation which was probably stopped in the end of November 2015.        Laboratory study on 03/22/2016 reported normal WBC 8450, neutrophils 6100, lymphs is 1400 and monocytes 550. Serum iron was 26, TIBC 469 on saturation 6% and ferritin less than 5 NG/ML. Chemistry lab reported normal renal function with a creatinine 0.69, normal liver function panel, total protein 7.5 and albumin 4.2, normal electrolytes. Patient was restarted back on oral on sedimentation twice a day with good tolerance.      Patient continues take Lortab as needed for left leg cramping. Urine drug test on 08/05/2016 was completely negative, and repeated study on August 26 was positive for opiate, negative for other recreational drugs.      Repeat laboratory study on 08/31/2016 reported iron 21, ferritin less than 5, iron saturation 5%, TIBC 462. Hemoglobin was 11.5 MCV 78.1 MCHC 30.4. Platelets 163,000. Total WBC 8870 including neutrophils 6400 and lymphocytes 1500. BCR/ABL was 0.294% by RT-PCR method.       Patient was given IV Feraheme treatment in September 2016, with 2 doses. Repeated laboratory study on 10/06/2016 reported significantly improved and normalized ferritin 269, iron 80, TIBC 365 and iron saturation 22%. Her hemoglobin was 12.2, and MCV 80.8.      Patient reported she was seen by Dr. Fam in 10/2016 and was started on half tablets of Topamax. I reviewed Dr. Fam’s clinic note and telephone conversation record. The patient reports she has no recurrence of migraine headache. However, she is very tearful today, reporting that she has thoughts of not taking any medications. She did assure me that she has been taking the medication up to this point. 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 The patient denies suicide ideation. When she was initially diagnosed of CML back in 2014, she had depression and I started the patient on Prozac. The patient reported initially it helped her, however, she no longer feels the effect of Prozac. She feels depressed. The patient reports she has no personal hobby. She goes to work and goes home, taking care of her kids. She does not enjoy life as she used to.      Laboratory study on December 29, 2016 reported at ferritin 19.9, iron 33, TIBC 347 iron saturation 10%.  Hemoglobin was 13, normal WBC and platelets.  Unremarkable CMP.  Patient was given 2 doses of Feraheme treatment in early January 2017.      Cytogenetic study on March 14, 2017 reported a BCR-ABL products at 0.098%, showed further improved residual disease.  There is also reported a ferritin 103.7, iron 79, TIBC 336 and iron saturation 24%.  Hemoglobin was 13.5, MCV 92.3, platelets 199,000 WBC 7000.  She had a completely normal CMP.       Repeated laboratory study on June 20, 2017 reported at nondetectable BCR-ABL product.  Ferritin was 45, iron 35 TIBC 333 and iron saturation 11%.  Had a normal CBC and CMP.    In the end of July 2018, patient called reporting worsening significant leg cramping, and getting worse recently and has been taking 6 tablets of Advil with no significant improvement.  We suspect it might be caused by Sprycel for her CML.  We discussed with patient, and decreased to half dose at 50 mg daily.  Patient reports that she is improved leg cramping since dose reduction.    Per medical records this patient had emergency room visit again on 8/18/2018.  Patient reports she had significant abdomen/pelvic pain at a time associate with nausea.  Laboratory study showed significantly elevated WBC at 25,900 including neutrophils 24,400, with elevated hemoglobin 15.8 and platelets 146,000.     She had a thorough investigation,  including CT scan for abdomen pelvis which was unremarkable.  She then had ultrasound for the pelvis and it was also unremarkable.  She had ultrasound for the gallbladder examination on the same day and was unremarkable.  She had a normal CMP except of marginally elevated glucose at 112.  Her urinalysis showed only marginally increased WBC 3-5/HPF.  She was negative for yeast infection, negative for Chlamydia trichomonas and Neisseria gonorrhea.  Patient was prescribed Flagyl and doxycycline and discharged to home.      lab study on 2019 reported normal iron saturation 47% and elevated ferritin 507.1 ng/mL with free iron 184 and TIBC 388.  hemoglobin is normal at 13.7 and platelets 186,000. normal WBC at 5080 including ANC 3300, lymphocytes 930 and monocytes 630. Labs reported significantly elevated ALT at 655,  and alkaline phosphatase 234 but normal total bilirubin 0.9. She had normal renal function creatinine 0.78. Normal electrolytes.     On 2019, I searched Up-to-Date and suspected that this patient had drug-induced hepatitis from Diflucan or with combination of Diflucan with Sprycel. This patient had been on Sprycel for years and had no problem with abnormal liver panel previously. It seems Diflucan inhibits CY moderately, which likely interferes with the metabolism of Sprycel. I instructed the patient to hold her Sprycel for now.    Sprycel treatment resumed as of 2019 upon completion of MEVYRET for her hepatitis C.    Laboratory study performed on 2019 showed normal CBC and CMP. BCR/ABL by RT-PCR detected BCR/ABL1 Major. HCV RNA by PCR showed HCV not detected.    Laboratory studies 2019 report her hemoglobin is normal at 15.2 and platelets 146,000. normal WBC at 7650 including ANC 5190, lymphocytes 1600 and monocytes 630.    Recent laboratory studies confirmed to be no detectable virus on 2019.    U/S Liver performed on 10/24/2019 reported negative hepatic  ultrasound exam with no focal liver lesion, negative gallbladder examination with no cholelithiasis or bile duct dilation noted, however borderline splenomegaly was noted.     Laboratory study performed on 10/29/2019, reported a completely normal CBC, CMP. Normal iron saturation and still slightly elevated ferritin 325 ng/dL  BCR/ABL1 MAJOR (p210) and BCR/ABL1 MINOR (p190) were not detected.     Laboratory studies 1/24/2020, show hemoglobin 14.3 MCV 92.6 platelets 180,000 and WBC 6570 including neutrophils 4400.  She also has completely normal CMP.  Iron studies reported ferritin 273, iron saturation 11%, hemoglobin 14.3 WBC 6570 and platelets 180,000.  BCR-ABL by RT-PCR with 0%.     Patient presented today for 3-month follow-up and lab review.      Due to pandemic coronavirus infection, patient has been staying home with all 4 children.  Patient reports significant fatigue for the past month, similar to the time when she had iron deficiency.  Patient reports prior to that she was having regular exercise and with good energy level.    Patient reports compliant with Sprycel 50 mg daily.  She denies leg cramping.  She has no nausea no vomiting no abdominal pains.  She has good appetite and no diarrhea.  She denies headaches vision changes no dizziness or seizure activity.    Her laboratory study on 3/12/2020 reported marginal iron saturation 15% however had a good ferritin 212.5 ng/mL.  She had hemoglobin 13.1 and normal thyroid profile including TSH 1.20, free T4 at 1.37 ng/dL and a free T3 at 2.97 pg/mL     On 4/24/2020 her iron study showed ferritin 262 and iron saturation 21%.  Hemoglobin is 14.8.  She has normal WBC 5740 including ANC 3820, and normal platelets 161,000.   She was negative for BCR-ABL by RT-PCR on the same day.    Laboratory studies, 7/10/2020, show completely normal CBC and CMP.  Negative for BCR-ABL by RT-PCR.  Iron studies showed ferritin 185, iron saturation 13% free iron 37 TIBC  286.    Patient has completed normal CBC 10/9/2020.  Iron studies reported ferritin 2070, free iron 13%.  She also has completed normal CMP.  She was tested negative for BCR-ABL by RT-PCR method.  Sprycel 50 mg daily was continued.     We saw her recently on 10/9/2020 for routine follow-up for her CML.  She was tolerating Sprycel.  Physical examination laboratory studies were unremarkable.  No detectable BCR-ABL by RT-PCR.  We continued her Sprycel at that time.     On 11/17/2020, patient called and reported chest tightness, palpitation and dyspnea new onset in November 2020.  Patient reports this happened recently.  She has chest tightness, and associated tachycardia/palpitation.  Patient reports this is unpredictable, can be on and off.  She noticed that one day she was cooking meal for her family, and noted sudden onset palpitation and chest tightness.  She reports unable to induce purposefully.  She also has exertional dyspnea.  Patient reports 11/17/2020, we asked patient to hold Sprycel.  We suspect the patient may have pleural effusion or cardiac dysfunction secondary to Sprycel, we requested chest CT, echocardiogram study and also laboratory study for evaluation.     Negative chest x-ray on 11/17/2020.     Laboratory study on 11/17/2020 reported stable chronic condition with a ferritin 261 and iron saturation 12%, normal hemoglobin 15.0, unable to explain her dyspnea.     Echocardiogram study on 11/18/2020 reported normal results.     Laboratory study on 1/6/2021 reported hemoglobin 15.9 hematocrit 47.1%, platelets 181,000 WBC 9360 including ANC 7130 lymphocytes 1350. Iron study reported ferritin 293 and iron saturation 14% with free iron 47 and a TIBC 326.  Chemistry lab reported unremarkable CMP including renal function and liver function panel.      Patient reports she was seen by cardiologist Dr. Flowers on 2/4/2021.  Patient had thorough cardiology evaluation and there was no apparent  "abnormalities.    We have been withholding her Sprycel in middle 2020 because of palpitation, and referred patient to cardiology service.        Fortunately, her peripheral blood BCR-ABL by RT-PCR was complete negative, as reported on 2021.     Sprycel was resumed on 2021.      Laboratory studies on 3/16/2021 reported normal CBC including hemoglobin 14.4 MCV 88.7, platelets 173,000, and WBC 6400 including ANC 3770 lymphocytes 1800.  Chemistry lab reported normal CMP.     Laboratory studies on 2021 reported completely normal CBC and CMP.  Iron study also normal with ferritin 219 and iron saturation 16%.  Negative study for BCR-ABL by RT-PCR.     Laboratory study on 10/5/2021 reported complete normal CBC and CMP.  Iron study reported normal ferritin 250 ng/mL and iron saturation 21% with free iron 63 TIBC 300.  She was also complete negative for BCR-ABL by RT-PCR.      MEDICATIONS: The current medication list was reviewed with the patient and updated in the EMR this date per the medical assistant. Medication dosages and frequencies were confirmed to be accurate.        Allergies   Allergen Reactions   • Sulfa Antibiotics Unknown - Low Severity     Childhood reaction     SOCIAL HISTORY: . She smoked cigarettes previously, quit in 2006 with 8-pack-year history. Social drinker, maybe once a month. No illegal drug use. No risk for HIV except tattoos.  Hairstylist.       FAMILY HISTORY: Maternal grandmother had esophageal/stomach adenocarcinoma diagnosed at age of 72 and  of cancer at age of 73. The patient' s mother has hypertension but otherwise healthy.  No other family history of malignancy, especially no leukemia.          VITAL SIGNS:   Vitals:    10/07/22 1451   BP: 103/70   Pulse: 70   Resp: 20   Temp: 97.1 °F (36.2 °C)   TempSrc: Temporal   SpO2: 100%   Weight: 83.7 kg (184 lb 9.6 oz)   Height: 172.7 cm (67.99\")   PainSc:   3   PainLoc: Head  Comment: Head and legs   ECOG 0 "          Physical Exam  Vitals reviewed.   Constitutional:       General: She is not in acute distress.     Appearance: Normal appearance. She is well-developed.   HENT:      Head: Normocephalic and atraumatic.      Comments: Patient wears mask due to the pandemic coronavirus infection.     Eyes:      Conjunctiva/sclera: Conjunctivae normal.   Cardiovascular:      Rate and Rhythm: Normal rate and regular rhythm.      Heart sounds: Normal heart sounds. No murmur heard.  Pulmonary:      Effort: Pulmonary effort is normal. No respiratory distress.      Breath sounds: Normal breath sounds. No wheezing.   Abdominal:      General: Bowel sounds are normal. There is no distension.      Palpations: Abdomen is soft.      Tenderness: There is no abdominal tenderness.   Musculoskeletal:         General: No swelling.      Cervical back: Normal range of motion.   Skin:     General: Skin is warm and dry.      Findings: No rash.   Neurological:      Mental Status: She is alert and oriented to person, place, and time. Mental status is at baseline.   Psychiatric:         Mood and Affect: Mood normal.         Thought Content: Thought content normal.         LABORATORY DATA:    Lab Results   Component Value Date    WBC 8.22 10/07/2022    HGB 14.1 10/07/2022    HCT 42.0 10/07/2022    MCV 89.9 10/07/2022     10/07/2022     Lab Results   Component Value Date    NEUTROABS 5.44 10/07/2022     Glucose   Date Value Ref Range Status   10/07/2022 95 74 - 124 mg/dL Final     BUN   Date Value Ref Range Status   10/07/2022 13 6 - 20 mg/dL Final     Creatinine   Date Value Ref Range Status   10/07/2022 0.80 0.60 - 1.10 mg/dL Final     Sodium   Date Value Ref Range Status   10/07/2022 139 134 - 145 mmol/L Final     Potassium   Date Value Ref Range Status   10/07/2022 4.0 3.5 - 4.7 mmol/L Final     Chloride   Date Value Ref Range Status   10/07/2022 102 98 - 107 mmol/L Final     CO2   Date Value Ref Range Status   10/07/2022 25.9 22.0 - 29.0  mmol/L Final     Calcium   Date Value Ref Range Status   10/07/2022 10.0 8.5 - 10.2 mg/dL Final     Total Protein   Date Value Ref Range Status   10/07/2022 7.6 6.3 - 8.0 g/dL Final     Albumin   Date Value Ref Range Status   10/07/2022 4.60 3.50 - 5.20 g/dL Final     ALT (SGPT)   Date Value Ref Range Status   10/07/2022 13 0 - 33 U/L Final     AST (SGOT)   Date Value Ref Range Status   10/07/2022 14 0 - 32 U/L Final     Alkaline Phosphatase   Date Value Ref Range Status   10/07/2022 86 38 - 116 U/L Final     Total Bilirubin   Date Value Ref Range Status   10/07/2022 0.2 0.2 - 1.2 mg/dL Final     eGFR Non  Amer   Date Value Ref Range Status   12/21/2021 86 >60 mL/min/1.73 Final     BUN/Creatinine Ratio   Date Value Ref Range Status   10/07/2022 16.3 7.3 - 30.0 Final     Anion Gap   Date Value Ref Range Status   10/07/2022 11.1 5.0 - 15.0 mmol/L Final           ASSESSMENT:      1. Chronic myelogenous leukemia, chronic phase with excellent response to Sprycel and complete molecular response in August 2014. However she had interuption of treatment in early part of 2016, because of insurance issues and miscommunication, she stopped the medicine without telling us, and laboratory test on 03/22/2016 reported disease relapse with increased BCR/ABL product at 12.626%. subsequently she was restarted back on Sprycel, and responded well.  Since June 2017, she has completed molecular response as tested every 3 months.  She has been compliant with treatment.   · In the end of July 2018, she reported worsening significant cramping involving her legs, so we decreased her Sprycel to 50 mg daily starting early August.  She's been having less problem since that time.  She was tested negative for BCR-ABL on 8/31/2018.    · Patient had a negative BCR-ABL by RT-PCR in end of January 2019 and also on 3/29/2019.   · Patient was later found having significantly elevated liver function panel.  Sprycel was on hold and she was  subsequently found having acute hepatitis C infection.    · I discussed with Dr. Karyn Mendieta, we'll hold her Sprycel for now until she finishes hepatitis C treatment.   · She was started on hepatitis C treatment on 4/18/2019 and finished an 8-week course.  · On 07/02/2019 patient's labs showed BCR-ABL Major (p210) detected by RT-PCR at 0.0141%. BCR/ABL1 Minor (p190) was not detected. HCV was not detected.  · Sprycel treatment was resumed as of 07/02/2019 upon completion of MEVYRET.  · Laboratory study on 10/29/2019 reported normal CBC. BCR/ABL1 MAJOR (p210) and BCR/ABL1 MINOR (p190) were not detected.    · BCR-ABL by RT-PCR on 1/24/2020 was negative.     · Lab result for BCR ABL by RT-PCR was also negative on 4/24/2020. Patient will need to continue on sprycel treatment.    · 7/10/2020 patient has normal CBC and CMP.  Negative RT-PCR for BCR ABL.  Tolerating well.  Continue Sprycel at 50 mg daily.  · On 10/9/2020, completely normal CBC with good tolerance.  BCR-ABL was tested negative by RT-PCR method.  Continue Sprycel.   · Patient reports 11/17/2020 chest tightness in palpitation, and exertion dyspnea progressively getting worse in the past week.  We asked patient to hold Sprycel.   · Chest x-ray examination on 11/17/2020 was unremarkable.  Echocardiogram study on 11/18/2020 was also benign.  Patient was seen by cardiologist for further evaluation.    · Laboratory studies on 1/6/2021 reported normal WBC 9360 including ANC 7130 lymphocytes 1350. BCR-ABL by RT-PCR was complete negative, as reported on 1/12/2021.   · Patient had negative cardiac work-up.  She also has resolution of symptoms.  Discussed with patient on 2/9/2021, we recommended resumption of Sprycel 50 mg daily.  Patient is agreeable.  · On 3/16/2021, patient reports tolerating Sprycel, no recurrent symptoms of dyspnea, chest tightness or palpitation.  Continue Sprycel 50 mg daily.  · On 5/4/2021, patient has normal CBC and CMP.  Negative results for  BCR-ABL by RT-PCR for P210 and P190 as reported on 5/12/2021.    · On 10/5/2021 patient presented for reevaluation.  She reports tolerating Sprycel.  No specific side effects reported.  Maintains normal CBC and negative BCR-ABL.  We will continue treatment for now.  · On 12/21/2021, patient reports good tolerance to Sprycel.  Normal CBC CMP.  Negative study by peripheral blood RT-PCR.  Continue Sprycel.   · On 3/14/2022, patient reports excellent tolerance to Sprycel at 50 mg daily.  Maintains normal CBC and CMP.  · 6/8/2022 continues on Sprycel 50 mg daily tolerating well.  Patient was negative for BCR-ABL by RT-PCR.  · On 10/7/2022 patient reports compliance with Sprycel and good tolerance.  We will continue treatment.      2. Recurrent Iron deficiency anemia.    · She was treated again with Feraheme October 2017.   Iron deficiency thought to be related to ulcer identified on EGD, being treated with Carafate.  She also had heavy menses.  · She had recurrent iron deficiency again and was given Feraheme 2 doses in March 2018.   · Subsequently recurrent iron deficiency in July 2018, she was switched to Venofer 3 doses total 900 mg.   · Patient had simple hysterectomy in October 2018.  · Laboratory study showed supratherapeutic ferritin 458 and iron saturation 40% on 4/30/2019.   · Laboratory study performed 10/29/2019, showed normal iron saturation and ferritin 325 ng/dL.  · Normal iron studies including ferritin 262 and iron saturation 21% on 4/24/2020.  No evidence of recurrent iron deficiency.   · Lab study on 7/10/2020 reported ferritin 185, iron saturation 13% and normal hemoglobin 14.5.  She has deteriorating iron studies.   Continue to monitor in 3 months.  · Labs on 10/9/2020 reported ferritin 270, iron saturation 13% and hemoglobin 14.9.  · Lab studies on 1/6/2021 reported mild erythrocytosis.  Hemoglobin is 15.9 and hematocrit of 47.1%, with ferritin 293 and iron saturation 14%.    · Normal hemoglobin 14.4  on 3/16/2021.    · On 5/4/2021 lab study reported hemoglobin 13.7, ferritin 219 and iron saturation 16%.    · On 10/5/2021, patient maintains normal hemoglobin.  Iron study reported further improved ferritin 250 ng/mL and iron saturation 21%.  Since her hysterectomy, patient has no recurrence of iron deficiency.  Discussed with patient today, there is no need for routine monitoring of iron study from now.    · On 3/14/2022, normal hemoglobin 14.3.  · 6/8/2022 hemoglobin 13.6.  · On 10/7/2022 patient has normal hemoglobin 14.1.       *Chest tightness, palpitation and dyspnea new onset in November 2020.  Patient reports this happened recently, and that usually unpredictable, she has chest tightness, and associated tachycardia/palpitation.  Patient reports this is unpredictable, can be on and off.  She noticed 1 day she was cooking meal for her family, and noted several palpitation and chest tightness.  She reports unable to induce purposefully.  She also has exertional dyspnea.  Patient reports 11/17/2020, we asked patient to hold Sprycel.  We requested chest CT, echocardiogram study and also laboratory study for evaluation.  · Negative chest x-ray on 11/17/2020.   · Laboratory study on 11/17/2020 reported stable chronic condition with a ferritin 261 and iron saturation 12%, normal hemoglobin 15.0, unable to explain her dyspnea.   · Echocardiogram study on 11/18/2020 reported normal results.   · Patient is evaluated 11/20/2020, she reports somewhat improved symptoms, since she stopped Sprycel for the past 3 days.  She denies extremity edema.  She denies fever sweating or chills.  We recommend patient to continue to hold Sprycel for another 2 weeks.  We also recommended patient to be tested for COVID-19.    · Reevaluation on 12/4/2020, patient reports that she did not get a Covid test.  She denies other illness such as fever sweating nausea vomiting, diarrhea, change of taste, cough etc.  Discussed with patient, will  check a thyroid panel, which turned out to be normal on 12/4/2020.    · Patient was referred to cardiologist for evaluation of hypertension.  She was seen by Dr. Flowers on 12/21/2020 and the case waiting for further evaluation.  · Patient had a negative cardiac work-up.  Her symptom has resolved.  · Patient was restarted on Sprycel 2/9/2021.  She reports no recurrent symptoms 3/16/2021.  · On 5/4/2021, patient reports no recurrent symptoms.  · On 10/5/2021 patient reports no dyspnea no chest pain or discomfort.   · On 12/21/2021, patient reports no recurrent symptoms.  · On 3/14/2022, patient has no chest pain no dyspnea or pressureness on chest.  · 6/8/2022 no complaints of this today.       *COVID-19 vaccination.  · Patient reports receiving none 2 doses of the Moderna vaccine.    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   · On 10/7/2022 patient reports she was not infected with COVID-19, despite recently her  and her children were infected.        PLAN:    1. Continue Sprycel 50 mg daily.  2. Pending result for peripheral blood BCR-ABL by PT-PCR.  Expecting results in a few days.  3. Continue oral B12 at 1000 mcg daily.  4. Return in 3 months for follow-up visit with APRN with repeat labs including CBC, CMP, BCR-ABL by PT-PCR.  5. Come back to see me in 6 months please send lab studies.  6. Call/return sooner should she develop any new concerns or problems.      Reviewed the laboratory results with the patient and discussed management plan.  Patient is agreeable.    Patient continues on high risk medication requiring close monitor for toxicity.    Isabel Castle MD PhD  10/07/2022      CC:      Karyn Mendieta M.D.    Luis Alfredo Flowers M.D.

## 2022-10-08 PROBLEM — Z30.9 CONTRACEPTION MANAGEMENT: Status: RESOLVED | Noted: 2017-09-07 | Resolved: 2022-10-08

## 2022-10-08 PROBLEM — Z86.2 HISTORY OF IRON DEFICIENCY ANEMIA: Status: ACTIVE | Noted: 2022-10-08

## 2022-10-20 ENCOUNTER — SPECIALTY PHARMACY (OUTPATIENT)
Dept: PHARMACY | Facility: HOSPITAL | Age: 37
End: 2022-10-20

## 2022-10-20 NOTE — PROGRESS NOTES
MTM Encounter-Re: Adherence and side effects (Dasatinib)    Today's encounter was conducted via telephone.    Medication:  Dasatinib 50mg QD  - Reason for outreach: Routine medication check-in .  - Administration: Patient is taking every day at the same time .  - Missed doses: Patient reports missing 0 doses in the last 30 days.  - Self-administration: Patient demonstrates ability to self-administer medication. No barriers to adherence identified.   - Diagnosis/Indication: CML. Progress toward achieving therapeutic goals reviewed.   - Patient denies side effects, aside from Headache and joint pain, which is manageable and not bothersome to patient. Advised patient to alert office if this changes.    - Medication availability/affordability: Patient has had no issues obtaining medication from pharmacy.   - Questions/concerns about medications: none       Completed medication reconciliation today to assess for drug interactions.   Reviewed allergies, medical history, labs, quality of life, and medication history with patient.   Patient denies starting or stopping any medications.  Assessed medication list for interactions, no significant drug interactions noted.   Advised pt to call the clinic if any new medications are started so we can assess for drug-drug interactions.     All questions addressed. Patient had no additional concerns for MTM office.     Joana Abdullahi RPH  10/20/2022  13:26 EDT

## 2022-10-24 LAB — REF LAB TEST METHOD: NORMAL

## 2022-11-29 ENCOUNTER — TELEPHONE (OUTPATIENT)
Dept: ONCOLOGY | Facility: CLINIC | Age: 37
End: 2022-11-29

## 2022-11-29 NOTE — TELEPHONE ENCOUNTER
Caller: Hu Houston    Relationship to patient: Self    Best call back number: [phone]    Chief complaint: CANC./WOODY. APPTS.    Type of visit: LAB/FOLLOW UP    Requested date: FIRST WEEK OF January - MIDWEEK BETWEEN 9 - 9:30    If rescheduling, when is the original appointment: 12/30/2022

## 2023-01-04 ENCOUNTER — TELEMEDICINE (OUTPATIENT)
Dept: FAMILY MEDICINE CLINIC | Facility: TELEHEALTH | Age: 38
End: 2023-01-04
Payer: MEDICAID

## 2023-01-04 DIAGNOSIS — K04.7 DENTAL ABSCESS: Primary | ICD-10-CM

## 2023-01-04 PROCEDURE — 99212 OFFICE O/P EST SF 10 MIN: CPT | Performed by: NURSE PRACTITIONER

## 2023-01-04 RX ORDER — AMOXICILLIN 875 MG/1
875 TABLET, COATED ORAL 2 TIMES DAILY
Qty: 20 TABLET | Refills: 0 | Status: SHIPPED | OUTPATIENT
Start: 2023-01-04 | End: 2023-01-14

## 2023-01-04 RX ORDER — BENZONATATE 200 MG/1
CAPSULE ORAL
COMMUNITY
Start: 2022-10-24

## 2023-01-04 RX ORDER — IBUPROFEN 800 MG/1
800 TABLET ORAL EVERY 6 HOURS PRN
Qty: 40 TABLET | Refills: 0 | Status: SHIPPED | OUTPATIENT
Start: 2023-01-04 | End: 2023-02-21 | Stop reason: HOSPADM

## 2023-01-04 NOTE — PROGRESS NOTES
You have chosen to receive care through a telehealth visit.  Do you consent to use a video/audio connection for your medical care today? Yes     CHIEF COMPLAINT  No chief complaint on file.        HPI  Hu Houston is a 37 y.o. female  presents with complaint of infected tooth that is broken, right bottom back tooth, very painful, mild swelling.  She denies fever at this time.     Review of Systems   See HPI    Past Medical History:   Diagnosis Date   • Anemia in neoplastic disease    • Asthma     childhood   • Chiari I malformation (HCC)     eval by Dr Moore, NS 2016   • Chronic myelogenous leukemia (HCC)     remission, Dr Castle follows   • Chronic pain    • CML (chronic myelocytic leukemia) (HCC)    • Cystitis    • Depression    • GERD (gastroesophageal reflux disease)     ulcer   • H/O Iron deficiency anemia    • H/O Lower extremity pain     Resolved.   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   • Pulmonary hypertension (HCC)    • Seizures (HCC)     as child after head injry   • Splenomegaly    • Status post D&C    • Vitamin D deficiency        Family History   Problem Relation Age of Onset   • Hyperlipidemia Mother    • Hypertension Mother    • Stomach cancer Maternal Grandmother 72        Adenocarcinoma esophagus and stomach   • Stroke Paternal Grandmother    • Malig Hyperthermia Neg Hx        Social History     Socioeconomic History   • Marital status:    Tobacco Use   • Smoking status: Former     Packs/day: 1.00     Years: 8.00     Pack years: 8.00     Types: Cigarettes     Quit date: 2006     Years since quittin.5   • Smokeless tobacco: Never   Vaping Use   • Vaping Use: Never used   Substance and Sexual Activity   • Alcohol use: Yes     Comment: Social, maybe once every 6 months/no caffeine use   • Drug use: Not Currently     Types: Cocaine(coke)     Comment: prior  to 2006   • Sexual activity: Defer     Birth control/protection: Surgical     Comment: Tubal       Hu Houston  reports that she quit smoking about 16 years ago. Her smoking use included cigarettes. She has a 8.00 pack-year smoking history. She has never used smokeless tobacco..              LMP 10/11/2018     PHYSICAL EXAM  Physical Exam   Constitutional: She is oriented to person, place, and time. She appears well-developed and well-nourished. She does not have a sickly appearance. She does not appear ill.   HENT:   Head: Normocephalic and atraumatic.   Mouth/Throat:       Dental abscess from broken tooth   Pulmonary/Chest: Effort normal.  No respiratory distress.  Neurological: She is alert and oriented to person, place, and time.         Diagnoses and all orders for this visit:    1. Dental abscess (Primary)  -     amoxicillin (AMOXIL) 875 MG tablet; Take 1 tablet by mouth 2 (Two) Times a Day for 10 days.  Dispense: 20 tablet; Refill: 0  -     ibuprofen (ADVIL,MOTRIN) 800 MG tablet; Take 1 tablet by mouth Every 6 (Six) Hours As Needed for Mild Pain.  Dispense: 40 tablet; Refill: 0    --take medications as prescribed  --increase fluids, rest as needed, ibuprofen for pain  --f/u with dentist ASAP        FOLLOW-UP  As discussed during visit with PCP/[facility] if no improvement or Urgent Care/Emergency Department if worsening of symptoms    Patient verbalizes understanding of medication dosage, comfort measures, instructions for treatment and follow-up.    Deepa Cummings, FELIPA  01/04/2023  08:35 EST    The use of a video visit has been reviewed with the patient and verbal informed consent has been obtained. Myself and Hu Houston participated in this visit. The patient is located in [address] [place] [address].    I am located in [place], [place]. [employer] and [employer] were utilized. I spent 8 minutes in the patient's chart for this visit.

## 2023-01-04 NOTE — PATIENT INSTRUCTIONS
Dental Abscess  A dental abscess is an infection around a tooth that may involve pain, swelling, and a collection of pus, as well as other symptoms. Treatment is important to help with symptoms and to prevent the infection from spreading.  The general types of dental abscesses are:  Pulpal abscess. This abscess may form from the inner part of the tooth (pulp).  Periodontal abscess. This abscess may form from the gum.  What are the causes?  This condition is caused by a bacterial infection in or around the tooth. It may result from:  Severe tooth decay (cavities).  Trauma to the tooth, such as a broken or chipped tooth.  What increases the risk?  This condition is more likely to develop in males. It is also more likely to develop in people who:  Have cavities.  Have severe gum disease.  Eat sugary snacks between meals.  Use tobacco products.  Have diabetes.  Have a weakened disease-fighting system (immune system).  Do not brush and care for their teeth regularly.  What are the signs or symptoms?  Mild symptoms of this condition include:  Tenderness.  Bad breath.  Fever.  A bitter taste in the mouth.  Pain in and around the infected tooth.  Moderate symptoms of this condition include:  Swollen neck glands.  Chills.  Pus drainage.  Swelling and redness around the infected tooth, in the mouth, or in the face.  Severe pain in and around the infected tooth.  Severe symptoms of this condition include:  Difficulty swallowing.  Difficulty opening the mouth.  Nausea.  Vomiting.  How is this diagnosed?  This condition is diagnosed based on:  Your symptoms and your medical and dental history.  An examination of the infected tooth. During the exam, your dental care provider may tap on the infected tooth.  You may also need to have X-rays taken of the affected area.  How is this treated?  This condition is treated by getting rid of the infection. This may be done with:  Antibiotic medicines. These may be used in certain  situations.  Antibacterial mouth rinse.  Incision and drainage. This procedure is done by making an incision in the abscess to drain out the pus. Removing pus is the first priority in treating an abscess.  A root canal. This may be performed to save the tooth. Your dental care provider accesses the visible part of your tooth (crown) with a drill and removes any infected pulp. Then the space is filled and sealed off.  Tooth extraction. The tooth is pulled out if it cannot be saved by other treatment.  You may also receive treatment for pain, such as:  Acetaminophen or NSAIDs.  Gels that contain a numbing medicine.  An injection to block the pain near your nerve.  Follow these instructions at home:  Medicines  Take over-the-counter and prescription medicines only as told by your dental care provider.  If you were prescribed an antibiotic, take it as told by your dental care provider. Do not stop taking the antibiotic even if you start to feel better.  If you were prescribed a gel that contains a numbing medicine, use it exactly as told in the directions. Do not use these gels for children who are younger than 2 years of age.  Use an antibacterial mouth rinse as told by your dental care provider.  General instructions    Gargle with a mixture of salt and water 3-4 times a day or as needed. To make salt water, completely dissolve ½-1 tsp (3-6 g) of salt in 1 cup (237 mL) of warm water.  Eat a soft diet while your abscess is healing.  Drink enough fluid to keep your urine pale yellow.  Do not apply heat to the outside of your mouth.  Do not use any products that contain nicotine or tobacco. These products include cigarettes, chewing tobacco, and vaping devices, such as e-cigarettes. If you need help quitting, ask your dental care provider.  Keep all follow-up visits. This is important.  How is this prevented?    Excellent dental home care, which includes brushing your teeth every morning and night with fluoride  toothpaste. Floss one time each day.  Get regularly scheduled dental cleanings.  Consider having a dental sealant applied on teeth that have deep grooves to prevent cavities.  Drink fluoridated water regularly. This includes most tap water. Check the label on bottled water to see if it contains fluoride.  Reduce or eliminate sugary drinks.  Eat healthy meals and snacks.  Wear a mouth guard or face shield to protect your teeth while playing sports.  Contact a health care provider if:  Your pain is worse and is not helped by medicine.  You have swelling.  You see pus around the tooth.  You have a fever or chills.  Get help right away if:  Your symptoms suddenly get worse.  You have a very bad headache.  You have problems breathing or swallowing.  You have trouble opening your mouth.  You have swelling in your neck or around your eye.  These symptoms may represent a serious problem that is an emergency. Do not wait to see if the symptoms will go away. Get medical help right away. Call your local emergency services (911 in the [place] Do not drive yourself to the hospital.  Summary  A dental abscess is a collection of pus in or around a tooth that results from an infection.  A dental abscess may result from severe tooth decay, trauma to the tooth, or severe gum disease around a tooth.  Symptoms include severe pain, swelling, redness, and drainage of pus in and around the infected tooth.  The first priority in treating a dental abscess is to drain out the pus. Treatment may also involve removing damage inside the tooth (root canal) or extracting the tooth.  This information is not intended to replace advice given to you by your health care provider. Make sure you discuss any questions you have with your health care provider.  Document Revised: 02/24/2022 Document Reviewed: 02/24/2022  Elsevier Patient Education © 2022 [employer]

## 2023-01-13 ENCOUNTER — LAB (OUTPATIENT)
Dept: LAB | Facility: HOSPITAL | Age: 38
End: 2023-01-13
Payer: MEDICAID

## 2023-01-13 ENCOUNTER — OFFICE VISIT (OUTPATIENT)
Dept: ONCOLOGY | Facility: CLINIC | Age: 38
End: 2023-01-13
Payer: MEDICAID

## 2023-01-13 ENCOUNTER — PRIOR AUTHORIZATION (OUTPATIENT)
Dept: ONCOLOGY | Facility: CLINIC | Age: 38
End: 2023-01-13
Payer: MEDICAID

## 2023-01-13 VITALS
DIASTOLIC BLOOD PRESSURE: 72 MMHG | RESPIRATION RATE: 16 BRPM | HEIGHT: 68 IN | OXYGEN SATURATION: 99 % | TEMPERATURE: 97.8 F | SYSTOLIC BLOOD PRESSURE: 104 MMHG | HEART RATE: 63 BPM | BODY MASS INDEX: 29.02 KG/M2 | WEIGHT: 191.5 LBS

## 2023-01-13 DIAGNOSIS — C92.10 CML (CHRONIC MYELOID LEUKEMIA): ICD-10-CM

## 2023-01-13 DIAGNOSIS — C92.10 CML (CHRONIC MYELOID LEUKEMIA): Primary | ICD-10-CM

## 2023-01-13 LAB
ALBUMIN SERPL-MCNC: 4.5 G/DL (ref 3.5–5.2)
ALBUMIN/GLOB SERPL: 1.4 G/DL (ref 1.1–2.4)
ALP SERPL-CCNC: 81 U/L (ref 38–116)
ALT SERPL W P-5'-P-CCNC: 15 U/L (ref 0–33)
ANION GAP SERPL CALCULATED.3IONS-SCNC: 11.5 MMOL/L (ref 5–15)
AST SERPL-CCNC: 16 U/L (ref 0–32)
BASOPHILS # BLD AUTO: 0.06 10*3/MM3 (ref 0–0.2)
BASOPHILS NFR BLD AUTO: 0.9 % (ref 0–1.5)
BILIRUB SERPL-MCNC: 0.3 MG/DL (ref 0.2–1.2)
BUN SERPL-MCNC: 13 MG/DL (ref 6–20)
BUN/CREAT SERPL: 19.1 (ref 7.3–30)
CALCIUM SPEC-SCNC: 9.9 MG/DL (ref 8.5–10.2)
CHLORIDE SERPL-SCNC: 105 MMOL/L (ref 98–107)
CO2 SERPL-SCNC: 26.5 MMOL/L (ref 22–29)
CREAT SERPL-MCNC: 0.68 MG/DL (ref 0.6–1.1)
DEPRECATED RDW RBC AUTO: 42.3 FL (ref 37–54)
EGFRCR SERPLBLD CKD-EPI 2021: 114.5 ML/MIN/1.73
EOSINOPHIL # BLD AUTO: 0.13 10*3/MM3 (ref 0–0.4)
EOSINOPHIL NFR BLD AUTO: 1.9 % (ref 0.3–6.2)
ERYTHROCYTE [DISTWIDTH] IN BLOOD BY AUTOMATED COUNT: 12.8 % (ref 12.3–15.4)
GLOBULIN UR ELPH-MCNC: 3.2 GM/DL (ref 1.8–3.5)
GLUCOSE SERPL-MCNC: 90 MG/DL (ref 74–124)
HCT VFR BLD AUTO: 40.6 % (ref 34–46.6)
HGB BLD-MCNC: 13.6 G/DL (ref 12–15.9)
IMM GRANULOCYTES # BLD AUTO: 0.05 10*3/MM3 (ref 0–0.05)
IMM GRANULOCYTES NFR BLD AUTO: 0.7 % (ref 0–0.5)
LYMPHOCYTES # BLD AUTO: 1.34 10*3/MM3 (ref 0.7–3.1)
LYMPHOCYTES NFR BLD AUTO: 19.6 % (ref 19.6–45.3)
MCH RBC QN AUTO: 30.2 PG (ref 26.6–33)
MCHC RBC AUTO-ENTMCNC: 33.5 G/DL (ref 31.5–35.7)
MCV RBC AUTO: 90.2 FL (ref 79–97)
MONOCYTES # BLD AUTO: 0.53 10*3/MM3 (ref 0.1–0.9)
MONOCYTES NFR BLD AUTO: 7.7 % (ref 5–12)
NEUTROPHILS NFR BLD AUTO: 4.74 10*3/MM3 (ref 1.7–7)
NEUTROPHILS NFR BLD AUTO: 69.2 % (ref 42.7–76)
NRBC BLD AUTO-RTO: 0 /100 WBC (ref 0–0.2)
PLATELET # BLD AUTO: 217 10*3/MM3 (ref 140–450)
PMV BLD AUTO: 10.2 FL (ref 6–12)
POTASSIUM SERPL-SCNC: 4.4 MMOL/L (ref 3.5–4.7)
PROT SERPL-MCNC: 7.7 G/DL (ref 6.3–8)
RBC # BLD AUTO: 4.5 10*6/MM3 (ref 3.77–5.28)
SODIUM SERPL-SCNC: 143 MMOL/L (ref 134–145)
WBC NRBC COR # BLD: 6.85 10*3/MM3 (ref 3.4–10.8)

## 2023-01-13 PROCEDURE — 80053 COMPREHEN METABOLIC PANEL: CPT

## 2023-01-13 PROCEDURE — 36415 COLL VENOUS BLD VENIPUNCTURE: CPT

## 2023-01-13 PROCEDURE — 85025 COMPLETE CBC W/AUTO DIFF WBC: CPT

## 2023-01-13 PROCEDURE — 99214 OFFICE O/P EST MOD 30 MIN: CPT | Performed by: NURSE PRACTITIONER

## 2023-01-13 NOTE — TELEPHONE ENCOUNTER
No PA needed for BCR/ABL by PCR [id number] and [id number] per Yesenia LACEY. Tracking ID # [id number]. Ok'd lab to send to [facility] today.

## 2023-01-13 NOTE — PROGRESS NOTES
REASONS FOR FOLLOWUP:    1. 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   · Patient is on dose reduced Sprycel 50 mg daily with undetectable disease by RT-PCR.              HISTORY OF PRESENT ILLNESS: The patient is a 37 y.o. female with the above mentioned history here today for 3 month follow up.  She continues on Sprycel 50 mg daily.  She is tolerating this well.  No issues with nausea, vomiting, diarrhea, or constipation.  No fevers, chills, or night sweats.  No adenopathy. She has no new problems or concerns today.      Past Medical History:   Diagnosis Date   • Anemia in neoplastic disease    • Asthma     childhood   • Chiari I malformation (HCC)     eval by Dr Moore, NS 2016   • Chronic myelogenous leukemia (HCC)     remission, Dr Castle follows   • Chronic pain    • CML (chronic myelocytic leukemia) (HCC)    • Cystitis    • Depression    • GERD (gastroesophageal reflux disease)     ulcer   • H/O Iron deficiency anemia    • H/O Lower extremity pain     Resolved.   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      *  Hysterectomy in October 2018      *  Acute hepatitis C in early 2019, treated successfully by Dr. Karyn Mendieta.     OB/GYN HISTORY: Menarche age 9. G5, P4, 1 miscarriage of the third pregnancy. First pregnancy at age 17. Patient underwent partial hysterectomy in 2018.       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   · Patient was started on  hydroxyurea temporarily on 10/23/2013 with significant drop of WBC counts.    · Patient started on Sprycel on 12/02/2013.   · Peripheral blood tested with 3.4% of cells positive for BCR-ABL translocation, tested 02/17/2014.    · The patient had CCyR 6 months into treatment as tested on 05/15/2014.    · Complete molecular response as tested 08/08/14 with negative product of BCR/ABL.   · There was interruption of her treatment due to insurance coverage and misunderstanding from patient's part, she had a relapse of disease with BCR/ABL product at 12.626% in March 2016, and she restarted back on Sprycel.   · Good response as tested on 08/31/2016 with BCR/ABL at 0.294%.    · Since June 2017, patient has been persistently tested negative for BCR/ABL by RT-PCR every 3 month period.     · Patient reported worsening leg cramping in end of July 2018.  Sprycel was decreased to 50 mg daily.  Laboratory studies on 8/31/2018, on 1/30/2019 and on 3/29/2019 were negative for BCR-ABL by RT-PCR.    · Sprycel was on hold during treatment for acute hepatitis C in early part of 2019.  It was resumed in July 2019.  She was weakly positive for BCR-ABL.  · On 10/29/2019 BCR/ABL1 MAJOR (p210) and BCR/ABL1 MINOR (p190) were not detected.    · BCR-ABL by RT-PCR with 0% on 1/24/2020 and also on 4/24/2020.  · Patient was tested negative on 7/10/2020.  Negative on 10/9/2020.    · Because of palpitation, Sprycel was on hold since 11/17/2020.     · BCR-ABL by RT-PCR was complete negative as reported on 1/06/2021.  · Sprycel was resumed on 2/9/2021 at 50 mg daily.  · Continue BCR-ABL by RT-PCR in May 2021 in October 2021.          Laboratory results on 09/23/2015 showed normalization of hemoglobin 12.2, but still has macrocytosis, MCV 73.3. She has normal platelets and WBC at 9400. Serum ferritin < 5 ng/ml, iron sats 6.3%, iron 29, TIBC 455 mcg/ml. Still has severe iron deficiency, needs to continue oral iron therapy. The patient restarted taking  oral iron supplementation which was probably stopped in the end of November 2015.        Laboratory study on 03/22/2016 reported normal WBC 8450, neutrophils 6100, lymphs is 1400 and monocytes 550. Serum iron was 26, TIBC 469 on saturation 6% and ferritin less than 5 NG/ML. Chemistry lab reported normal renal function with a creatinine 0.69, normal liver function panel, total protein 7.5 and albumin 4.2, normal electrolytes. Patient was restarted back on oral on sedimentation twice a day with good tolerance.      Patient continues take Lortab as needed for left leg cramping. Urine drug test on 08/05/2016 was completely negative, and repeated study on August 26 was positive for opiate, negative for other recreational drugs.      Repeat laboratory study on 08/31/2016 reported iron 21, ferritin less than 5, iron saturation 5%, TIBC 462. Hemoglobin was 11.5 MCV 78.1 MCHC 30.4. Platelets 163,000. Total WBC 8870 including neutrophils 6400 and lymphocytes 1500. BCR/ABL was 0.294% by RT-PCR method.       Patient was given IV Feraheme treatment in September 2016, with 2 doses. Repeated laboratory study on 10/06/2016 reported significantly improved and normalized ferritin 269, iron 80, TIBC 365 and iron saturation 22%. Her hemoglobin was 12.2, and MCV 80.8.      Patient reported she was seen by Dr. Fam in 10/2016 and was started on half tablets of Topamax. I reviewed Dr. Fam’s clinic note and telephone conversation record. The patient reports she has no recurrence of migraine headache. However, she is very tearful today, reporting that she has thoughts of not taking any medications. She did assure me that she has been taking the medication up to this point. 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 The patient denies suicide ideation. When she was initially diagnosed of CML back in 2014, she had depression and  I started the patient on Prozac. The patient reported initially it helped her, however, she no longer feels the effect of Prozac. She feels depressed. The patient reports she has no personal hobby. She goes to work and goes home, taking care of her kids. She does not enjoy life as she used to.      Laboratory study on December 29, 2016 reported at ferritin 19.9, iron 33, TIBC 347 iron saturation 10%.  Hemoglobin was 13, normal WBC and platelets.  Unremarkable CMP.  Patient was given 2 doses of Feraheme treatment in early January 2017.      Cytogenetic study on March 14, 2017 reported a BCR-ABL products at 0.098%, showed further improved residual disease.  There is also reported a ferritin 103.7, iron 79, TIBC 336 and iron saturation 24%.  Hemoglobin was 13.5, MCV 92.3, platelets 199,000 WBC 7000.  She had a completely normal CMP.       Repeated laboratory study on June 20, 2017 reported at nondetectable BCR-ABL product.  Ferritin was 45, iron 35 TIBC 333 and iron saturation 11%.  Had a normal CBC and CMP.    In the end of July 2018, patient called reporting worsening significant leg cramping, and getting worse recently and has been taking 6 tablets of Advil with no significant improvement.  We suspect it might be caused by Sprycel for her CML.  We discussed with patient, and decreased to half dose at 50 mg daily.  Patient reports that she is improved leg cramping since dose reduction.    Per medical records this patient had emergency room visit again on 8/18/2018.  Patient reports she had significant abdomen/pelvic pain at a time associate with nausea.  Laboratory study showed significantly elevated WBC at 25,900 including neutrophils 24,400, with elevated hemoglobin 15.8 and platelets 146,000.     She had a thorough investigation, including CT scan for abdomen pelvis which was unremarkable.  She then had ultrasound for the pelvis and it was also unremarkable.  She had ultrasound for the gallbladder examination on  the same day and was unremarkable.  She had a normal CMP except of marginally elevated glucose at 112.  Her urinalysis showed only marginally increased WBC 3-5/HPF.  She was negative for yeast infection, negative for Chlamydia trichomonas and Neisseria gonorrhea.  Patient was prescribed Flagyl and doxycycline and discharged to home.      lab study on 2019 reported normal iron saturation 47% and elevated ferritin 507.1 ng/mL with free iron 184 and TIBC 388.  hemoglobin is normal at 13.7 and platelets 186,000. normal WBC at 5080 including ANC 3300, lymphocytes 930 and monocytes 630. Labs reported significantly elevated ALT at 655,  and alkaline phosphatase 234 but normal total bilirubin 0.9. She had normal renal function creatinine 0.78. Normal electrolytes.     On 2019, I searched Up-to-Date and suspected that this patient had drug-induced hepatitis from Diflucan or with combination of Diflucan with Sprycel. This patient had been on Sprycel for years and had no problem with abnormal liver panel previously. It seems Diflucan inhibits CY moderately, which likely interferes with the metabolism of Sprycel. I instructed the patient to hold her Sprycel for now.    Sprycel treatment resumed as of 2019 upon completion of MEVYRET for her hepatitis C.    Laboratory study performed on 2019 showed normal CBC and CMP. BCR/ABL by RT-PCR detected BCR/ABL1 Major. HCV RNA by PCR showed HCV not detected.    Laboratory studies 2019 report her hemoglobin is normal at 15.2 and platelets 146,000. normal WBC at 7650 including ANC 5190, lymphocytes 1600 and monocytes 630.    Recent laboratory studies confirmed to be no detectable virus on 2019.    U/S Liver performed on 10/24/2019 reported negative hepatic ultrasound exam with no focal liver lesion, negative gallbladder examination with no cholelithiasis or bile duct dilation noted, however borderline splenomegaly was noted.     Laboratory study  performed on 10/29/2019, reported a completely normal CBC, CMP. Normal iron saturation and still slightly elevated ferritin 325 ng/dL  BCR/ABL1 MAJOR (p210) and BCR/ABL1 MINOR (p190) were not detected.     Laboratory studies 1/24/2020, show hemoglobin 14.3 MCV 92.6 platelets 180,000 and WBC 6570 including neutrophils 4400.  She also has completely normal CMP.  Iron studies reported ferritin 273, iron saturation 11%, hemoglobin 14.3 WBC 6570 and platelets 180,000.  BCR-ABL by RT-PCR with 0%.     Patient presented today for 3-month follow-up and lab review.      Due to pandemic coronavirus infection, patient has been staying home with all 4 children.  Patient reports significant fatigue for the past month, similar to the time when she had iron deficiency.  Patient reports prior to that she was having regular exercise and with good energy level.    Patient reports compliant with Sprycel 50 mg daily.  She denies leg cramping.  She has no nausea no vomiting no abdominal pains.  She has good appetite and no diarrhea.  She denies headaches vision changes no dizziness or seizure activity.    Her laboratory study on 3/12/2020 reported marginal iron saturation 15% however had a good ferritin 212.5 ng/mL.  She had hemoglobin 13.1 and normal thyroid profile including TSH 1.20, free T4 at 1.37 ng/dL and a free T3 at 2.97 pg/mL     On 4/24/2020 her iron study showed ferritin 262 and iron saturation 21%.  Hemoglobin is 14.8.  She has normal WBC 5740 including ANC 3820, and normal platelets 161,000.   She was negative for BCR-ABL by RT-PCR on the same day.    Laboratory studies, 7/10/2020, show completely normal CBC and CMP.  Negative for BCR-ABL by RT-PCR.  Iron studies showed ferritin 185, iron saturation 13% free iron 37 TIBC 286.    Patient has completed normal CBC 10/9/2020.  Iron studies reported ferritin 2070, free iron 13%.  She also has completed normal CMP.  She was tested negative for BCR-ABL by RT-PCR method.  Sprycel  50 mg daily was continued.     We saw her recently on 10/9/2020 for routine follow-up for her CML.  She was tolerating Sprycel.  Physical examination laboratory studies were unremarkable.  No detectable BCR-ABL by RT-PCR.  We continued her Sprycel at that time.     On 11/17/2020, patient called and reported chest tightness, palpitation and dyspnea new onset in November 2020.  Patient reports this happened recently.  She has chest tightness, and associated tachycardia/palpitation.  Patient reports this is unpredictable, can be on and off.  She noticed that one day she was cooking meal for her family, and noted sudden onset palpitation and chest tightness.  She reports unable to induce purposefully.  She also has exertional dyspnea.  Patient reports 11/17/2020, we asked patient to hold Sprycel.  We suspect the patient may have pleural effusion or cardiac dysfunction secondary to Sprycel, we requested chest CT, echocardiogram study and also laboratory study for evaluation.     Negative chest x-ray on 11/17/2020.     Laboratory study on 11/17/2020 reported stable chronic condition with a ferritin 261 and iron saturation 12%, normal hemoglobin 15.0, unable to explain her dyspnea.     Echocardiogram study on 11/18/2020 reported normal results.     Laboratory study on 1/6/2021 reported hemoglobin 15.9 hematocrit 47.1%, platelets 181,000 WBC 9360 including ANC 7130 lymphocytes 1350. Iron study reported ferritin 293 and iron saturation 14% with free iron 47 and a TIBC 326.  Chemistry lab reported unremarkable CMP including renal function and liver function panel.      Patient reports she was seen by cardiologist Dr. Flowers on 2/4/2021.  Patient had thorough cardiology evaluation and there was no apparent abnormalities.    We have been withholding her Sprycel in middle November 2020 because of palpitation, and referred patient to cardiology service.        Fortunately, her peripheral blood BCR-ABL by RT-PCR was complete  "negative, as reported on 2021.     Sprycel was resumed on 2021.      Laboratory studies on 3/16/2021 reported normal CBC including hemoglobin 14.4 MCV 88.7, platelets 173,000, and WBC 6400 including ANC 3770 lymphocytes 1800.  Chemistry lab reported normal CMP.     Laboratory studies on 2021 reported completely normal CBC and CMP.  Iron study also normal with ferritin 219 and iron saturation 16%.  Negative study for BCR-ABL by RT-PCR.     Laboratory study on 10/5/2021 reported complete normal CBC and CMP.  Iron study reported normal ferritin 250 ng/mL and iron saturation 21% with free iron 63 TIBC 300.  She was also complete negative for BCR-ABL by RT-PCR.      MEDICATIONS: The current medication list was reviewed with the patient and updated in the EMR this date per the medical assistant. Medication dosages and frequencies were confirmed to be accurate.        Allergies   Allergen Reactions   • Sulfa Antibiotics Unknown - Low Severity     Childhood reaction     SOCIAL HISTORY: . She smoked cigarettes previously, quit in 2006 with 8-pack-year history. Social drinker, maybe once a month. No illegal drug use. No risk for HIV except tattoos.  Hairstylist.       FAMILY HISTORY: Maternal grandmother had esophageal/stomach adenocarcinoma diagnosed at age of 72 and  of cancer at age of 73. The patient' s mother has hypertension but otherwise healthy.  No other family history of malignancy, especially no leukemia.          VITAL SIGNS:   Vitals:    23 1002   BP: 104/72   Pulse: 63   Resp: 16   Temp: 97.8 °F (36.6 °C)   TempSrc: Temporal   SpO2: 99%   Weight: 86.9 kg (191 lb 8 oz)   Height: 172.7 cm (67.99\")   PainSc: 0-No pain   ECOG 0          Physical Exam  Vitals and nursing note reviewed.   Constitutional:       Appearance: Normal appearance. She is well-developed.   HENT:      Head: Normocephalic and atraumatic.      Nose: Nose normal.      Mouth/Throat:      Pharynx: No " oropharyngeal exudate.   Eyes:      Pupils: Pupils are equal, round, and reactive to light.   Cardiovascular:      Rate and Rhythm: Normal rate and regular rhythm.      Heart sounds: Normal heart sounds.   Pulmonary:      Effort: Pulmonary effort is normal. No respiratory distress.      Breath sounds: Normal breath sounds. No wheezing, rhonchi or rales.   Abdominal:      General: Bowel sounds are normal. There is no distension.      Palpations: Abdomen is soft.      Tenderness: There is no abdominal tenderness.   Musculoskeletal:         General: Normal range of motion.      Cervical back: Normal range of motion and neck supple.   Lymphadenopathy:      Cervical: No cervical adenopathy.      Upper Body:      Right upper body: No supraclavicular adenopathy.      Left upper body: No supraclavicular adenopathy.   Skin:     General: Skin is warm and dry.   Neurological:      Mental Status: She is alert and oriented to person, place, and time.   Psychiatric:         Behavior: Behavior normal.         LABORATORY DATA:    Lab Results   Component Value Date    WBC 6.85 01/13/2023    HGB 13.6 01/13/2023    HCT 40.6 01/13/2023    MCV 90.2 01/13/2023     01/13/2023     Lab Results   Component Value Date    NEUTROABS 4.74 01/13/2023     Glucose   Date Value Ref Range Status   10/07/2022 95 74 - 124 mg/dL Final     BUN   Date Value Ref Range Status   10/07/2022 13 6 - 20 mg/dL Final     Creatinine   Date Value Ref Range Status   10/07/2022 0.80 0.60 - 1.10 mg/dL Final     Sodium   Date Value Ref Range Status   10/07/2022 139 134 - 145 mmol/L Final     Potassium   Date Value Ref Range Status   10/07/2022 4.0 3.5 - 4.7 mmol/L Final     Chloride   Date Value Ref Range Status   10/07/2022 102 98 - 107 mmol/L Final     CO2   Date Value Ref Range Status   10/07/2022 25.9 22.0 - 29.0 mmol/L Final     Calcium   Date Value Ref Range Status   10/07/2022 10.0 8.5 - 10.2 mg/dL Final     Total Protein   Date Value Ref Range Status    10/07/2022 7.6 6.3 - 8.0 g/dL Final     Albumin   Date Value Ref Range Status   10/07/2022 4.60 3.50 - 5.20 g/dL Final     ALT (SGPT)   Date Value Ref Range Status   10/07/2022 13 0 - 33 U/L Final     AST (SGOT)   Date Value Ref Range Status   10/07/2022 14 0 - 32 U/L Final     Alkaline Phosphatase   Date Value Ref Range Status   10/07/2022 86 38 - 116 U/L Final     Total Bilirubin   Date Value Ref Range Status   10/07/2022 0.2 0.2 - 1.2 mg/dL Final     eGFR Non  Amer   Date Value Ref Range Status   12/21/2021 86 >60 mL/min/1.73 Final     BUN/Creatinine Ratio   Date Value Ref Range Status   10/07/2022 16.3 7.3 - 30.0 Final     Anion Gap   Date Value Ref Range Status   10/07/2022 11.1 5.0 - 15.0 mmol/L Final           ASSESSMENT:      1. Chronic myelogenous leukemia, chronic phase with excellent response to Sprycel and complete molecular response in August 2014. However she had interuption of treatment in early part of 2016, because of insurance issues and miscommunication, she stopped the medicine without telling us, and laboratory test on 03/22/2016 reported disease relapse with increased BCR/ABL product at 12.626%. subsequently she was restarted back on Sprycel, and responded well.  Since June 2017, she has completed molecular response as tested every 3 months.  She has been compliant with treatment.   · In the end of July 2018, she reported worsening significant cramping involving her legs, so we decreased her Sprycel to 50 mg daily starting early August.  She's been having less problem since that time.  She was tested negative for BCR-ABL on 8/31/2018.    · Patient had a negative BCR-ABL by RT-PCR in end of January 2019 and also on 3/29/2019.   · Patient was later found having significantly elevated liver function panel.  Sprycel was on hold and she was subsequently found having acute hepatitis C infection.    · I discussed with Dr. Karyn Mendieta, we'll hold her Sprycel for now until she finishes hepatitis  C treatment.   · She was started on hepatitis C treatment on 4/18/2019 and finished an 8-week course.  · On 07/02/2019 patient's labs showed BCR-ABL Major (p210) detected by RT-PCR at 0.0141%. BCR/ABL1 Minor (p190) was not detected. HCV was not detected.  · Sprycel treatment was resumed as of 07/02/2019 upon completion of MEVYRET.  · Laboratory study on 10/29/2019 reported normal CBC. BCR/ABL1 MAJOR (p210) and BCR/ABL1 MINOR (p190) were not detected.    · BCR-ABL by RT-PCR on 1/24/2020 was negative.     · Lab result for BCR ABL by RT-PCR was also negative on 4/24/2020. Patient will need to continue on sprycel treatment.    · 7/10/2020 patient has normal CBC and CMP.  Negative RT-PCR for BCR ABL.  Tolerating well.  Continue Sprycel at 50 mg daily.  · On 10/9/2020, completely normal CBC with good tolerance.  BCR-ABL was tested negative by RT-PCR method.  Continue Sprycel.   · Patient reports 11/17/2020 chest tightness in palpitation, and exertion dyspnea progressively getting worse in the past week.  We asked patient to hold Sprycel.   · Chest x-ray examination on 11/17/2020 was unremarkable.  Echocardiogram study on 11/18/2020 was also benign.  Patient was seen by cardiologist for further evaluation.    · Laboratory studies on 1/6/2021 reported normal WBC 9360 including ANC 7130 lymphocytes 1350. BCR-ABL by RT-PCR was complete negative, as reported on 1/12/2021.   · Patient had negative cardiac work-up.  She also has resolution of symptoms.  Discussed with patient on 2/9/2021, we recommended resumption of Sprycel 50 mg daily.  Patient is agreeable.  · On 3/16/2021, patient reports tolerating Sprycel, no recurrent symptoms of dyspnea, chest tightness or palpitation.  Continue Sprycel 50 mg daily.  · On 5/4/2021, patient has normal CBC and CMP.  Negative results for BCR-ABL by RT-PCR for P210 and P190 as reported on 5/12/2021.    · On 10/5/2021 patient presented for reevaluation.  She reports tolerating Sprycel.  No  specific side effects reported.  Maintains normal CBC and negative BCR-ABL.  We will continue treatment for now.  · On 12/21/2021, patient reports good tolerance to Sprycel.  Normal CBC CMP.  Negative study by peripheral blood RT-PCR.  Continue Sprycel.   · On 3/14/2022, patient reports excellent tolerance to Sprycel at 50 mg daily.  Maintains normal CBC and CMP.  · 6/8/2022 continues on Sprycel 50 mg daily tolerating well.  Patient was negative for BCR-ABL by RT-PCR.  · On 10/7/2022 patient reports compliance with Sprycel and good tolerance.  We will continue treatment.  · 1/13/2023 continues on Sprycel 50 mg daily, tolerating well.       2. Recurrent Iron deficiency anemia.    · She was treated again with Feraheme October 2017.   Iron deficiency thought to be related to ulcer identified on EGD, being treated with Carafate.  She also had heavy menses.  · She had recurrent iron deficiency again and was given Feraheme 2 doses in March 2018.   · Subsequently recurrent iron deficiency in July 2018, she was switched to Venofer 3 doses total 900 mg.   · Patient had simple hysterectomy in October 2018.  · Laboratory study showed supratherapeutic ferritin 458 and iron saturation 40% on 4/30/2019.   · Laboratory study performed 10/29/2019, showed normal iron saturation and ferritin 325 ng/dL.  · Normal iron studies including ferritin 262 and iron saturation 21% on 4/24/2020.  No evidence of recurrent iron deficiency.   · Lab study on 7/10/2020 reported ferritin 185, iron saturation 13% and normal hemoglobin 14.5.  She has deteriorating iron studies.   Continue to monitor in 3 months.  · Labs on 10/9/2020 reported ferritin 270, iron saturation 13% and hemoglobin 14.9.  · Lab studies on 1/6/2021 reported mild erythrocytosis.  Hemoglobin is 15.9 and hematocrit of 47.1%, with ferritin 293 and iron saturation 14%.    · Normal hemoglobin 14.4 on 3/16/2021.    · On 5/4/2021 lab study reported hemoglobin 13.7, ferritin 219 and iron  saturation 16%.    · On 10/5/2021, patient maintains normal hemoglobin.  Iron study reported further improved ferritin 250 ng/mL and iron saturation 21%.  Since her hysterectomy, patient has no recurrence of iron deficiency.  Discussed with patient today, there is no need for routine monitoring of iron study from now.    · On 3/14/2022, normal hemoglobin 14.3.  · 6/8/2022 hemoglobin 13.6.  · On 10/7/2022 patient has normal hemoglobin 14.1.  · 1/13/2023 Hgb 13.6       *Chest tightness, palpitation and dyspnea new onset in November 2020.  Patient reports this happened recently, and that usually unpredictable, she has chest tightness, and associated tachycardia/palpitation.  Patient reports this is unpredictable, can be on and off.  She noticed 1 day she was cooking meal for her family, and noted several palpitation and chest tightness.  She reports unable to induce purposefully.  She also has exertional dyspnea.  Patient reports 11/17/2020, we asked patient to hold Sprycel.  We requested chest CT, echocardiogram study and also laboratory study for evaluation.  · Negative chest x-ray on 11/17/2020.   · Laboratory study on 11/17/2020 reported stable chronic condition with a ferritin 261 and iron saturation 12%, normal hemoglobin 15.0, unable to explain her dyspnea.   · Echocardiogram study on 11/18/2020 reported normal results.   · Patient is evaluated 11/20/2020, she reports somewhat improved symptoms, since she stopped Sprycel for the past 3 days.  She denies extremity edema.  She denies fever sweating or chills.  We recommend patient to continue to hold Sprycel for another 2 weeks.  We also recommended patient to be tested for COVID-19.    · Reevaluation on 12/4/2020, patient reports that she did not get a Covid test.  She denies other illness such as fever sweating nausea vomiting, diarrhea, change of taste, cough etc.  Discussed with patient, will check a thyroid panel, which turned out to be normal on 12/4/2020.     · Patient was referred to cardiologist for evaluation of hypertension.  She was seen by Dr. Flowers on 12/21/2020 and the case waiting for further evaluation.  · Patient had a negative cardiac work-up.  Her symptom has resolved.  · Patient was restarted on Sprycel 2/9/2021.  She reports no recurrent symptoms 3/16/2021.  · On 5/4/2021, patient reports no recurrent symptoms.  · On 10/5/2021 patient reports no dyspnea no chest pain or discomfort.   · On 12/21/2021, patient reports no recurrent symptoms.  · On 3/14/2022, patient has no chest pain no dyspnea or pressureness on chest.  · 6/8/2022 no complaints of this today.       *COVID-19 vaccination.  · Patient reports receiving none 2 doses of the Moderna vaccine.    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   · On 10/7/2022 patient reports she was not infected with COVID-19, despite recently her  and her children were infected.        PLAN:    1. Continue Sprycel 50 mg daily.   2. Continue oral B12 1000 mcg daily.  3. 3 months follow up with Dr. Castle with repeat labs  4. Repeat BCR-ABL by PT-PCR pending.   5. Call/ return sooner should he develop any new concerns or problems.    Patient is on a high risk medication requiring close monitoring for toxicity.    Bita Sousa, APRN  01/13/2023      CC:      Karyn Mendieta M.D.    Luis Alfredo Flowers M.D.

## 2023-01-19 LAB — REF LAB TEST METHOD: NORMAL

## 2023-02-03 ENCOUNTER — TELEPHONE (OUTPATIENT)
Dept: SURGERY | Facility: CLINIC | Age: 38
End: 2023-02-03
Payer: MEDICAID

## 2023-02-03 NOTE — TELEPHONE ENCOUNTER
Spoke with pt regarding obtaining Hida Scan results from [facility].  Will obtain and discuss at OV on Monday.

## 2023-02-03 NOTE — TELEPHONE ENCOUNTER
Caller: BOB ADKINS    Relationship: SELF     Best call back number: [phone]    What is the best time to reach you: ANYTIME    Who are you requesting to speak with (clinical staff, provider,  specific staff member): KORINA    What was the call regarding: PT CALLING ABOUT HER APPT ON MON 2/6/23.    Do you require a callback: YES

## 2023-02-06 ENCOUNTER — OFFICE VISIT (OUTPATIENT)
Dept: SURGERY | Facility: CLINIC | Age: 38
End: 2023-02-06
Payer: MEDICAID

## 2023-02-06 VITALS
HEART RATE: 72 BPM | BODY MASS INDEX: 28.79 KG/M2 | DIASTOLIC BLOOD PRESSURE: 74 MMHG | SYSTOLIC BLOOD PRESSURE: 128 MMHG | RESPIRATION RATE: 16 BRPM | WEIGHT: 190 LBS | HEIGHT: 68 IN

## 2023-02-06 DIAGNOSIS — R10.13 EPIGASTRIC PAIN: Primary | ICD-10-CM

## 2023-02-06 DIAGNOSIS — R11.0 NAUSEA: ICD-10-CM

## 2023-02-06 DIAGNOSIS — R12 HEARTBURN: ICD-10-CM

## 2023-02-06 DIAGNOSIS — R14.0 BLOATING: ICD-10-CM

## 2023-02-06 PROCEDURE — 99203 OFFICE O/P NEW LOW 30 MIN: CPT | Performed by: SURGERY

## 2023-02-06 NOTE — H&P
"Hu Houston 37 y.o. female presents as a self ref for eval epi pain that she describes as \"stabbing pain\" bloating, change in bowel habits = decrease in freq, GERD, nausea, and heartburn.  Recent GB US = neg.   Chief Complaint   Patient presents with   • Abdominal Pain     Epigastric pain             HPI   Above note and agree.  This very pleasant 37-year-old female is here for severe epigastric pain.  She had an EGD done in 2017 that showed a large ulcer.  She is on chemotherapy daily for CML maintenance.  She has been having occasional stabbing epigastric pain as well as severe heartburn.  She also gets nauseated and bloated.  She is taking Tums for it.  She does not smoke or use tobacco products.  She had ultrasound done at [facility] that was negative.  She has no fevers or chills.  She has no chest pain or shortness of breath.      Review of Systems   All other systems reviewed and are negative.            Current Outpatient Medications:   •  albuterol sulfate  (90 Base) MCG/ACT inhaler, , Disp: , Rfl:   •  ascorbic acid (VITAMIN C) 500 MG tablet, Take 500 mg by mouth Daily., Disp: , Rfl:   •  B Complex-C (SUPER B COMPLEX PO), Take  by mouth., Disp: , Rfl:   •  benzonatate (TESSALON) 200 MG capsule, , Disp: , Rfl:   •  dasatinib (Sprycel) 50 MG chemo tablet, TAKE 1 TABLET BY MOUTH ONCE DAILY AT THE SAME TIME. MAY TAKE WITH OR WITHOUT FOOD. SWALLOW WHOLE. AVOID GRAPEFRUIT PRODUCTS., Disp: 30 tablet, Rfl: 5  •  ibuprofen (ADVIL,MOTRIN) 800 MG tablet, Take 1 tablet by mouth Every 6 (Six) Hours As Needed for Mild Pain., Disp: 40 tablet, Rfl: 0  •  POTASSIUM PO, Take  by mouth., Disp: , Rfl:         Allergies   Allergen Reactions   • Sulfa Antibiotics Unknown - Low Severity     Childhood reaction           Past Medical History:   Diagnosis Date   • Anemia in neoplastic disease    • Asthma     childhood   • Chiari I malformation (HCC)     eval by Dr Moore, NS 2016   • Chronic myelogenous leukemia " (HCC)     remission, Dr Castle follows   • Chronic pain    • CML (chronic myelocytic leukemia) (HCC)    • Cystitis    • Depression    • GERD (gastroesophageal reflux disease)     ulcer   • H/O Iron deficiency anemia    • H/O Lower extremity pain     Resolved.   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   • Pulmonary hypertension (HCC)    • Seizures (HCC)     as child after head injry   • Splenomegaly    • Status post D&C    • Vitamin D deficiency            Past Surgical History:   Procedure Laterality Date   • BONE MARROW BIOPSY     • D & C HYSTEROSCOPY N/A 10/1/2018    Procedure: DILATATION AND CURETTAGE HYSTEROSCOPY;  Surgeon: Beck Cornejo MD;  Location: [facility] OR;  Service: Obstetrics/Gynecology   • D & C HYSTEROSCOPY ENDOMETRIAL ABLATION N/A 2018    Procedure: Hysteroscopy NovaSure ablation;  Surgeon: Beck Cornejo MD;  Location: [facility] OR;  Service: Obstetrics/Gynecology   • ENDOSCOPY N/A 2017    Procedure: ESOPHAGOGASTRODUODENOSCOPY with biopsies;  Surgeon: Sophie Trejo MD;  Location: [facility] OR;  Service:    • GYNECOLOGY EXAM UNDER ANESTHESIA      IUD removal   • TOTAL LAPAROSCOPIC HYSTERECTOMY Bilateral 10/29/2018    Procedure: TOTAL LAPAROSCOPIC HYSTERECTOMY, bilateral salpingectomy;  Surgeon: Beck Cornejo MD;  Location: [facility] OR;  Service: Obstetrics/Gynecology   • TUBAL COAGULATION LAPAROSCOPIC Bilateral 2018    Procedure: TUBAL COAGULATION LAPAROSCOPIC;  Surgeon: Beck Cornejo MD;  Location: [facility] OR;  Service: Obstetrics/Gynecology           Social History     Tobacco Use   • Smoking status: Former     Packs/day: 1.00     Years: 8.00     Pack years: 8.00     Types: Cigarettes     Quit date: 2006     Years since quittin.6   • Smokeless tobacco: Never   Vaping Use   • Vaping Use: Never used   Substance  "Use Topics   • Alcohol use: Yes     Comment: Social, maybe once every 6 months/no caffeine use   • Drug use: Not Currently     Types: Cocaine(coke)     Comment: prior to 2006           Immunization History   Administered Date(s) Administered   • COVID-19 (MODERNA) 1st, 2nd, 3rd Dose Only 03/25/2021, 04/23/2021, 01/07/2022   • DTaP, Unspecified 1984, 1984, 1984, 02/21/1989, 09/12/2012   • Flu Vaccine Split Quad 11/11/2020   • FluLaval/Fluzone >6mos 11/02/2019, 11/11/2020   • Hep B / HiB 09/12/2012   • IPV 1984, 1984, 02/21/1989, 09/12/2012   • MMR 10/01/1985, 06/16/1995   • Pneumococcal Conjugate 13-Valent (PCV13) 09/12/2012   • Td 10/19/1999   • Tdap 02/06/2012, 05/22/2019           Physical Exam  Vitals and nursing note reviewed.   Constitutional:       Appearance: Normal appearance.   HENT:      Head: Normocephalic and atraumatic.   Cardiovascular:      Rate and Rhythm: Normal rate and regular rhythm.   Pulmonary:      Effort: Pulmonary effort is normal.      Breath sounds: Normal breath sounds.   Abdominal:      General: Bowel sounds are normal.      Palpations: Abdomen is soft.      Comments: Epigastric tenderness to palpation   Musculoskeletal:         General: No swelling or tenderness.   Skin:     General: Skin is warm and dry.   Neurological:      General: No focal deficit present.      Mental Status: She is alert and oriented to person, place, and time.   Psychiatric:         Mood and Affect: Mood normal.         Behavior: Behavior normal.         Debilities/Disabilities Identified: None    Emotional Behavior: Appropriate      /74   Pulse 72   Resp 16   Ht 172.7 cm (68\")   Wt 86.2 kg (190 lb)   LMP 10/11/2018   BMI 28.89 kg/m²         Diagnoses and all orders for this visit:    1. Epigastric pain (Primary)    2. Heartburn    3. Nausea    4. Bloating    We will get Hu scheduled for an EGD and a HIDA scan with ejection fraction.  I discussed with the patient the " benefits and risks of performing endoscopy.  Benefits and risks were not limited to but including bleeding, infection, perforation, complications of anesthesia, aspiration.  The patient appeared to understand and is willing to proceed.    Thank you for allowing me to participate in the care of this interesting patient.

## 2023-02-06 NOTE — PROGRESS NOTES
"Hu Houston 37 y.o. female presents as a self ref for eval epi pain that she describes as \"stabbing pain\" bloating, change in bowel habits = decrease in freq, GERD, nausea, and heartburn.  Recent GB US = neg.   Chief Complaint   Patient presents with   • Abdominal Pain     Epigastric pain             HPI   Above note and agree.  This very pleasant 37-year-old female is here for severe epigastric pain.  She had an EGD done in 2017 that showed a large ulcer.  She is on chemotherapy daily for CML maintenance.  She has been having occasional stabbing epigastric pain as well as severe heartburn.  She also gets nauseated and bloated.  She is taking Tums for it.  She does not smoke or use tobacco products.  She had ultrasound done at [facility] that was negative.  She has no fevers or chills.  She has no chest pain or shortness of breath.      Review of Systems   All other systems reviewed and are negative.            Current Outpatient Medications:   •  albuterol sulfate  (90 Base) MCG/ACT inhaler, , Disp: , Rfl:   •  ascorbic acid (VITAMIN C) 500 MG tablet, Take 500 mg by mouth Daily., Disp: , Rfl:   •  B Complex-C (SUPER B COMPLEX PO), Take  by mouth., Disp: , Rfl:   •  benzonatate (TESSALON) 200 MG capsule, , Disp: , Rfl:   •  dasatinib (Sprycel) 50 MG chemo tablet, TAKE 1 TABLET BY MOUTH ONCE DAILY AT THE SAME TIME. MAY TAKE WITH OR WITHOUT FOOD. SWALLOW WHOLE. AVOID GRAPEFRUIT PRODUCTS., Disp: 30 tablet, Rfl: 5  •  ibuprofen (ADVIL,MOTRIN) 800 MG tablet, Take 1 tablet by mouth Every 6 (Six) Hours As Needed for Mild Pain., Disp: 40 tablet, Rfl: 0  •  POTASSIUM PO, Take  by mouth., Disp: , Rfl:         Allergies   Allergen Reactions   • Sulfa Antibiotics Unknown - Low Severity     Childhood reaction           Past Medical History:   Diagnosis Date   • Anemia in neoplastic disease    • Asthma     childhood   • Chiari I malformation (HCC)     eval by Dr Moore, NS 2016   • Chronic myelogenous leukemia " (HCC)     remission, Dr Castle follows   • Chronic pain    • CML (chronic myelocytic leukemia) (HCC)    • Cystitis    • Depression    • GERD (gastroesophageal reflux disease)     ulcer   • H/O Iron deficiency anemia    • H/O Lower extremity pain     Resolved.   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   • Pulmonary hypertension (HCC)    • Seizures (HCC)     as child after head injry   • Splenomegaly    • Status post D&C    • Vitamin D deficiency            Past Surgical History:   Procedure Laterality Date   • BONE MARROW BIOPSY     • D & C HYSTEROSCOPY N/A 10/1/2018    Procedure: DILATATION AND CURETTAGE HYSTEROSCOPY;  Surgeon: Beck Cornejo MD;  Location: [facility] OR;  Service: Obstetrics/Gynecology   • D & C HYSTEROSCOPY ENDOMETRIAL ABLATION N/A 2018    Procedure: Hysteroscopy NovaSure ablation;  Surgeon: Beck Cornejo MD;  Location: [facility] OR;  Service: Obstetrics/Gynecology   • ENDOSCOPY N/A 2017    Procedure: ESOPHAGOGASTRODUODENOSCOPY with biopsies;  Surgeon: Sophie Trejo MD;  Location: [facility] OR;  Service:    • GYNECOLOGY EXAM UNDER ANESTHESIA      IUD removal   • TOTAL LAPAROSCOPIC HYSTERECTOMY Bilateral 10/29/2018    Procedure: TOTAL LAPAROSCOPIC HYSTERECTOMY, bilateral salpingectomy;  Surgeon: Beck Cornejo MD;  Location: [facility] OR;  Service: Obstetrics/Gynecology   • TUBAL COAGULATION LAPAROSCOPIC Bilateral 2018    Procedure: TUBAL COAGULATION LAPAROSCOPIC;  Surgeon: Beck Cornejo MD;  Location: [facility] OR;  Service: Obstetrics/Gynecology           Social History     Tobacco Use   • Smoking status: Former     Packs/day: 1.00     Years: 8.00     Pack years: 8.00     Types: Cigarettes     Quit date: 2006     Years since quittin.6   • Smokeless tobacco: Never   Vaping Use   • Vaping Use: Never used   Substance  "Use Topics   • Alcohol use: Yes     Comment: Social, maybe once every 6 months/no caffeine use   • Drug use: Not Currently     Types: Cocaine(coke)     Comment: prior to 2006           Immunization History   Administered Date(s) Administered   • COVID-19 (MODERNA) 1st, 2nd, 3rd Dose Only 03/25/2021, 04/23/2021, 01/07/2022   • DTaP, Unspecified 1984, 1984, 1984, 02/21/1989, 09/12/2012   • Flu Vaccine Split Quad 11/11/2020   • FluLaval/Fluzone >6mos 11/02/2019, 11/11/2020   • Hep B / HiB 09/12/2012   • IPV 1984, 1984, 02/21/1989, 09/12/2012   • MMR 10/01/1985, 06/16/1995   • Pneumococcal Conjugate 13-Valent (PCV13) 09/12/2012   • Td 10/19/1999   • Tdap 02/06/2012, 05/22/2019           Physical Exam  Vitals and nursing note reviewed.   Constitutional:       Appearance: Normal appearance.   HENT:      Head: Normocephalic and atraumatic.   Cardiovascular:      Rate and Rhythm: Normal rate and regular rhythm.   Pulmonary:      Effort: Pulmonary effort is normal.      Breath sounds: Normal breath sounds.   Abdominal:      General: Bowel sounds are normal.      Palpations: Abdomen is soft.      Comments: Epigastric tenderness to palpation   Musculoskeletal:         General: No swelling or tenderness.   Skin:     General: Skin is warm and dry.   Neurological:      General: No focal deficit present.      Mental Status: She is alert and oriented to person, place, and time.   Psychiatric:         Mood and Affect: Mood normal.         Behavior: Behavior normal.         Debilities/Disabilities Identified: None    Emotional Behavior: Appropriate      /74   Pulse 72   Resp 16   Ht 172.7 cm (68\")   Wt 86.2 kg (190 lb)   LMP 10/11/2018   BMI 28.89 kg/m²         Diagnoses and all orders for this visit:    1. Epigastric pain (Primary)    2. Heartburn    3. Nausea    4. Bloating    We will get Hu scheduled for an EGD and a HIDA scan with ejection fraction.  I discussed with the patient the " benefits and risks of performing endoscopy.  Benefits and risks were not limited to but including bleeding, infection, perforation, complications of anesthesia, aspiration.  The patient appeared to understand and is willing to proceed.    Thank you for allowing me to participate in the care of this interesting patient.

## 2023-02-06 NOTE — PATIENT INSTRUCTIONS
Exercising to Lose Weight  Getting regular exercise is important for everyone. It is especially important if you are overweight. Being overweight increases your risk of heart disease, stroke, diabetes, high blood pressure, and several types of cancer. Exercising, and reducing the calories you consume, can help you lose weight and improve fitness and health.  Exercise can be moderate or vigorous intensity. To lose weight, most people need to do a certain amount of moderate or vigorous-intensity exercise each week.  How can exercise affect me?  You lose weight when you exercise enough to burn more calories than you eat. Exercise also reduces body fat and builds muscle. The more muscle you have, the more calories you burn. Exercise also:  • Improves mood.  • Reduces stress and tension.  • Improves your overall fitness, flexibility, and endurance.  • Increases bone strength.  Moderate-intensity exercise  Moderate-intensity exercise is any activity that gets you moving enough to burn at least three times more energy (calories) than if you were sitting.  Examples of moderate exercise include:  • Walking a mile in 15 minutes.  • Doing light yard work.  • Biking at an easy pace.  Most people should get at least 150 minutes of moderate-intensity exercise a week to maintain their body weight.  Vigorous-intensity exercise  Vigorous-intensity exercise is any activity that gets you moving enough to burn at least six times more calories than if you were sitting. When you exercise at this intensity, you should be working hard enough that you are not able to carry on a conversation.  Examples of vigorous exercise include:  • Running.  • Playing a team sport, such as football, basketball, and soccer.  • Jumping rope.  Most people should get at least 75 minutes a week of vigorous exercise to maintain their body weight.  What actions can I take to lose weight?  The amount of exercise you need to lose weight depends on:  • Your  age.  • The type of exercise.  • Any health conditions you have.  • Your overall physical ability.  Talk to your health care provider about how much exercise you need and what types of activities are safe for you.  Nutrition    • Make changes to your diet as told by your health care provider or diet and nutrition specialist (dietitian). This may include:  ? Eating fewer calories.  ? Eating more protein.  ? Eating less unhealthy fats.  ? Eating a diet that includes fresh fruits and vegetables, whole grains, low-fat dairy products, and lean protein.  ? Avoiding foods with added fat, salt, and sugar.  • Drink plenty of water while you exercise to prevent dehydration or heat stroke.  Activity  • Choose an activity that you enjoy and set realistic goals. Your health care provider can help you make an exercise plan that works for you.  • Exercise at a moderate or vigorous intensity most days of the week.  ? The intensity of exercise may vary from person to person. You can tell how intense a workout is for you by paying attention to your breathing and heartbeat. Most people will notice their breathing and heartbeat get faster with more intense exercise.  • Do resistance training twice each week, such as:  ? Push-ups.  ? Sit-ups.  ? Lifting weights.  ? Using resistance bands.  • Getting short amounts of exercise can be just as helpful as long, structured periods of exercise. If you have trouble finding time to exercise, try doing these things as part of your daily routine:  ? Get up, stretch, and walk around every 30 minutes throughout the day.  ? Go for a walk during your lunch break.  ? Park your car farther away from your destination.  ? If you take public transportation, get off one stop early and walk the rest of the way.  ? Make phone calls while standing up and walking around.  ? Take the stairs instead of elevators or escalators.  • Wear comfortable clothes and shoes with good support.  • Do not exercise so much  that you hurt yourself, feel dizzy, or get very short of breath.  Where to find more information  • [place] Department of Health and Human Services: www.hhs.gov  • Centers for Disease Control and Prevention: www.cdc.gov  Contact a health care provider:  • Before starting a new exercise program.  • If you have questions or concerns about your weight.  • If you have a medical problem that keeps you from exercising.  Get help right away if:  • You have any of the following while exercising:  ? Injury.  ? Dizziness.  ? Difficulty breathing or shortness of breath that does not go away when you stop exercising.  ? Chest pain.  ? Rapid heartbeat.  These symptoms may represent a serious problem that is an emergency. Do not wait to see if the symptoms will go away. Get medical help right away. Call your local emergency services (911 in the [place] Do not drive yourself to the hospital.  Summary  • Getting regular exercise is especially important if you are overweight.  • Being overweight increases your risk of heart disease, stroke, diabetes, high blood pressure, and several types of cancer.  • Losing weight happens when you burn more calories than you eat.  • Reducing the amount of calories you eat, and getting regular moderate or vigorous exercise each week, helps you lose weight.  This information is not intended to replace advice given to you by your health care provider. Make sure you discuss any questions you have with your health care provider.  Document Revised: 02/13/2022 Document Reviewed: 02/13/2022  [employer] Patient Education © 2022 [employer]      MyPlate from [employer]  MyPlate is an outline of a general healthy diet based on the Dietary Guidelines for Americans, 8693-9090, from the U.[place]. Department of Agriculture (USDA). It sets guidelines for how much food you should eat from each food group based on your age, sex, and level of physical activity.  What are tips for following MyPlate?  To follow MyPlate  recommendations:  • Eat a wide variety of fruits and vegetables, grains, and protein foods.  • Serve smaller portions and eat less food throughout the day.  • Limit portion sizes to avoid overeating.  • Enjoy your food.  • Get at least 150 minutes of exercise every week. This is about 30 minutes each day, 5 or more days per week.  It can be difficult to have every meal look like MyPlate. Think about MyPlate as eating guidelines for an entire day, rather than each individual meal.  Fruits and vegetables  • Make one half of your plate fruits and vegetables.  • Eat many different colors of fruits and vegetables each day.  • For a 2,000-calorie daily food plan, eat:  ? 2½ cups of vegetables every day.  ? 2 cups of fruit every day.  • 1 cup is equal to:  ? 1 cup raw or cooked vegetables.  ? 1 cup raw fruit.  ? 1 medium-sized orange, apple, or banana.  ? 1 cup 100% fruit or vegetable juice.  ? 2 cups raw leafy greens, such as lettuce, spinach, or kale.  ? ½ cup dried fruit.  Grains  • One fourth of your plate should be grains.  • Make at least half of the grains you eat each day whole grains.  • For a 2,000-calorie daily food plan, eat 6 oz of grains every day.  • 1 oz is equal to:  ? 1 slice bread.  ? 1 cup cereal.  ? ½ cup cooked rice, cereal, or pasta.  Protein  • One fourth of your plate should be protein.  • Eat a wide variety of protein foods, including meat, poultry, fish, eggs, beans, nuts, and tofu.  • For a 2,000-calorie daily food plan, eat 5½ oz of protein every day.  • 1 oz is equal to:  ? 1 oz meat, poultry, or fish.  ? ¼ cup cooked beans.  ? 1 egg.  ? ½ oz nuts or seeds.  ? 1 Tbsp peanut butter.  Dairy  • Drink fat-free or low-fat (1%) milk.  • Eat or drink dairy as a side to meals.  • For a 2,000-calorie daily food plan, eat or drink 3 cups of dairy every day.  • 1 cup is equal to:  ? 1 cup milk, yogurt, cottage cheese, or soy milk (soy beverage).  ? 2 oz processed cheese.  ? 1½ oz natural cheese.  Fats,  oils, salt, and sugars  • Only small amounts of oils are recommended.  • Avoid foods that are high in calories and low in nutritional value (empty calories), like foods high in fat or added sugars.  • Choose foods that are low in salt (sodium). Choose foods that have less than 140 milligrams (mg) of sodium per serving.  • Drink water instead of sugary drinks. Drink enough fluid to keep your urine pale yellow.  Where to find support  • Work with your health care provider or a dietitian to develop a customized eating plan that is right for you.  • Download an trenton (mobile application) to help you track your daily food intake.  Where to find more information  • USDA: ChooseMyPlate.gov  Summary  • MyPlate is a general guideline for healthy eating from the USDA. It is based on the Dietary Guidelines for Americans, 8328-5256.  • In general, fruits and vegetables should take up one half of your plate, grains should take up one fourth of your plate, and protein should take up one fourth of your plate.  This information is not intended to replace advice given to you by your health care provider. Make sure you discuss any questions you have with your health care provider.  Document Revised: 11/08/2021 Document Reviewed: 11/08/2021  Elsevier Patient Education © 2022 [employer]      Calorie Counting for Weight Loss  Calories are units of energy. Your body needs a certain number of calories from food to keep going throughout the day. When you eat or drink more calories than your body needs, your body stores the extra calories mostly as fat. When you eat or drink fewer calories than your body needs, your body burns fat to get the energy it needs.  Calorie counting means keeping track of how many calories you eat and drink each day. Calorie counting can be helpful if you need to lose weight. If you eat fewer calories than your body needs, you should lose weight. Ask your health care provider what a healthy weight is for  you.  For calorie counting to work, you will need to eat the right number of calories each day to lose a healthy amount of weight per week. A dietitian can help you figure out how many calories you need in a day and will suggest ways to reach your calorie goal.  • A healthy amount of weight to lose each week is usually 1-2 lb (0.5-0.9 kg). This usually means that your daily calorie intake should be reduced by 500-750 calories.  • Eating 1,200-1,500 calories a day can help most women lose weight.  • Eating 1,500-1,800 calories a day can help most men lose weight.  What do I need to know about calorie counting?  Work with your health care provider or dietitian to determine how many calories you should get each day. To meet your daily calorie goal, you will need to:  • Find out how many calories are in each food that you would like to eat. Try to do this before you eat.  • Decide how much of the food you plan to eat.  • Keep a food log. Do this by writing down what you ate and how many calories it had.  To successfully lose weight, it is important to balance calorie counting with a healthy lifestyle that includes regular activity.  Where do I find calorie information?  The number of calories in a food can be found on a Nutrition Facts label. If a food does not have a Nutrition Facts label, try to look up the calories online or ask your dietitian for help.  Remember that calories are listed per serving. If you choose to have more than one serving of a food, you will have to multiply the calories per serving by the number of servings you plan to eat. For example, the label on a package of bread might say that a serving size is 1 slice and that there are 90 calories in a serving. If you eat 1 slice, you will have eaten 90 calories. If you eat 2 slices, you will have eaten 180 calories.  How do I keep a food log?  After each time that you eat, record the following in your food log as soon as possible:  • What you ate. Be  sure to include toppings, sauces, and other extras on the food.  • How much you ate. This can be measured in cups, ounces, or number of items.  • How many calories were in each food and drink.  • The total number of calories in the food you ate.  Keep your food log near you, such as in a pocket-sized notebook or on an trenton or website on your mobile phone. Some programs will calculate calories for you and show you how many calories you have left to meet your daily goal.  What are some portion-control tips?  • Know how many calories are in a serving. This will help you know how many servings you can have of a certain food.  • Use a measuring cup to measure serving sizes. You could also try weighing out portions on a kitchen scale. With time, you will be able to estimate serving sizes for some foods.  • Take time to put servings of different foods on your favorite plates or in your favorite bowls and cups so you know what a serving looks like.  • Try not to eat straight from a food's packaging, such as from a bag or box. Eating straight from the package makes it hard to see how much you are eating and can lead to overeating. Put the amount you would like to eat in a cup or on a plate to make sure you are eating the right portion.  • Use smaller plates, glasses, and bowls for smaller portions and to prevent overeating.  • Try not to multitask. For example, avoid watching TV or using your computer while eating. If it is time to eat, sit down at a table and enjoy your food. This will help you recognize when you are full. It will also help you be more mindful of what and how much you are eating.  What are tips for following this plan?  Reading food labels  • Check the calorie count compared with the serving size. The serving size may be smaller than what you are used to eating.  • Check the source of the calories. Try to choose foods that are high in protein, fiber, and vitamins, and low in saturated fat, trans fat, and  sodium.  Shopping  • Read nutrition labels while you shop. This will help you make healthy decisions about which foods to buy.  • Pay attention to nutrition labels for low-fat or fat-free foods. These foods sometimes have the same number of calories or more calories than the full-fat versions. They also often have added sugar, starch, or salt to make up for flavor that was removed with the fat.  • Make a grocery list of lower-calorie foods and stick to it.  Cooking  • Try to cook your favorite foods in a healthier way. For example, try baking instead of frying.  • Use low-fat dairy products.  Meal planning  • Use more fruits and vegetables. One-half of your plate should be fruits and vegetables.  • Include lean proteins, such as chicken, turkey, and fish.  Lifestyle  Each week, aim to do one of the following:  • 150 minutes of moderate exercise, such as walking.  • 75 minutes of vigorous exercise, such as running.  General information  • Know how many calories are in the foods you eat most often. This will help you calculate calorie counts faster.  • Find a way of tracking calories that works for you. Get creative. Try different apps or programs if writing down calories does not work for you.  What foods should I eat?    • Eat nutritious foods. It is better to have a nutritious, high-calorie food, such as an avocado, than a food with few nutrients, such as a bag of potato chips.  • Use your calories on foods and drinks that will fill you up and will not leave you hungry soon after eating.  ? Examples of foods that fill you up are nuts and nut butters, vegetables, lean proteins, and high-fiber foods such as whole grains. High-fiber foods are foods with more than 5 g of fiber per serving.  • Pay attention to calories in drinks. Low-calorie drinks include water and unsweetened drinks.  The items listed above may not be a complete list of foods and beverages you can eat. Contact a dietitian for more information.  What  foods should I limit?  Limit foods or drinks that are not good sources of vitamins, minerals, or protein or that are high in unhealthy fats. These include:  • Candy.  • Other sweets.  • Sodas, specialty coffee drinks, alcohol, and juice.  The items listed above may not be a complete list of foods and beverages you should avoid. Contact a dietitian for more information.  How do I count calories when eating out?  • Pay attention to portions. Often, portions are much larger when eating out. Try these tips to keep portions smaller:  ? Consider sharing a meal instead of getting your own.  ? If you get your own meal, eat only half of it. Before you start eating, ask for a container and put half of your meal into it.  ? When available, consider ordering smaller portions from the menu instead of full portions.  • Pay attention to your food and drink choices. Knowing the way food is cooked and what is included with the meal can help you eat fewer calories.  ? If calories are listed on the menu, choose the lower-calorie options.  ? Choose dishes that include vegetables, fruits, whole grains, low-fat dairy products, and lean proteins.  ? Choose items that are boiled, broiled, grilled, or steamed. Avoid items that are buttered, battered, fried, or served with cream sauce. Items labeled as crispy are usually fried, unless stated otherwise.  ? Choose water, low-fat milk, unsweetened iced tea, or other drinks without added sugar. If you want an alcoholic beverage, choose a lower-calorie option, such as a glass of wine or light beer.  ? Ask for dressings, sauces, and syrups on the side. These are usually high in calories, so you should limit the amount you eat.  ? If you want a salad, choose a garden salad and ask for grilled meats. Avoid extra toppings such as gutiérrez, cheese, or fried items. Ask for the dressing on the side, or ask for olive oil and vinegar or lemon to use as dressing.  • Estimate how many servings of a food you  are given. Knowing serving sizes will help you be aware of how much food you are eating at restaurants.  Where to find more information  • Centers for Disease Control and Prevention: www.cdc.gov  • U.[place]. Department of Agriculture: myplate.gov  Summary  • Calorie counting means keeping track of how many calories you eat and drink each day. If you eat fewer calories than your body needs, you should lose weight.  • A healthy amount of weight to lose per week is usually 1-2 lb (0.5-0.9 kg). This usually means reducing your daily calorie intake by 500-750 calories.  • The number of calories in a food can be found on a Nutrition Facts label. If a food does not have a Nutrition Facts label, try to look up the calories online or ask your dietitian for help.  • Use smaller plates, glasses, and bowls for smaller portions and to prevent overeating.  • Use your calories on foods and drinks that will fill you up and not leave you hungry shortly after a meal.  This information is not intended to replace advice given to you by your health care provider. Make sure you discuss any questions you have with your health care provider.  Document Revised: 01/28/2021 Document Reviewed: 01/28/2021  Elsevier Patient Education © 2022 [employer] Inc.

## 2023-02-08 ENCOUNTER — TELEPHONE (OUTPATIENT)
Dept: SURGERY | Facility: CLINIC | Age: 38
End: 2023-02-08
Payer: MEDICAID

## 2023-02-08 DIAGNOSIS — R12 HEARTBURN: ICD-10-CM

## 2023-02-08 DIAGNOSIS — R14.0 BLOATING: ICD-10-CM

## 2023-02-08 DIAGNOSIS — R11.0 NAUSEA: ICD-10-CM

## 2023-02-08 DIAGNOSIS — R10.13 EPIGASTRIC PAIN: Primary | ICD-10-CM

## 2023-02-08 NOTE — TELEPHONE ENCOUNTER
Pt has been scheduled for HIDA 02/17/23 1pm [facility]. Pt has been notified of appt and instrucitons.

## 2023-02-10 ENCOUNTER — PATIENT ROUNDING (BHMG ONLY) (OUTPATIENT)
Dept: SURGERY | Facility: CLINIC | Age: 38
End: 2023-02-10
Payer: MEDICAID

## 2023-02-10 NOTE — PROGRESS NOTES
February 10, 2023    Hello, may I speak with Hu Houston?    My name is Yareli Garrett      I am  with [facility] [place], [place] [facility]  [address] [place] [address] [phone].    Before we get started may I verify your date of birth? 1985    I am calling to officially welcome you to our practice and ask about your recent visit. Is this a good time to talk? yes    Tell me about your visit with us. What things went well?  Her visit went well       We're always looking for ways to make our patients' experiences even better. Do you have recommendations on ways we may improve?  no    Overall were you satisfied with your first visit to our practice? yes       I appreciate you taking the time to speak with me today. Is there anything else I can do for you? no      Thank you, and have a great day.

## 2023-02-17 ENCOUNTER — HOSPITAL ENCOUNTER (OUTPATIENT)
Dept: NUCLEAR MEDICINE | Facility: HOSPITAL | Age: 38
Discharge: HOME | End: 2023-02-17
Payer: MEDICAID

## 2023-02-17 DIAGNOSIS — R11.0 NAUSEA: ICD-10-CM

## 2023-02-17 DIAGNOSIS — R10.13 EPIGASTRIC PAIN: ICD-10-CM

## 2023-02-17 DIAGNOSIS — R12 HEARTBURN: ICD-10-CM

## 2023-02-17 DIAGNOSIS — R14.0 BLOATING: ICD-10-CM

## 2023-02-17 PROCEDURE — 0 TECHNETIUM TC 99M MEBROFENIN KIT: Performed by: SURGERY

## 2023-02-17 PROCEDURE — 78226 HEPATOBILIARY SYSTEM IMAGING: CPT

## 2023-02-17 PROCEDURE — A9537 TC99M MEBROFENIN: HCPCS | Performed by: SURGERY

## 2023-02-17 RX ORDER — KIT FOR THE PREPARATION OF TECHNETIUM TC 99M MEBROFENIN 45 MG/10ML
1 INJECTION, POWDER, LYOPHILIZED, FOR SOLUTION INTRAVENOUS
Status: COMPLETED | OUTPATIENT
Start: 2023-02-17 | End: 2023-02-17

## 2023-02-17 RX ADMIN — MEBROFENIN 1 DOSE: 45 INJECTION, POWDER, LYOPHILIZED, FOR SOLUTION INTRAVENOUS at 12:40

## 2023-02-20 ENCOUNTER — ANESTHESIA EVENT (OUTPATIENT)
Dept: PERIOP | Facility: HOSPITAL | Age: 38
End: 2023-02-20
Payer: MEDICAID

## 2023-02-20 RX ORDER — CALCIUM CARBONATE 200(500)MG
1 TABLET,CHEWABLE ORAL AS NEEDED
COMMUNITY
End: 2023-02-21 | Stop reason: HOSPADM

## 2023-02-20 RX ORDER — ACETAMINOPHEN 500 MG
1000 TABLET ORAL EVERY 6 HOURS PRN
COMMUNITY

## 2023-02-21 ENCOUNTER — HOSPITAL ENCOUNTER (OUTPATIENT)
Facility: HOSPITAL | Age: 38
Setting detail: HOSPITAL OUTPATIENT SURGERY
Discharge: HOME | End: 2023-02-21
Attending: SURGERY | Admitting: SURGERY
Payer: MEDICAID

## 2023-02-21 ENCOUNTER — ANESTHESIA (OUTPATIENT)
Dept: PERIOP | Facility: HOSPITAL | Age: 38
End: 2023-02-21
Payer: MEDICAID

## 2023-02-21 VITALS
DIASTOLIC BLOOD PRESSURE: 63 MMHG | OXYGEN SATURATION: 99 % | TEMPERATURE: 98.3 F | SYSTOLIC BLOOD PRESSURE: 104 MMHG | BODY MASS INDEX: 29.16 KG/M2 | HEART RATE: 63 BPM | WEIGHT: 191.8 LBS | RESPIRATION RATE: 12 BRPM

## 2023-02-21 DIAGNOSIS — R12 HEARTBURN: ICD-10-CM

## 2023-02-21 DIAGNOSIS — R14.0 BLOATING: ICD-10-CM

## 2023-02-21 DIAGNOSIS — R10.13 EPIGASTRIC PAIN: ICD-10-CM

## 2023-02-21 DIAGNOSIS — R11.0 NAUSEA: ICD-10-CM

## 2023-02-21 PROCEDURE — 87081 CULTURE SCREEN ONLY: CPT | Performed by: SURGERY

## 2023-02-21 PROCEDURE — 43239 EGD BIOPSY SINGLE/MULTIPLE: CPT | Performed by: SURGERY

## 2023-02-21 PROCEDURE — 25010000002 PROPOFOL 200 MG/20ML EMULSION: Performed by: ANESTHESIOLOGY

## 2023-02-21 PROCEDURE — 88305 TISSUE EXAM BY PATHOLOGIST: CPT | Performed by: SURGERY

## 2023-02-21 RX ORDER — SODIUM CHLORIDE 9 MG/ML
40 INJECTION, SOLUTION INTRAVENOUS AS NEEDED
Status: DISCONTINUED | OUTPATIENT
Start: 2023-02-21 | End: 2023-02-21 | Stop reason: HOSPADM

## 2023-02-21 RX ORDER — PROPOFOL 10 MG/ML
INJECTION, EMULSION INTRAVENOUS AS NEEDED
Status: DISCONTINUED | OUTPATIENT
Start: 2023-02-21 | End: 2023-02-21 | Stop reason: SURG

## 2023-02-21 RX ORDER — SODIUM CHLORIDE 0.9 % (FLUSH) 0.9 %
10 SYRINGE (ML) INJECTION EVERY 12 HOURS SCHEDULED
Status: DISCONTINUED | OUTPATIENT
Start: 2023-02-21 | End: 2023-02-21 | Stop reason: HOSPADM

## 2023-02-21 RX ORDER — SODIUM CHLORIDE 0.9 % (FLUSH) 0.9 %
10 SYRINGE (ML) INJECTION AS NEEDED
Status: DISCONTINUED | OUTPATIENT
Start: 2023-02-21 | End: 2023-02-21 | Stop reason: HOSPADM

## 2023-02-21 RX ORDER — SUCRALFATE 1 G/1
1 TABLET ORAL 4 TIMES DAILY
Qty: 120 TABLET | Refills: 6 | Status: SHIPPED | OUTPATIENT
Start: 2023-02-21 | End: 2024-02-21

## 2023-02-21 RX ORDER — SODIUM CHLORIDE, SODIUM LACTATE, POTASSIUM CHLORIDE, CALCIUM CHLORIDE 600; 310; 30; 20 MG/100ML; MG/100ML; MG/100ML; MG/100ML
9 INJECTION, SOLUTION INTRAVENOUS CONTINUOUS
Status: DISCONTINUED | OUTPATIENT
Start: 2023-02-21 | End: 2023-02-21 | Stop reason: HOSPADM

## 2023-02-21 RX ORDER — LIDOCAINE HYDROCHLORIDE 10 MG/ML
0.5 INJECTION, SOLUTION EPIDURAL; INFILTRATION; INTRACAUDAL; PERINEURAL ONCE AS NEEDED
Status: DISCONTINUED | OUTPATIENT
Start: 2023-02-21 | End: 2023-02-21 | Stop reason: HOSPADM

## 2023-02-21 RX ORDER — LIDOCAINE HYDROCHLORIDE 20 MG/ML
INJECTION, SOLUTION INFILTRATION; PERINEURAL AS NEEDED
Status: DISCONTINUED | OUTPATIENT
Start: 2023-02-21 | End: 2023-02-21 | Stop reason: SURG

## 2023-02-21 RX ADMIN — PROPOFOL 250 MG: 10 INJECTION, EMULSION INTRAVENOUS at 09:36

## 2023-02-21 RX ADMIN — LIDOCAINE HYDROCHLORIDE 100 MG: 20 INJECTION, SOLUTION INFILTRATION; PERINEURAL at 09:36

## 2023-02-21 RX ADMIN — SODIUM CHLORIDE, POTASSIUM CHLORIDE, SODIUM LACTATE AND CALCIUM CHLORIDE: 600; 310; 30; 20 INJECTION, SOLUTION INTRAVENOUS at 09:31

## 2023-02-21 NOTE — ANESTHESIA PREPROCEDURE EVALUATION
Anesthesia Evaluation     NPO Solid Status: > 8 hours  NPO Liquid Status: > 8 hours           Airway   Mallampati: III  TM distance: <3 FB  Neck ROM: full  Dental - normal exam     Pulmonary - normal exam   (+) asthma,  (-) shortness of breath  Cardiovascular - negative cardio ROS    Rhythm: regular  Rate: normal        Neuro/Psych  (+) seizures well controlled, headaches, psychiatric history Depression,    (-) syncope, numbness  GI/Hepatic/Renal/Endo    (+)  GERD well controlled,  hepatitis C, liver disease,   (-) no renal disease, diabetes, no thyroid disorder    Musculoskeletal     Abdominal    Substance History   (-) alcohol use, drug use     OB/GYN    (-)  Pregnant        Other   blood dyscrasia,   history of cancer active                    Anesthesia Plan    ASA 3     MAC   total IV anesthesia  intravenous induction     Anesthetic plan, risks, benefits, and alternatives have been provided, discussed and informed consent has been obtained with: patient.    Use of blood products discussed with patient  Consented to blood products.   Plan discussed with CRNA.        CODE STATUS:

## 2023-02-21 NOTE — ANESTHESIA POSTPROCEDURE EVALUATION
Patient: Hu Houston    Procedure Summary     Date: 02/21/23 Room / Location: [facility] ENDOSCOPY 1 / [facility] OR    Anesthesia Start: 0931 Anesthesia Stop: 0948    Procedure: Esophagogastroduodenoscopy with possible biopsy, polypectomy, dilation (Esophagus) Diagnosis:       Epigastric pain      Heartburn      Nausea      Bloating      (Epigastric pain [R10.13])      (Heartburn [R12])      (Nausea [R11.0])      (Bloating [R14.0])    Surgeons: Jennifer More DO Provider: Tejal Castillo MD    Anesthesia Type: MAC ASA Status: 3          Anesthesia Type: MAC    Vitals  Vitals Value Taken Time   /63 02/21/23 1025   Temp     Pulse 63 02/21/23 1025   Resp 12 02/21/23 1020   SpO2 99 % 02/21/23 1025           Post Anesthesia Care and Evaluation    Patient location during evaluation: bedside  Patient participation: complete - patient participated  Level of consciousness: awake and alert  Pain score: 0  Pain management: adequate    Airway patency: patent  Anesthetic complications: No anesthetic complications  PONV Status: none  Cardiovascular status: acceptable  Respiratory status: acceptable  Hydration status: acceptable  No anesthesia care post op

## 2023-02-21 NOTE — H&P
"Hu Houston 37 y.o. female presents as a self ref for eval epi pain that she describes as \"stabbing pain\" bloating, change in bowel habits = decrease in freq, GERD, nausea, and heartburn.  Recent GB US = neg.   Chief Complaint   Patient presents with   • Abdominal Pain       Epigastric pain                  HPI   Above note and agree.  This very pleasant 37-year-old female is here for severe epigastric pain.  She had an EGD done in 2017 that showed a large ulcer.  She is on chemotherapy daily for CML maintenance.  She has been having occasional stabbing epigastric pain as well as severe heartburn.  She also gets nauseated and bloated.  She is taking Tums for it.  She does not smoke or use tobacco products.  She had ultrasound done at [facility] that was negative.  She has no fevers or chills.  She has no chest pain or shortness of breath.        Review of Systems   All other systems reviewed and are negative.                 Current Outpatient Medications:   •  albuterol sulfate  (90 Base) MCG/ACT inhaler, , Disp: , Rfl:   •  ascorbic acid (VITAMIN C) 500 MG tablet, Take 500 mg by mouth Daily., Disp: , Rfl:   •  B Complex-C (SUPER B COMPLEX PO), Take  by mouth., Disp: , Rfl:   •  benzonatate (TESSALON) 200 MG capsule, , Disp: , Rfl:   •  dasatinib (Sprycel) 50 MG chemo tablet, TAKE 1 TABLET BY MOUTH ONCE DAILY AT THE SAME TIME. MAY TAKE WITH OR WITHOUT FOOD. SWALLOW WHOLE. AVOID GRAPEFRUIT PRODUCTS., Disp: 30 tablet, Rfl: 5  •  ibuprofen (ADVIL,MOTRIN) 800 MG tablet, Take 1 tablet by mouth Every 6 (Six) Hours As Needed for Mild Pain., Disp: 40 tablet, Rfl: 0  •  POTASSIUM PO, Take  by mouth., Disp: , Rfl:                  Allergies   Allergen Reactions   • Sulfa Antibiotics Unknown - Low Severity       Childhood reaction                    Past Medical History:   Diagnosis Date   • Anemia in neoplastic disease     • Asthma       childhood   • Chiari I malformation (HCC)       eval by Dr Moore, [place] " 2016   • Chronic myelogenous leukemia (HCC)       remission, Dr Castle follows   • Chronic pain     • CML (chronic myelocytic leukemia) (HCC)     • Cystitis     • Depression     • GERD (gastroesophageal reflux disease)       ulcer   • H/O Iron deficiency anemia     • H/O Lower extremity pain       Resolved.   • History of ongoing treatment with high-risk medication     • History of pyelonephritis     • Migraine     • Myalgia     • Neuropathy of forearm       right more than left   • Pulmonary hypertension (HCC)     • Seizures (HCC)       as child after head injry   • Splenomegaly     • Status post D&C     • Vitamin D deficiency                       Past Surgical History:   Procedure Laterality Date   • BONE MARROW BIOPSY   2013   • D & C HYSTEROSCOPY N/A 10/1/2018     Procedure: DILATATION AND CURETTAGE HYSTEROSCOPY;  Surgeon: Beck Cornejo MD;  Location: [facility] OR;  Service: Obstetrics/Gynecology   • D & C HYSTEROSCOPY ENDOMETRIAL ABLATION N/A 4/9/2018     Procedure: Hysteroscopy NovaSure ablation;  Surgeon: Beck Cornejo MD;  Location: [facility] OR;  Service: Obstetrics/Gynecology   • ENDOSCOPY N/A 8/4/2017     Procedure: ESOPHAGOGASTRODUODENOSCOPY with biopsies;  Surgeon: Sophie Trejo MD;  Location: [facility] OR;  Service:    • GYNECOLOGY EXAM UNDER ANESTHESIA         IUD removal   • TOTAL LAPAROSCOPIC HYSTERECTOMY Bilateral 10/29/2018     Procedure: TOTAL LAPAROSCOPIC HYSTERECTOMY, bilateral salpingectomy;  Surgeon: Beck Cornejo MD;  Location: [facility] OR;  Service: Obstetrics/Gynecology   • TUBAL COAGULATION LAPAROSCOPIC Bilateral 4/9/2018     Procedure: TUBAL COAGULATION LAPAROSCOPIC;  Surgeon: Beck Cornejo MD;  Location: [facility] OR;  Service: Obstetrics/Gynecology               Social History            Tobacco Use   • Smoking status: Former       Packs/day: 1.00       Years: 8.00       Pack years: 8.00       Types: Cigarettes       Quit date: 7/4/2006       Years  "since quittin.6   • Smokeless tobacco: Never   Vaping Use   • Vaping Use: Never used   Substance Use Topics   • Alcohol use: Yes       Comment: Social, maybe once every 6 months/no caffeine use   • Drug use: Not Currently       Types: Cocaine(coke)       Comment: prior to                     Immunization History   Administered Date(s) Administered   • COVID-19 (MODERNA) 1st, 2nd, 3rd Dose Only 2021, 2021, 2022   • DTaP, Unspecified 1984, 1984, 1984, 1989, 2012   • Flu Vaccine Split Quad 2020   • FluLaval/Fluzone >6mos 2019, 2020   • Hep B / HiB 2012   • IPV 1984, 1984, 1989, 2012   • MMR 10/01/1985, 1995   • Pneumococcal Conjugate 13-Valent (PCV13) 2012   • Td 10/19/1999   • Tdap 2012, 2019               Physical Exam  Vitals and nursing note reviewed.   Constitutional:       Appearance: Normal appearance.   HENT:      Head: Normocephalic and atraumatic.   Cardiovascular:      Rate and Rhythm: Normal rate and regular rhythm.   Pulmonary:      Effort: Pulmonary effort is normal.      Breath sounds: Normal breath sounds.   Abdominal:      General: Bowel sounds are normal.      Palpations: Abdomen is soft.      Comments: Epigastric tenderness to palpation   Musculoskeletal:         General: No swelling or tenderness.   Skin:     General: Skin is warm and dry.   Neurological:      General: No focal deficit present.      Mental Status: She is alert and oriented to person, place, and time.   Psychiatric:         Mood and Affect: Mood normal.         Behavior: Behavior normal.            Debilities/Disabilities Identified: None     Emotional Behavior: Appropriate        /74   Pulse 72   Resp 16   Ht 172.7 cm (68\")   Wt 86.2 kg (190 lb)   LMP 10/11/2018   BMI 28.89 kg/m²            Diagnoses and all orders for this visit:     1. Epigastric pain (Primary)     2. Heartburn     3. " Nausea     4. Bloating     We will get Hu scheduled for an EGD and a HIDA scan with ejection fraction.  I discussed with the patient the benefits and risks of performing endoscopy.  Benefits and risks were not limited to but including bleeding, infection, perforation, complications of anesthesia, aspiration.  The patient appeared to understand and is willing to proceed.     Thank you for allowing me to participate in the care of this interesting patient.

## 2023-02-21 NOTE — OP NOTE
EGD Procedure Note    Pre-operative Diagnosis: Epigastric pain, heartburn, nausea, bloating    Post-operative Diagnosis: Gastritis and duodenitis    Procedure:  EGD with biopsies with cold forceps    Surgeon: Argenis    Estimated Blood Loss: Minimal    Complications: None    Anesthetic: MAC Tejal Carlin MD    Indications: See preoperative diagnosis    Findings/Treatments:    Gastritis-biopsy for H. pylori with cold forceps  Duodenitis-biopsies with cold forceps    Recommendations:  -Await pathology.      Technique:    After discussing the benefits and risks of an EGD, benefits and risks not limited to but including:  Bleeding, infection, perforation, aspiration; informed consent was signed.  The patient was taken into the endoscopy suite at [facility] and placed in the left lateral decubitus position.  MAC anesthesia was induced under appropriate monitoring.  Bite block was placed and the gastroscope was inserted thru such and advanced under direct vision to second portion of the duodenum.  A careful inspection was made as the gastroscope was withdrawn, including a retroflexed view of the proximal stomach; findings and interventions are described below.  If biopsies were taken, this was done with the cold biopsy forceps.    Jennifer More D.O.

## 2023-02-22 LAB
LAB AP CASE REPORT: NORMAL
PATH REPORT.FINAL DX SPEC: NORMAL
PATH REPORT.GROSS SPEC: NORMAL
UREASE TISS QL: NEGATIVE

## 2023-02-24 NOTE — TELEPHONE ENCOUNTER
Dr. Castle's office is wanting to be sure pt is okay to see Dr. Tim. They stated Dr. Castle spoke to you about seeing her and just want to make sure you are okay with her seeing another provider.     Elli    Temazepam is a controlled substance and needs every 3 month follow up  last refill without visit  meds due and filled for 2/28

## 2023-03-06 ENCOUNTER — TELEPHONE (OUTPATIENT)
Dept: SURGERY | Facility: CLINIC | Age: 38
End: 2023-03-06
Payer: MEDICAID

## 2023-03-06 NOTE — TELEPHONE ENCOUNTER
Caller: Hu Houston    Relationship: Self    Best call back number:    [phone]     What is the best time to reach you: N/A    Who are you requesting to speak with (clinical staff, provider,  specific staff member): REEMA    Do you require a callback: YES

## 2023-03-07 NOTE — TELEPHONE ENCOUNTER
03/06/2023 - Ret call to pt.  Pt is c/o constipation and suspects it is a side effect of Sucralfate as c/o started after she started this med.  Pt advised to increase water intake and hold Sucralfate for 5 days and see if constipation improves.  Pt instructed to report back to this office after 5 days to get progress.  Pt is agreeable.

## 2023-03-10 NOTE — TELEPHONE ENCOUNTER
PT CALLED BACK. PLEASE CALL PT AT     [phone]    Attempted to Warm Transfer but, unable to reach the Practice:

## 2023-03-10 NOTE — TELEPHONE ENCOUNTER
Ret call to pt and she reports the constipation has improved since stopping the Sucralfate.  Pt advised to hold Sucralfate for next 2 days then start with 1 tab daily and see if constipation returns.  Pt is agreeable.

## 2023-03-23 ENCOUNTER — SPECIALTY PHARMACY (OUTPATIENT)
Dept: PHARMACY | Facility: HOSPITAL | Age: 38
End: 2023-03-23
Payer: MEDICAID

## 2023-03-23 NOTE — PROGRESS NOTES
Re: Refills of Oral Specialty Medication - Sprycel (dasatinib)    • Drug-Drug Interactions: The current medication list was reviewed and there are no relevant drug-drug interactions.  • Medication Allergies: The patient has no relevant allergies as it relates to their oral specialty medication  • Review of Labs/Dose Adjustments: NO DOSE CHANGE - I reviewed the most recent note and labs and the patient will continue without any dose changes.  I sent refills as described below.    Drug: Sprycel (dasatinib)  Strength: 50 mg  Directions: Take one tablet by mouth daily  Quantity: 30  Refills: 5  Pharmacy prescription sent to: [facility]    Name/Credentials: Joana Abdullahi, JoshuaD, BCPS    3/23/2023  09:29 EDT      Completed independent double check on medication order/RX. Micaela Grady RP, BCOP

## 2023-04-12 ENCOUNTER — OFFICE VISIT (OUTPATIENT)
Dept: ONCOLOGY | Facility: CLINIC | Age: 38
End: 2023-04-12
Payer: MEDICAID

## 2023-04-12 ENCOUNTER — LAB (OUTPATIENT)
Dept: LAB | Facility: HOSPITAL | Age: 38
End: 2023-04-12
Payer: MEDICAID

## 2023-04-12 ENCOUNTER — PRIOR AUTHORIZATION (OUTPATIENT)
Dept: ONCOLOGY | Facility: CLINIC | Age: 38
End: 2023-04-12
Payer: MEDICAID

## 2023-04-12 VITALS
TEMPERATURE: 97.1 F | OXYGEN SATURATION: 100 % | SYSTOLIC BLOOD PRESSURE: 109 MMHG | BODY MASS INDEX: 29.1 KG/M2 | WEIGHT: 192 LBS | HEART RATE: 59 BPM | DIASTOLIC BLOOD PRESSURE: 72 MMHG | HEIGHT: 68 IN

## 2023-04-12 DIAGNOSIS — C92.10 CML (CHRONIC MYELOID LEUKEMIA): Primary | ICD-10-CM

## 2023-04-12 DIAGNOSIS — T45.1X5S ADVERSE EFFECT OF ANTINEOPLASTIC DRUG, SEQUELA: ICD-10-CM

## 2023-04-12 LAB
ALBUMIN SERPL-MCNC: 4.5 G/DL (ref 3.5–5.2)
ALBUMIN/GLOB SERPL: 1.4 G/DL (ref 1.1–2.4)
ALP SERPL-CCNC: 75 U/L (ref 38–116)
ALT SERPL W P-5'-P-CCNC: 13 U/L (ref 0–33)
ANION GAP SERPL CALCULATED.3IONS-SCNC: 11 MMOL/L (ref 5–15)
AST SERPL-CCNC: 16 U/L (ref 0–32)
BASOPHILS # BLD AUTO: 0.03 10*3/MM3 (ref 0–0.2)
BASOPHILS NFR BLD AUTO: 0.5 % (ref 0–1.5)
BILIRUB SERPL-MCNC: 0.3 MG/DL (ref 0.2–1.2)
BUN SERPL-MCNC: 9 MG/DL (ref 6–20)
BUN/CREAT SERPL: 12.7 (ref 7.3–30)
CALCIUM SPEC-SCNC: 9.7 MG/DL (ref 8.5–10.2)
CHLORIDE SERPL-SCNC: 103 MMOL/L (ref 98–107)
CO2 SERPL-SCNC: 25 MMOL/L (ref 22–29)
CREAT SERPL-MCNC: 0.71 MG/DL (ref 0.6–1.1)
DEPRECATED RDW RBC AUTO: 42.8 FL (ref 37–54)
EGFRCR SERPLBLD CKD-EPI 2021: 111.1 ML/MIN/1.73
EOSINOPHIL # BLD AUTO: 0.19 10*3/MM3 (ref 0–0.4)
EOSINOPHIL NFR BLD AUTO: 3 % (ref 0.3–6.2)
ERYTHROCYTE [DISTWIDTH] IN BLOOD BY AUTOMATED COUNT: 13.2 % (ref 12.3–15.4)
GLOBULIN UR ELPH-MCNC: 3.2 GM/DL (ref 1.8–3.5)
GLUCOSE SERPL-MCNC: 83 MG/DL (ref 74–124)
HCT VFR BLD AUTO: 43.6 % (ref 34–46.6)
HGB BLD-MCNC: 14.3 G/DL (ref 12–15.9)
IMM GRANULOCYTES # BLD AUTO: 0.04 10*3/MM3 (ref 0–0.05)
IMM GRANULOCYTES NFR BLD AUTO: 0.6 % (ref 0–0.5)
LYMPHOCYTES # BLD AUTO: 1.56 10*3/MM3 (ref 0.7–3.1)
LYMPHOCYTES NFR BLD AUTO: 24.3 % (ref 19.6–45.3)
MCH RBC QN AUTO: 29.2 PG (ref 26.6–33)
MCHC RBC AUTO-ENTMCNC: 32.8 G/DL (ref 31.5–35.7)
MCV RBC AUTO: 89.2 FL (ref 79–97)
MONOCYTES # BLD AUTO: 0.48 10*3/MM3 (ref 0.1–0.9)
MONOCYTES NFR BLD AUTO: 7.5 % (ref 5–12)
NEUTROPHILS NFR BLD AUTO: 4.13 10*3/MM3 (ref 1.7–7)
NEUTROPHILS NFR BLD AUTO: 64.1 % (ref 42.7–76)
NRBC BLD AUTO-RTO: 0 /100 WBC (ref 0–0.2)
PLATELET # BLD AUTO: 181 10*3/MM3 (ref 140–450)
PMV BLD AUTO: 10 FL (ref 6–12)
POTASSIUM SERPL-SCNC: 4.1 MMOL/L (ref 3.5–4.7)
PROT SERPL-MCNC: 7.7 G/DL (ref 6.3–8)
RBC # BLD AUTO: 4.89 10*6/MM3 (ref 3.77–5.28)
SODIUM SERPL-SCNC: 139 MMOL/L (ref 134–145)
WBC NRBC COR # BLD: 6.43 10*3/MM3 (ref 3.4–10.8)

## 2023-04-12 PROCEDURE — 80053 COMPREHEN METABOLIC PANEL: CPT

## 2023-04-12 PROCEDURE — 36415 COLL VENOUS BLD VENIPUNCTURE: CPT

## 2023-04-12 PROCEDURE — 85025 COMPLETE CBC W/AUTO DIFF WBC: CPT

## 2023-04-12 NOTE — PROGRESS NOTES
REASONS FOR FOLLOWUP:    1. Chronic myelogenous leukemia with splenomegaly, chronic phase, positive [place] chromosome, no mutation at kinase domain diagnosed in November 2013.   · Patient is on dose reduced Sprycel 50 mg daily with undetectable disease by RT-PCR.              HISTORY OF PRESENT ILLNESS: The patient is a 38 y.o. female with the above mentioned history, who presents to the clinic today on 4/12/2023 for a 3-month follow-up evaluation.     The patient complains of leg pain and bone pain. She recently had an EGD done. Her stomach was inflamed due to taking ibuprofen. She was prescribed Carafate 4 times a day for 1 week. She was only taking it 2 to 3 times a day. It caused constipation.    Laboratory study today on 4/12/2023 reports normal hemoglobin 14.3, WBC 6430 including neutrophils 4130, lymphocytes 1560, monocytes 480, and normal platelets 181,000. BCR-ABL is pending.     Most recent BCR-ABL was from 1/13/2023 which reported a complete negative result for both the p210 and the p180 fusion products.        Past Medical History:   Diagnosis Date   • Anemia in neoplastic disease    • Asthma     childhood   • Chiari I malformation     eval by Dr Moore, NS 2016   • Chronic myelogenous leukemia     remission, Dr Castle follows   • Chronic pain    • CML (chronic myelocytic leukemia)    • Cystitis    • Depression    • GERD (gastroesophageal reflux disease)     ulcer   • H/O Iron deficiency anemia    • H/O Lower extremity pain     Resolved.   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      *  Hysterectomy in October 2018      *  Acute hepatitis C in early 2019, treated successfully by Dr. Karyn Mendieta.      OB/GYN HISTORY: Menarche age 9. G5, P4, 1 miscarriage of the third pregnancy. First pregnancy at age 17. Patient underwent partial hysterectomy in 2018.       HEMATOLOGIC/ONCOLOGIC HISTORY:   * Chronic myelogenous leukemia with splenomegaly, chronic phase, positive [place] chromosome, no mutation at kinase domain diagnosed in November 2013.   · Patient was started on hydroxyurea temporarily on 10/23/2013 with significant drop of WBC counts.    · Patient started on Sprycel on 12/02/2013.   · Peripheral blood tested with 3.4% of cells positive for BCR-ABL translocation, tested 02/17/2014.    · The patient had CCyR 6 months into treatment as tested on 05/15/2014.    · Complete molecular response as tested 08/08/14 with negative product of BCR/ABL.   · There was interruption of her treatment due to insurance coverage and misunderstanding from patient's part, she had a relapse of disease with BCR/ABL product at 12.626% in March 2016, and she restarted back on Sprycel.   · Good response as tested on 08/31/2016 with BCR/ABL at 0.294%.    · Since June 2017, patient has been persistently tested negative for BCR/ABL by RT-PCR every 3 month period.     · Patient reported worsening leg cramping in end of July 2018.  Sprycel was decreased to 50 mg daily.  Laboratory studies on 8/31/2018, on 1/30/2019 and on 3/29/2019 were negative for BCR-ABL by RT-PCR.    · Sprycel was on hold during treatment for acute hepatitis C in early part of 2019.  It was resumed in July 2019.  She was weakly positive for BCR-ABL.  · On 10/29/2019 BCR/ABL1 MAJOR (p210) and BCR/ABL1 MINOR (p190) were not detected.    · BCR-ABL by RT-PCR with 0% on 1/24/2020 and also on 4/24/2020.  · Patient was tested negative on 7/10/2020.  Negative on 10/9/2020.    · Because of palpitation, Sprycel was on hold since 11/17/2020.     · BCR-ABL by RT-PCR was complete negative as reported on 1/06/2021.  · Sprycel was resumed on 2/9/2021 at 50 mg daily.  · Continue  BCR-ABL by RT-PCR in May 2021 in October 2021.          Laboratory results on 09/23/2015 showed normalization of hemoglobin 12.2, but still has macrocytosis, MCV 73.3. She has normal platelets and WBC at 9400. Serum ferritin < 5 ng/ml, iron sats 6.3%, iron 29, TIBC 455 mcg/ml. Still has severe iron deficiency, needs to continue oral iron therapy. The patient restarted taking oral iron supplementation which was probably stopped in the end of November 2015.        Laboratory study on 03/22/2016 reported normal WBC 8450, neutrophils 6100, lymphs is 1400 and monocytes 550. Serum iron was 26, TIBC 469 on saturation 6% and ferritin less than 5 NG/ML. Chemistry lab reported normal renal function with a creatinine 0.69, normal liver function panel, total protein 7.5 and albumin 4.2, normal electrolytes. Patient was restarted back on oral on sedimentation twice a day with good tolerance.      Patient continues take Lortab as needed for left leg cramping. Urine drug test on 08/05/2016 was completely negative, and repeated study on August 26 was positive for opiate, negative for other recreational drugs.      Repeat laboratory study on 08/31/2016 reported iron 21, ferritin less than 5, iron saturation 5%, TIBC 462. Hemoglobin was 11.5 MCV 78.1 MCHC 30.4. Platelets 163,000. Total WBC 8870 including neutrophils 6400 and lymphocytes 1500. BCR/ABL was 0.294% by RT-PCR method.       Patient was given IV Feraheme treatment in September 2016, with 2 doses. Repeated laboratory study on 10/06/2016 reported significantly improved and normalized ferritin 269, iron 80, TIBC 365 and iron saturation 22%. Her hemoglobin was 12.2, and MCV 80.8.      Patient reported she was seen by Dr. Fam in 10/2016 and was started on half tablets of Topamax. I reviewed Dr. Fam’s clinic note and telephone conversation record. The patient reports she has no recurrence of migraine headache. However, she is very tearful today, reporting that she  has thoughts of not taking any medications. She did assure me that she has been taking the medication up to this point. 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 The patient denies suicide ideation. When she was initially diagnosed of CML back in 2014, she had depression and I started the patient on Prozac. The patient reported initially it helped her, however, she no longer feels the effect of Prozac. She feels depressed. The patient reports she has no personal hobby. She goes to work and goes home, taking care of her kids. She does not enjoy life as she used to.      Laboratory study on December 29, 2016 reported at ferritin 19.9, iron 33, TIBC 347 iron saturation 10%.  Hemoglobin was 13, normal WBC and platelets.  Unremarkable CMP.  Patient was given 2 doses of Feraheme treatment in early January 2017.      Cytogenetic study on March 14, 2017 reported a BCR-ABL products at 0.098%, showed further improved residual disease.  There is also reported a ferritin 103.7, iron 79, TIBC 336 and iron saturation 24%.  Hemoglobin was 13.5, MCV 92.3, platelets 199,000 WBC 7000.  She had a completely normal CMP.       Repeated laboratory study on June 20, 2017 reported at nondetectable BCR-ABL product.  Ferritin was 45, iron 35 TIBC 333 and iron saturation 11%.  Had a normal CBC and CMP.    In the end of July 2018, patient called reporting worsening significant leg cramping, and getting worse recently and has been taking 6 tablets of Advil with no significant improvement.  We suspect it might be caused by Sprycel for her CML.  We discussed with patient, and decreased to half dose at 50 mg daily.  Patient reports that she is improved leg cramping since dose reduction.    Per medical records this patient had emergency room visit again on 8/18/2018.  Patient reports she had significant abdomen/pelvic pain at a time associate  with nausea.  Laboratory study showed significantly elevated WBC at 25,900 including neutrophils 24,400, with elevated hemoglobin 15.8 and platelets 146,000.     She had a thorough investigation, including CT scan for abdomen pelvis which was unremarkable.  She then had ultrasound for the pelvis and it was also unremarkable.  She had ultrasound for the gallbladder examination on the same day and was unremarkable.  She had a normal CMP except of marginally elevated glucose at 112.  Her urinalysis showed only marginally increased WBC 3-5/HPF.  She was negative for yeast infection, negative for Chlamydia trichomonas and Neisseria gonorrhea.  Patient was prescribed Flagyl and doxycycline and discharged to home.      lab study on 2019 reported normal iron saturation 47% and elevated ferritin 507.1 ng/mL with free iron 184 and TIBC 388.  hemoglobin is normal at 13.7 and platelets 186,000. normal WBC at 5080 including ANC 3300, lymphocytes 930 and monocytes 630. Labs reported significantly elevated ALT at 655,  and alkaline phosphatase 234 but normal total bilirubin 0.9. She had normal renal function creatinine 0.78. Normal electrolytes.     On 2019, I searched Up-to-Date and suspected that this patient had drug-induced hepatitis from Diflucan or with combination of Diflucan with Sprycel. This patient had been on Sprycel for years and had no problem with abnormal liver panel previously. It seems Diflucan inhibits CY moderately, which likely interferes with the metabolism of Sprycel. I instructed the patient to hold her Sprycel for now.    Sprycel treatment resumed as of 2019 upon completion of MEVYRET for her hepatitis C.    Laboratory study performed on 2019 showed normal CBC and CMP. BCR/ABL by RT-PCR detected BCR/ABL1 Major. HCV RNA by PCR showed HCV not detected.    Laboratory studies 2019 report her hemoglobin is normal at 15.2 and platelets 146,000. normal WBC at 7650 including  ANC 5190, lymphocytes 1600 and monocytes 630.    Recent laboratory studies confirmed to be no detectable virus on 9/11/2019.    U/S Liver performed on 10/24/2019 reported negative hepatic ultrasound exam with no focal liver lesion, negative gallbladder examination with no cholelithiasis or bile duct dilation noted, however borderline splenomegaly was noted.     Laboratory study performed on 10/29/2019, reported a completely normal CBC, CMP. Normal iron saturation and still slightly elevated ferritin 325 ng/dL  BCR/ABL1 MAJOR (p210) and BCR/ABL1 MINOR (p190) were not detected.     Laboratory studies 1/24/2020, show hemoglobin 14.3 MCV 92.6 platelets 180,000 and WBC 6570 including neutrophils 4400.  She also has completely normal CMP.  Iron studies reported ferritin 273, iron saturation 11%, hemoglobin 14.3 WBC 6570 and platelets 180,000.  BCR-ABL by RT-PCR with 0%.     Patient presented today for 3-month follow-up and lab review.      Due to pandemic coronavirus infection, patient has been staying home with all 4 children.  Patient reports significant fatigue for the past month, similar to the time when she had iron deficiency.  Patient reports prior to that she was having regular exercise and with good energy level.    Patient reports compliant with Sprycel 50 mg daily.  She denies leg cramping.  She has no nausea no vomiting no abdominal pains.  She has good appetite and no diarrhea.  She denies headaches vision changes no dizziness or seizure activity.    Her laboratory study on 3/12/2020 reported marginal iron saturation 15% however had a good ferritin 212.5 ng/mL.  She had hemoglobin 13.1 and normal thyroid profile including TSH 1.20, free T4 at 1.37 ng/dL and a free T3 at 2.97 pg/mL     On 4/24/2020 her iron study showed ferritin 262 and iron saturation 21%.  Hemoglobin is 14.8.  She has normal WBC 5740 including ANC 3820, and normal platelets 161,000.   She was negative for BCR-ABL by RT-PCR on the same  day.    Laboratory studies, 7/10/2020, show completely normal CBC and CMP.  Negative for BCR-ABL by RT-PCR.  Iron studies showed ferritin 185, iron saturation 13% free iron 37 TIBC 286.    Patient has completed normal CBC 10/9/2020.  Iron studies reported ferritin 2070, free iron 13%.  She also has completed normal CMP.  She was tested negative for BCR-ABL by RT-PCR method.  Sprycel 50 mg daily was continued.     We saw her recently on 10/9/2020 for routine follow-up for her CML.  She was tolerating Sprycel.  Physical examination laboratory studies were unremarkable.  No detectable BCR-ABL by RT-PCR.  We continued her Sprycel at that time.     On 11/17/2020, patient called and reported chest tightness, palpitation and dyspnea new onset in November 2020.  Patient reports this happened recently.  She has chest tightness, and associated tachycardia/palpitation.  Patient reports this is unpredictable, can be on and off.  She noticed that one day she was cooking meal for her family, and noted sudden onset palpitation and chest tightness.  She reports unable to induce purposefully.  She also has exertional dyspnea.  Patient reports 11/17/2020, we asked patient to hold Sprycel.  We suspect the patient may have pleural effusion or cardiac dysfunction secondary to Sprycel, we requested chest CT, echocardiogram study and also laboratory study for evaluation.     Negative chest x-ray on 11/17/2020.     Laboratory study on 11/17/2020 reported stable chronic condition with a ferritin 261 and iron saturation 12%, normal hemoglobin 15.0, unable to explain her dyspnea.     Echocardiogram study on 11/18/2020 reported normal results.     Laboratory study on 1/6/2021 reported hemoglobin 15.9 hematocrit 47.1%, platelets 181,000 WBC 9360 including ANC 7130 lymphocytes 1350. Iron study reported ferritin 293 and iron saturation 14% with free iron 47 and a TIBC 326.  Chemistry lab reported unremarkable CMP including renal function and  liver function panel.      Patient reports she was seen by cardiologist Dr. Flowers on 2021.  Patient had thorough cardiology evaluation and there was no apparent abnormalities.    We have been withholding her Sprycel in middle 2020 because of palpitation, and referred patient to cardiology service.        Fortunately, her peripheral blood BCR-ABL by RT-PCR was complete negative, as reported on 2021.     Sprycel was resumed on 2021.      Laboratory studies on 3/16/2021 reported normal CBC including hemoglobin 14.4 MCV 88.7, platelets 173,000, and WBC 6400 including ANC 3770 lymphocytes 1800.  Chemistry lab reported normal CMP.     Laboratory studies on 2021 reported completely normal CBC and CMP.  Iron study also normal with ferritin 219 and iron saturation 16%.  Negative study for BCR-ABL by RT-PCR.     Laboratory study on 10/5/2021 reported complete normal CBC and CMP.  Iron study reported normal ferritin 250 ng/mL and iron saturation 21% with free iron 63 TIBC 300.  She was also complete negative for BCR-ABL by RT-PCR.      MEDICATIONS: The current medication list was reviewed with the patient and updated in the EMR this date per the medical assistant. Medication dosages and frequencies were confirmed to be accurate.        Allergies   Allergen Reactions   • Sulfa Antibiotics Unknown - Low Severity     Childhood reaction     SOCIAL HISTORY: . She smoked cigarettes previously, quit in 2006 with 8-pack-year history. Social drinker, maybe once a month. No illegal drug use. No risk for HIV except tattoos.  Hairstylist.       FAMILY HISTORY: Maternal grandmother had esophageal/stomach adenocarcinoma diagnosed at age of 72 and  of cancer at age of 73. The patient' s mother has hypertension but otherwise healthy.  No other family history of malignancy, especially no leukemia.          VITAL SIGNS:   Vitals:    23 1039   BP: 109/72   Pulse: 59   Temp: 97.1 °F (36.2 °C)  "  TempSrc: Temporal   SpO2: 100%   Weight: 87.1 kg (192 lb)   Height: 172.7 cm (67.99\")   PainSc: 0-No pain     ECOG 0          Physical Exam  Vitals and nursing note reviewed.   Constitutional:       Appearance: Normal appearance. She is well-developed.   HENT:      Head: Normocephalic and atraumatic.      Nose: Nose normal.      Mouth/Throat:      Pharynx: No oropharyngeal exudate.   Eyes:      Conjunctiva/sclera: Conjunctivae normal.   Cardiovascular:      Rate and Rhythm: Normal rate and regular rhythm.      Heart sounds: Normal heart sounds. No murmur heard.  Pulmonary:      Effort: Pulmonary effort is normal. No respiratory distress.      Breath sounds: Normal breath sounds. No wheezing.   Abdominal:      General: Bowel sounds are normal. There is no distension.      Palpations: Abdomen is soft.      Tenderness: There is no abdominal tenderness.   Musculoskeletal:      Cervical back: Neck supple.      Right lower leg: No edema.      Left lower leg: No edema.   Lymphadenopathy:      Cervical: No cervical adenopathy.      Upper Body:      Right upper body: No supraclavicular adenopathy.      Left upper body: No supraclavicular adenopathy.   Skin:     General: Skin is warm and dry.      Findings: No bruising.   Neurological:      Mental Status: She is alert and oriented to person, place, and time. Mental status is at baseline.   Psychiatric:         Mood and Affect: Mood normal.         Behavior: Behavior normal.         LABORATORY DATA:    Lab Results   Component Value Date    WBC 6.43 04/12/2023    HGB 14.3 04/12/2023    HCT 43.6 04/12/2023    MCV 89.2 04/12/2023     04/12/2023     Lab Results   Component Value Date    NEUTROABS 4.13 04/12/2023     Lab Results   Component Value Date    GLUCOSE 83 04/12/2023    BUN 9 04/12/2023    CREATININE 0.71 04/12/2023    EGFR 111.1 04/12/2023    BCR 12.7 04/12/2023    K 4.1 04/12/2023    CO2 25.0 04/12/2023    CALCIUM 9.7 04/12/2023    ALBUMIN 4.5 04/12/2023    " BILITOT 0.3 04/12/2023    AST 16 04/12/2023    ALT 13 04/12/2023           ASSESSMENT:      1. Chronic myelogenous leukemia, chronic phase with excellent response to Sprycel and complete molecular response in August 2014. However she had interuption of treatment in early part of 2016, because of insurance issues and miscommunication, she stopped the medicine without telling us, and laboratory test on 03/22/2016 reported disease relapse with increased BCR/ABL product at 12.626%. subsequently she was restarted back on Sprycel, and responded well.  Since June 2017, she has completed molecular response as tested every 3 months.  She has been compliant with treatment.   · In the end of July 2018, she reported worsening significant cramping involving her legs, so we decreased her Sprycel to 50 mg daily starting early August.  She's been having less problem since that time.  She was tested negative for BCR-ABL on 8/31/2018.    · Patient had a negative BCR-ABL by RT-PCR in end of January 2019 and also on 3/29/2019.   · Patient was later found having significantly elevated liver function panel.  Sprycel was on hold and she was subsequently found having acute hepatitis C infection.    · I discussed with Dr. Karyn Mendieta, we'll hold her Sprycel for now until she finishes hepatitis C treatment.   · She was started on hepatitis C treatment on 4/18/2019 and finished an 8-week course.  · On 07/02/2019 patient's labs showed BCR-ABL Major (p210) detected by RT-PCR at 0.0141%. BCR/ABL1 Minor (p190) was not detected. HCV was not detected.  · Sprycel treatment was resumed as of 07/02/2019 upon completion of MEVYRET.  · Laboratory study on 10/29/2019 reported normal CBC. BCR/ABL1 MAJOR (p210) and BCR/ABL1 MINOR (p190) were not detected.    · BCR-ABL by RT-PCR on 1/24/2020 was negative.     · Lab result for BCR ABL by RT-PCR was also negative on 4/24/2020. Patient will need to continue on sprycel treatment.  · 7/10/2020 patient has normal  CBC and CMP.  Negative RT-PCR for BCR ABL.  Tolerating well.  Continue Sprycel at 50 mg daily.  · On 10/9/2020, completely normal CBC with good tolerance.  BCR-ABL was tested negative by RT-PCR method.  Continue Sprycel.   · Patient reports 11/17/2020 chest tightness in palpitation, and exertion dyspnea progressively getting worse in the past week.  We asked patient to hold Sprycel.   · Chest x-ray examination on 11/17/2020 was unremarkable.  Echocardiogram study on 11/18/2020 was also benign.  Patient was seen by cardiologist for further evaluation.    · Laboratory studies on 1/6/2021 reported normal WBC 9360 including ANC 7130 lymphocytes 1350. BCR-ABL by RT-PCR was complete negative, as reported on 1/12/2021.   · Patient had negative cardiac work-up.  She also has resolution of symptoms.  Discussed with patient on 2/9/2021, we recommended resumption of Sprycel 50 mg daily.  Patient is agreeable.  · On 3/16/2021, patient reports tolerating Sprycel, no recurrent symptoms of dyspnea, chest tightness or palpitation.  Continue Sprycel 50 mg daily.  · On 5/4/2021, patient has normal CBC and CMP.  Negative results for BCR-ABL by RT-PCR for P210 and P190 as reported on 5/12/2021.    · On 10/5/2021 patient presented for reevaluation.  She reports tolerating Sprycel.  No specific side effects reported.  Maintains normal CBC and negative BCR-ABL.  We will continue treatment for now.  · On 12/21/2021, patient reports good tolerance to Sprycel.  Normal CBC CMP.  Negative study by peripheral blood RT-PCR.  Continue Sprycel.   · On 3/14/2022, patient reports excellent tolerance to Sprycel at 50 mg daily.  Maintains normal CBC and CMP.  · 6/8/2022 continues on Sprycel 50 mg daily tolerating well.  Patient was negative for BCR-ABL by RT-PCR.  · On 10/7/2022 patient reports compliance with Sprycel and good tolerance.  We will continue treatment.  · 1/13/2023 continues on Sprycel 50 mg daily, tolerating well.  BCR-ABL was  completely negative result for both the p210 and the p180 fusion products.  · The patient presents for 3-month follow-up evaluation 4/12/2023. She continues on Sprycel 50 mg daily. She tolerated it well except having some pain in the lower extremities, which she stated has improved since decreased from 100 mg to 50 mg daily. She has been taking ibuprofen for that. Nevertheless, she recently had an EGD examination which led to the discovery of duodenitis. I discussed with the patient about switching to a different TKI, we searched Up-to-Date. We found that bosutinib, and dasatinib could all cause similar side effects with arthralgia, muscle pain, limb pain, even the new medicine ponatinib could have caused the similar side effects at about the same kind of percentages. Discussed with patient if she is concerned about switching off the current Sprycel, which she really has excellent response.  Patient decided to continue Sprycel.      2. Recurrent Iron deficiency anemia.    · She was treated again with Feraheme October 2017.   Iron deficiency thought to be related to ulcer identified on EGD, being treated with Carafate.  She also had heavy menses.  · She had recurrent iron deficiency again and was given Feraheme 2 doses in March 2018.   · Subsequently recurrent iron deficiency in July 2018, she was switched to Venofer 3 doses total 900 mg.   · Patient had simple hysterectomy in October 2018.  · Laboratory study showed supratherapeutic ferritin 458 and iron saturation 40% on 4/30/2019.   · Laboratory study performed 10/29/2019, showed normal iron saturation and ferritin 325 ng/dL.  · Normal iron studies including ferritin 262 and iron saturation 21% on 4/24/2020.  No evidence of recurrent iron deficiency.   · Lab study on 7/10/2020 reported ferritin 185, iron saturation 13% and normal hemoglobin 14.5.  She has deteriorating iron studies.   Continue to monitor in 3 months.  · Labs on 10/9/2020 reported ferritin 270,  iron saturation 13% and hemoglobin 14.9.  · Lab studies on 1/6/2021 reported mild erythrocytosis.  Hemoglobin is 15.9 and hematocrit of 47.1%, with ferritin 293 and iron saturation 14%.    · Normal hemoglobin 14.4 on 3/16/2021.    · On 5/4/2021 lab study reported hemoglobin 13.7, ferritin 219 and iron saturation 16%.    · On 10/5/2021, patient maintains normal hemoglobin.  Iron study reported further improved ferritin 250 ng/mL and iron saturation 21%.  Since her hysterectomy, patient has no recurrence of iron deficiency.  Discussed with patient today, there is no need for routine monitoring of iron study from now.    · On 3/14/2022, normal hemoglobin 14.3.  · 6/8/2022 hemoglobin 13.6.  · On 10/7/2022 patient has normal hemoglobin 14.1.  · 1/13/2023 Hgb 13.6  · On 4/12/2023 patient has normal hemoglobin 14.3.       3.  Chest tightness, palpitation and dyspnea new onset in November 2020.  Patient reports this happened recently, and that usually unpredictable, she has chest tightness, and associated tachycardia/palpitation.  Patient reports this is unpredictable, can be on and off.  She noticed 1 day she was cooking meal for her family, and noted several palpitation and chest tightness.  She reports unable to induce purposefully.  She also has exertional dyspnea.  Patient reports 11/17/2020, we asked patient to hold Sprycel.  We requested chest CT, echocardiogram study and also laboratory study for evaluation.  · Negative chest x-ray on 11/17/2020.   · Laboratory study on 11/17/2020 reported stable chronic condition with a ferritin 261 and iron saturation 12%, normal hemoglobin 15.0, unable to explain her dyspnea.   · Echocardiogram study on 11/18/2020 reported normal results.   · Patient is evaluated 11/20/2020, she reports somewhat improved symptoms, since she stopped Sprycel for the past 3 days.  She denies extremity edema.  She denies fever sweating or chills.  We recommend patient to continue to hold Sprycel for  another 2 weeks.  We also recommended patient to be tested for COVID-19.    · Reevaluation on 12/4/2020, patient reports that she did not get a Covid test.  She denies other illness such as fever sweating nausea vomiting, diarrhea, change of taste, cough etc.  Discussed with patient, will check a thyroid panel, which turned out to be normal on 12/4/2020.    · Patient was referred to cardiologist for evaluation of hypertension.  She was seen by Dr. Flowers on 12/21/2020 and the case waiting for further evaluation.  · Patient had a negative cardiac work-up.  Her symptom has resolved.  · Patient was restarted on Sprycel 2/9/2021.  She reports no recurrent symptoms 3/16/2021.  · On 5/4/2021, patient reports no recurrent symptoms.  · On 10/5/2021 patient reports no dyspnea no chest pain or discomfort.   · On 12/21/2021, patient reports no recurrent symptoms.  · On 3/14/2022, patient has no chest pain no dyspnea or pressureness on chest.  · 6/8/2022 no complaints of this today.  · No recurrent symptoms as reported today on 4/12/2023.       4.  COVID-19 vaccination.  · Patient reports receiving none 2 doses of the Moderna vaccine.    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   · On 10/7/2022 patient reports she was not infected with COVID-19, despite recently her  and her children were infected.        PLAN:    1. After discussion, patient decided to continue Sprycel 50 mg daily.  2. Pending results for BCR-ABL by RT-PCR.    3. Continue Carafate 4 times a day.  4. Continue oral vitamin B12 at 1000 mcg daily.  5. Follow-up with me in 3 months for reevaluation with lab studies CBC CMP.     6. Call/ return sooner should she develop any new concerns or problems.    I discussed with the patient about laboratory results and further management plan.  Patient voiced understanding and agreeable.  More than 35 min  "were used for patient care, over half of that time were for counseling.    Patient is on a high risk medication requiring close monitoring for toxicity.    Bao Castle MD PhD  04/12/2023      CC:  Jennifer More DO Anna Hart, M.D.    Luis Alfredo Flowers M.D.    Transcribed from ambient dictation for Bao Castle MD PhD by Lucy Juarez.  04/12/23   11:37 EDT    MICHAEL Provider Statement:[id number]::\"Patient or patient representative verbalized consent to the visit recording.\",\"I have personally performed the services described in this document as transcribed by the above individual, and it is both accurate and complete.\"      Addendum:  Lab study on 4/12/2023 about BCR-ABL by RT-PCR was complete negative.    BAO CASTLE M.D., Ph.D.      "

## 2023-04-12 NOTE — TELEPHONE ENCOUNTER
No PA required for BCR/ABL by PCR [id number] and [id number] per [facility]. Tracking # [id number]. Ok'd lab to send to [facility] today.

## 2023-04-20 LAB — REF LAB TEST METHOD: NORMAL

## 2023-05-18 ENCOUNTER — SPECIALTY PHARMACY (OUTPATIENT)
Dept: PHARMACY | Facility: HOSPITAL | Age: 38
End: 2023-05-18
Payer: MEDICAID

## 2023-05-18 NOTE — PROGRESS NOTES
Specialty Pharmacy Patient Management Program  Oncology 6-Month Clinical Assessment       Hu Houston is a 38 y.o. female with chronic myelogenous leukemia called today to assess adherence and side effects.    Regimen: Sprycel (dasatinib) 50 mg daily    Reason for Outreach: Routine medication check-in .             Goals     • Specialty Pharmacy General Goal      Progression free survival          Medication Assessment:  • Medication Assessment  o  No missed doses reported  o Administration: Patient is taking every day at the same time  and as prescribed .   o Patient can self administer medications: Yes  o Medication Follow-Up Plan: Next clinical assessment  • Lab Review: The labs listed below have been reviewed. No dose adjustments are needed for the oral specialty medication(s) based on the labs.    Lab Results   Component Value Date    GLUCOSE 83 04/12/2023    CALCIUM 9.7 04/12/2023     04/12/2023    K 4.1 04/12/2023    CO2 25.0 04/12/2023     04/12/2023    BUN 9 04/12/2023    CREATININE 0.71 04/12/2023    EGFRIFNONA 86 12/21/2021    BCR 12.7 04/12/2023    ANIONGAP 11.0 04/12/2023     Lab Results   Component Value Date    WBC 6.43 04/12/2023    RBC 4.89 04/12/2023    HGB 14.3 04/12/2023    HCT 43.6 04/12/2023    MCV 89.2 04/12/2023    MCH 29.2 04/12/2023    MCHC 32.8 04/12/2023    RDW 13.2 04/12/2023    RDWSD 42.8 04/12/2023    MPV 10.0 04/12/2023     04/12/2023    NEUTRORELPCT 64.1 04/12/2023    LYMPHORELPCT 24.3 04/12/2023    MONORELPCT 7.5 04/12/2023    EOSRELPCT 3.0 04/12/2023    BASORELPCT 0.5 04/12/2023    AUTOIGPER 0.6 (H) 04/12/2023    NEUTROABS 4.13 04/12/2023    LYMPHSABS 1.56 04/12/2023    MONOSABS 0.48 04/12/2023    EOSABS 0.19 04/12/2023    BASOSABS 0.03 04/12/2023    AUTOIGNUM 0.04 04/12/2023    NRBC 0.0 04/12/2023     • Drug-drug interactions  o Completed medication reconciliation today to assess for drug interactions. Patient denies starting or stopping any medications.     o Assessed medication list for interactions, no significant drug interactions noted.   o Advised patient to call the clinic if any new medications are started so we can assess for drug-drug interactions.  • Drug-food interactions discussed: eating grapefruit and drinking grapefruit juice  • Vaccines are coordinated by the patient's oncologist and primary care provider.    Allergies  Known allergies and reactions were discussed with the patient. The patient's chart has been reviewed for allergy information and updated as necessary.   Allergies   Allergen Reactions   • Sulfa Antibiotics Unknown - Low Severity     Childhood reaction        Hospitalizations and Urgent Care Visits Since Last Assessment:  • Unplanned hospitalizations or inpatient admissions: None  • ED Visits: None  • Urgent Office Visits: None    Adherence Assessment:   Patient reports no missed doses in the last month.    Quality of Life Assessment:     -- Quality of Life: 7/10    Financial Assessment:  Medication availability/affordability: Patient has had no issues obtaining medication from pharmacy.    Patient reiterated side effect of muscle and joint pain. Reports that ibuprofen was irritating her stomach, but was only medication that she'd found to help the pain. Has been avoiding NSAIDs and tylenol due to risk of gastritis and hepatic inflammation. I recommended the patient look into diclofenac gel OTC and call with any questions.     All questions addressed and patient had no additional concerns. Patient has pharmacy contact information.    Name/Credentials Ethel Ortiz PharmD    5/18/2023  09:39 EDT     Thanks,   Joana Abdullahi, JoshuaD, BCPS

## 2023-07-03 ENCOUNTER — TELEPHONE (OUTPATIENT)
Dept: ONCOLOGY | Facility: CLINIC | Age: 38
End: 2023-07-03

## 2023-07-03 NOTE — TELEPHONE ENCOUNTER
Caller: Hu Houston    Relationship: Self    Best call back number: [phone]    What is the best time to reach you: ANYTIME    Who are you requesting to speak with (clinical staff, provider, specific staff member): SCHEDULING    What was the call regarding: PLEASE CALL PATIENT TO R/S HER 7/5 APPT.

## 2023-07-31 ENCOUNTER — TELEPHONE (OUTPATIENT)
Dept: ONCOLOGY | Facility: CLINIC | Age: 38
End: 2023-07-31
Payer: MEDICAID

## 2023-08-09 ENCOUNTER — PRIOR AUTHORIZATION (OUTPATIENT)
Dept: ONCOLOGY | Facility: CLINIC | Age: 38
End: 2023-08-09
Payer: MEDICAID

## 2023-08-09 ENCOUNTER — LAB (OUTPATIENT)
Dept: LAB | Facility: HOSPITAL | Age: 38
End: 2023-08-09
Payer: MEDICAID

## 2023-08-09 ENCOUNTER — OFFICE VISIT (OUTPATIENT)
Dept: ONCOLOGY | Facility: CLINIC | Age: 38
End: 2023-08-09
Payer: MEDICAID

## 2023-08-09 VITALS
RESPIRATION RATE: 18 BRPM | TEMPERATURE: 98.9 F | DIASTOLIC BLOOD PRESSURE: 72 MMHG | BODY MASS INDEX: 28.31 KG/M2 | HEIGHT: 68 IN | WEIGHT: 186.8 LBS | OXYGEN SATURATION: 99 % | HEART RATE: 61 BPM | SYSTOLIC BLOOD PRESSURE: 111 MMHG

## 2023-08-09 DIAGNOSIS — C92.10 CML (CHRONIC MYELOID LEUKEMIA): ICD-10-CM

## 2023-08-09 DIAGNOSIS — T45.1X5S ADVERSE EFFECT OF ANTINEOPLASTIC DRUG, SEQUELA: ICD-10-CM

## 2023-08-09 DIAGNOSIS — R53.82 CHRONIC FATIGUE: ICD-10-CM

## 2023-08-09 DIAGNOSIS — R93.89 ABNORMAL IMAGING OF THYROID: ICD-10-CM

## 2023-08-09 DIAGNOSIS — C92.10 CML (CHRONIC MYELOID LEUKEMIA): Primary | ICD-10-CM

## 2023-08-09 LAB
ALBUMIN SERPL-MCNC: 4 G/DL (ref 3.5–5.2)
ALBUMIN/GLOB SERPL: 1.5 G/DL
ALP SERPL-CCNC: 81 U/L (ref 39–117)
ALT SERPL W P-5'-P-CCNC: 6 U/L (ref 1–33)
ANION GAP SERPL CALCULATED.3IONS-SCNC: 16.5 MMOL/L (ref 5–15)
AST SERPL-CCNC: 17 U/L (ref 1–32)
BASOPHILS # BLD AUTO: 0.06 10*3/MM3 (ref 0–0.2)
BASOPHILS NFR BLD AUTO: 0.7 % (ref 0–1.5)
BILIRUB SERPL-MCNC: 0.2 MG/DL (ref 0–1.2)
BUN SERPL-MCNC: 10 MG/DL (ref 6–20)
BUN/CREAT SERPL: 15.9 (ref 7–25)
CALCIUM SPEC-SCNC: 9.1 MG/DL (ref 8.6–10.5)
CHLORIDE SERPL-SCNC: 104 MMOL/L (ref 98–107)
CO2 SERPL-SCNC: 19.5 MMOL/L (ref 22–29)
CREAT SERPL-MCNC: 0.63 MG/DL (ref 0.6–1.1)
DEPRECATED RDW RBC AUTO: 41.2 FL (ref 37–54)
EGFRCR SERPLBLD CKD-EPI 2021: 115.9 ML/MIN/1.73
EOSINOPHIL # BLD AUTO: 0.2 10*3/MM3 (ref 0–0.4)
EOSINOPHIL NFR BLD AUTO: 2.2 % (ref 0.3–6.2)
ERYTHROCYTE [DISTWIDTH] IN BLOOD BY AUTOMATED COUNT: 12.7 % (ref 12.3–15.4)
GLOBULIN UR ELPH-MCNC: 2.6 GM/DL
GLUCOSE SERPL-MCNC: 77 MG/DL (ref 65–99)
HCT VFR BLD AUTO: 43.2 % (ref 34–46.6)
HGB BLD-MCNC: 14.1 G/DL (ref 12–15.9)
IMM GRANULOCYTES # BLD AUTO: 0.03 10*3/MM3 (ref 0–0.05)
IMM GRANULOCYTES NFR BLD AUTO: 0.3 % (ref 0–0.5)
LYMPHOCYTES # BLD AUTO: 1.68 10*3/MM3 (ref 0.7–3.1)
LYMPHOCYTES NFR BLD AUTO: 18.3 % (ref 19.6–45.3)
MCH RBC QN AUTO: 29.2 PG (ref 26.6–33)
MCHC RBC AUTO-ENTMCNC: 32.6 G/DL (ref 31.5–35.7)
MCV RBC AUTO: 89.4 FL (ref 79–97)
MONOCYTES # BLD AUTO: 0.68 10*3/MM3 (ref 0.1–0.9)
MONOCYTES NFR BLD AUTO: 7.4 % (ref 5–12)
NEUTROPHILS NFR BLD AUTO: 6.51 10*3/MM3 (ref 1.7–7)
NEUTROPHILS NFR BLD AUTO: 71.1 % (ref 42.7–76)
NRBC BLD AUTO-RTO: 0 /100 WBC (ref 0–0.2)
PLATELET # BLD AUTO: 211 10*3/MM3 (ref 140–450)
PMV BLD AUTO: 10.3 FL (ref 6–12)
POTASSIUM SERPL-SCNC: 4 MMOL/L (ref 3.5–5.2)
PROT SERPL-MCNC: 6.6 G/DL (ref 6–8.5)
RBC # BLD AUTO: 4.83 10*6/MM3 (ref 3.77–5.28)
SODIUM SERPL-SCNC: 140 MMOL/L (ref 136–145)
WBC NRBC COR # BLD: 9.16 10*3/MM3 (ref 3.4–10.8)

## 2023-08-09 PROCEDURE — 85025 COMPLETE CBC W/AUTO DIFF WBC: CPT

## 2023-08-09 PROCEDURE — 80053 COMPREHEN METABOLIC PANEL: CPT

## 2023-08-09 PROCEDURE — 36415 COLL VENOUS BLD VENIPUNCTURE: CPT

## 2023-08-09 NOTE — PROGRESS NOTES
REASONS FOR FOLLOWUP:    Chronic myelogenous leukemia with splenomegaly, chronic phase, positive [place] chromosome, no mutation at kinase domain diagnosed in November 2013.   Patient is on dose reduced Sprycel 50 mg daily with undetectable disease by RT-PCR.              HISTORY OF PRESENT ILLNESS: The patient is a 38 y.o. female with the above mentioned history, who presents today 8/9/2023 for a 4-month follow-up evaluation.    The patient reports recently she went to [facility] because of pain in the neck. She was in the ER on 7/9/2023 and subsequently had an MRI for the cervical spine and the thoracic spine on 7/10/2023 for further evaluation. The study reported tiny central disc protrusion of the C6-7. Also, there was incidental finding of left thyroid nodule 1 cm. Radiologist recommended to have ultrasound examination. Otherwise, the MRI of the cervical spine has no significant abnormalities. The thoracic MRI examination reported normal results.    Patient otherwise has no specific complaints. She has chronic fatigue ever since she has been on treatment for CML. She also has some intermittent leg cramping. Otherwise, no specific complaints. She has no weight loss. No nausea, vomiting, no skin rashes.    Laboratory studies today 8/9/2023 reports normal CBC with total WBC 9,160, including neutrophils 6500, lymphocytes 1680, monocytes 680, and normal hemoglobin 14.1, platelets 211,000. Chemistry lab reported that unremarkable with CMP except a mildly decreased bicarbonate at 19.5 and elevated anion gap of 16.5.    Her laboratory study on 4/12/2023 reported  negative for BCR-ABL by RT-PCR, for both the p210 and the p190 products.        Past Medical History:   Diagnosis Date    Anemia in neoplastic disease     Asthma     childhood    Chiari I malformation     eval by Dr Moore, [place] 2016    Chronic myelogenous leukemia     remission, Dr Castle follows    Chronic pain     CML (chronic myelocytic leukemia)      Cystitis     Depression     GERD (gastroesophageal reflux disease)     ulcer    H/O Iron deficiency anemia     H/O Lower extremity pain     Resolved.    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      *  Hysterectomy in October 2018      *  Acute hepatitis C in early 2019, treated successfully by Dr. Karyn Mendieta.     OB/GYN HISTORY: Menarche age 9. G5, P4, 1 miscarriage of the third pregnancy. First pregnancy at age 17. Patient underwent partial hysterectomy in 2018.       HEMATOLOGIC/ONCOLOGIC HISTORY:   * Chronic myelogenous leukemia with splenomegaly, chronic phase, positive [place] chromosome, no mutation at kinase domain diagnosed in November 2013.   Patient was started on hydroxyurea temporarily on 10/23/2013 with significant drop of WBC counts.    Patient started on Sprycel on 12/02/2013.   Peripheral blood tested with 3.4% of cells positive for BCR-ABL translocation, tested 02/17/2014.    The patient had CCyR 6 months into treatment as tested on 05/15/2014.    Complete molecular response as tested 08/08/14 with negative product of BCR/ABL.   There was interruption of her treatment due to insurance coverage and misunderstanding from patient's part, she had a relapse of disease with BCR/ABL product at 12.626% in March 2016, and she restarted back on Sprycel.   Good response as tested on 08/31/2016 with BCR/ABL at 0.294%.    Since June 2017, patient has been persistently tested negative for BCR/ABL by RT-PCR every 3 month period.     Patient reported worsening leg cramping in end of July 2018.  Sprycel was decreased to 50 mg daily.  Laboratory studies on 8/31/2018, on 1/30/2019 and on 3/29/2019 were negative for  BCR-ABL by RT-PCR.    Sprycel was on hold during treatment for acute hepatitis C in early part of 2019.  It was resumed in July 2019.  She was weakly positive for BCR-ABL.  On 10/29/2019 BCR/ABL1 MAJOR (p210) and BCR/ABL1 MINOR (p190) were not detected.    BCR-ABL by RT-PCR with 0% on 1/24/2020 and also on 4/24/2020.  Patient was tested negative on 7/10/2020.  Negative on 10/9/2020.    Because of palpitation, Sprycel was on hold since 11/17/2020.     BCR-ABL by RT-PCR was complete negative as reported on 1/06/2021.  Sprycel was resumed on 2/9/2021 at 50 mg daily.  Continue BCR-ABL by RT-PCR in May 2021 in October 2021.          Laboratory results on 09/23/2015 showed normalization of hemoglobin 12.2, but still has macrocytosis, MCV 73.3. She has normal platelets and WBC at 9400. Serum ferritin < 5 ng/ml, iron sats 6.3%, iron 29, TIBC 455 mcg/ml. Still has severe iron deficiency, needs to continue oral iron therapy. The patient restarted taking oral iron supplementation which was probably stopped in the end of November 2015.        Laboratory study on 03/22/2016 reported normal WBC 8450, neutrophils 6100, lymphs is 1400 and monocytes 550. Serum iron was 26, TIBC 469 on saturation 6% and ferritin less than 5 NG/ML. Chemistry lab reported normal renal function with a creatinine 0.69, normal liver function panel, total protein 7.5 and albumin 4.2, normal electrolytes. Patient was restarted back on oral on sedimentation twice a day with good tolerance.      Patient continues take Lortab as needed for left leg cramping. Urine drug test on 08/05/2016 was completely negative, and repeated study on August 26 was positive for opiate, negative for other recreational drugs.      Repeat laboratory study on 08/31/2016 reported iron 21, ferritin less than 5, iron saturation 5%, TIBC 462. Hemoglobin was 11.5 MCV 78.1 MCHC 30.4. Platelets 163,000. Total WBC 8870 including neutrophils 6400 and lymphocytes 1500. BCR/ABL was 0.294% by  RT-PCR method.       Patient was given IV Feraheme treatment in September 2016, with 2 doses. Repeated laboratory study on 10/06/2016 reported significantly improved and normalized ferritin 269, iron 80, TIBC 365 and iron saturation 22%. Her hemoglobin was 12.2, and MCV 80.8.      Patient reported she was seen by Dr. Fam in 10/2016 and was started on half tablets of Topamax. I reviewed Dr. Fam's clinic note and telephone conversation record. The patient reports she has no recurrence of migraine headache. However, she is very tearful today, reporting that she has thoughts of not taking any medications. She did assure me that she has been taking the medication up to this point. 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 The patient denies suicide ideation. When she was initially diagnosed of CML back in 2014, she had depression and I started the patient on Prozac. The patient reported initially it helped her, however, she no longer feels the effect of Prozac. She feels depressed. The patient reports she has no personal hobby. She goes to work and goes home, taking care of her kids. She does not enjoy life as she used to.      Laboratory study on December 29, 2016 reported at ferritin 19.9, iron 33, TIBC 347 iron saturation 10%.  Hemoglobin was 13, normal WBC and platelets.  Unremarkable CMP.  Patient was given 2 doses of Feraheme treatment in early January 2017.      Cytogenetic study on March 14, 2017 reported a BCR-ABL products at 0.098%, showed further improved residual disease.  There is also reported a ferritin 103.7, iron 79, TIBC 336 and iron saturation 24%.  Hemoglobin was 13.5, MCV 92.3, platelets 199,000 WBC 7000.  She had a completely normal CMP.       Repeated laboratory study on June 20, 2017 reported at nondetectable BCR-ABL product.  Ferritin was 45, iron 35 TIBC 333 and iron saturation  11%.  Had a normal CBC and CMP.    In the end of July 2018, patient called reporting worsening significant leg cramping, and getting worse recently and has been taking 6 tablets of Advil with no significant improvement.  We suspect it might be caused by Sprycel for her CML.  We discussed with patient, and decreased to half dose at 50 mg daily.  Patient reports that she is improved leg cramping since dose reduction.    Per medical records this patient had emergency room visit again on 8/18/2018.  Patient reports she had significant abdomen/pelvic pain at a time associate with nausea.  Laboratory study showed significantly elevated WBC at 25,900 including neutrophils 24,400, with elevated hemoglobin 15.8 and platelets 146,000.     She had a thorough investigation, including CT scan for abdomen pelvis which was unremarkable.  She then had ultrasound for the pelvis and it was also unremarkable.  She had ultrasound for the gallbladder examination on the same day and was unremarkable.  She had a normal CMP except of marginally elevated glucose at 112.  Her urinalysis showed only marginally increased WBC 3-5/HPF.  She was negative for yeast infection, negative for Chlamydia trichomonas and Neisseria gonorrhea.  Patient was prescribed Flagyl and doxycycline and discharged to home.      lab study on 4/30/2019 reported normal iron saturation 47% and elevated ferritin 507.1 ng/mL with free iron 184 and TIBC 388.  hemoglobin is normal at 13.7 and platelets 186,000. normal WBC at 5080 including ANC 3300, lymphocytes 930 and monocytes 630. Labs reported significantly elevated ALT at 655,  and alkaline phosphatase 234 but normal total bilirubin 0.9. She had normal renal function creatinine 0.78. Normal electrolytes.       On 04/30/2019, I searched Up-to-Date and suspected that this patient had drug-induced hepatitis from Diflucan or with combination of Diflucan with Sprycel. This patient had been on Sprycel for years and  had no problem with abnormal liver panel previously. It seems Diflucan inhibits CY moderately, which likely interferes with the metabolism of Sprycel. I instructed the patient to hold her Sprycel for now.    Sprycel treatment resumed as of 2019 upon completion of MEVYRET for her hepatitis C.    Laboratory study performed on 2019 showed normal CBC and CMP. BCR/ABL by RT-PCR detected BCR/ABL1 Major. HCV RNA by PCR showed HCV not detected.    Laboratory studies 2019 report her hemoglobin is normal at 15.2 and platelets 146,000. normal WBC at 7650 including ANC 5190, lymphocytes 1600 and monocytes 630.    Recent laboratory studies confirmed to be no detectable virus on 2019.    U/S Liver performed on 10/24/2019 reported negative hepatic ultrasound exam with no focal liver lesion, negative gallbladder examination with no cholelithiasis or bile duct dilation noted, however borderline splenomegaly was noted.     Laboratory study performed on 10/29/2019, reported a completely normal CBC, CMP. Normal iron saturation and still slightly elevated ferritin 325 ng/dL  BCR/ABL1 MAJOR (p210) and BCR/ABL1 MINOR (p190) were not detected.     Laboratory studies 2020, show hemoglobin 14.3 MCV 92.6 platelets 180,000 and WBC 6570 including neutrophils 4400.  She also has completely normal CMP.  Iron studies reported ferritin 273, iron saturation 11%, hemoglobin 14.3 WBC 6570 and platelets 180,000.  BCR-ABL by RT-PCR with 0%.     Patient presented today for 3-month follow-up and lab review.      Due to pandemic coronavirus infection, patient has been staying home with all 4 children.  Patient reports significant fatigue for the past month, similar to the time when she had iron deficiency.  Patient reports prior to that she was having regular exercise and with good energy level.    Patient reports compliant with Sprycel 50 mg daily.  She denies leg cramping.  She has no nausea no vomiting no abdominal pains.   She has good appetite and no diarrhea.  She denies headaches vision changes no dizziness or seizure activity.    Her laboratory study on 3/12/2020 reported marginal iron saturation 15% however had a good ferritin 212.5 ng/mL.  She had hemoglobin 13.1 and normal thyroid profile including TSH 1.20, free T4 at 1.37 ng/dL and a free T3 at 2.97 pg/mL     On 4/24/2020 her iron study showed ferritin 262 and iron saturation 21%.  Hemoglobin is 14.8.  She has normal WBC 5740 including ANC 3820, and normal platelets 161,000.   She was negative for BCR-ABL by RT-PCR on the same day.    Laboratory studies, 7/10/2020, show completely normal CBC and CMP.  Negative for BCR-ABL by RT-PCR.  Iron studies showed ferritin 185, iron saturation 13% free iron 37 TIBC 286.    Patient has completed normal CBC 10/9/2020.  Iron studies reported ferritin 2070, free iron 13%.  She also has completed normal CMP.  She was tested negative for BCR-ABL by RT-PCR method.  Sprycel 50 mg daily was continued.     We saw her recently on 10/9/2020 for routine follow-up for her CML.  She was tolerating Sprycel.  Physical examination laboratory studies were unremarkable.  No detectable BCR-ABL by RT-PCR.  We continued her Sprycel at that time.     On 11/17/2020, patient called and reported chest tightness, palpitation and dyspnea new onset in November 2020.  Patient reports this happened recently.  She has chest tightness, and associated tachycardia/palpitation.  Patient reports this is unpredictable, can be on and off.  She noticed that one day she was cooking meal for her family, and noted sudden onset palpitation and chest tightness.  She reports unable to induce purposefully.  She also has exertional dyspnea.  Patient reports 11/17/2020, we asked patient to hold Sprycel.  We suspect the patient may have pleural effusion or cardiac dysfunction secondary to Sprycel, we requested chest CT, echocardiogram study and also laboratory study for evaluation.      Negative chest x-ray on 11/17/2020.     Laboratory study on 11/17/2020 reported stable chronic condition with a ferritin 261 and iron saturation 12%, normal hemoglobin 15.0, unable to explain her dyspnea.     Echocardiogram study on 11/18/2020 reported normal results.     Laboratory study on 1/6/2021 reported hemoglobin 15.9 hematocrit 47.1%, platelets 181,000 WBC 9360 including ANC 7130 lymphocytes 1350. Iron study reported ferritin 293 and iron saturation 14% with free iron 47 and a TIBC 326.  Chemistry lab reported unremarkable CMP including renal function and liver function panel.      Patient reports she was seen by cardiologist Dr. Flowers on 2/4/2021.  Patient had thorough cardiology evaluation and there was no apparent abnormalities.    We have been withholding her Sprycel in middle November 2020 because of palpitation, and referred patient to cardiology service.        Fortunately, her peripheral blood BCR-ABL by RT-PCR was complete negative, as reported on 1/12/2021.     Sprycel was resumed on 2/9/2021.      Laboratory studies on 3/16/2021 reported normal CBC including hemoglobin 14.4 MCV 88.7, platelets 173,000, and WBC 6400 including ANC 3770 lymphocytes 1800.  Chemistry lab reported normal CMP.     Laboratory studies on 5/4/2021 reported completely normal CBC and CMP.  Iron study also normal with ferritin 219 and iron saturation 16%.  Negative study for BCR-ABL by RT-PCR.     Laboratory study on 10/5/2021 reported complete normal CBC and CMP.  Iron study reported normal ferritin 250 ng/mL and iron saturation 21% with free iron 63 TIBC 300.  She was also complete negative for BCR-ABL by RT-PCR.    The patient complains of leg pain and bone pain. She recently had an EGD done. Her stomach was inflamed due to taking ibuprofen. She was prescribed Carafate 4 times a day for 1 week. She was only taking it 2 to 3 times a day. It caused constipation.    Laboratory study today on 4/12/2023 reports normal  "hemoglobin 14.3, WBC 6430 including neutrophils 4130, lymphocytes 1560, monocytes 480, and normal platelets 181,000. BCR-ABL is pending.          MEDICATIONS: The current medication list was reviewed with the patient and updated in the EMR this date per the medical assistant. Medication dosages and frequencies were confirmed to be accurate.        Allergies   Allergen Reactions    Sulfa Antibiotics Unknown - Low Severity     Childhood reaction     SOCIAL HISTORY: . She smoked cigarettes previously, quit in 2006 with 8-pack-year history. Social drinker, maybe once a month. No illegal drug use. No risk for HIV except tattoos.  Hairstylist.       FAMILY HISTORY: Maternal grandmother had esophageal/stomach adenocarcinoma diagnosed at age of 72 and  of cancer at age of 73. The patient' s mother has hypertension but otherwise healthy.  No other family history of malignancy, especially no leukemia.          VITAL SIGNS:   Vitals:    23 1319   BP: 111/72   Pulse: 61   Resp: 18   Temp: 98.9 øF (37.2 øC)   TempSrc: Temporal   SpO2: 99%   Weight: 84.7 kg (186 lb 12.8 oz)   Height: 172.7 cm (67.99\")   PainSc: 0-No pain     ECOG 0          Physical Exam  Vitals and nursing note reviewed.   Constitutional:       Appearance: Normal appearance. She is well-developed.   HENT:      Head: Normocephalic and atraumatic.      Nose: Nose normal.      Mouth/Throat:      Pharynx: No oropharyngeal exudate.   Eyes:      Conjunctiva/sclera: Conjunctivae normal.   Cardiovascular:      Rate and Rhythm: Normal rate and regular rhythm.      Heart sounds: Normal heart sounds. No murmur heard.  Pulmonary:      Effort: Pulmonary effort is normal. No respiratory distress.      Breath sounds: Normal breath sounds. No wheezing.   Abdominal:      General: Bowel sounds are normal. There is no distension.      Palpations: Abdomen is soft.      Tenderness: There is no abdominal tenderness.   Musculoskeletal:      Cervical back: Neck " supple.      Right lower leg: No edema.      Left lower leg: No edema.   Lymphadenopathy:      Cervical: No cervical adenopathy.      Upper Body:      Right upper body: No supraclavicular adenopathy.      Left upper body: No supraclavicular adenopathy.   Skin:     General: Skin is warm and dry.      Findings: No bruising.   Neurological:      Mental Status: She is alert and oriented to person, place, and time. Mental status is at baseline.   Psychiatric:         Mood and Affect: Mood normal.         Behavior: Behavior normal.     IMAGING:    MRI Thoracic Spine Without Contrast  Narrative: THORACIC MRI WITHOUT CONTRAST         HISTORY:  Neck and upper back pain radiating to the left shoulder blade worse  after chiropractic treatment     TECHNIQUE:  Multiplanar imaging of the thoracic spine was performed with short and  long TR     FINDINGS:  Alignment is satisfactory. The thoracic discs are normal. The canal is  normal in size. No disc bulging or herniation. The thoracic spinal cord  has a normal appearance. There is no evidence of marrow edema.  Paraspinous structures are unremarkable.     Impression: Normal     This report was finalized on 7/10/2023 2:46 PM by Dr. Carson Alcaraz MD.     MRI Cervical Spine Without Contrast  Narrative: CERVICAL MRI         HISTORY:  Neck pain after chiropractic manipulation     TECHNIQUE:  Multiplanar imaging the cervical spine was performed with short and long  TR     FINDINGS:  The craniocervical junction is widely patent.     The upper cervical discs are normal. No significant bulging or  herniation is noted. There is minimal central disc protrusion at C6-7 of  approximately 2 mm with no cord contact or compression. The C7-T1 disc  is normal.     The cord is normal in size and signal. Normal flow voids are seen in the  vertebral arteries. No marrow edema is noted. The cervical facets are  intact. There is a 1 cm left thyroid nodule. It has intermediate signal  on T1 and T2-weighted  imaging and could represent a colloid cyst.  Consider ultrasound for further evaluation if this has not been  evaluated in the past.     Impression: Tiny central disc protrusion C6-7. Left thyroid nodule.  Otherwise negative.     This report was finalized on 7/10/2023 2:45 PM by Dr. Carson Alcaraz MD.         LABORATORY DATA:    Lab Results   Component Value Date    WBC 9.16 08/09/2023    HGB 14.1 08/09/2023    HCT 43.2 08/09/2023    MCV 89.4 08/09/2023     08/09/2023     Lab Results   Component Value Date    NEUTROABS 6.51 08/09/2023     Lab Results   Component Value Date    GLUCOSE 77 08/09/2023    BUN 10 08/09/2023    CREATININE 0.63 08/09/2023    EGFR 115.9 08/09/2023    BCR 15.9 08/09/2023    K 4.0 08/09/2023    CO2 19.5 (L) 08/09/2023    CALCIUM 9.1 08/09/2023    ALBUMIN 4.0 08/09/2023    BILITOT 0.2 08/09/2023    AST 17 08/09/2023    ALT 6 08/09/2023           ASSESSMENT:      1. Chronic myelogenous leukemia, chronic phase with excellent response to Sprycel and complete molecular response in August 2014. However she had interuption of treatment in early part of 2016, because of insurance issues and miscommunication, she stopped the medicine without telling us, and laboratory test on 03/22/2016 reported disease relapse with increased BCR/ABL product at 12.626%. subsequently she was restarted back on Sprycel, and responded well.  Since June 2017, she has completed molecular response as tested every 3 months.  She has been compliant with treatment.   In the end of July 2018, she reported worsening significant cramping involving her legs, so we decreased her Sprycel to 50 mg daily starting early August.  She's been having less problem since that time.  She was tested negative for BCR-ABL on 8/31/2018.    Patient had a negative BCR-ABL by RT-PCR in end of January 2019 and also on 3/29/2019.   Patient was later found having significantly elevated liver function panel.  Sprycel was on hold and she was  subsequently found having acute hepatitis C infection.    I discussed with Dr. Karyn Mendieta, we'll hold her Sprycel for now until she finishes hepatitis C treatment.   She was started on hepatitis C treatment on 4/18/2019 and finished an 8-week course.  On 07/02/2019 patient's labs showed BCR-ABL Major (p210) detected by RT-PCR at 0.0141%. BCR/ABL1 Minor (p190) was not detected. HCV was not detected.  Sprycel treatment was resumed as of 07/02/2019 upon completion of MEVYRET.  Laboratory study on 10/29/2019 reported normal CBC. BCR/ABL1 MAJOR (p210) and BCR/ABL1 MINOR (p190) were not detected.    BCR-ABL by RT-PCR on 1/24/2020 was negative.     Lab result for BCR ABL by RT-PCR was also negative on 4/24/2020. Patient will need to continue on sprycel treatment.  7/10/2020 patient has normal CBC and CMP.  Negative RT-PCR for BCR ABL.  Tolerating well.  Continue Sprycel at 50 mg daily.  On 10/9/2020, completely normal CBC with good tolerance.  BCR-ABL was tested negative by RT-PCR method.  Continue Sprycel.   Patient reports 11/17/2020 chest tightness in palpitation, and exertion dyspnea progressively getting worse in the past week.  We asked patient to hold Sprycel.   Chest x-ray examination on 11/17/2020 was unremarkable.  Echocardiogram study on 11/18/2020 was also benign.  Patient was seen by cardiologist for further evaluation.    Laboratory studies on 1/6/2021 reported normal WBC 9360 including ANC 7130 lymphocytes 1350. BCR-ABL by RT-PCR was complete negative, as reported on 1/12/2021.   Patient had negative cardiac work-up.  She also has resolution of symptoms.  Discussed with patient on 2/9/2021, we recommended resumption of Sprycel 50 mg daily.  Patient is agreeable.  On 3/16/2021, patient reports tolerating Sprycel, no recurrent symptoms of dyspnea, chest tightness or palpitation.  Continue Sprycel 50 mg daily.  On 5/4/2021, patient has normal CBC and CMP.  Negative results for BCR-ABL by RT-PCR for P210 and P190  as reported on 5/12/2021.    On 10/5/2021 patient presented for reevaluation.  She reports tolerating Sprycel.  No specific side effects reported.  Maintains normal CBC and negative BCR-ABL.  We will continue treatment for now.  On 12/21/2021, patient reports good tolerance to Sprycel.  Normal CBC CMP.  Negative study by peripheral blood RT-PCR.  Continue Sprycel.   On 3/14/2022, patient reports excellent tolerance to Sprycel at 50 mg daily.  Maintains normal CBC and CMP.  6/8/2022 continues on Sprycel 50 mg daily tolerating well.  Patient was negative for BCR-ABL by RT-PCR.  On 10/7/2022 patient reports compliance with Sprycel and good tolerance.  We will continue treatment.  1/13/2023 continues on Sprycel 50 mg daily, tolerating well.  BCR-ABL was completely negative result for both the p210 and the p180 fusion products.  The patient presents for 3-month follow-up evaluation 4/12/2023. She continues on Sprycel 50 mg daily. She tolerated it well except having some pain in the lower extremities, which she stated has improved since decreased from 100 mg to 50 mg daily. She has been taking ibuprofen for that. Nevertheless, she recently had an EGD examination which led to the discovery of duodenitis. I discussed with the patient about switching to a different TKI, we searched Up-to-Date. We found that bosutinib, and dasatinib could all cause similar side effects with arthralgia, muscle pain, limb pain, even the new medicine ponatinib could have caused the similar side effects at about the same kind of percentages. Discussed with patient if she is concerned about switching off the current Sprycel, which she really has excellent response.  Patient decided to continue Sprycel.  Study on 04/12/2023 was negative for BCR-ABL by RT-PCR.  On 8/9/2023 patient reports tolerating treatment.  She is at her baseline condition.  Patient has normal CBC.  We will continue Sprycel.      2. Recurrent Iron deficiency anemia.    She was  treated again with Feraheme October 2017.   Iron deficiency thought to be related to ulcer identified on EGD, being treated with Carafate.  She also had heavy menses.  She had recurrent iron deficiency again and was given Feraheme 2 doses in March 2018.   Subsequently recurrent iron deficiency in July 2018, she was switched to Venofer 3 doses total 900 mg.   Patient had simple hysterectomy in October 2018.  Laboratory study showed supratherapeutic ferritin 458 and iron saturation 40% on 4/30/2019.   Laboratory study performed 10/29/2019, showed normal iron saturation and ferritin 325 ng/dL.  Normal iron studies including ferritin 262 and iron saturation 21% on 4/24/2020.  No evidence of recurrent iron deficiency.   Lab study on 7/10/2020 reported ferritin 185, iron saturation 13% and normal hemoglobin 14.5.  She has deteriorating iron studies.   Continue to monitor in 3 months.  Labs on 10/9/2020 reported ferritin 270, iron saturation 13% and hemoglobin 14.9.  Lab studies on 1/6/2021 reported mild erythrocytosis.  Hemoglobin is 15.9 and hematocrit of 47.1%, with ferritin 293 and iron saturation 14%.    Normal hemoglobin 14.4 on 3/16/2021.    On 5/4/2021 lab study reported hemoglobin 13.7, ferritin 219 and iron saturation 16%.    On 10/5/2021, patient maintains normal hemoglobin.  Iron study reported further improved ferritin 250 ng/mL and iron saturation 21%.  Since her hysterectomy, patient has no recurrence of iron deficiency.  Discussed with patient today, there is no need for routine monitoring of iron study from now.    On 3/14/2022, normal hemoglobin 14.3.  6/8/2022 hemoglobin 13.6.  On 10/7/2022 patient has normal hemoglobin 14.1.  1/13/2023 Hgb 13.6  On 4/12/2023 patient has normal hemoglobin 14.3.  Patient has normal hemoglobin 14.1 on 08/09/2023.          3.  Chest tightness, palpitation and dyspnea new onset in November 2020.  Patient reports this happened recently, and that usually unpredictable, she has  chest tightness, and associated tachycardia/palpitation.  Patient reports this is unpredictable, can be on and off.  She noticed 1 day she was cooking meal for her family, and noted several palpitation and chest tightness.  She reports unable to induce purposefully.  She also has exertional dyspnea.  Patient reports 11/17/2020, we asked patient to hold Sprycel.  We requested chest CT, echocardiogram study and also laboratory study for evaluation.  Negative chest x-ray on 11/17/2020.   Laboratory study on 11/17/2020 reported stable chronic condition with a ferritin 261 and iron saturation 12%, normal hemoglobin 15.0, unable to explain her dyspnea.   Echocardiogram study on 11/18/2020 reported normal results.   Patient is evaluated 11/20/2020, she reports somewhat improved symptoms, since she stopped Sprycel for the past 3 days.  She denies extremity edema.  She denies fever sweating or chills.  We recommend patient to continue to hold Sprycel for another 2 weeks.  We also recommended patient to be tested for COVID-19.    Reevaluation on 12/4/2020, patient reports that she did not get a Covid test.  She denies other illness such as fever sweating nausea vomiting, diarrhea, change of taste, cough etc.  Discussed with patient, will check a thyroid panel, which turned out to be normal on 12/4/2020.    Patient was referred to cardiologist for evaluation of hypertension.  She was seen by Dr. Flowers on 12/21/2020 and the case waiting for further evaluation.  Patient had a negative cardiac work-up.  Her symptom has resolved.  Patient was restarted on Sprycel 2/9/2021.  She reports no recurrent symptoms 3/16/2021.  On 5/4/2021, patient reports no recurrent symptoms.  On 10/5/2021 patient reports no dyspnea no chest pain or discomfort.   On 12/21/2021, patient reports no recurrent symptoms.  On 3/14/2022, patient has no chest pain no dyspnea or pressureness on chest.  6/8/2022 no complaints of this today.  No recurrent  "symptoms as reported on 4/12/2023.No specific complaints today.  No specific complaints today on 8/9/2023.        4.  COVID-19 vaccination.  Patient reports receiving none 2 doses of the Moderna vaccine.    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   On 10/7/2022 patient reports she was not infected with COVID-19, despite recently her  and her children were infected.    5. Incidental finding of left thyroid nodule by cervical spine MRI examination for the assessment of neck pain  Patient is asymptomatic.   Today's physical examination has no abnormal discovery in the thyroid area, no palpable nodule. I will request ultrasound for the thyroid for further evaluation as recommended by radiologist.    PLAN:    Pending study results for BCR-ABL by RT-PCR.  We will arrange patient to have a thyroid ultrasound examination for further evaluation of thyroid nodule.  Continue Sprycel 50 mg daily.  Continue B complex and vitamin B12.  Patient will see me in 3 months for reevaluation, we will check CBC, CMP, and BCR-ABL by RT-PCR.  Call/ return sooner should she develop any new concerns or problems.    Thyroid nodule is a new problem today.     I discussed with the patient about laboratory results and further management plan.  Patient voiced understanding and agreeable.    Patient is on a high risk medication requiring close monitoring for toxicity.      Isabel Castle MD PhD  08/09/2023      CC:  Jennifer More DO Anna Hart, M.D.    Luis Alfredo Flowers M.D.    Transcribed from ambient dictation for Isabel Castle MD PhD by Lucy Juarez.      MICHAEL Provider Statement:[id number]::\"Patient or patient representative verbalized consent to the visit recording.\",\"I have personally performed the services described in this document as transcribed by the above individual, and it is both accurate and " "complete.\"      Addendum:    US Thyroid  Narrative: THYROID ULTRASOUND EXAMINATION, 8/11/2023     HISTORY:   Thyroid nodules detected on MRI of the cervical spine.     COMPARISON: MRI of the cervical spine 7/10/2023     TECHNIQUE:   High-resolution grayscale ultrasound imaging of the thyroid gland was  performed.     THYROID SIZE:   *  The thyroid gland is mildly enlarged.     *  Right lobe: 0.9 x 1.8 x 5.3 cm  *  Left lobe: 1.4 x 2 x 5 cm  *  Isthmus thickness: 4 mm     THYROID FINDINGS:   Multiple bilateral predominately anechoic lesions scattered throughout  both lobes of the thyroid gland. At least 5 nodules identified within  the right lobe ranging in size from 5 to 10 mm. The larger lesion  demonstrates ringdown artifact compatible with colloid cyst. All of the  lesions demonstrate well-circumscribed margins with through  transmission. No internal hypervascularity or calcification. 2 anechoic  lesions seen in the left lobe with a 1 x 0.6 x 1.6 cm anechoic lesion  within the midportion of the left lobe with features compatible with a  colloid cyst. Smaller 1 cm nodule seen in the inferior left lobe. This  is also compatible with a colloid cyst. Intervening thyroid parenchyma  appears normal. Extrathyroidal soft tissues appear normal. Overall  vascularity within normal limits.     Impression: Multiple bilateral thyroid nodules as detailed above  compatible with colloid cysts with the largest lesion in the left lobe  measuring up to 1.6 cm and corresponding to the abnormality seen on  prior MRI. These are compatible with TI-RADS 1 and TI-RADS 2 nodules and  are considered benign. No additional follow-up recommended. Optional 1  year follow-up could be performed to confirm long-term stability.        This report was finalized on 8/11/2023 12:30 PM by Dr. PRICE Littlejohn MD.         Lab study on 8/9/2023 for BCR-ABL by RT-PCR was complete negative for the p210 and p190 products.      BAO SLADE M.D., Ph.D.  "

## 2023-08-09 NOTE — TELEPHONE ENCOUNTER
No PA needed for BCR/ABL by PCR [id number] and [id number] per [facility]. Tracking # [id number]. Ok'd lab to send to [facility].

## 2023-08-11 ENCOUNTER — HOSPITAL ENCOUNTER (OUTPATIENT)
Dept: ULTRASOUND IMAGING | Facility: HOSPITAL | Age: 38
Discharge: HOME | End: 2023-08-11
Admitting: INTERNAL MEDICINE
Payer: MEDICAID

## 2023-08-11 DIAGNOSIS — C92.10 CML (CHRONIC MYELOID LEUKEMIA): ICD-10-CM

## 2023-08-11 DIAGNOSIS — R93.89 ABNORMAL IMAGING OF THYROID: ICD-10-CM

## 2023-08-11 PROCEDURE — 76536 US EXAM OF HEAD AND NECK: CPT

## 2023-08-18 LAB — REF LAB TEST METHOD: NORMAL

## 2023-09-03 PROBLEM — R93.89 ABNORMAL IMAGING OF THYROID: Status: ACTIVE | Noted: 2023-09-03

## 2023-09-05 ENCOUNTER — SPECIALTY PHARMACY (OUTPATIENT)
Dept: PHARMACY | Facility: HOSPITAL | Age: 38
End: 2023-09-05
Payer: MEDICAID

## 2023-09-05 ENCOUNTER — TELEPHONE (OUTPATIENT)
Dept: ONCOLOGY | Facility: CLINIC | Age: 38
End: 2023-09-05
Payer: MEDICAID

## 2023-09-05 NOTE — PROGRESS NOTES
Drug: Sprycel (dasatinib)  Strength: 50 mg  Directions: Take one tablet by mouth daily  Quantity: 30  Refills: 5  Pharmacy prescription sent to: [facility]    Completed independent double check on medication order/RX.  Hadley Archuleta, JoshuaD, BCOP

## 2023-09-05 NOTE — TELEPHONE ENCOUNTER
----- Message from Isabel Castle MD PhD sent at 9/3/2023  9:20 PM EDT -----  Regarding: Ultrasound for thyroid  Roxanne,    Please tell patient her ultrasound for the thyroid was benign.  Nothing need to worry.  Also blood test is negative for leukemia clone.    Thank you!    Tin

## 2023-09-05 NOTE — PROGRESS NOTES
Re: Refills of Oral Specialty Medication - Sprycel (dasatinib)    Drug-Drug Interactions: The current medication list was reviewed and there are no relevant drug-drug interactions.  Medication Allergies: The patient has no relevant allergies as it relates to their oral specialty medication  Review of Labs/Dose Adjustments: NO DOSE CHANGE - I reviewed the most recent note and labs and the patient will continue without any dose changes.  I sent refills as described below.    Drug: Sprycel (dasatinib)  Strength: 50 mg  Directions: Take one tablet by mouth daily  Quantity: 30  Refills: 5  Pharmacy prescription sent to: [facility]    Name/Credentials: Joana Abdullahi, JoshuaD, BCPS    9/5/2023  12:54 EDT

## 2023-09-05 NOTE — TELEPHONE ENCOUNTER
Per Dr Castle patient called and informed her ultrasound for the thyroid was benign and blood test is negative for leukemia clone. Patient has nothing to worry about.    Patient v/u

## 2023-10-09 ENCOUNTER — SPECIALTY PHARMACY (OUTPATIENT)
Dept: PHARMACY | Facility: HOSPITAL | Age: 38
End: 2023-10-09
Payer: MEDICAID

## 2023-10-26 NOTE — TELEPHONE ENCOUNTER
----- Message from Isabel Castle MD PhD sent at 2/8/2019 12:23 PM EST -----  WOW    Thanks for letting me know this!      ----- Message -----  From: Jacqueline Marshall RN  Sent: 2/8/2019  12:02 PM  To: Isabel Castle MD PhD    Patients OBGYN office called to let you know that the patient has a positive Hepatitis C test from a viral antibody screen, so her docs do NOT believe she has drug induced hepatitis. They knew you were watching her liver enzymes so wanted to make you aware. They are referring her to infectious disease. Please let me know if you need us to do anything. Her GYN was contacting her about the positive test.      (5' 7.99\")       Physical Exam  Constitutional:       General: She is not in acute distress. Appearance: Normal appearance. She is well-developed. She is obese. HENT:      Head: Normocephalic and atraumatic. Right Ear: External ear normal.      Left Ear: External ear normal.      Nose: Nose normal.   Eyes:      General: No scleral icterus. Conjunctiva/sclera: Conjunctivae normal.      Pupils: Pupils are equal, round, and reactive to light. Neck:      Thyroid: No thyromegaly. Cardiovascular:      Rate and Rhythm: Normal rate and regular rhythm. Heart sounds: Normal heart sounds. No murmur heard. Pulmonary:      Effort: Pulmonary effort is normal. No accessory muscle usage or respiratory distress. Breath sounds: Normal breath sounds. No wheezing. Musculoskeletal:         General: Normal range of motion. Cervical back: Normal range of motion and neck supple. Right lower leg: No edema. Left lower leg: No edema. Skin:     General: Skin is warm and dry. Findings: No rash. Neurological:      Mental Status: She is alert and oriented to person, place, and time. Deep Tendon Reflexes: Reflexes are normal and symmetric. Comments: Diabetic foot exam: No lesions seen. Pulses: intact, Monofilament: intact 6 sites on the left, no sensation distal great toe on the right, otherwise sensation preserved      Psychiatric:         Mood and Affect: Mood and affect normal.         Speech: Speech normal.         Behavior: Behavior normal.         Assessment/Plan:      Diagnoses and all orders for this visit:    Controlled type 2 diabetes mellitus without complication, without long-term current use of insulin (HCC)  -     POCT glycosylated hemoglobin (Hb A1C)  -     POCT Microalbumin  -     Comprehensive Metabolic Panel; Future  -     Diabetic Foot Exam  -stable, continue meds the same    Missed period  -     POCT urine pregnancy  -     HCG, Quantitative, Pregnancy;  Future  -

## 2023-11-06 ENCOUNTER — LAB (OUTPATIENT)
Dept: LAB | Facility: HOSPITAL | Age: 38
End: 2023-11-06
Payer: MEDICAID

## 2023-11-06 ENCOUNTER — OFFICE VISIT (OUTPATIENT)
Dept: ONCOLOGY | Facility: CLINIC | Age: 38
End: 2023-11-06
Payer: MEDICAID

## 2023-11-06 ENCOUNTER — PRIOR AUTHORIZATION (OUTPATIENT)
Dept: ONCOLOGY | Facility: CLINIC | Age: 38
End: 2023-11-06
Payer: MEDICAID

## 2023-11-06 VITALS
OXYGEN SATURATION: 98 % | SYSTOLIC BLOOD PRESSURE: 100 MMHG | RESPIRATION RATE: 18 BRPM | HEIGHT: 68 IN | BODY MASS INDEX: 29.24 KG/M2 | TEMPERATURE: 98.2 F | HEART RATE: 68 BPM | WEIGHT: 192.9 LBS | DIASTOLIC BLOOD PRESSURE: 68 MMHG

## 2023-11-06 DIAGNOSIS — C92.10 CML (CHRONIC MYELOID LEUKEMIA): ICD-10-CM

## 2023-11-06 DIAGNOSIS — Z86.2 HISTORY OF IRON DEFICIENCY ANEMIA: ICD-10-CM

## 2023-11-06 DIAGNOSIS — C92.10 CML (CHRONIC MYELOID LEUKEMIA): Primary | ICD-10-CM

## 2023-11-06 LAB
ALBUMIN SERPL-MCNC: 4.1 G/DL (ref 3.5–5.2)
ALBUMIN/GLOB SERPL: 1.4 G/DL
ALP SERPL-CCNC: 84 U/L (ref 39–117)
ALT SERPL W P-5'-P-CCNC: 12 U/L (ref 1–33)
ANION GAP SERPL CALCULATED.3IONS-SCNC: 11.1 MMOL/L (ref 5–15)
AST SERPL-CCNC: 16 U/L (ref 1–32)
BASOPHILS # BLD AUTO: 0.04 10*3/MM3 (ref 0–0.2)
BASOPHILS NFR BLD AUTO: 0.6 % (ref 0–1.5)
BILIRUB SERPL-MCNC: 0.2 MG/DL (ref 0–1.2)
BUN SERPL-MCNC: 10 MG/DL (ref 6–20)
BUN/CREAT SERPL: 15.2 (ref 7–25)
CALCIUM SPEC-SCNC: 9.1 MG/DL (ref 8.6–10.5)
CHLORIDE SERPL-SCNC: 107 MMOL/L (ref 98–107)
CO2 SERPL-SCNC: 25.9 MMOL/L (ref 22–29)
CREAT SERPL-MCNC: 0.66 MG/DL (ref 0.6–1.1)
DEPRECATED RDW RBC AUTO: 44 FL (ref 37–54)
EGFRCR SERPLBLD CKD-EPI 2021: 114.6 ML/MIN/1.73
EOSINOPHIL # BLD AUTO: 0.1 10*3/MM3 (ref 0–0.4)
EOSINOPHIL NFR BLD AUTO: 1.5 % (ref 0.3–6.2)
ERYTHROCYTE [DISTWIDTH] IN BLOOD BY AUTOMATED COUNT: 13.3 % (ref 12.3–15.4)
GLOBULIN UR ELPH-MCNC: 2.9 GM/DL
GLUCOSE SERPL-MCNC: 78 MG/DL (ref 65–99)
HCT VFR BLD AUTO: 40.6 % (ref 34–46.6)
HGB BLD-MCNC: 13.3 G/DL (ref 12–15.9)
IMM GRANULOCYTES # BLD AUTO: 0.03 10*3/MM3 (ref 0–0.05)
IMM GRANULOCYTES NFR BLD AUTO: 0.4 % (ref 0–0.5)
LYMPHOCYTES # BLD AUTO: 1.09 10*3/MM3 (ref 0.7–3.1)
LYMPHOCYTES NFR BLD AUTO: 16 % (ref 19.6–45.3)
MCH RBC QN AUTO: 29.7 PG (ref 26.6–33)
MCHC RBC AUTO-ENTMCNC: 32.8 G/DL (ref 31.5–35.7)
MCV RBC AUTO: 90.6 FL (ref 79–97)
MONOCYTES # BLD AUTO: 0.66 10*3/MM3 (ref 0.1–0.9)
MONOCYTES NFR BLD AUTO: 9.7 % (ref 5–12)
NEUTROPHILS NFR BLD AUTO: 4.89 10*3/MM3 (ref 1.7–7)
NEUTROPHILS NFR BLD AUTO: 71.8 % (ref 42.7–76)
NRBC BLD AUTO-RTO: 0 /100 WBC (ref 0–0.2)
PLATELET # BLD AUTO: 188 10*3/MM3 (ref 140–450)
PMV BLD AUTO: 10.3 FL (ref 6–12)
POTASSIUM SERPL-SCNC: 3.7 MMOL/L (ref 3.5–5.2)
PROT SERPL-MCNC: 7 G/DL (ref 6–8.5)
RBC # BLD AUTO: 4.48 10*6/MM3 (ref 3.77–5.28)
SODIUM SERPL-SCNC: 144 MMOL/L (ref 136–145)
WBC NRBC COR # BLD: 6.81 10*3/MM3 (ref 3.4–10.8)

## 2023-11-06 PROCEDURE — 80053 COMPREHEN METABOLIC PANEL: CPT

## 2023-11-06 PROCEDURE — 36415 COLL VENOUS BLD VENIPUNCTURE: CPT

## 2023-11-06 PROCEDURE — 85025 COMPLETE CBC W/AUTO DIFF WBC: CPT

## 2023-11-06 NOTE — TELEPHONE ENCOUNTER
No PA required for BCR/ABL by PCR [id number] per Yesenia LACEY. Tracking # [id number]. Ok'd lab to send to [facility] today.

## 2023-11-06 NOTE — PROGRESS NOTES
REASONS FOR FOLLOWUP:    Chronic myelogenous leukemia with splenomegaly, chronic phase, positive Deuel chromosome, no mutation at kinase domain diagnosed in November 2013.   Patient is on dose reduced Sprycel 50 mg daily with undetectable disease by RT-PCR.              HISTORY OF PRESENT ILLNESS: The patient is a 38 y.o. female with the above mentioned history, who presents today on 11/6/2023 for 3 months follow-up evaluation.    Patient reports she continues on Sprycel 50 mg daily with good tolerance.  She has no leg cramping.  No nausea no vomiting no diarrhea no skin rashes.  No chest pain no dyspnea.    Laboratory studies today 11/6/2023 reported normal CBC including WBC 6010 neutrophils of 4890 lymphocytes 1090 monocytes 660 and basophil 40, hemoglobin 13.3 MCV 90.6, and platelets 180,000.  Chemistry lab reported unremarkable CMP.    Her last BCR-ABL by RT-PCR 89 2023 was completely negative.        Past Medical History:   Diagnosis Date    Anemia in neoplastic disease     Asthma     childhood    Chiari I malformation     eval by Dr Moore, [place] 2016    Chronic myelogenous leukemia     remission, Dr Castle follows    Chronic pain     CML (chronic myelocytic leukemia)     Cystitis     Depression     GERD (gastroesophageal reflux disease)     ulcer    H/O Iron deficiency anemia     H/O Lower extremity pain     Resolved.    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      *  Hysterectomy in October 2018      *  Acute hepatitis C in early 2019, treated successfully by Dr. Karyn Mendieta.     OB/GYN HISTORY: Menarche age 9. G5, P4, 1 miscarriage of the third pregnancy. First pregnancy at age 17. Patient underwent partial  hysterectomy in 2018.       HEMATOLOGIC/ONCOLOGIC HISTORY:   * Chronic myelogenous leukemia with splenomegaly, chronic phase, positive [place] chromosome, no mutation at kinase domain diagnosed in November 2013.   Patient was started on hydroxyurea temporarily on 10/23/2013 with significant drop of WBC counts.    Patient started on Sprycel on 12/02/2013.   Peripheral blood tested with 3.4% of cells positive for BCR-ABL translocation, tested 02/17/2014.    The patient had CCyR 6 months into treatment as tested on 05/15/2014.    Complete molecular response as tested 08/08/14 with negative product of BCR/ABL.   There was interruption of her treatment due to insurance coverage and misunderstanding from patient's part, she had a relapse of disease with BCR/ABL product at 12.626% in March 2016, and she restarted back on Sprycel.   Good response as tested on 08/31/2016 with BCR/ABL at 0.294%.    Since June 2017, patient has been persistently tested negative for BCR/ABL by RT-PCR every 3 month period.     Patient reported worsening leg cramping in end of July 2018.  Sprycel was decreased to 50 mg daily.  Laboratory studies on 8/31/2018, on 1/30/2019 and on 3/29/2019 were negative for BCR-ABL by RT-PCR.    Sprycel was on hold during treatment for acute hepatitis C in early part of 2019.  It was resumed in July 2019.  She was weakly positive for BCR-ABL.  On 10/29/2019 BCR/ABL1 MAJOR (p210) and BCR/ABL1 MINOR (p190) were not detected.    BCR-ABL by RT-PCR with 0% on 1/24/2020 and also on 4/24/2020.  Patient was tested negative on 7/10/2020.  Negative on 10/9/2020.    Because of palpitation, Sprycel was on hold since 11/17/2020.     BCR-ABL by RT-PCR was complete negative as reported on 1/06/2021.  Sprycel was resumed on 2/9/2021 at 50 mg daily.  Continue BCR-ABL by RT-PCR in May 2021 in October 2021.          Laboratory results on 09/23/2015 showed normalization of hemoglobin 12.2, but still has macrocytosis, MCV 73.3.  She has normal platelets and WBC at 9400. Serum ferritin < 5 ng/ml, iron sats 6.3%, iron 29, TIBC 455 mcg/ml. Still has severe iron deficiency, needs to continue oral iron therapy. The patient restarted taking oral iron supplementation which was probably stopped in the end of November 2015.        Laboratory study on 03/22/2016 reported normal WBC 8450, neutrophils 6100, lymphs is 1400 and monocytes 550. Serum iron was 26, TIBC 469 on saturation 6% and ferritin less than 5 NG/ML. Chemistry lab reported normal renal function with a creatinine 0.69, normal liver function panel, total protein 7.5 and albumin 4.2, normal electrolytes. Patient was restarted back on oral on sedimentation twice a day with good tolerance.      Patient continues take Lortab as needed for left leg cramping. Urine drug test on 08/05/2016 was completely negative, and repeated study on August 26 was positive for opiate, negative for other recreational drugs.      Repeat laboratory study on 08/31/2016 reported iron 21, ferritin less than 5, iron saturation 5%, TIBC 462. Hemoglobin was 11.5 MCV 78.1 MCHC 30.4. Platelets 163,000. Total WBC 8870 including neutrophils 6400 and lymphocytes 1500. BCR/ABL was 0.294% by RT-PCR method.       Patient was given IV Feraheme treatment in September 2016, with 2 doses. Repeated laboratory study on 10/06/2016 reported significantly improved and normalized ferritin 269, iron 80, TIBC 365 and iron saturation 22%. Her hemoglobin was 12.2, and MCV 80.8.      Patient reported she was seen by Dr. Fam in 10/2016 and was started on half tablets of Topamax. I reviewed Dr. Fam’s clinic note and telephone conversation record. The patient reports she has no recurrence of migraine headache. However, she is very tearful today, reporting that she has thoughts of not taking any medications. She did assure me that she has been taking the medication up to this point. 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 The patient denies suicide ideation. When she was initially diagnosed of CML back in 2014, she had depression and I started the patient on Prozac. The patient reported initially it helped her, however, she no longer feels the effect of Prozac. She feels depressed. The patient reports she has no personal hobby. She goes to work and goes home, taking care of her kids. She does not enjoy life as she used to.      Laboratory study on December 29, 2016 reported at ferritin 19.9, iron 33, TIBC 347 iron saturation 10%.  Hemoglobin was 13, normal WBC and platelets.  Unremarkable CMP.  Patient was given 2 doses of Feraheme treatment in early January 2017.      Cytogenetic study on March 14, 2017 reported a BCR-ABL products at 0.098%, showed further improved residual disease.  There is also reported a ferritin 103.7, iron 79, TIBC 336 and iron saturation 24%.  Hemoglobin was 13.5, MCV 92.3, platelets 199,000 WBC 7000.  She had a completely normal CMP.       Repeated laboratory study on June 20, 2017 reported at nondetectable BCR-ABL product.  Ferritin was 45, iron 35 TIBC 333 and iron saturation 11%.  Had a normal CBC and CMP.    In the end of July 2018, patient called reporting worsening significant leg cramping, and getting worse recently and has been taking 6 tablets of Advil with no significant improvement.  We suspect it might be caused by Sprycel for her CML.  We discussed with patient, and decreased to half dose at 50 mg daily.  Patient reports that she is improved leg cramping since dose reduction.    Per medical records this patient had emergency room visit again on 8/18/2018.  Patient reports she had significant abdomen/pelvic pain at a time associate with nausea.  Laboratory study showed significantly elevated WBC at 25,900 including neutrophils 24,400, with elevated hemoglobin 15.8 and platelets 146,000.     She had a  thorough investigation, including CT scan for abdomen pelvis which was unremarkable.  She then had ultrasound for the pelvis and it was also unremarkable.  She had ultrasound for the gallbladder examination on the same day and was unremarkable.  She had a normal CMP except of marginally elevated glucose at 112.  Her urinalysis showed only marginally increased WBC 3-5/HPF.  She was negative for yeast infection, negative for Chlamydia trichomonas and Neisseria gonorrhea.  Patient was prescribed Flagyl and doxycycline and discharged to home.      lab study on 2019 reported normal iron saturation 47% and elevated ferritin 507.1 ng/mL with free iron 184 and TIBC 388.  hemoglobin is normal at 13.7 and platelets 186,000. normal WBC at 5080 including ANC 3300, lymphocytes 930 and monocytes 630. Labs reported significantly elevated ALT at 655,  and alkaline phosphatase 234 but normal total bilirubin 0.9. She had normal renal function creatinine 0.78. Normal electrolytes.       On 2019, I searched Up-to-Date and suspected that this patient had drug-induced hepatitis from Diflucan or with combination of Diflucan with Sprycel. This patient had been on Sprycel for years and had no problem with abnormal liver panel previously. It seems Diflucan inhibits CY moderately, which likely interferes with the metabolism of Sprycel. I instructed the patient to hold her Sprycel for now.    Sprycel treatment resumed as of 2019 upon completion of MEVYRET for her hepatitis C.    Laboratory study performed on 2019 showed normal CBC and CMP. BCR/ABL by RT-PCR detected BCR/ABL1 Major. HCV RNA by PCR showed HCV not detected.    Laboratory studies 2019 report her hemoglobin is normal at 15.2 and platelets 146,000. normal WBC at 7650 including ANC 5190, lymphocytes 1600 and monocytes 630.    Recent laboratory studies confirmed to be no detectable virus on 2019.    U/S Liver performed on 10/24/2019 reported  negative hepatic ultrasound exam with no focal liver lesion, negative gallbladder examination with no cholelithiasis or bile duct dilation noted, however borderline splenomegaly was noted.     Laboratory study performed on 10/29/2019, reported a completely normal CBC, CMP. Normal iron saturation and still slightly elevated ferritin 325 ng/dL  BCR/ABL1 MAJOR (p210) and BCR/ABL1 MINOR (p190) were not detected.     Laboratory studies 1/24/2020, show hemoglobin 14.3 MCV 92.6 platelets 180,000 and WBC 6570 including neutrophils 4400.  She also has completely normal CMP.  Iron studies reported ferritin 273, iron saturation 11%, hemoglobin 14.3 WBC 6570 and platelets 180,000.  BCR-ABL by RT-PCR with 0%.     Patient presented today for 3-month follow-up and lab review.      Due to pandemic coronavirus infection, patient has been staying home with all 4 children.  Patient reports significant fatigue for the past month, similar to the time when she had iron deficiency.  Patient reports prior to that she was having regular exercise and with good energy level.    Patient reports compliant with Sprycel 50 mg daily.  She denies leg cramping.  She has no nausea no vomiting no abdominal pains.  She has good appetite and no diarrhea.  She denies headaches vision changes no dizziness or seizure activity.    Her laboratory study on 3/12/2020 reported marginal iron saturation 15% however had a good ferritin 212.5 ng/mL.  She had hemoglobin 13.1 and normal thyroid profile including TSH 1.20, free T4 at 1.37 ng/dL and a free T3 at 2.97 pg/mL     On 4/24/2020 her iron study showed ferritin 262 and iron saturation 21%.  Hemoglobin is 14.8.  She has normal WBC 5740 including ANC 3820, and normal platelets 161,000.   She was negative for BCR-ABL by RT-PCR on the same day.    Laboratory studies, 7/10/2020, show completely normal CBC and CMP.  Negative for BCR-ABL by RT-PCR.  Iron studies showed ferritin 185, iron saturation 13% free iron 37  TIBC 286.    Patient has completed normal CBC 10/9/2020.  Iron studies reported ferritin 2070, free iron 13%.  She also has completed normal CMP.  She was tested negative for BCR-ABL by RT-PCR method.  Sprycel 50 mg daily was continued.     We saw her recently on 10/9/2020 for routine follow-up for her CML.  She was tolerating Sprycel.  Physical examination laboratory studies were unremarkable.  No detectable BCR-ABL by RT-PCR.  We continued her Sprycel at that time.     On 11/17/2020, patient called and reported chest tightness, palpitation and dyspnea new onset in November 2020.  Patient reports this happened recently.  She has chest tightness, and associated tachycardia/palpitation.  Patient reports this is unpredictable, can be on and off.  She noticed that one day she was cooking meal for her family, and noted sudden onset palpitation and chest tightness.  She reports unable to induce purposefully.  She also has exertional dyspnea.  Patient reports 11/17/2020, we asked patient to hold Sprycel.  We suspect the patient may have pleural effusion or cardiac dysfunction secondary to Sprycel, we requested chest CT, echocardiogram study and also laboratory study for evaluation.     Negative chest x-ray on 11/17/2020.     Laboratory study on 11/17/2020 reported stable chronic condition with a ferritin 261 and iron saturation 12%, normal hemoglobin 15.0, unable to explain her dyspnea.     Echocardiogram study on 11/18/2020 reported normal results.     Laboratory study on 1/6/2021 reported hemoglobin 15.9 hematocrit 47.1%, platelets 181,000 WBC 9360 including ANC 7130 lymphocytes 1350. Iron study reported ferritin 293 and iron saturation 14% with free iron 47 and a TIBC 326.  Chemistry lab reported unremarkable CMP including renal function and liver function panel.      Patient reports she was seen by cardiologist Dr. Flowers on 2/4/2021.  Patient had thorough cardiology evaluation and there was no apparent  abnormalities.    We have been withholding her Sprycel in middle November 2020 because of palpitation, and referred patient to cardiology service.        Fortunately, her peripheral blood BCR-ABL by RT-PCR was complete negative, as reported on 1/12/2021.     Sprycel was resumed on 2/9/2021.      Laboratory studies on 3/16/2021 reported normal CBC including hemoglobin 14.4 MCV 88.7, platelets 173,000, and WBC 6400 including ANC 3770 lymphocytes 1800.  Chemistry lab reported normal CMP.     Laboratory studies on 5/4/2021 reported completely normal CBC and CMP.  Iron study also normal with ferritin 219 and iron saturation 16%.  Negative study for BCR-ABL by RT-PCR.     Laboratory study on 10/5/2021 reported complete normal CBC and CMP.  Iron study reported normal ferritin 250 ng/mL and iron saturation 21% with free iron 63 TIBC 300.  She was also complete negative for BCR-ABL by RT-PCR.    The patient complains of leg pain and bone pain. She recently had an EGD done. Her stomach was inflamed due to taking ibuprofen. She was prescribed Carafate 4 times a day for 1 week. She was only taking it 2 to 3 times a day. It caused constipation.    Laboratory study on 4/12/2023 reports normal hemoglobin 14.3, WBC 6430 including neutrophils 4130, lymphocytes 1560, monocytes 480, and normal platelets 181,000.  Her laboratory study on 4/12/2023 reported negative for BCR-ABL by RT-PCR, for both the p210 and the p190 products.       The patient reports recently she went to [facility] because of pain in the neck. She was in the ER on 7/9/2023 and subsequently had an MRI for the cervical spine and the thoracic spine on 7/10/2023 for further evaluation. The study reported tiny central disc protrusion of the C6-7. Also, there was incidental finding of left thyroid nodule 1 cm. Radiologist recommended to have ultrasound examination. Otherwise, the MRI of the cervical spine has no significant abnormalities. The thoracic MRI  "examination reported normal results.    Laboratory studies on 2023 reports normal CBC with total WBC 9,160, including neutrophils 6500, lymphocytes 1680, monocytes 680, and normal hemoglobin 14.1, platelets 211,000. Chemistry lab reported that unremarkable with CMP except a mildly decreased bicarbonate at 19.5 and elevated anion gap of 16.5.    Lab study on 2023 for BCR-ABL by RT-PCR was complete negative for the p210 and p190 products.      MEDICATIONS: The current medication list was reviewed with the patient and updated in the EMR this date per the medical assistant. Medication dosages and frequencies were confirmed to be accurate.        Allergies   Allergen Reactions    Sulfa Antibiotics Unknown - Low Severity     Childhood reaction     SOCIAL HISTORY: . She smoked cigarettes previously, quit in 2006 with 8-pack-year history. Social drinker, maybe once a month. No illegal drug use. No risk for HIV except tattoos.  Hairstylist.       FAMILY HISTORY: Maternal grandmother had esophageal/stomach adenocarcinoma diagnosed at age of 72 and  of cancer at age of 73. The patient' s mother has hypertension but otherwise healthy.  No other family history of malignancy, especially no leukemia.          VITAL SIGNS:   Vitals:    23 0954   BP: 100/68   Pulse: 68   Resp: 18   Temp: 98.2 °F (36.8 °C)   TempSrc: Temporal   SpO2: 98%   Weight: 87.5 kg (192 lb 14.4 oz)   Height: 172.7 cm (67.99\")   PainSc: 0-No pain     ECOG 0          Physical Exam  Vitals and nursing note reviewed.   Constitutional:       Appearance: Normal appearance. She is well-developed.   HENT:      Head: Normocephalic and atraumatic.      Nose: Nose normal.      Mouth/Throat:      Pharynx: No oropharyngeal exudate.   Eyes:      Conjunctiva/sclera: Conjunctivae normal.   Cardiovascular:      Rate and Rhythm: Normal rate and regular rhythm.      Heart sounds: Normal heart sounds. No murmur heard.  Pulmonary:      Effort: " Pulmonary effort is normal. No respiratory distress.      Breath sounds: Normal breath sounds.   Abdominal:      General: Bowel sounds are normal.      Palpations: Abdomen is soft.      Tenderness: There is no abdominal tenderness.   Musculoskeletal:      Cervical back: Neck supple.      Right lower leg: No edema.      Left lower leg: No edema.   Lymphadenopathy:      Upper Body:      Right upper body: No supraclavicular adenopathy.      Left upper body: No supraclavicular adenopathy.   Skin:     Findings: No bruising.   Neurological:      Mental Status: She is alert and oriented to person, place, and time.   Psychiatric:         Behavior: Behavior normal.       IMAGING:    US Thyroid  Narrative: THYROID ULTRASOUND EXAMINATION, 8/11/2023     HISTORY:   Thyroid nodules detected on MRI of the cervical spine.     COMPARISON: MRI of the cervical spine 7/10/2023     TECHNIQUE:   High-resolution grayscale ultrasound imaging of the thyroid gland was  performed.     THYROID SIZE:   *  The thyroid gland is mildly enlarged.     *  Right lobe: 0.9 x 1.8 x 5.3 cm  *  Left lobe: 1.4 x 2 x 5 cm  *  Isthmus thickness: 4 mm     THYROID FINDINGS:   Multiple bilateral predominately anechoic lesions scattered throughout  both lobes of the thyroid gland. At least 5 nodules identified within  the right lobe ranging in size from 5 to 10 mm. The larger lesion  demonstrates ringdown artifact compatible with colloid cyst. All of the  lesions demonstrate well-circumscribed margins with through  transmission. No internal hypervascularity or calcification. 2 anechoic  lesions seen in the left lobe with a 1 x 0.6 x 1.6 cm anechoic lesion  within the midportion of the left lobe with features compatible with a  colloid cyst. Smaller 1 cm nodule seen in the inferior left lobe. This  is also compatible with a colloid cyst. Intervening thyroid parenchyma  appears normal. Extrathyroidal soft tissues appear normal. Overall  vascularity within normal  limits.     Impression: Multiple bilateral thyroid nodules as detailed above  compatible with colloid cysts with the largest lesion in the left lobe  measuring up to 1.6 cm and corresponding to the abnormality seen on  prior MRI. These are compatible with TI-RADS 1 and TI-RADS 2 nodules and  are considered benign. No additional follow-up recommended. Optional 1  year follow-up could be performed to confirm long-term stability.        This report was finalized on 8/11/2023 12:30 PM by Dr. PRICE Littlejohn MD.         LABORATORY DATA:    Lab Results   Component Value Date    WBC 6.81 11/06/2023    HGB 13.3 11/06/2023    HCT 40.6 11/06/2023    MCV 90.6 11/06/2023     11/06/2023     Lab Results   Component Value Date    NEUTROABS 4.89 11/06/2023     Lab Results   Component Value Date    GLUCOSE 78 11/06/2023    BUN 10 11/06/2023    CREATININE 0.66 11/06/2023    EGFR 114.6 11/06/2023    BCR 15.2 11/06/2023    K 3.7 11/06/2023    CO2 25.9 11/06/2023    CALCIUM 9.1 11/06/2023    ALBUMIN 4.1 11/06/2023    BILITOT 0.2 11/06/2023    AST 16 11/06/2023    ALT 12 11/06/2023           ASSESSMENT:      1. Chronic myelogenous leukemia, chronic phase with excellent response to Sprycel and complete molecular response in August 2014. However she had interuption of treatment in early part of 2016, because of insurance issues and miscommunication, she stopped the medicine without telling us, and laboratory test on 03/22/2016 reported disease relapse with increased BCR/ABL product at 12.626%. subsequently she was restarted back on Sprycel, and responded well.  Since June 2017, she has completed molecular response as tested every 3 months.  She has been compliant with treatment.   In the end of July 2018, she reported worsening significant cramping involving her legs, so we decreased her Sprycel to 50 mg daily starting early August.  She's been having less problem since that time.  She was tested negative for BCR-ABL on  8/31/2018.    Patient had a negative BCR-ABL by RT-PCR in end of January 2019 and also on 3/29/2019.   Patient was later found having significantly elevated liver function panel.  Sprycel was on hold and she was subsequently found having acute hepatitis C infection.    I discussed with Dr. Karyn Mendieta, we'll hold her Sprycel for now until she finishes hepatitis C treatment.   She was started on hepatitis C treatment on 4/18/2019 and finished an 8-week course.  On 07/02/2019 patient's labs showed BCR-ABL Major (p210) detected by RT-PCR at 0.0141%. BCR/ABL1 Minor (p190) was not detected. HCV was not detected.  Sprycel treatment was resumed as of 07/02/2019 upon completion of MEVYRET.  Laboratory study on 10/29/2019 reported normal CBC. BCR/ABL1 MAJOR (p210) and BCR/ABL1 MINOR (p190) were not detected.    BCR-ABL by RT-PCR on 1/24/2020 was negative.     Lab result for BCR ABL by RT-PCR was also negative on 4/24/2020. Patient will need to continue on sprycel treatment.  7/10/2020 patient has normal CBC and CMP.  Negative RT-PCR for BCR ABL.  Tolerating well.  Continue Sprycel at 50 mg daily.  On 10/9/2020, completely normal CBC with good tolerance.  BCR-ABL was tested negative by RT-PCR method.  Continue Sprycel.   Patient reports 11/17/2020 chest tightness in palpitation, and exertion dyspnea progressively getting worse in the past week.  We asked patient to hold Sprycel.   Chest x-ray examination on 11/17/2020 was unremarkable.  Echocardiogram study on 11/18/2020 was also benign.  Patient was seen by cardiologist for further evaluation.    Laboratory studies on 1/6/2021 reported normal WBC 9360 including ANC 7130 lymphocytes 1350. BCR-ABL by RT-PCR was complete negative, as reported on 1/12/2021.   Patient had negative cardiac work-up.  She also has resolution of symptoms.  Discussed with patient on 2/9/2021, we recommended resumption of Sprycel 50 mg daily.  Patient is agreeable.  On 3/16/2021, patient reports  tolerating Sprycel, no recurrent symptoms of dyspnea, chest tightness or palpitation.  Continue Sprycel 50 mg daily.  On 5/4/2021, patient has normal CBC and CMP.  Negative results for BCR-ABL by RT-PCR for P210 and P190 as reported on 5/12/2021.    On 10/5/2021 patient presented for reevaluation.  She reports tolerating Sprycel.  No specific side effects reported.  Maintains normal CBC and negative BCR-ABL.  We will continue treatment for now.  On 12/21/2021, patient reports good tolerance to Sprycel.  Normal CBC CMP.  Negative study by peripheral blood RT-PCR.  Continue Sprycel.   On 3/14/2022, patient reports excellent tolerance to Sprycel at 50 mg daily.  Maintains normal CBC and CMP.  6/8/2022 continues on Sprycel 50 mg daily tolerating well.  Patient was negative for BCR-ABL by RT-PCR.  On 10/7/2022 patient reports compliance with Sprycel and good tolerance.  We will continue treatment.  1/13/2023 continues on Sprycel 50 mg daily, tolerating well.  BCR-ABL was completely negative result for both the p210 and the p180 fusion products.  The patient presents for 3-month follow-up evaluation 4/12/2023. She continues on Sprycel 50 mg daily. She tolerated it well except having some pain in the lower extremities, which she stated has improved since decreased from 100 mg to 50 mg daily. She has been taking ibuprofen for that. Nevertheless, she recently had an EGD examination which led to the discovery of duodenitis. I discussed with the patient about switching to a different TKI, we searched Up-to-Date. We found that bosutinib, and dasatinib could all cause similar side effects with arthralgia, muscle pain, limb pain, even the new medicine ponatinib could have caused the similar side effects at about the same kind of percentages. Discussed with patient if she is concerned about switching off the current Sprycel, which she really has excellent response.  Patient decided to continue Sprycel.  Study on 04/12/2023 was  negative for BCR-ABL by RT-PCR.  On 8/9/2023 patient reports tolerating treatment.  She is at her baseline condition.  Patient has normal CBC.  We will continue Sprycel.  Patient was completely negative for BCR-ABL by RT-PCR.      2. Recurrent Iron deficiency anemia.    She was treated again with Feraheme October 2017.   Iron deficiency thought to be related to ulcer identified on EGD, being treated with Carafate.  She also had heavy menses.  She had recurrent iron deficiency again and was given Feraheme 2 doses in March 2018.   Subsequently recurrent iron deficiency in July 2018, she was switched to Venofer 3 doses total 900 mg.   Patient had simple hysterectomy in October 2018.  Laboratory study showed supratherapeutic ferritin 458 and iron saturation 40% on 4/30/2019.   Laboratory study performed 10/29/2019, showed normal iron saturation and ferritin 325 ng/dL.  Normal iron studies including ferritin 262 and iron saturation 21% on 4/24/2020.  No evidence of recurrent iron deficiency.   Lab study on 7/10/2020 reported ferritin 185, iron saturation 13% and normal hemoglobin 14.5.  She has deteriorating iron studies.   Continue to monitor in 3 months.  Labs on 10/9/2020 reported ferritin 270, iron saturation 13% and hemoglobin 14.9.  Lab studies on 1/6/2021 reported mild erythrocytosis.  Hemoglobin is 15.9 and hematocrit of 47.1%, with ferritin 293 and iron saturation 14%.    Normal hemoglobin 14.4 on 3/16/2021.    On 5/4/2021 lab study reported hemoglobin 13.7, ferritin 219 and iron saturation 16%.    On 10/5/2021, patient maintains normal hemoglobin.  Iron study reported further improved ferritin 250 ng/mL and iron saturation 21%.  Since her hysterectomy, patient has no recurrence of iron deficiency.  Discussed with patient today, there is no need for routine monitoring of iron study from now.    On 3/14/2022, normal hemoglobin 14.3.  6/8/2022 hemoglobin 13.6.  On 10/7/2022 patient has normal hemoglobin  14.1.  1/13/2023 Hgb 13.6  On 4/12/2023 patient has normal hemoglobin 14.3.  Patient has normal hemoglobin 14.1 on 08/09/2023.   Normal hemoglobin 13.3 on 11/6/2023.        3.  Chest tightness, palpitation and dyspnea new onset in November 2020.  Patient reports this happened recently, and that usually unpredictable, she has chest tightness, and associated tachycardia/palpitation.  Patient reports this is unpredictable, can be on and off.  She noticed 1 day she was cooking meal for her family, and noted several palpitation and chest tightness.  She reports unable to induce purposefully.  She also has exertional dyspnea.  Patient reports 11/17/2020, we asked patient to hold Sprycel.  We requested chest CT, echocardiogram study and also laboratory study for evaluation.  Negative chest x-ray on 11/17/2020.   Laboratory study on 11/17/2020 reported stable chronic condition with a ferritin 261 and iron saturation 12%, normal hemoglobin 15.0, unable to explain her dyspnea.   Echocardiogram study on 11/18/2020 reported normal results.   Patient is evaluated 11/20/2020, she reports somewhat improved symptoms, since she stopped Sprycel for the past 3 days.  She denies extremity edema.  She denies fever sweating or chills.  We recommend patient to continue to hold Sprycel for another 2 weeks.  We also recommended patient to be tested for COVID-19.    Reevaluation on 12/4/2020, patient reports that she did not get a Covid test.  She denies other illness such as fever sweating nausea vomiting, diarrhea, change of taste, cough etc.  Discussed with patient, will check a thyroid panel, which turned out to be normal on 12/4/2020.    Patient was referred to cardiologist for evaluation of hypertension.  She was seen by Dr. Flowers on 12/21/2020 and the case waiting for further evaluation.  Patient had a negative cardiac work-up.  Her symptom has resolved.  Patient was restarted on Sprycel 2/9/2021.  She reports no recurrent symptoms  3/16/2021.  On 5/4/2021, patient reports no recurrent symptoms.  On 10/5/2021 patient reports no dyspnea no chest pain or discomfort.   On 12/21/2021, patient reports no recurrent symptoms.  On 3/14/2022, patient has no chest pain no dyspnea or pressureness on chest.  6/8/2022 no complaints of this today.  No recurrent symptoms as reported on 4/12/2023.  No specific complaints today on 8/9/2023.   No chest pain dyspnea on 11/6/2023.       4.  COVID-19 vaccination.  Patient reports receiving none 2 doses of the Moderna vaccine.    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   On 10/7/2022 patient reports she was not infected with COVID-19, despite recently her  and her children were infected.    5. Incidental finding of left thyroid nodule by cervical spine MRI examination for the assessment of neck pain  Patient is asymptomatic.   Today's physical examination has no abnormal discovery in the thyroid area, no palpable nodule. I will request ultrasound for the thyroid for further evaluation as recommended by radiologist.  Patient had a ultrasound of the thyroid 8/11/2023 and the radiologist recommended multiple small thyroid nodules fits with TI-RADS 1 and TI-RADS 2 nodules and are considered benign.  No additional follow-up recommended by radiologist.      PLAN:    Pending study result for BCR-ABL by RT-PCR.  Continue Sprycel 50 mg daily.  Continue B complex and vitamin B12.  Return in 3 months for follow-up with me, with laboratory studies including CBC, CMP, and BCR-ABL by RT-PCR.  Call/ return sooner should she develop any new concerns or problems.    I discussed with the patient about laboratory results and further management plan.  Patient voiced understanding and agreeable.     Patient is on a high risk medication requiring close monitoring for toxicity.      Isabel Castle MD  PhD  11/06/2023      CC:  Jennifer More DO Anna Hart, M.D. Brian Kaebnick, M.D.

## 2023-11-15 LAB — REF LAB TEST METHOD: NORMAL

## 2024-01-05 ENCOUNTER — SPECIALTY PHARMACY (OUTPATIENT)
Dept: PHARMACY | Facility: HOSPITAL | Age: 39
End: 2024-01-05
Payer: MEDICAID

## 2024-01-05 NOTE — PROGRESS NOTES
Fax rec from [employer] stating [facility] has started a PA for pts Sprycel 50 mg.    I attempted to submit a PA to [employer] and rec a message stating drug/product is not covered under the pharmacy benefit-PA is not available.    I called [employer] [phone] and spoke to Tay. I was able to complete the PA verbally over the phone. The request has been submitted. The turn around time for this is 24-72 hours.    Office will be faxed a decision letter.    Preeti Rios, Pharmacy Technician  Specialty Pharmacy Technician

## 2024-01-29 ENCOUNTER — OFFICE VISIT (OUTPATIENT)
Dept: ONCOLOGY | Facility: CLINIC | Age: 39
End: 2024-01-29
Payer: MEDICAID

## 2024-01-29 ENCOUNTER — PRIOR AUTHORIZATION (OUTPATIENT)
Dept: ONCOLOGY | Facility: CLINIC | Age: 39
End: 2024-01-29
Payer: MEDICAID

## 2024-01-29 ENCOUNTER — LAB (OUTPATIENT)
Dept: LAB | Facility: HOSPITAL | Age: 39
End: 2024-01-29
Payer: MEDICAID

## 2024-01-29 VITALS
HEIGHT: 68 IN | OXYGEN SATURATION: 99 % | WEIGHT: 201.8 LBS | SYSTOLIC BLOOD PRESSURE: 118 MMHG | DIASTOLIC BLOOD PRESSURE: 74 MMHG | HEART RATE: 80 BPM | RESPIRATION RATE: 18 BRPM | BODY MASS INDEX: 30.58 KG/M2 | TEMPERATURE: 97.9 F

## 2024-01-29 DIAGNOSIS — C92.10 CML (CHRONIC MYELOID LEUKEMIA): ICD-10-CM

## 2024-01-29 DIAGNOSIS — Z86.39 HISTORY OF IRON DEFICIENCY: ICD-10-CM

## 2024-01-29 DIAGNOSIS — C92.10 CML (CHRONIC MYELOID LEUKEMIA): Primary | ICD-10-CM

## 2024-01-29 LAB
ALBUMIN SERPL-MCNC: 4.4 G/DL (ref 3.5–5.2)
ALBUMIN/GLOB SERPL: 1.4 G/DL
ALP SERPL-CCNC: 96 U/L (ref 39–117)
ALT SERPL W P-5'-P-CCNC: 9 U/L (ref 1–33)
ANION GAP SERPL CALCULATED.3IONS-SCNC: 8.5 MMOL/L (ref 5–15)
AST SERPL-CCNC: 15 U/L (ref 1–32)
BASOPHILS # BLD AUTO: 0.05 10*3/MM3 (ref 0–0.2)
BASOPHILS NFR BLD AUTO: 0.6 % (ref 0–1.5)
BILIRUB SERPL-MCNC: 0.2 MG/DL (ref 0–1.2)
BUN SERPL-MCNC: 8 MG/DL (ref 6–20)
BUN/CREAT SERPL: 11 (ref 7–25)
CALCIUM SPEC-SCNC: 9.5 MG/DL (ref 8.6–10.5)
CHLORIDE SERPL-SCNC: 105 MMOL/L (ref 98–107)
CO2 SERPL-SCNC: 28.5 MMOL/L (ref 22–29)
CREAT SERPL-MCNC: 0.73 MG/DL (ref 0.57–1)
DEPRECATED RDW RBC AUTO: 43.8 FL (ref 37–54)
EGFRCR SERPLBLD CKD-EPI 2021: 107.4 ML/MIN/1.73
EOSINOPHIL # BLD AUTO: 0.16 10*3/MM3 (ref 0–0.4)
EOSINOPHIL NFR BLD AUTO: 1.9 % (ref 0.3–6.2)
ERYTHROCYTE [DISTWIDTH] IN BLOOD BY AUTOMATED COUNT: 13.2 % (ref 12.3–15.4)
GLOBULIN UR ELPH-MCNC: 3.2 GM/DL
GLUCOSE SERPL-MCNC: 89 MG/DL (ref 65–99)
HCT VFR BLD AUTO: 44.6 % (ref 34–46.6)
HGB BLD-MCNC: 14.8 G/DL (ref 12–15.9)
IMM GRANULOCYTES # BLD AUTO: 0.05 10*3/MM3 (ref 0–0.05)
IMM GRANULOCYTES NFR BLD AUTO: 0.6 % (ref 0–0.5)
LYMPHOCYTES # BLD AUTO: 1.52 10*3/MM3 (ref 0.7–3.1)
LYMPHOCYTES NFR BLD AUTO: 17.9 % (ref 19.6–45.3)
MCH RBC QN AUTO: 30.1 PG (ref 26.6–33)
MCHC RBC AUTO-ENTMCNC: 33.2 G/DL (ref 31.5–35.7)
MCV RBC AUTO: 90.8 FL (ref 79–97)
MONOCYTES # BLD AUTO: 0.57 10*3/MM3 (ref 0.1–0.9)
MONOCYTES NFR BLD AUTO: 6.7 % (ref 5–12)
NEUTROPHILS NFR BLD AUTO: 6.12 10*3/MM3 (ref 1.7–7)
NEUTROPHILS NFR BLD AUTO: 72.3 % (ref 42.7–76)
NRBC BLD AUTO-RTO: 0 /100 WBC (ref 0–0.2)
PLATELET # BLD AUTO: 193 10*3/MM3 (ref 140–450)
PMV BLD AUTO: 10.3 FL (ref 6–12)
POTASSIUM SERPL-SCNC: 4 MMOL/L (ref 3.5–5.2)
PROT SERPL-MCNC: 7.6 G/DL (ref 6–8.5)
RBC # BLD AUTO: 4.91 10*6/MM3 (ref 3.77–5.28)
SODIUM SERPL-SCNC: 142 MMOL/L (ref 136–145)
WBC NRBC COR # BLD AUTO: 8.47 10*3/MM3 (ref 3.4–10.8)

## 2024-01-29 PROCEDURE — 80053 COMPREHEN METABOLIC PANEL: CPT

## 2024-01-29 PROCEDURE — 36415 COLL VENOUS BLD VENIPUNCTURE: CPT

## 2024-01-29 PROCEDURE — 85025 COMPLETE CBC W/AUTO DIFF WBC: CPT

## 2024-01-29 RX ORDER — MECLIZINE HYDROCHLORIDE 25 MG/1
TABLET ORAL
COMMUNITY
Start: 2023-11-29

## 2024-01-29 NOTE — TELEPHONE ENCOUNTER
No PA required for BCR/ABL by PCR [id number] per Yesenia LACEY. Tracking # [id number]. Ok'd lab to send to [facility].

## 2024-01-29 NOTE — PROGRESS NOTES
REASONS FOR FOLLOWUP:    Chronic myelogenous leukemia with splenomegaly, chronic phase, positive [place] chromosome, no mutation at kinase domain diagnosed in November 2013.   Patient is on dose reduced Sprycel 50 mg daily with undetectable disease by RT-PCR.              HISTORY OF PRESENT ILLNESS: The patient is a 38 y.o. female with the above mentioned history, who presents on 1/29/2024 for 3-month follow-up evaluation.    The patient reports she is doing well with Sprycel 50 mg daily. She denies side effects such as nausea, vomiting, diarrhea, skin rashes, dyspnea, palpitation or leg edema. She has excellent performance status, ECOG 0. She denies bleeding or bruising.    Laboratory studies on 1/29/2024 reported normal CBC including hemoglobin 14.8, MCV 90.8, platelets 193,000, and WBC 8,470, including neutrophils 6,120, lymphocytes 1,520, monocytes 570, and basophil 50. Chemistry lab reported normal CMP with creatinine 0.73, normal electrolytes including calcium 9.5, normal liver function panel, and glucose 89.      Past Medical History:   Diagnosis Date    Anemia in neoplastic disease     Asthma     childhood    Chiari I malformation     eval by Dr Moore, [place] 2016    Chronic myelogenous leukemia     remission, Dr Castle follows    Chronic pain     CML (chronic myelocytic leukemia)     Cystitis     Depression     GERD (gastroesophageal reflux disease)     ulcer    H/O Iron deficiency anemia     H/O Lower extremity pain     Resolved.    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      *  Hysterectomy in October 2018      *  Acute hepatitis C in early 2019, treated successfully by Dr. Karyn Mendieta.     OB/GYN HISTORY: Menarche  age 9. G5, P4, 1 miscarriage of the third pregnancy. First pregnancy at age 17. Patient underwent partial hysterectomy in 2018.       HEMATOLOGIC/ONCOLOGIC HISTORY:   * Chronic myelogenous leukemia with splenomegaly, chronic phase, positive [place] chromosome, no mutation at kinase domain diagnosed in November 2013.   Patient was started on hydroxyurea temporarily on 10/23/2013 with significant drop of WBC counts.    Patient started on Sprycel on 12/02/2013.   Peripheral blood tested with 3.4% of cells positive for BCR-ABL translocation, tested 02/17/2014.    The patient had CCyR 6 months into treatment as tested on 05/15/2014.    Complete molecular response as tested 08/08/14 with negative product of BCR/ABL.   There was interruption of her treatment due to insurance coverage and misunderstanding from patient's part, she had a relapse of disease with BCR/ABL product at 12.626% in March 2016, and she restarted back on Sprycel.   Good response as tested on 08/31/2016 with BCR/ABL at 0.294%.    Since June 2017, patient has been persistently tested negative for BCR/ABL by RT-PCR every 3 month period.     Patient reported worsening leg cramping in end of July 2018.  Sprycel was decreased to 50 mg daily.  Laboratory studies on 8/31/2018, on 1/30/2019 and on 3/29/2019 were negative for BCR-ABL by RT-PCR.    Sprycel was on hold during treatment for acute hepatitis C in early part of 2019.  It was resumed in July 2019.  She was weakly positive for BCR-ABL.  On 10/29/2019 BCR/ABL1 MAJOR (p210) and BCR/ABL1 MINOR (p190) were not detected.    BCR-ABL by RT-PCR with 0% on 1/24/2020 and also on 4/24/2020.  Patient was tested negative on 7/10/2020.  Negative on 10/9/2020.    Because of palpitation, Sprycel was on hold since 11/17/2020.     BCR-ABL by RT-PCR was complete negative as reported on 1/06/2021.  Sprycel was resumed on 2/9/2021 at 50 mg daily.  Continue BCR-ABL by RT-PCR in May 2021 in October 2021.           Laboratory results on 09/23/2015 showed normalization of hemoglobin 12.2, but still has macrocytosis, MCV 73.3. She has normal platelets and WBC at 9400. Serum ferritin < 5 ng/ml, iron sats 6.3%, iron 29, TIBC 455 mcg/ml. Still has severe iron deficiency, needs to continue oral iron therapy. The patient restarted taking oral iron supplementation which was probably stopped in the end of November 2015.        Laboratory study on 03/22/2016 reported normal WBC 8450, neutrophils 6100, lymphs is 1400 and monocytes 550. Serum iron was 26, TIBC 469 on saturation 6% and ferritin less than 5 NG/ML. Chemistry lab reported normal renal function with a creatinine 0.69, normal liver function panel, total protein 7.5 and albumin 4.2, normal electrolytes. Patient was restarted back on oral on sedimentation twice a day with good tolerance.      Patient continues take Lortab as needed for left leg cramping. Urine drug test on 08/05/2016 was completely negative, and repeated study on August 26 was positive for opiate, negative for other recreational drugs.      Repeat laboratory study on 08/31/2016 reported iron 21, ferritin less than 5, iron saturation 5%, TIBC 462. Hemoglobin was 11.5 MCV 78.1 MCHC 30.4. Platelets 163,000. Total WBC 8870 including neutrophils 6400 and lymphocytes 1500. BCR/ABL was 0.294% by RT-PCR method.       Patient was given IV Feraheme treatment in September 2016, with 2 doses. Repeated laboratory study on 10/06/2016 reported significantly improved and normalized ferritin 269, iron 80, TIBC 365 and iron saturation 22%. Her hemoglobin was 12.2, and MCV 80.8.      Patient reported she was seen by Dr. Fam in 10/2016 and was started on half tablets of Topamax. I reviewed Dr. Fam’s clinic note and telephone conversation record. The patient reports she has no recurrence of migraine headache. However, she is very tearful today, reporting that she has thoughts of not taking any medications. She did  assure me that she has been taking the medication up to this point. 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 The patient denies suicide ideation. When she was initially diagnosed of CML back in 2014, she had depression and I started the patient on Prozac. The patient reported initially it helped her, however, she no longer feels the effect of Prozac. She feels depressed. The patient reports she has no personal hobby. She goes to work and goes home, taking care of her kids. She does not enjoy life as she used to.      Laboratory study on December 29, 2016 reported at ferritin 19.9, iron 33, TIBC 347 iron saturation 10%.  Hemoglobin was 13, normal WBC and platelets.  Unremarkable CMP.  Patient was given 2 doses of Feraheme treatment in early January 2017.      Cytogenetic study on March 14, 2017 reported a BCR-ABL products at 0.098%, showed further improved residual disease.  There is also reported a ferritin 103.7, iron 79, TIBC 336 and iron saturation 24%.  Hemoglobin was 13.5, MCV 92.3, platelets 199,000 WBC 7000.  She had a completely normal CMP.       Repeated laboratory study on June 20, 2017 reported at nondetectable BCR-ABL product.  Ferritin was 45, iron 35 TIBC 333 and iron saturation 11%.  Had a normal CBC and CMP.    In the end of July 2018, patient called reporting worsening significant leg cramping, and getting worse recently and has been taking 6 tablets of Advil with no significant improvement.  We suspect it might be caused by Sprycel for her CML.  We discussed with patient, and decreased to half dose at 50 mg daily.  Patient reports that she is improved leg cramping since dose reduction.    Per medical records this patient had emergency room visit again on 8/18/2018.  Patient reports she had significant abdomen/pelvic pain at a time associate with nausea.  Laboratory study showed significantly  elevated WBC at 25,900 including neutrophils 24,400, with elevated hemoglobin 15.8 and platelets 146,000.     She had a thorough investigation, including CT scan for abdomen pelvis which was unremarkable.  She then had ultrasound for the pelvis and it was also unremarkable.  She had ultrasound for the gallbladder examination on the same day and was unremarkable.  She had a normal CMP except of marginally elevated glucose at 112.  Her urinalysis showed only marginally increased WBC 3-5/HPF.  She was negative for yeast infection, negative for Chlamydia trichomonas and Neisseria gonorrhea.  Patient was prescribed Flagyl and doxycycline and discharged to home.      lab study on 2019 reported normal iron saturation 47% and elevated ferritin 507.1 ng/mL with free iron 184 and TIBC 388.  hemoglobin is normal at 13.7 and platelets 186,000. normal WBC at 5080 including ANC 3300, lymphocytes 930 and monocytes 630. Labs reported significantly elevated ALT at 655,  and alkaline phosphatase 234 but normal total bilirubin 0.9. She had normal renal function creatinine 0.78. Normal electrolytes.       On 2019, I searched Up-to-Date and suspected that this patient had drug-induced hepatitis from Diflucan or with combination of Diflucan with Sprycel. This patient had been on Sprycel for years and had no problem with abnormal liver panel previously. It seems Diflucan inhibits CY moderately, which likely interferes with the metabolism of Sprycel. I instructed the patient to hold her Sprycel for now.    Sprycel treatment resumed as of 2019 upon completion of MEVYRET for her hepatitis C.    Laboratory study performed on 2019 showed normal CBC and CMP. BCR/ABL by RT-PCR detected BCR/ABL1 Major. HCV RNA by PCR showed HCV not detected.    Laboratory studies 2019 report her hemoglobin is normal at 15.2 and platelets 146,000. normal WBC at 7650 including ANC 5190, lymphocytes 1600 and monocytes  630.    Recent laboratory studies confirmed to be no detectable virus on 9/11/2019.    U/S Liver performed on 10/24/2019 reported negative hepatic ultrasound exam with no focal liver lesion, negative gallbladder examination with no cholelithiasis or bile duct dilation noted, however borderline splenomegaly was noted.     Laboratory study performed on 10/29/2019, reported a completely normal CBC, CMP. Normal iron saturation and still slightly elevated ferritin 325 ng/dL  BCR/ABL1 MAJOR (p210) and BCR/ABL1 MINOR (p190) were not detected.     Laboratory studies 1/24/2020, show hemoglobin 14.3 MCV 92.6 platelets 180,000 and WBC 6570 including neutrophils 4400.  She also has completely normal CMP.  Iron studies reported ferritin 273, iron saturation 11%, hemoglobin 14.3 WBC 6570 and platelets 180,000.  BCR-ABL by RT-PCR with 0%.     Patient presented today for 3-month follow-up and lab review.      Due to pandemic coronavirus infection, patient has been staying home with all 4 children.  Patient reports significant fatigue for the past month, similar to the time when she had iron deficiency.  Patient reports prior to that she was having regular exercise and with good energy level.    Patient reports compliant with Sprycel 50 mg daily.  She denies leg cramping.  She has no nausea no vomiting no abdominal pains.  She has good appetite and no diarrhea.  She denies headaches vision changes no dizziness or seizure activity.    Her laboratory study on 3/12/2020 reported marginal iron saturation 15% however had a good ferritin 212.5 ng/mL.  She had hemoglobin 13.1 and normal thyroid profile including TSH 1.20, free T4 at 1.37 ng/dL and a free T3 at 2.97 pg/mL     On 4/24/2020 her iron study showed ferritin 262 and iron saturation 21%.  Hemoglobin is 14.8.  She has normal WBC 5740 including ANC 3820, and normal platelets 161,000.   She was negative for BCR-ABL by RT-PCR on the same day.    Laboratory studies, 7/10/2020, show  completely normal CBC and CMP.  Negative for BCR-ABL by RT-PCR.  Iron studies showed ferritin 185, iron saturation 13% free iron 37 TIBC 286.    Patient has completed normal CBC 10/9/2020.  Iron studies reported ferritin 2070, free iron 13%.  She also has completed normal CMP.  She was tested negative for BCR-ABL by RT-PCR method.  Sprycel 50 mg daily was continued.     We saw her recently on 10/9/2020 for routine follow-up for her CML.  She was tolerating Sprycel.  Physical examination laboratory studies were unremarkable.  No detectable BCR-ABL by RT-PCR.  We continued her Sprycel at that time.     On 11/17/2020, patient called and reported chest tightness, palpitation and dyspnea new onset in November 2020.  Patient reports this happened recently.  She has chest tightness, and associated tachycardia/palpitation.  Patient reports this is unpredictable, can be on and off.  She noticed that one day she was cooking meal for her family, and noted sudden onset palpitation and chest tightness.  She reports unable to induce purposefully.  She also has exertional dyspnea.  Patient reports 11/17/2020, we asked patient to hold Sprycel.  We suspect the patient may have pleural effusion or cardiac dysfunction secondary to Sprycel, we requested chest CT, echocardiogram study and also laboratory study for evaluation.     Negative chest x-ray on 11/17/2020.     Laboratory study on 11/17/2020 reported stable chronic condition with a ferritin 261 and iron saturation 12%, normal hemoglobin 15.0, unable to explain her dyspnea.     Echocardiogram study on 11/18/2020 reported normal results.     Laboratory study on 1/6/2021 reported hemoglobin 15.9 hematocrit 47.1%, platelets 181,000 WBC 9360 including ANC 7130 lymphocytes 1350. Iron study reported ferritin 293 and iron saturation 14% with free iron 47 and a TIBC 326.  Chemistry lab reported unremarkable CMP including renal function and liver function panel.      Patient reports she  was seen by cardiologist Dr. Flowers on 2/4/2021.  Patient had thorough cardiology evaluation and there was no apparent abnormalities.    We have been withholding her Sprycel in middle November 2020 because of palpitation, and referred patient to cardiology service.        Fortunately, her peripheral blood BCR-ABL by RT-PCR was complete negative, as reported on 1/12/2021.     Sprycel was resumed on 2/9/2021.      Laboratory studies on 3/16/2021 reported normal CBC including hemoglobin 14.4 MCV 88.7, platelets 173,000, and WBC 6400 including ANC 3770 lymphocytes 1800.  Chemistry lab reported normal CMP.     Laboratory studies on 5/4/2021 reported completely normal CBC and CMP.  Iron study also normal with ferritin 219 and iron saturation 16%.  Negative study for BCR-ABL by RT-PCR.     Laboratory study on 10/5/2021 reported complete normal CBC and CMP.  Iron study reported normal ferritin 250 ng/mL and iron saturation 21% with free iron 63 TIBC 300.  She was also complete negative for BCR-ABL by RT-PCR.    The patient complains of leg pain and bone pain. She recently had an EGD done. Her stomach was inflamed due to taking ibuprofen. She was prescribed Carafate 4 times a day for 1 week. She was only taking it 2 to 3 times a day. It caused constipation.    Laboratory study on 4/12/2023 reports normal hemoglobin 14.3, WBC 6430 including neutrophils 4130, lymphocytes 1560, monocytes 480, and normal platelets 181,000.  Her laboratory study on 4/12/2023 reported negative for BCR-ABL by RT-PCR, for both the p210 and the p190 products.       The patient reports recently she went to [facility] because of pain in the neck. She was in the ER on 7/9/2023 and subsequently had an MRI for the cervical spine and the thoracic spine on 7/10/2023 for further evaluation. The study reported tiny central disc protrusion of the C6-7. Also, there was incidental finding of left thyroid nodule 1 cm. Radiologist recommended to have  "ultrasound examination. Otherwise, the MRI of the cervical spine has no significant abnormalities. The thoracic MRI examination reported normal results.    Laboratory studies on 2023 reports normal CBC with total WBC 9,160, including neutrophils 6500, lymphocytes 1680, monocytes 680, and normal hemoglobin 14.1, platelets 211,000. Chemistry lab reported that unremarkable with CMP except a mildly decreased bicarbonate at 19.5 and elevated anion gap of 16.5.    Lab study on 2023 for BCR-ABL by RT-PCR was complete negative for the p210 and p190 products.      Laboratory studies today 2023 reported normal CBC including WBC 6010 neutrophils of 4890 lymphocytes 1090 monocytes 660 and basophil 40, hemoglobin 13.3 MCV 90.6, and platelets 180,000.  Chemistry lab reported unremarkable CMP.  Negative BCR-ABL study by RT-PCR on 2023.          MEDICATIONS: The current medication list was reviewed with the patient and updated in the EMR this date per the medical assistant. Medication dosages and frequencies were confirmed to be accurate.        Allergies   Allergen Reactions    Sulfa Antibiotics Unknown - Low Severity     Childhood reaction     SOCIAL HISTORY: . She smoked cigarettes previously, quit in 2006 with 8-pack-year history. Social drinker, maybe once a month. No illegal drug use. No risk for HIV except tattoos.  Hairstylist.       FAMILY HISTORY: Maternal grandmother had esophageal/stomach adenocarcinoma diagnosed at age of 72 and  of cancer at age of 73. The patient' s mother has hypertension but otherwise healthy.  No other family history of malignancy, especially no leukemia.          VITAL SIGNS:   Vitals:    24 1000   BP: 118/74   Pulse: 80   Resp: 18   Temp: 97.9 °F (36.6 °C)   TempSrc: Temporal   SpO2: 99%   Weight: 91.5 kg (201 lb 12.8 oz)   Height: 172.7 cm (67.99\")   PainSc: 0-No pain     ECOG 0          2024 exam   Physical Exam  Vitals and nursing note reviewed. "   Constitutional:       Appearance: Normal appearance. She is well-developed.   HENT:      Head: Normocephalic and atraumatic.      Nose: Nose normal.   Eyes:      Conjunctiva/sclera: Conjunctivae normal.   Cardiovascular:      Rate and Rhythm: Normal rate and regular rhythm.      Heart sounds: Normal heart sounds. No murmur heard.  Pulmonary:      Effort: Pulmonary effort is normal. No respiratory distress.      Breath sounds: Normal breath sounds. No wheezing.   Abdominal:      General: Bowel sounds are normal. There is no distension.      Palpations: Abdomen is soft.      Tenderness: There is no abdominal tenderness.   Musculoskeletal:      Right lower leg: No edema.      Left lower leg: No edema.   Lymphadenopathy:      Upper Body:      Right upper body: No supraclavicular adenopathy.      Left upper body: No supraclavicular adenopathy.   Skin:     Findings: No rash.   Neurological:      Mental Status: She is alert. Mental status is at baseline.   Psychiatric:         Mood and Affect: Mood normal.       IMAGING:      LABORATORY DATA:    Lab Results   Component Value Date    WBC 8.47 01/29/2024    HGB 14.8 01/29/2024    HCT 44.6 01/29/2024    MCV 90.8 01/29/2024     01/29/2024     Lab Results   Component Value Date    NEUTROABS 6.12 01/29/2024     Lab Results   Component Value Date    GLUCOSE 89 01/29/2024    BUN 8 01/29/2024    CREATININE 0.73 01/29/2024    EGFR 107.4 01/29/2024    BCR 11.0 01/29/2024    K 4.0 01/29/2024    CO2 28.5 01/29/2024    CALCIUM 9.5 01/29/2024    ALBUMIN 4.4 01/29/2024    BILITOT 0.2 01/29/2024    AST 15 01/29/2024    ALT 9 01/29/2024           ASSESSMENT:      1. Chronic myelogenous leukemia, chronic phase with excellent response to Sprycel and complete molecular response in August 2014. However she had interuption of treatment in early part of 2016, because of insurance issues and miscommunication, she stopped the medicine without telling us, and laboratory test on 03/22/2016  reported disease relapse with increased BCR/ABL product at 12.626%. subsequently she was restarted back on Sprycel, and responded well.  Since June 2017, she has completed molecular response as tested every 3 months.  She has been compliant with treatment.   In the end of July 2018, she reported worsening significant cramping involving her legs, so we decreased her Sprycel to 50 mg daily starting early August.  She's been having less problem since that time.  She was tested negative for BCR-ABL on 8/31/2018.    Patient had a negative BCR-ABL by RT-PCR in end of January 2019 and also on 3/29/2019.   Patient was later found having significantly elevated liver function panel.  Sprycel was on hold and she was subsequently found having acute hepatitis C infection.    I discussed with Dr. Karyn Mendieta, we'll hold her Sprycel for now until she finishes hepatitis C treatment.   She was started on hepatitis C treatment on 4/18/2019 and finished an 8-week course.  On 07/02/2019 patient's labs showed BCR-ABL Major (p210) detected by RT-PCR at 0.0141%. BCR/ABL1 Minor (p190) was not detected. HCV was not detected.  Sprycel treatment was resumed as of 07/02/2019 upon completion of MEVYRET.  Laboratory study on 10/29/2019 reported normal CBC. BCR/ABL1 MAJOR (p210) and BCR/ABL1 MINOR (p190) were not detected.    BCR-ABL by RT-PCR on 1/24/2020 was negative.     Lab result for BCR ABL by RT-PCR was also negative on 4/24/2020. Patient will need to continue on sprycel treatment.  7/10/2020 patient has normal CBC and CMP.  Negative RT-PCR for BCR ABL.  Tolerating well.  Continue Sprycel at 50 mg daily.  On 10/9/2020, completely normal CBC with good tolerance.  BCR-ABL was tested negative by RT-PCR method.  Continue Sprycel.   Patient reports 11/17/2020 chest tightness in palpitation, and exertion dyspnea progressively getting worse in the past week.  We asked patient to hold Sprycel.   Chest x-ray examination on 11/17/2020 was  unremarkable.  Echocardiogram study on 11/18/2020 was also benign.  Patient was seen by cardiologist for further evaluation.    Laboratory studies on 1/6/2021 reported normal WBC 9360 including ANC 7130 lymphocytes 1350. BCR-ABL by RT-PCR was complete negative, as reported on 1/12/2021.   Patient had negative cardiac work-up.  She also has resolution of symptoms.  Discussed with patient on 2/9/2021, we recommended resumption of Sprycel 50 mg daily.  Patient is agreeable.  On 3/16/2021, patient reports tolerating Sprycel, no recurrent symptoms of dyspnea, chest tightness or palpitation.  Continue Sprycel 50 mg daily.  On 5/4/2021, patient has normal CBC and CMP.  Negative results for BCR-ABL by RT-PCR for P210 and P190 as reported on 5/12/2021.    On 10/5/2021 patient presented for reevaluation.  She reports tolerating Sprycel.  No specific side effects reported.  Maintains normal CBC and negative BCR-ABL.  We will continue treatment for now.  On 12/21/2021, patient reports good tolerance to Sprycel.  Normal CBC CMP.  Negative study by peripheral blood RT-PCR.  Continue Sprycel.   On 3/14/2022, patient reports excellent tolerance to Sprycel at 50 mg daily.  Maintains normal CBC and CMP.  6/8/2022 continues on Sprycel 50 mg daily tolerating well.  Patient was negative for BCR-ABL by RT-PCR.  On 10/7/2022 patient reports compliance with Sprycel and good tolerance.  We will continue treatment.  1/13/2023 continues on Sprycel 50 mg daily, tolerating well.  BCR-ABL was completely negative result for both the p210 and the p180 fusion products.  The patient presents for 3-month follow-up evaluation 4/12/2023. She continues on Sprycel 50 mg daily. She tolerated it well except having some pain in the lower extremities, which she stated has improved since decreased from 100 mg to 50 mg daily. She has been taking ibuprofen for that. Nevertheless, she recently had an EGD examination which led to the discovery of duodenitis. I  discussed with the patient about switching to a different TKI, we searched Up-to-Date. We found that bosutinib, and dasatinib could all cause similar side effects with arthralgia, muscle pain, limb pain, even the new medicine ponatinib could have caused the similar side effects at about the same kind of percentages. Discussed with patient if she is concerned about switching off the current Sprycel, which she really has excellent response.  Patient decided to continue Sprycel.  Study on 04/12/2023 was negative for BCR-ABL by RT-PCR.  On 8/9/2023 patient reports tolerating treatment.  She is at her baseline condition.  Patient has normal CBC.  We will continue Sprycel.  Patient was completely negative for BCR-ABL by RT-PCR.   Negative BCR-ABL study by RT-PCR on 11/6/2023.   The patient presented today on 01/29/2024. She reports good tolerance to low dose Sprycel 50 mg daily. She has normal CBC. Pending study for BCR-ABL.      2. Recurrent Iron deficiency anemia.    She was treated again with Feraheme October 2017.   Iron deficiency thought to be related to ulcer identified on EGD, being treated with Carafate.  She also had heavy menses.  She had recurrent iron deficiency again and was given Feraheme 2 doses in March 2018.   Subsequently recurrent iron deficiency in July 2018, she was switched to Venofer 3 doses total 900 mg.   Patient had simple hysterectomy in October 2018.  Laboratory study showed supratherapeutic ferritin 458 and iron saturation 40% on 4/30/2019.   Laboratory study performed 10/29/2019, showed normal iron saturation and ferritin 325 ng/dL.  Normal iron studies including ferritin 262 and iron saturation 21% on 4/24/2020.  No evidence of recurrent iron deficiency.   Lab study on 7/10/2020 reported ferritin 185, iron saturation 13% and normal hemoglobin 14.5.  She has deteriorating iron studies.   Continue to monitor in 3 months.  Labs on 10/9/2020 reported ferritin 270, iron saturation 13% and  hemoglobin 14.9.  Lab studies on 1/6/2021 reported mild erythrocytosis.  Hemoglobin is 15.9 and hematocrit of 47.1%, with ferritin 293 and iron saturation 14%.    Normal hemoglobin 14.4 on 3/16/2021.    On 5/4/2021 lab study reported hemoglobin 13.7, ferritin 219 and iron saturation 16%.    On 10/5/2021, patient maintains normal hemoglobin.  Iron study reported further improved ferritin 250 ng/mL and iron saturation 21%.  Since her hysterectomy, patient has no recurrence of iron deficiency.  Discussed with patient today, there is no need for routine monitoring of iron study from now.    On 3/14/2022, normal hemoglobin 14.3.  6/8/2022 hemoglobin 13.6.  On 10/7/2022 patient has normal hemoglobin 14.1.  1/13/2023 Hgb 13.6  On 4/12/2023 patient has normal hemoglobin 14.3.  Patient has normal hemoglobin 14.1 on 08/09/2023.   Normal hemoglobin 13.3 on 11/6/2023.   Today on 01/29/2024, the patient has normal hemoglobin 14.8.  She denies evidence of bleeding.  No need to check iron studies.       3.  Chest tightness, palpitation and dyspnea new onset in November 2020.  Patient reports this happened recently, and that usually unpredictable, she has chest tightness, and associated tachycardia/palpitation.  Patient reports this is unpredictable, can be on and off.  She noticed 1 day she was cooking meal for her family, and noted several palpitation and chest tightness.  She reports unable to induce purposefully.  She also has exertional dyspnea.  Patient reports 11/17/2020, we asked patient to hold Sprycel.  We requested chest CT, echocardiogram study and also laboratory study for evaluation.  Negative chest x-ray on 11/17/2020.   Laboratory study on 11/17/2020 reported stable chronic condition with a ferritin 261 and iron saturation 12%, normal hemoglobin 15.0, unable to explain her dyspnea.   Echocardiogram study on 11/18/2020 reported normal results.   Patient is evaluated 11/20/2020, she reports somewhat improved symptoms,  since she stopped Sprycel for the past 3 days.  She denies extremity edema.  She denies fever sweating or chills.  We recommend patient to continue to hold Sprycel for another 2 weeks.  We also recommended patient to be tested for COVID-19.    Reevaluation on 12/4/2020, patient reports that she did not get a Covid test.  She denies other illness such as fever sweating nausea vomiting, diarrhea, change of taste, cough etc.  Discussed with patient, will check a thyroid panel, which turned out to be normal on 12/4/2020.    Patient was referred to cardiologist for evaluation of hypertension.  She was seen by Dr. Flowers on 12/21/2020 and the case waiting for further evaluation.  Patient had a negative cardiac work-up.  Her symptom has resolved.  Patient was restarted on Sprycel 2/9/2021.  She reports no recurrent symptoms 3/16/2021.  On 5/4/2021, patient reports no recurrent symptoms.  On 10/5/2021 patient reports no dyspnea no chest pain or discomfort.   On 12/21/2021, patient reports no recurrent symptoms.  On 3/14/2022, patient has no chest pain no dyspnea or pressureness on chest.  6/8/2022 no complaints of this today.  No recurrent symptoms as reported on 4/12/2023.  No specific complaints today on 8/9/2023.   No chest pain dyspnea on 11/6/2023.  01/29/2024, the patient reports good tolerance. She denies any recurrent chest tightness or dyspnea.       4.  COVID-19 vaccination.  Patient reports receiving none 2 doses of the Moderna vaccine.    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   On 10/7/2022 patient reports she was not infected with COVID-19, despite recently her  and her children were infected.    5. Incidental finding of left thyroid nodule by cervical spine MRI examination for the assessment of neck pain  Patient is asymptomatic.   Today's physical examination has no abnormal discovery in  the thyroid area, no palpable nodule. I will request ultrasound for the thyroid for further evaluation as recommended by radiologist.  Patient had ultrasound of the thyroid 8/11/2023 and radiologist commented multiple small thyroid nodules fits with TI-RADS 1 and TI-RADS 2 nodules and are considered benign.  No additional follow-up recommended by radiologist.      PLAN:    Pending results for BCR-ABL by RT-PCR.  Continue low dose Sprycel 50 mg daily.  The patient will continue oral vitamin B complex and vitamin B12.  I will see the patient in 3 months for reevaluation, we will obtain laboratory study of CBC, CMP, BCR-ABL by RT-PCR.    I discussed with the patient about laboratory results and further management plan.  Patient voiced understanding and agreeable.      Patient is on a high risk medication requiring close monitoring for toxicity.      Isabel Castle MD PhD  01/29/2024      CC:  Jennifer More DO Anna Hart, M.D.    Luis Alfredo Flowers M.D.      Transcribed from ambient dictation for Isabel Castle MD PhD by Lucy Juarez.  01/29/24   13:18 EST    Patient or patient representative verbalized consent to the visit recording.  I have personally performed the services described in this document as transcribed by the above individual, and it is both accurate and complete.    Isabel Castle MD PhD

## 2024-02-12 ENCOUNTER — SPECIALTY PHARMACY (OUTPATIENT)
Dept: PHARMACY | Facility: HOSPITAL | Age: 39
End: 2024-02-12
Payer: MEDICAID

## 2024-02-12 DIAGNOSIS — C92.10 CML (CHRONIC MYELOID LEUKEMIA): Primary | ICD-10-CM

## 2024-02-27 LAB — REF LAB TEST METHOD: NORMAL

## 2024-03-04 ENCOUNTER — SPECIALTY PHARMACY (OUTPATIENT)
Dept: PHARMACY | Facility: HOSPITAL | Age: 39
End: 2024-03-04
Payer: MEDICAID

## 2024-03-04 NOTE — PROGRESS NOTES
Specialty Pharmacy Patient Management Program  Oncology 6-Month Clinical Assessment       Hu Houston is a 39 y.o. 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    Regimen: Sprycel 50mg, take one by mouth daily.    Reason for Outreach: Routine medication check-in .       Problem list reviewed by Sandy Weiner, Pharmacy Intern on 3/4/2024 at 10:27 AM  Medicines reviewed by Sandy Weiner, Pharmacy Intern on 3/4/2024 at 10:27 AM  Allergies reviewed by Sandy Weiner, Pharmacy Intern on 3/4/2024 at 10:27 AM     Goals Addressed Today    None       Medication Assessment:  Medication Assessment  No missed doses.  Administration: Patient is taking every day at the same time .   Patient can self administer medications: yes  Medication Follow-Up Plan: Next clinical assessment  Lab Review: The labs listed below have been reviewed. All labs are within normal limits. No dose adjustments are needed for the oral specialty medication(s) based on the labs.    Lab Results   Component Value Date    GLUCOSE 89 01/29/2024    CALCIUM 9.5 01/29/2024     01/29/2024    K 4.0 01/29/2024    CO2 28.5 01/29/2024     01/29/2024    BUN 8 01/29/2024    CREATININE 0.73 01/29/2024    EGFRIFNONA 86 12/21/2021    BCR 11.0 01/29/2024    ANIONGAP 8.5 01/29/2024     Lab Results   Component Value Date    WBC 8.47 01/29/2024    RBC 4.91 01/29/2024    HGB 14.8 01/29/2024    HCT 44.6 01/29/2024    MCV 90.8 01/29/2024    MCH 30.1 01/29/2024    MCHC 33.2 01/29/2024    RDW 13.2 01/29/2024    RDWSD 43.8 01/29/2024    MPV 10.3 01/29/2024     01/29/2024    NEUTRORELPCT 72.3 01/29/2024    LYMPHORELPCT 17.9 (L) 01/29/2024    MONORELPCT 6.7 01/29/2024    EOSRELPCT 1.9 01/29/2024    BASORELPCT 0.6 01/29/2024    AUTOIGPER 0.6 (H) 01/29/2024    NEUTROABS 6.12 01/29/2024    LYMPHSABS 1.52 01/29/2024    MONOSABS 0.57 01/29/2024    EOSABS 0.16 01/29/2024    BASOSABS 0.05 01/29/2024    AUTOIGNUM 0.05 01/29/2024     NRBC 0.0 01/29/2024     Drug-drug interactions  Completed medication reconciliation today to assess for drug interactions. Patient denies starting or stopping any medications.    Assessed medication list for interactions, no significant drug interactions noted.   Advised patient to call the clinic if any new medications are started so we can assess for drug-drug interactions.  Drug-food interactions discussed: eating grapefruit and drinking grapefruit juice  Vaccines are coordinated by the patient's oncologist and primary care provider.    Allergies  Known allergies and reactions were discussed with the patient. The patient's chart has been reviewed for allergy information and updated as necessary.   Allergies   Allergen Reactions    Sulfa Antibiotics Unknown - Low Severity     Childhood reaction        Hospitalizations and Urgent Care Visits Since Last Assessment:  Unplanned hospitalizations or inpatient admissions: no  ED Visits: no  Urgent Office Visits: no    Adherence Assessment:  Patient takes her medication at the same time each day, with no adherence issues identified.     Quality of Life Assessment:     -- Quality of Life: 8/10    Financial Assessment:  Medication availability/affordability: Patient has had no issues obtaining medication from pharmacy.    Attestation     I attest the patient was actively involved in and has agreed to the above plan of care.  If the prescribed therapy is at any point deemed not appropriate based on the current or future assessments, a consultation will be initiated with the patient's specialty care provider to determine the best course of action. The revised plan of therapy will be documented along with any required assessments and/or additional patient education provided.       All questions addressed and patient had no additional concerns. Patient has pharmacy contact information.    Name/Credentials  Sandy Weiner, Pharmacy Intern     3/4/2024  10:28 EST

## 2024-03-11 ENCOUNTER — TELEPHONE (OUTPATIENT)
Dept: ONCOLOGY | Facility: CLINIC | Age: 39
End: 2024-03-11

## 2024-03-11 NOTE — TELEPHONE ENCOUNTER
Caller: Hu Houston    Relationship: Self    Best call back number: [phone]     What is the best time to reach you: ANYTIME    Who are you requesting to speak with (clinical staff, provider,  specific staff member): CLINICAL    What was the call regarding: THE PT IS SWITCHING TO HER WORK'S INSURANCE AND THEY ARE QUOTING THAT HER MEDICATION IS GOING TO COST HER $5000 A MONTH. THE PT IS WANTING TO KNOW IF THERE ARE ANY PROGRAMS THAT CAN ASSIST WITH THIS? THIS WOULD BE FOR THE SPRYCEL.     Is it okay if the provider responds through [facility]: NO - PLEASE CALL BACK TO ADVISE.

## 2024-03-11 NOTE — TELEPHONE ENCOUNTER
HUB call from pt forwarded to me-she called in stating she was getting new insurance through her work and was quoted a copay of $5000 for her Sprycel. She asked for any assistance.    I returned her call and explained if she gets a commercial plan through work, I would be able to get her set up with the copay card. I explained to get a plan that works with her budget as I can get assistance for the Sprycel cost. She will give me a call when she gets her new insurance situated. I also asked her to call me if she is down to 2 weeks of medication on hand and insurance is not yet completed. She agrees to do so.

## 2024-04-22 ENCOUNTER — TELEPHONE (OUTPATIENT)
Dept: ONCOLOGY | Facility: CLINIC | Age: 39
End: 2024-04-22
Payer: COMMERCIAL

## 2024-04-22 ENCOUNTER — SPECIALTY PHARMACY (OUTPATIENT)
Dept: PHARMACY | Facility: HOSPITAL | Age: 39
End: 2024-04-22
Payer: COMMERCIAL

## 2024-04-22 NOTE — TELEPHONE ENCOUNTER
HUB message from pt forwarded to me-she was calling in regarding new insurance and the Sprycel. She's asking how to get it cheaper.    I have submitted a PA to [facility] through [employer].    I started the process for the Sprycel copay card-[employer] needs to speak with her. She can call [phone] to finish the process.    She can also enroll into the [employer] program that is offered by her insurance. She will have to all them at [phone].    I attempted to contact pt to notify her of the above and had to leave a VM. I asked for a return call back.

## 2024-04-22 NOTE — PROGRESS NOTES
Sprycel approved from 4/22/2024-4/22/2025-Vamsi Rios, Pharmacy Technician  Specialty Pharmacy Technician

## 2024-04-22 NOTE — TELEPHONE ENCOUNTER
Caller: Hu Houston    Relationship: Self    Best call back number: [phone]    What is the best time to reach you: ANYTIME    Who are you requesting to speak with (clinical staff, provider,  specific staff member): PHARMACY     What was the call regarding: PATIENT WOULD LIKE TO TALK TO THE PHARMACY REGARDING HER SPRYCEL 50 MG CHEMO TABLET AND HOW TO GET IT FOR CHEAPER.    SHE HAS NEW INSURANCE UPDATED IN HER CHART.    CALL TO DISCUSS

## 2024-04-24 ENCOUNTER — SPECIALTY PHARMACY (OUTPATIENT)
Dept: PHARMACY | Facility: HOSPITAL | Age: 39
End: 2024-04-24
Payer: COMMERCIAL

## 2024-04-24 NOTE — PROGRESS NOTES
Pt returned my call from the other day. I have provided her with the phone numbers to call [employer] and [employer] for the copay card.    I asked her to call me back after she speaks with both these companies so I can get the billing information to provide to [facility].    She called me back and stated [facility] would not enroll her until a claim is processed through insurance. She was able to get the Sprycel copay card information from [employer].    ID-[id number]  BIN-[id number]  [facility]-[id number]  [employer]  $15,000 yearly cap    The above information was provided to Amara at [facility]     Preeti Rios, Pharmacy Technician  Specialty Pharmacy Technician

## 2024-05-29 ENCOUNTER — LAB (OUTPATIENT)
Dept: LAB | Facility: HOSPITAL | Age: 39
End: 2024-05-29
Payer: COMMERCIAL

## 2024-05-29 ENCOUNTER — PRIOR AUTHORIZATION (OUTPATIENT)
Dept: ONCOLOGY | Facility: CLINIC | Age: 39
End: 2024-05-29
Payer: COMMERCIAL

## 2024-05-29 DIAGNOSIS — C92.10 CML (CHRONIC MYELOID LEUKEMIA): ICD-10-CM

## 2024-05-29 LAB
ALBUMIN SERPL-MCNC: 4.4 G/DL (ref 3.5–5.2)
ALBUMIN/GLOB SERPL: 1.5 G/DL
ALP SERPL-CCNC: 94 U/L (ref 39–117)
ALT SERPL W P-5'-P-CCNC: 11 U/L (ref 1–33)
ANION GAP SERPL CALCULATED.3IONS-SCNC: 12.1 MMOL/L (ref 5–15)
AST SERPL-CCNC: 18 U/L (ref 1–32)
BASOPHILS # BLD AUTO: 0.03 10*3/MM3 (ref 0–0.2)
BASOPHILS NFR BLD AUTO: 0.4 % (ref 0–1.5)
BILIRUB SERPL-MCNC: 0.3 MG/DL (ref 0–1.2)
BUN SERPL-MCNC: 10 MG/DL (ref 6–20)
BUN/CREAT SERPL: 13.2 (ref 7–25)
CALCIUM SPEC-SCNC: 9.2 MG/DL (ref 8.6–10.5)
CHLORIDE SERPL-SCNC: 102 MMOL/L (ref 98–107)
CO2 SERPL-SCNC: 24.9 MMOL/L (ref 22–29)
CREAT SERPL-MCNC: 0.76 MG/DL (ref 0.57–1)
DEPRECATED RDW RBC AUTO: 42.7 FL (ref 37–54)
EGFRCR SERPLBLD CKD-EPI 2021: 101.7 ML/MIN/1.73
EOSINOPHIL # BLD AUTO: 0.12 10*3/MM3 (ref 0–0.4)
EOSINOPHIL NFR BLD AUTO: 1.6 % (ref 0.3–6.2)
ERYTHROCYTE [DISTWIDTH] IN BLOOD BY AUTOMATED COUNT: 13.2 % (ref 12.3–15.4)
GLOBULIN UR ELPH-MCNC: 2.9 GM/DL
GLUCOSE SERPL-MCNC: 93 MG/DL (ref 65–99)
HCT VFR BLD AUTO: 43.5 % (ref 34–46.6)
HGB BLD-MCNC: 14.6 G/DL (ref 12–15.9)
IMM GRANULOCYTES # BLD AUTO: 0.05 10*3/MM3 (ref 0–0.05)
IMM GRANULOCYTES NFR BLD AUTO: 0.7 % (ref 0–0.5)
LYMPHOCYTES # BLD AUTO: 1.47 10*3/MM3 (ref 0.7–3.1)
LYMPHOCYTES NFR BLD AUTO: 19.3 % (ref 19.6–45.3)
MCH RBC QN AUTO: 29.7 PG (ref 26.6–33)
MCHC RBC AUTO-ENTMCNC: 33.6 G/DL (ref 31.5–35.7)
MCV RBC AUTO: 88.6 FL (ref 79–97)
MONOCYTES # BLD AUTO: 0.63 10*3/MM3 (ref 0.1–0.9)
MONOCYTES NFR BLD AUTO: 8.3 % (ref 5–12)
NEUTROPHILS NFR BLD AUTO: 5.31 10*3/MM3 (ref 1.7–7)
NEUTROPHILS NFR BLD AUTO: 69.7 % (ref 42.7–76)
NRBC BLD AUTO-RTO: 0 /100 WBC (ref 0–0.2)
PLATELET # BLD AUTO: 218 10*3/MM3 (ref 140–450)
PMV BLD AUTO: 9.9 FL (ref 6–12)
POTASSIUM SERPL-SCNC: 3.7 MMOL/L (ref 3.5–5.2)
PROT SERPL-MCNC: 7.3 G/DL (ref 6–8.5)
RBC # BLD AUTO: 4.91 10*6/MM3 (ref 3.77–5.28)
SODIUM SERPL-SCNC: 139 MMOL/L (ref 136–145)
WBC NRBC COR # BLD AUTO: 7.61 10*3/MM3 (ref 3.4–10.8)

## 2024-05-29 PROCEDURE — 36415 COLL VENOUS BLD VENIPUNCTURE: CPT

## 2024-05-29 PROCEDURE — 80053 COMPREHEN METABOLIC PANEL: CPT

## 2024-05-29 PROCEDURE — 85025 COMPLETE CBC W/AUTO DIFF WBC: CPT

## 2024-05-29 NOTE — TELEPHONE ENCOUNTER
No PA required for BCR/ABL by PCR [id number] per [facility]. Tracking # [id number]. Ok'd lab to send to [facility].

## 2024-05-30 ENCOUNTER — SPECIALTY PHARMACY (OUTPATIENT)
Dept: PHARMACY | Facility: HOSPITAL | Age: 39
End: 2024-05-30
Payer: COMMERCIAL

## 2024-05-30 NOTE — PROGRESS NOTES
Specialty Pharmacy Note: Sprycel (dasatinib)    Hu Houston is a 39 y.o. female with CML was seen 5/29/24 by Dr. Castle. Per provider dictation, no changes to oral oncology regimen Sprycel (dasatinib).  Labs Review: The CMP and CBC from 5/29/24 have been reviewed. No dose adjustments are needed for the oral specialty medication(s) based on the labs. BCR/ABL PCR pending from 5/29/24.     Specialty pharmacy will continue to follow patient.    Joana Abdullahi, JoshuaD, BCPS  5/30/2024  12:25 EDT

## 2024-06-05 LAB — REF LAB TEST METHOD: NORMAL

## 2024-06-06 ENCOUNTER — TELEPHONE (OUTPATIENT)
Dept: ONCOLOGY | Facility: CLINIC | Age: 39
End: 2024-06-06
Payer: COMMERCIAL

## 2024-06-06 NOTE — TELEPHONE ENCOUNTER
Per Dr Castle the BCR-ABL result is 0.01% and is nothing to be concerned about. Will recheck in 3 months.    Patient v/u

## 2024-06-06 NOTE — TELEPHONE ENCOUNTER
Patient calling due to her BCR-ABL test results DETECTED.   Explain to patient will show results with Dr Castle and will return call with results.    Patient v/u

## 2024-06-06 NOTE — TELEPHONE ENCOUNTER
Provider: Tin  Caller: patient  Relationship to Patient: self  Call Back Phone Number: [phone]  Reason for Call:Pt calling about questions regarding a scan.

## 2024-08-15 NOTE — PROGRESS NOTES
REASONS FOR FOLLOWUP:    Chronic myelogenous leukemia with splenomegaly, chronic phase, positive [place] chromosome, no mutation at kinase domain diagnosed in November 2013.   Patient is on dose reduced Sprycel 50 mg daily with undetectable disease by RT-PCR.              HISTORY OF PRESENT ILLNESS: Patient returns today 8/15/2024 for lab review and evaluation continuing on Sprycel 50 mg daily.  She reports that she is doing fine.  She denies any issues with fevers, chills, night sweats.  She has had some weight loss which has not necessarily been intentional.  She does feel like she has a good appetite, she stays quite active running with her four children.      Past Medical History:   Diagnosis Date    Anemia in neoplastic disease     Asthma     childhood    Chiari I malformation     eval by Dr Moore, [place] 2016    Chronic myelogenous leukemia     remission, Dr Castle follows    Chronic pain     CML (chronic myelocytic leukemia)     Cystitis     Depression     GERD (gastroesophageal reflux disease)     ulcer    H/O Iron deficiency anemia     H/O Lower extremity pain     Resolved.    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      *  Hysterectomy in October 2018      *  Acute hepatitis C in early 2019, treated successfully by Dr. Karyn Mendieta.     OB/GYN HISTORY: Menarche age 9. G5, P4, 1 miscarriage of the third pregnancy. First pregnancy at age 17. Patient underwent partial hysterectomy in 2018.       HEMATOLOGIC/ONCOLOGIC HISTORY:   * Chronic myelogenous leukemia with splenomegaly, chronic phase, positive [place] chromosome, no mutation at kinase domain diagnosed in November 2013.   Patient was started on hydroxyurea  temporarily on 10/23/2013 with significant drop of WBC counts.    Patient started on Sprycel on 12/02/2013.   Peripheral blood tested with 3.4% of cells positive for BCR-ABL translocation, tested 02/17/2014.    The patient had CCyR 6 months into treatment as tested on 05/15/2014.    Complete molecular response as tested 08/08/14 with negative product of BCR/ABL.   There was interruption of her treatment due to insurance coverage and misunderstanding from patient's part, she had a relapse of disease with BCR/ABL product at 12.626% in March 2016, and she restarted back on Sprycel.   Good response as tested on 08/31/2016 with BCR/ABL at 0.294%.    Since June 2017, patient has been persistently tested negative for BCR/ABL by RT-PCR every 3 month period.     Patient reported worsening leg cramping in end of July 2018.  Sprycel was decreased to 50 mg daily.  Laboratory studies on 8/31/2018, on 1/30/2019 and on 3/29/2019 were negative for BCR-ABL by RT-PCR.    Sprycel was on hold during treatment for acute hepatitis C in early part of 2019.  It was resumed in July 2019.  She was weakly positive for BCR-ABL.  On 10/29/2019 BCR/ABL1 MAJOR (p210) and BCR/ABL1 MINOR (p190) were not detected.    BCR-ABL by RT-PCR with 0% on 1/24/2020 and also on 4/24/2020.  Patient was tested negative on 7/10/2020.  Negative on 10/9/2020.    Because of palpitation, Sprycel was on hold since 11/17/2020.     BCR-ABL by RT-PCR was complete negative as reported on 1/06/2021.  Sprycel was resumed on 2/9/2021 at 50 mg daily.  Continue BCR-ABL by RT-PCR in May 2021 in October 2021.          Laboratory results on 09/23/2015 showed normalization of hemoglobin 12.2, but still has macrocytosis, MCV 73.3. She has normal platelets and WBC at 9400. Serum ferritin < 5 ng/ml, iron sats 6.3%, iron 29, TIBC 455 mcg/ml. Still has severe iron deficiency, needs to continue oral iron therapy. The patient restarted taking oral iron supplementation which was probably  stopped in the end of November 2015.        Laboratory study on 03/22/2016 reported normal WBC 8450, neutrophils 6100, lymphs is 1400 and monocytes 550. Serum iron was 26, TIBC 469 on saturation 6% and ferritin less than 5 NG/ML. Chemistry lab reported normal renal function with a creatinine 0.69, normal liver function panel, total protein 7.5 and albumin 4.2, normal electrolytes. Patient was restarted back on oral on sedimentation twice a day with good tolerance.      Patient continues take Lortab as needed for left leg cramping. Urine drug test on 08/05/2016 was completely negative, and repeated study on August 26 was positive for opiate, negative for other recreational drugs.      Repeat laboratory study on 08/31/2016 reported iron 21, ferritin less than 5, iron saturation 5%, TIBC 462. Hemoglobin was 11.5 MCV 78.1 MCHC 30.4. Platelets 163,000. Total WBC 8870 including neutrophils 6400 and lymphocytes 1500. BCR/ABL was 0.294% by RT-PCR method.       Patient was given IV Feraheme treatment in September 2016, with 2 doses. Repeated laboratory study on 10/06/2016 reported significantly improved and normalized ferritin 269, iron 80, TIBC 365 and iron saturation 22%. Her hemoglobin was 12.2, and MCV 80.8.      Patient reported she was seen by Dr. Fam in 10/2016 and was started on half tablets of Topamax. I reviewed Dr. Fam’s clinic note and telephone conversation record. The patient reports she has no recurrence of migraine headache. However, she is very tearful today, reporting that she has thoughts of not taking any medications. She did assure me that she has been taking the medication up to this point. 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 The patient denies suicide ideation. When she was initially diagnosed of CML back in 2014, she had depression and I started the patient on Prozac. The patient  reported initially it helped her, however, she no longer feels the effect of Prozac. She feels depressed. The patient reports she has no personal hobby. She goes to work and goes home, taking care of her kids. She does not enjoy life as she used to.      Laboratory study on December 29, 2016 reported at ferritin 19.9, iron 33, TIBC 347 iron saturation 10%.  Hemoglobin was 13, normal WBC and platelets.  Unremarkable CMP.  Patient was given 2 doses of Feraheme treatment in early January 2017.      Cytogenetic study on March 14, 2017 reported a BCR-ABL products at 0.098%, showed further improved residual disease.  There is also reported a ferritin 103.7, iron 79, TIBC 336 and iron saturation 24%.  Hemoglobin was 13.5, MCV 92.3, platelets 199,000 WBC 7000.  She had a completely normal CMP.       Repeated laboratory study on June 20, 2017 reported at nondetectable BCR-ABL product.  Ferritin was 45, iron 35 TIBC 333 and iron saturation 11%.  Had a normal CBC and CMP.    In the end of July 2018, patient called reporting worsening significant leg cramping, and getting worse recently and has been taking 6 tablets of Advil with no significant improvement.  We suspect it might be caused by Sprycel for her CML.  We discussed with patient, and decreased to half dose at 50 mg daily.  Patient reports that she is improved leg cramping since dose reduction.    Per medical records this patient had emergency room visit again on 8/18/2018.  Patient reports she had significant abdomen/pelvic pain at a time associate with nausea.  Laboratory study showed significantly elevated WBC at 25,900 including neutrophils 24,400, with elevated hemoglobin 15.8 and platelets 146,000.     She had a thorough investigation, including CT scan for abdomen pelvis which was unremarkable.  She then had ultrasound for the pelvis and it was also unremarkable.  She had ultrasound for the gallbladder examination on the same day and was unremarkable.  She had a  normal CMP except of marginally elevated glucose at 112.  Her urinalysis showed only marginally increased WBC 3-5/HPF.  She was negative for yeast infection, negative for Chlamydia trichomonas and Neisseria gonorrhea.  Patient was prescribed Flagyl and doxycycline and discharged to home.      lab study on 2019 reported normal iron saturation 47% and elevated ferritin 507.1 ng/mL with free iron 184 and TIBC 388.  hemoglobin is normal at 13.7 and platelets 186,000. normal WBC at 5080 including ANC 3300, lymphocytes 930 and monocytes 630. Labs reported significantly elevated ALT at 655,  and alkaline phosphatase 234 but normal total bilirubin 0.9. She had normal renal function creatinine 0.78. Normal electrolytes.       On 2019, I searched Up-to-Date and suspected that this patient had drug-induced hepatitis from Diflucan or with combination of Diflucan with Sprycel. This patient had been on Sprycel for years and had no problem with abnormal liver panel previously. It seems Diflucan inhibits CY moderately, which likely interferes with the metabolism of Sprycel. I instructed the patient to hold her Sprycel for now.    Sprycel treatment resumed as of 2019 upon completion of MEVYRET for her hepatitis C.    Laboratory study performed on 2019 showed normal CBC and CMP. BCR/ABL by RT-PCR detected BCR/ABL1 Major. HCV RNA by PCR showed HCV not detected.    Laboratory studies 2019 report her hemoglobin is normal at 15.2 and platelets 146,000. normal WBC at 7650 including ANC 5190, lymphocytes 1600 and monocytes 630.    Recent laboratory studies confirmed to be no detectable virus on 2019.    U/S Liver performed on 10/24/2019 reported negative hepatic ultrasound exam with no focal liver lesion, negative gallbladder examination with no cholelithiasis or bile duct dilation noted, however borderline splenomegaly was noted.     Laboratory study performed on 10/29/2019, reported a  completely normal CBC, CMP. Normal iron saturation and still slightly elevated ferritin 325 ng/dL  BCR/ABL1 MAJOR (p210) and BCR/ABL1 MINOR (p190) were not detected.     Laboratory studies 1/24/2020, show hemoglobin 14.3 MCV 92.6 platelets 180,000 and WBC 6570 including neutrophils 4400.  She also has completely normal CMP.  Iron studies reported ferritin 273, iron saturation 11%, hemoglobin 14.3 WBC 6570 and platelets 180,000.  BCR-ABL by RT-PCR with 0%.     Patient presented today for 3-month follow-up and lab review.      Due to pandemic coronavirus infection, patient has been staying home with all 4 children.  Patient reports significant fatigue for the past month, similar to the time when she had iron deficiency.  Patient reports prior to that she was having regular exercise and with good energy level.    Patient reports compliant with Sprycel 50 mg daily.  She denies leg cramping.  She has no nausea no vomiting no abdominal pains.  She has good appetite and no diarrhea.  She denies headaches vision changes no dizziness or seizure activity.    Her laboratory study on 3/12/2020 reported marginal iron saturation 15% however had a good ferritin 212.5 ng/mL.  She had hemoglobin 13.1 and normal thyroid profile including TSH 1.20, free T4 at 1.37 ng/dL and a free T3 at 2.97 pg/mL     On 4/24/2020 her iron study showed ferritin 262 and iron saturation 21%.  Hemoglobin is 14.8.  She has normal WBC 5740 including ANC 3820, and normal platelets 161,000.   She was negative for BCR-ABL by RT-PCR on the same day.    Laboratory studies, 7/10/2020, show completely normal CBC and CMP.  Negative for BCR-ABL by RT-PCR.  Iron studies showed ferritin 185, iron saturation 13% free iron 37 TIBC 286.    Patient has completed normal CBC 10/9/2020.  Iron studies reported ferritin 2070, free iron 13%.  She also has completed normal CMP.  She was tested negative for BCR-ABL by RT-PCR method.  Sprycel 50 mg daily was continued.     We saw  her recently on 10/9/2020 for routine follow-up for her CML.  She was tolerating Sprycel.  Physical examination laboratory studies were unremarkable.  No detectable BCR-ABL by RT-PCR.  We continued her Sprycel at that time.     On 11/17/2020, patient called and reported chest tightness, palpitation and dyspnea new onset in November 2020.  Patient reports this happened recently.  She has chest tightness, and associated tachycardia/palpitation.  Patient reports this is unpredictable, can be on and off.  She noticed that one day she was cooking meal for her family, and noted sudden onset palpitation and chest tightness.  She reports unable to induce purposefully.  She also has exertional dyspnea.  Patient reports 11/17/2020, we asked patient to hold Sprycel.  We suspect the patient may have pleural effusion or cardiac dysfunction secondary to Sprycel, we requested chest CT, echocardiogram study and also laboratory study for evaluation.     Negative chest x-ray on 11/17/2020.     Laboratory study on 11/17/2020 reported stable chronic condition with a ferritin 261 and iron saturation 12%, normal hemoglobin 15.0, unable to explain her dyspnea.     Echocardiogram study on 11/18/2020 reported normal results.     Laboratory study on 1/6/2021 reported hemoglobin 15.9 hematocrit 47.1%, platelets 181,000 WBC 9360 including ANC 7130 lymphocytes 1350. Iron study reported ferritin 293 and iron saturation 14% with free iron 47 and a TIBC 326.  Chemistry lab reported unremarkable CMP including renal function and liver function panel.      Patient reports she was seen by cardiologist Dr. Flowers on 2/4/2021.  Patient had thorough cardiology evaluation and there was no apparent abnormalities.    We have been withholding her Sprycel in middle November 2020 because of palpitation, and referred patient to cardiology service.        Fortunately, her peripheral blood BCR-ABL by RT-PCR was complete negative, as reported on 1/12/2021.      Sprycel was resumed on 2/9/2021.      Laboratory studies on 3/16/2021 reported normal CBC including hemoglobin 14.4 MCV 88.7, platelets 173,000, and WBC 6400 including ANC 3770 lymphocytes 1800.  Chemistry lab reported normal CMP.     Laboratory studies on 5/4/2021 reported completely normal CBC and CMP.  Iron study also normal with ferritin 219 and iron saturation 16%.  Negative study for BCR-ABL by RT-PCR.     Laboratory study on 10/5/2021 reported complete normal CBC and CMP.  Iron study reported normal ferritin 250 ng/mL and iron saturation 21% with free iron 63 TIBC 300.  She was also complete negative for BCR-ABL by RT-PCR.    The patient complains of leg pain and bone pain. She recently had an EGD done. Her stomach was inflamed due to taking ibuprofen. She was prescribed Carafate 4 times a day for 1 week. She was only taking it 2 to 3 times a day. It caused constipation.    Laboratory study on 4/12/2023 reports normal hemoglobin 14.3, WBC 6430 including neutrophils 4130, lymphocytes 1560, monocytes 480, and normal platelets 181,000.  Her laboratory study on 4/12/2023 reported negative for BCR-ABL by RT-PCR, for both the p210 and the p190 products.       The patient reports recently she went to [facility] because of pain in the neck. She was in the ER on 7/9/2023 and subsequently had an MRI for the cervical spine and the thoracic spine on 7/10/2023 for further evaluation. The study reported tiny central disc protrusion of the C6-7. Also, there was incidental finding of left thyroid nodule 1 cm. Radiologist recommended to have ultrasound examination. Otherwise, the MRI of the cervical spine has no significant abnormalities. The thoracic MRI examination reported normal results.    Laboratory studies on 8/9/2023 reports normal CBC with total WBC 9,160, including neutrophils 6500, lymphocytes 1680, monocytes 680, and normal hemoglobin 14.1, platelets 211,000. Chemistry lab reported that unremarkable  "with CMP except a mildly decreased bicarbonate at 19.5 and elevated anion gap of 16.5.    Lab study on 2023 for BCR-ABL by RT-PCR was complete negative for the p210 and p190 products.    Laboratory studies today 2023 reported normal CBC including WBC 6010 neutrophils of 4890 lymphocytes 1090 monocytes 660 and basophil 40, hemoglobin 13.3 MCV 90.6, and platelets 180,000.  Chemistry lab reported unremarkable CMP.  Negative BCR-ABL study by RT-PCR on 2023.    Laboratory studies on 2024 reported normal CBC including hemoglobin 14.8, MCV 90.8, platelets 193,000, and WBC 8,470, including neutrophils 6,120, lymphocytes 1,520, monocytes 570, and basophil 50. Chemistry lab reported normal CMP with creatinine 0.73, normal electrolytes including calcium 9.5, normal liver function panel, and glucose 89.        MEDICATIONS: The current medication list was reviewed with the patient and updated in the EMR this date per the medical assistant. Medication dosages and frequencies were confirmed to be accurate.        Allergies   Allergen Reactions    Sulfa Antibiotics Unknown - Low Severity     Childhood reaction     SOCIAL HISTORY: . She smoked cigarettes previously, quit in 2006 with 8-pack-year history. Social drinker, maybe once a month. No illegal drug use. No risk for HIV except tattoos.  Hairstylist.       FAMILY HISTORY: Maternal grandmother had esophageal/stomach adenocarcinoma diagnosed at age of 72 and  of cancer at age of 73. The patient' s mother has hypertension but otherwise healthy.  No other family history of malignancy, especially no leukemia.          VITAL SIGNS:   Vitals:    24 1149   BP: 100/65   Pulse: 72   Resp: 16   Temp: 98.4 °F (36.9 °C)   TempSrc: Oral   SpO2: 99%   Weight: 83.2 kg (183 lb 8 oz)   Height: 172.7 cm (67.99\")   PainSc: 0-No pain       ECOG 0      Physical Exam  Vitals and nursing note reviewed.   Constitutional:       Appearance: Normal appearance. " She is well-developed.   HENT:      Head: Normocephalic and atraumatic.   Eyes:      Conjunctiva/sclera: Conjunctivae normal.   Cardiovascular:      Rate and Rhythm: Normal rate and regular rhythm.      Heart sounds: Normal heart sounds. No murmur heard.  Pulmonary:      Effort: Pulmonary effort is normal. No respiratory distress.      Breath sounds: Normal breath sounds.   Abdominal:      General: Bowel sounds are normal.      Palpations: Abdomen is soft.      Tenderness: There is no abdominal tenderness.   Musculoskeletal:      Right lower leg: No edema.      Left lower leg: No edema.   Lymphadenopathy:      Upper Body:      Right upper body: No supraclavicular or axillary adenopathy.      Left upper body: No supraclavicular adenopathy.   Neurological:      Mental Status: She is alert. Mental status is at baseline.   Psychiatric:         Thought Content: Thought content normal.       IMAGING:      LABORATORY DATA:    Lab Results   Component Value Date    WBC 7.62 08/16/2024    HGB 14.9 08/16/2024    HCT 45.3 08/16/2024    MCV 91.9 08/16/2024     08/16/2024     Lab Results   Component Value Date    NEUTROABS 5.51 08/16/2024     Lab Results   Component Value Date    GLUCOSE 93 05/29/2024    BUN 10 05/29/2024    CREATININE 0.76 05/29/2024    EGFR 101.7 05/29/2024    BCR 13.2 05/29/2024    K 3.7 05/29/2024    CO2 24.9 05/29/2024    CALCIUM 9.2 05/29/2024    ALBUMIN 4.4 05/29/2024    BILITOT 0.3 05/29/2024    AST 18 05/29/2024    ALT 11 05/29/2024           ASSESSMENT:      1. Chronic myelogenous leukemia, chronic phase with excellent response to Sprycel and complete molecular response in August 2014. However she had interuption of treatment in early part of 2016, because of insurance issues and miscommunication, she stopped the medicine without telling us, and laboratory test on 03/22/2016 reported disease relapse with increased BCR/ABL product at 12.626%. subsequently she was restarted back on Sprycel, and  responded well.  Since June 2017, she has completed molecular response as tested every 3 months.  She has been compliant with treatment.   In the end of July 2018, she reported worsening significant cramping involving her legs, so we decreased her Sprycel to 50 mg daily starting early August.  She's been having less problem since that time.  She was tested negative for BCR-ABL on 8/31/2018.    Patient had a negative BCR-ABL by RT-PCR in end of January 2019 and also on 3/29/2019.   Patient was later found having significantly elevated liver function panel.  Sprycel was on hold and she was subsequently found having acute hepatitis C infection.    I discussed with Dr. Karyn Mendieta, we'll hold her Sprycel for now until she finishes hepatitis C treatment.   She was started on hepatitis C treatment on 4/18/2019 and finished an 8-week course.  On 07/02/2019 patient's labs showed BCR-ABL Major (p210) detected by RT-PCR at 0.0141%. BCR/ABL1 Minor (p190) was not detected. HCV was not detected.  Sprycel treatment was resumed as of 07/02/2019 upon completion of MEVYRET.  Laboratory study on 10/29/2019 reported normal CBC. BCR/ABL1 MAJOR (p210) and BCR/ABL1 MINOR (p190) were not detected.    BCR-ABL by RT-PCR on 1/24/2020 was negative.     Lab result for BCR ABL by RT-PCR was also negative on 4/24/2020. Patient will need to continue on sprycel treatment.  7/10/2020 patient has normal CBC and CMP.  Negative RT-PCR for BCR ABL.  Tolerating well.  Continue Sprycel at 50 mg daily.  On 10/9/2020, completely normal CBC with good tolerance.  BCR-ABL was tested negative by RT-PCR method.  Continue Sprycel.   Patient reports 11/17/2020 chest tightness in palpitation, and exertion dyspnea progressively getting worse in the past week.  We asked patient to hold Sprycel.   Chest x-ray examination on 11/17/2020 was unremarkable.  Echocardiogram study on 11/18/2020 was also benign.  Patient was seen by cardiologist for further evaluation.     Laboratory studies on 1/6/2021 reported normal WBC 9360 including ANC 7130 lymphocytes 1350. BCR-ABL by RT-PCR was complete negative, as reported on 1/12/2021.   Patient had negative cardiac work-up.  She also has resolution of symptoms.  Discussed with patient on 2/9/2021, we recommended resumption of Sprycel 50 mg daily.  Patient is agreeable.  On 3/16/2021, patient reports tolerating Sprycel, no recurrent symptoms of dyspnea, chest tightness or palpitation.  Continue Sprycel 50 mg daily.  On 5/4/2021, patient has normal CBC and CMP.  Negative results for BCR-ABL by RT-PCR for P210 and P190 as reported on 5/12/2021.    On 10/5/2021 patient presented for reevaluation.  She reports tolerating Sprycel.  No specific side effects reported.  Maintains normal CBC and negative BCR-ABL.  We will continue treatment for now.  On 12/21/2021, patient reports good tolerance to Sprycel.  Normal CBC CMP.  Negative study by peripheral blood RT-PCR.  Continue Sprycel.   On 3/14/2022, patient reports excellent tolerance to Sprycel at 50 mg daily.  Maintains normal CBC and CMP.  6/8/2022 continues on Sprycel 50 mg daily tolerating well.  Patient was negative for BCR-ABL by RT-PCR.  On 10/7/2022 patient reports compliance with Sprycel and good tolerance.  We will continue treatment.  1/13/2023 continues on Sprycel 50 mg daily, tolerating well.  BCR-ABL was completely negative result for both the p210 and the p180 fusion products.  The patient presents for 3-month follow-up evaluation 4/12/2023. She continues on Sprycel 50 mg daily. She tolerated it well except having some pain in the lower extremities, which she stated has improved since decreased from 100 mg to 50 mg daily. She has been taking ibuprofen for that. Nevertheless, she recently had an EGD examination which led to the discovery of duodenitis. I discussed with the patient about switching to a different TKI, we searched Up-to-Date. We found that bosutinib, and dasatinib  could all cause similar side effects with arthralgia, muscle pain, limb pain, even the new medicine ponatinib could have caused the similar side effects at about the same kind of percentages. Discussed with patient if she is concerned about switching off the current Sprycel, which she really has excellent response.  Patient decided to continue Sprycel.  Study on 04/12/2023 was negative for BCR-ABL by RT-PCR.  On 8/9/2023 patient reports tolerating treatment.  She is at her baseline condition.  Patient has normal CBC.  We will continue Sprycel.  Patient was completely negative for BCR-ABL by RT-PCR.   Negative BCR-ABL study by RT-PCR on 11/6/2023.   The patient presented today on 01/29/2024. She reports good tolerance to low dose Sprycel 50 mg daily. She has normal CBC. study for BCR-ABL by RT-PCR was completed negative.  The patient presented for evaluation on 05/29/2024. Currently, the patient reports tolerating low-dose Sprycel 50 mg daily well, with no symptoms of diarrhea, chest pain, dyspnea, or lower extremity edema. However, she does report intermittent lower extremity cramping, which she believes has been well-tolerated. Laboratory studies revealed normal CBC and well-controlled WBC counts.  BCR-ABL by RT-PCR detected quantitative result below the limit of quantification (less than 0.01%)  8/16/2024 returns in follow-up doing well with no new symptoms.  BCR-ABL pending for today.  Patient continuing on Sprycel 50 mg daily.      2. Recurrent Iron deficiency anemia.    She was treated again with Feraheme October 2017.   Iron deficiency thought to be related to ulcer identified on EGD, being treated with Carafate.  She also had heavy menses.  She had recurrent iron deficiency again and was given Feraheme 2 doses in March 2018.   Subsequently recurrent iron deficiency in July 2018, she was switched to Venofer 3 doses total 900 mg.   Patient had simple hysterectomy in October 2018.  Laboratory study showed  supratherapeutic ferritin 458 and iron saturation 40% on 4/30/2019.   Laboratory study performed 10/29/2019, showed normal iron saturation and ferritin 325 ng/dL.  Normal iron studies including ferritin 262 and iron saturation 21% on 4/24/2020.  No evidence of recurrent iron deficiency.   Lab study on 7/10/2020 reported ferritin 185, iron saturation 13% and normal hemoglobin 14.5.  She has deteriorating iron studies.   Continue to monitor in 3 months.  Labs on 10/9/2020 reported ferritin 270, iron saturation 13% and hemoglobin 14.9.  Lab studies on 1/6/2021 reported mild erythrocytosis.  Hemoglobin is 15.9 and hematocrit of 47.1%, with ferritin 293 and iron saturation 14%.    Normal hemoglobin 14.4 on 3/16/2021.    On 5/4/2021 lab study reported hemoglobin 13.7, ferritin 219 and iron saturation 16%.    On 10/5/2021, patient maintains normal hemoglobin.  Iron study reported further improved ferritin 250 ng/mL and iron saturation 21%.  Since her hysterectomy, patient has no recurrence of iron deficiency.  Discussed with patient today, there is no need for routine monitoring of iron study from now.    On 3/14/2022, normal hemoglobin 14.3.  6/8/2022 hemoglobin 13.6.  On 10/7/2022 patient has normal hemoglobin 14.1.  1/13/2023 Hgb 13.6  On 4/12/2023 patient has normal hemoglobin 14.3.  Patient has normal hemoglobin 14.1 on 08/09/2023.   Normal hemoglobin 13.3 on 11/6/2023.   on 01/29/2024, the patient has normal hemoglobin 14.8.  She denies evidence of bleeding.  No need to check iron studies.  On 05/29/2024, the patient's hemoglobin level is 14.6, and she denies any signs of bleeding.   8/16/2024 hemoglobin 14.9.     3.  Chest tightness, palpitation and dyspnea new onset in November 2020.  Patient reports this happened recently, and that usually unpredictable, she has chest tightness, and associated tachycardia/palpitation.  Patient reports this is unpredictable, can be on and off.  She noticed 1 day she was cooking  meal for her family, and noted several palpitation and chest tightness.  She reports unable to induce purposefully.  She also has exertional dyspnea.  Patient reports 11/17/2020, we asked patient to hold Sprycel.  We requested chest CT, echocardiogram study and also laboratory study for evaluation.  Negative chest x-ray on 11/17/2020.   Laboratory study on 11/17/2020 reported stable chronic condition with a ferritin 261 and iron saturation 12%, normal hemoglobin 15.0, unable to explain her dyspnea.   Echocardiogram study on 11/18/2020 reported normal results.   Patient is evaluated 11/20/2020, she reports somewhat improved symptoms, since she stopped Sprycel for the past 3 days.  She denies extremity edema.  She denies fever sweating or chills.  We recommend patient to continue to hold Sprycel for another 2 weeks.  We also recommended patient to be tested for COVID-19.    Reevaluation on 12/4/2020, patient reports that she did not get a Covid test.  She denies other illness such as fever sweating nausea vomiting, diarrhea, change of taste, cough etc.  Discussed with patient, will check a thyroid panel, which turned out to be normal on 12/4/2020.    Patient was referred to cardiologist for evaluation of hypertension.  She was seen by Dr. Flowers on 12/21/2020 and the case waiting for further evaluation.  Patient had a negative cardiac work-up.  Her symptom has resolved.  Patient was restarted on Sprycel 2/9/2021.  She reports no recurrent symptoms 3/16/2021.  On 5/4/2021, patient reports no recurrent symptoms.  On 10/5/2021 patient reports no dyspnea no chest pain or discomfort.   On 12/21/2021, patient reports no recurrent symptoms.  On 3/14/2022, patient has no chest pain no dyspnea or pressureness on chest.  6/8/2022 no complaints of this today.  No recurrent symptoms as reported on 4/12/2023.  No specific complaints today on 8/9/2023.   No chest pain dyspnea on 11/6/2023.  01/29/2024, the patient reports good  tolerance. She denies any recurrent chest tightness or dyspnea.  On 05/29/2024, the patient reported no other specific complaints.         4.  COVID-19 vaccination.  Patient reports receiving none 2 doses of the Moderna vaccine.    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   On 10/7/2022 patient reports she was not infected with COVID-19, despite recently her  and her children were infected.    5. Incidental finding of left thyroid nodule by cervical spine MRI examination for the assessment of neck pain  Patient is asymptomatic.   Today's physical examination has no abnormal discovery in the thyroid area, no palpable nodule. I will request ultrasound for the thyroid for further evaluation as recommended by radiologist.  Patient had ultrasound of the thyroid 8/11/2023 and radiologist commented multiple small thyroid nodules fits with TI-RADS 1 and TI-RADS 2 nodules and are considered benign.  No additional follow-up recommended by radiologist.      PLAN:    Continue Sprycel 50 mg daily.  Continue  B complex.  Results of BCR-ABL by RT-PCR pending.  Follow-up in 3 months with Dr. Castle with repeat CBC, CMP, BCR-ABL by RT-PCR.  Call/ return sooner should the patient develop any new concerns or problems.    Patient is on a high risk medication requiring close monitoring for toxicity.      FELIPA Gipson  08/16/2024      CC:  Jennifer More DO Anna Hart, M.D.    Luis Alfredo Flowers M.D.    Transcribed from ambient dictation for FELIPA Gipson by Lisa Mccarthy.  05/29/24   15:25 EDT    Patient or patient representative verbalized consent to the visit recording.  I have personally performed the services described in this document as transcribed by the above individual, and it is both accurate and complete.    Isabel Castle MD PhD

## 2024-08-16 ENCOUNTER — LAB (OUTPATIENT)
Dept: LAB | Facility: HOSPITAL | Age: 39
End: 2024-08-16
Payer: COMMERCIAL

## 2024-08-16 ENCOUNTER — OFFICE VISIT (OUTPATIENT)
Dept: ONCOLOGY | Facility: CLINIC | Age: 39
End: 2024-08-16
Payer: COMMERCIAL

## 2024-08-16 ENCOUNTER — PRIOR AUTHORIZATION (OUTPATIENT)
Dept: ONCOLOGY | Facility: CLINIC | Age: 39
End: 2024-08-16
Payer: COMMERCIAL

## 2024-08-16 VITALS
SYSTOLIC BLOOD PRESSURE: 100 MMHG | OXYGEN SATURATION: 99 % | HEART RATE: 72 BPM | RESPIRATION RATE: 16 BRPM | DIASTOLIC BLOOD PRESSURE: 65 MMHG | HEIGHT: 68 IN | TEMPERATURE: 98.4 F | WEIGHT: 183.5 LBS | BODY MASS INDEX: 27.81 KG/M2

## 2024-08-16 DIAGNOSIS — C92.10 CML (CHRONIC MYELOID LEUKEMIA): ICD-10-CM

## 2024-08-16 DIAGNOSIS — Z86.39 HISTORY OF IRON DEFICIENCY: ICD-10-CM

## 2024-08-16 DIAGNOSIS — Z79.899 HIGH RISK MEDICATION USE: ICD-10-CM

## 2024-08-16 DIAGNOSIS — C92.10 CML (CHRONIC MYELOID LEUKEMIA): Primary | ICD-10-CM

## 2024-08-16 LAB
ALBUMIN SERPL-MCNC: 4.6 G/DL (ref 3.5–5.2)
ALBUMIN/GLOB SERPL: 1.6 G/DL
ALP SERPL-CCNC: 80 U/L (ref 39–117)
ALT SERPL W P-5'-P-CCNC: <5 U/L (ref 1–33)
ANION GAP SERPL CALCULATED.3IONS-SCNC: 9.7 MMOL/L (ref 5–15)
AST SERPL-CCNC: 15 U/L (ref 1–32)
BASOPHILS # BLD AUTO: 0.03 10*3/MM3 (ref 0–0.2)
BASOPHILS NFR BLD AUTO: 0.4 % (ref 0–1.5)
BILIRUB SERPL-MCNC: 0.2 MG/DL (ref 0–1.2)
BUN SERPL-MCNC: 10 MG/DL (ref 6–20)
BUN/CREAT SERPL: 12.2 (ref 7–25)
CALCIUM SPEC-SCNC: 9.5 MG/DL (ref 8.6–10.5)
CHLORIDE SERPL-SCNC: 103 MMOL/L (ref 98–107)
CO2 SERPL-SCNC: 27.3 MMOL/L (ref 22–29)
CREAT SERPL-MCNC: 0.82 MG/DL (ref 0.57–1)
DEPRECATED RDW RBC AUTO: 45.1 FL (ref 37–54)
EGFRCR SERPLBLD CKD-EPI 2021: 92.9 ML/MIN/1.73
EOSINOPHIL # BLD AUTO: 0.16 10*3/MM3 (ref 0–0.4)
EOSINOPHIL NFR BLD AUTO: 2.1 % (ref 0.3–6.2)
ERYTHROCYTE [DISTWIDTH] IN BLOOD BY AUTOMATED COUNT: 13.3 % (ref 12.3–15.4)
GLOBULIN UR ELPH-MCNC: 2.8 GM/DL
GLUCOSE SERPL-MCNC: 86 MG/DL (ref 65–99)
HCT VFR BLD AUTO: 45.3 % (ref 34–46.6)
HGB BLD-MCNC: 14.9 G/DL (ref 12–15.9)
IMM GRANULOCYTES # BLD AUTO: 0.03 10*3/MM3 (ref 0–0.05)
IMM GRANULOCYTES NFR BLD AUTO: 0.4 % (ref 0–0.5)
LYMPHOCYTES # BLD AUTO: 1.38 10*3/MM3 (ref 0.7–3.1)
LYMPHOCYTES NFR BLD AUTO: 18.1 % (ref 19.6–45.3)
MCH RBC QN AUTO: 30.2 PG (ref 26.6–33)
MCHC RBC AUTO-ENTMCNC: 32.9 G/DL (ref 31.5–35.7)
MCV RBC AUTO: 91.9 FL (ref 79–97)
MONOCYTES # BLD AUTO: 0.51 10*3/MM3 (ref 0.1–0.9)
MONOCYTES NFR BLD AUTO: 6.7 % (ref 5–12)
NEUTROPHILS NFR BLD AUTO: 5.51 10*3/MM3 (ref 1.7–7)
NEUTROPHILS NFR BLD AUTO: 72.3 % (ref 42.7–76)
NRBC BLD AUTO-RTO: 0 /100 WBC (ref 0–0.2)
PLATELET # BLD AUTO: 211 10*3/MM3 (ref 140–450)
PMV BLD AUTO: 10.2 FL (ref 6–12)
POTASSIUM SERPL-SCNC: 3.7 MMOL/L (ref 3.5–5.2)
PROT SERPL-MCNC: 7.4 G/DL (ref 6–8.5)
RBC # BLD AUTO: 4.93 10*6/MM3 (ref 3.77–5.28)
SODIUM SERPL-SCNC: 140 MMOL/L (ref 136–145)
WBC NRBC COR # BLD AUTO: 7.62 10*3/MM3 (ref 3.4–10.8)

## 2024-08-16 PROCEDURE — 85025 COMPLETE CBC W/AUTO DIFF WBC: CPT

## 2024-08-16 PROCEDURE — 36415 COLL VENOUS BLD VENIPUNCTURE: CPT

## 2024-08-16 PROCEDURE — 80053 COMPREHEN METABOLIC PANEL: CPT

## 2024-08-16 NOTE — TELEPHONE ENCOUNTER
No PA required for BCR/ABL by PCR [id number] per Yesenia LACEY. Tracking # [id number]. Ok'd lab to send to [facility].

## 2024-09-03 LAB — REF LAB TEST METHOD: NORMAL

## 2024-09-12 ENCOUNTER — SPECIALTY PHARMACY (OUTPATIENT)
Dept: PHARMACY | Facility: HOSPITAL | Age: 39
End: 2024-09-12
Payer: COMMERCIAL

## 2024-09-12 NOTE — PROGRESS NOTES
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 Per provider dictation, no changes to oral oncology regimen Sprycel (dasatinib).  Labs Review: The CMP and CBC from 8/16/24 have been reviewed. No dose adjustments are needed for the oral specialty medication(s) based on the labs.    Specialty pharmacy will continue to follow patient.    Joana Abdullahi, JoshuaD, BCPS  9/12/2024  12:56 EDT

## 2024-09-26 ENCOUNTER — SPECIALTY PHARMACY (OUTPATIENT)
Dept: PHARMACY | Facility: HOSPITAL | Age: 39
End: 2024-09-26
Payer: COMMERCIAL

## 2024-10-10 ENCOUNTER — SPECIALTY PHARMACY (OUTPATIENT)
Dept: PHARMACY | Facility: HOSPITAL | Age: 39
End: 2024-10-10
Payer: COMMERCIAL

## 2024-10-10 NOTE — PROGRESS NOTES
Specialty Pharmacy Patient Management Program  Clinical Outreach     I called Hu Houston on 10/10/2024 10:14 EDT who is enrolled in the Oncology Patient Management program offered by [facility].  Patient did not answer today. I left a voice message with my call back number, [phone].    Joana Abdullahi, PharmD, [facility]  Clinical Specialty Pharmacist, Oncology  10/10/2024  10:14 EDT

## 2024-10-17 ENCOUNTER — SPECIALTY PHARMACY (OUTPATIENT)
Dept: PHARMACY | Facility: HOSPITAL | Age: 39
End: 2024-10-17
Payer: COMMERCIAL

## 2024-10-17 NOTE — PROGRESS NOTES
Specialty Pharmacy Patient Management Program  Clinical Outreach     I called Hu Houston on 10/17/2024 12:09 EDT who is enrolled in the Oncology Patient Management program offered by [facility].  Patient did not answer today. I left a voice message with my call back number, [phone].    Joana Abdullahi, PharmD, [facility]  Clinical Specialty Pharmacist, Oncology  10/17/2024  12:09 EDT

## 2024-10-21 NOTE — TELEPHONE ENCOUNTER
PT. STATES HER SOA IS PROGRESSIVELY GETTING WORSE.  STATES IT FEELS LIKE SOMETHING SITTING ON HER CHEST.  IT'S OFF AND ON ALL DAY LONG, BUT MOSTLY WITH EXERTION.  DOES HAVE IT SOME AT BEDTIME.  FEELS LIKE IT IS MORE CARDIAC RELATED THAN ANYTHING.  ALSO REPORTS PALPITATIONS WITH THE SOA.  MAY GO ONE DAY WITHOUT ANY SYMPTOMS.  DENIES FEVER OR CHILLS, BUT FEELS CLAMMY AT TIMES.  PT. PRESENTLY ON SPRYCEL.  DOESN'T SEE DR. SLADE BACK UNTIL January 2021.  ALL D/W DR. SLADE,  WILL SEND PT. FOR A ECHO ASAP, CXR PA AND LATERAL.  SHE WILL ALSO GET A CBC, IRON PROFILE, FERRITIN, AND CMP DONE.  IF PT. CAN GET ECHO DONE, DR. SLADE WILL SEE PT. ON Friday 2 UNITS!!!.  WILL SEND THE APPT DESK AND THE INFORMATICS NURSE A MESSAGE REGARDING ORDERS AND THEY WILL CALL PT. WITH DATE AND TIME.  PT. INSTRUCTED IF SYMPTOMS WORSEN SHE IS TO CALL 911 AND GO TO THE ER.  UNDERSTANDING NOTED.  
Grossly Intact

## 2024-11-07 ENCOUNTER — SPECIALTY PHARMACY (OUTPATIENT)
Dept: PHARMACY | Facility: HOSPITAL | Age: 39
End: 2024-11-07
Payer: COMMERCIAL

## 2024-11-27 ENCOUNTER — APPOINTMENT (OUTPATIENT)
Dept: ONCOLOGY | Facility: HOSPITAL | Age: 39
End: 2024-11-27
Payer: COMMERCIAL

## 2024-12-04 ENCOUNTER — APPOINTMENT (OUTPATIENT)
Dept: ONCOLOGY | Facility: HOSPITAL | Age: 39
End: 2024-12-04
Payer: COMMERCIAL

## 2025-01-02 ENCOUNTER — SPECIALTY PHARMACY (OUTPATIENT)
Dept: PHARMACY | Facility: HOSPITAL | Age: 40
End: 2025-01-02
Payer: COMMERCIAL

## 2025-01-02 ENCOUNTER — LAB (OUTPATIENT)
Dept: LAB | Facility: HOSPITAL | Age: 40
End: 2025-01-02
Payer: COMMERCIAL

## 2025-01-02 ENCOUNTER — OFFICE VISIT (OUTPATIENT)
Dept: ONCOLOGY | Facility: CLINIC | Age: 40
End: 2025-01-02
Payer: COMMERCIAL

## 2025-01-02 ENCOUNTER — SPECIALTY PHARMACY (OUTPATIENT)
Dept: ONCOLOGY | Facility: HOSPITAL | Age: 40
End: 2025-01-02
Payer: COMMERCIAL

## 2025-01-02 VITALS
DIASTOLIC BLOOD PRESSURE: 65 MMHG | SYSTOLIC BLOOD PRESSURE: 104 MMHG | TEMPERATURE: 98.2 F | RESPIRATION RATE: 17 BRPM | OXYGEN SATURATION: 98 % | WEIGHT: 177.7 LBS | BODY MASS INDEX: 26.93 KG/M2 | HEIGHT: 68 IN | HEART RATE: 78 BPM

## 2025-01-02 DIAGNOSIS — C92.10 CML (CHRONIC MYELOID LEUKEMIA): Primary | ICD-10-CM

## 2025-01-02 DIAGNOSIS — Z79.899 HIGH RISK MEDICATION USE: ICD-10-CM

## 2025-01-02 DIAGNOSIS — C92.10 CML (CHRONIC MYELOID LEUKEMIA): ICD-10-CM

## 2025-01-02 LAB
ALBUMIN SERPL-MCNC: 4.3 G/DL (ref 3.5–5.2)
ALBUMIN/GLOB SERPL: 1.5 G/DL
ALP SERPL-CCNC: 72 U/L (ref 39–117)
ALT SERPL W P-5'-P-CCNC: 14 U/L (ref 1–33)
ANION GAP SERPL CALCULATED.3IONS-SCNC: 10.5 MMOL/L (ref 5–15)
AST SERPL-CCNC: 16 U/L (ref 1–32)
BASOPHILS # BLD AUTO: 0.05 10*3/MM3 (ref 0–0.2)
BASOPHILS NFR BLD AUTO: 0.7 % (ref 0–1.5)
BILIRUB SERPL-MCNC: 0.2 MG/DL (ref 0–1.2)
BUN SERPL-MCNC: 13 MG/DL (ref 6–20)
BUN/CREAT SERPL: 12.7 (ref 7–25)
CALCIUM SPEC-SCNC: 9.4 MG/DL (ref 8.6–10.5)
CHLORIDE SERPL-SCNC: 103 MMOL/L (ref 98–107)
CO2 SERPL-SCNC: 24.5 MMOL/L (ref 22–29)
CREAT SERPL-MCNC: 1.02 MG/DL (ref 0.57–1)
DEPRECATED RDW RBC AUTO: 41.2 FL (ref 37–54)
EGFRCR SERPLBLD CKD-EPI 2021: 71.5 ML/MIN/1.73
EOSINOPHIL # BLD AUTO: 0.15 10*3/MM3 (ref 0–0.4)
EOSINOPHIL NFR BLD AUTO: 2.1 % (ref 0.3–6.2)
ERYTHROCYTE [DISTWIDTH] IN BLOOD BY AUTOMATED COUNT: 12.6 % (ref 12.3–15.4)
GLOBULIN UR ELPH-MCNC: 2.9 GM/DL
GLUCOSE SERPL-MCNC: 78 MG/DL (ref 65–99)
HCT VFR BLD AUTO: 45.6 % (ref 34–46.6)
HGB BLD-MCNC: 15 G/DL (ref 12–15.9)
IMM GRANULOCYTES # BLD AUTO: 0.04 10*3/MM3 (ref 0–0.05)
IMM GRANULOCYTES NFR BLD AUTO: 0.6 % (ref 0–0.5)
LYMPHOCYTES # BLD AUTO: 1.13 10*3/MM3 (ref 0.7–3.1)
LYMPHOCYTES NFR BLD AUTO: 15.8 % (ref 19.6–45.3)
MCH RBC QN AUTO: 29.6 PG (ref 26.6–33)
MCHC RBC AUTO-ENTMCNC: 32.9 G/DL (ref 31.5–35.7)
MCV RBC AUTO: 89.9 FL (ref 79–97)
MONOCYTES # BLD AUTO: 0.67 10*3/MM3 (ref 0.1–0.9)
MONOCYTES NFR BLD AUTO: 9.4 % (ref 5–12)
NEUTROPHILS NFR BLD AUTO: 5.11 10*3/MM3 (ref 1.7–7)
NEUTROPHILS NFR BLD AUTO: 71.4 % (ref 42.7–76)
NRBC BLD AUTO-RTO: 0 /100 WBC (ref 0–0.2)
PLATELET # BLD AUTO: 231 10*3/MM3 (ref 140–450)
PMV BLD AUTO: 10 FL (ref 6–12)
POTASSIUM SERPL-SCNC: 4 MMOL/L (ref 3.5–5.2)
PROT SERPL-MCNC: 7.2 G/DL (ref 6–8.5)
RBC # BLD AUTO: 5.07 10*6/MM3 (ref 3.77–5.28)
SODIUM SERPL-SCNC: 138 MMOL/L (ref 136–145)
WBC NRBC COR # BLD AUTO: 7.15 10*3/MM3 (ref 3.4–10.8)

## 2025-01-02 PROCEDURE — 36415 COLL VENOUS BLD VENIPUNCTURE: CPT

## 2025-01-02 PROCEDURE — 80053 COMPREHEN METABOLIC PANEL: CPT

## 2025-01-02 PROCEDURE — 85025 COMPLETE CBC W/AUTO DIFF WBC: CPT

## 2025-01-02 NOTE — PROGRESS NOTES
REASONS FOR FOLLOWUP:    Chronic myelogenous leukemia with splenomegaly, chronic phase, positive [place] chromosome, no mutation at kinase domain diagnosed in November 2013.   Patient is on dose reduced Sprycel 50 mg daily with undetectable disease by RT-PCR.        HISTORY OF PRESENT ILLNESS:    The patient is a 39 y.o. female with the above-mentioned history, returns to the office today for 4-month follow-up and review.  She continues on Sprycel 50 mg daily with overall excellent tolerance.  She has no lower extremity swelling or shortness of breath.  She does continue to have intermittent arthralgias and headaches for which she utilizes occasional ibuprofen.  She reports she takes this maybe every other day though is not overutilizing.  She has no new concerns, she has no fevers or chills, signs or symptoms of infection.    Past Medical History:   Diagnosis Date    Anemia in neoplastic disease     Asthma     childhood    Chiari I malformation     eval by Dr Moore, [place] 2016    Chronic myelogenous leukemia     remission, Dr Castle follows    Chronic pain     CML (chronic myelocytic leukemia)     Cystitis     Depression     GERD (gastroesophageal reflux disease)     ulcer    H/O Iron deficiency anemia     H/O Lower extremity pain     Resolved.    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      *  Hysterectomy in October 2018      *  Acute hepatitis C in early 2019, treated successfully by Dr. Karyn Mendieta.     OB/GYN HISTORY: Menarche age 9. G5, P4, 1 miscarriage of the third pregnancy. First pregnancy at age 17. Patient underwent partial hysterectomy in 2018.       HEMATOLOGIC/ONCOLOGIC HISTORY:   * Chronic myelogenous leukemia  with splenomegaly, chronic phase, positive [place]une[place] chromosome, no mutation at kinase domain diagnosed in November 2013.   Patient was started on hydroxyurea temporarily on 10/23/2013 with significant drop of WBC counts.    Patient started on Sprycel on 12/02/2013.   Peripheral blood tested with 3.4% of cells positive for BCR-ABL translocation, tested 02/17/2014.    The patient had CCyR 6 months into treatment as tested on 05/15/2014.    Complete molecular response as tested 08/08/14 with negative product of BCR/ABL.   There was interruption of her treatment due to insurance coverage and misunderstanding from patient's part, she had a relapse of disease with BCR/ABL product at 12.626% in March 2016, and she restarted back on Sprycel.   Good response as tested on 08/31/2016 with BCR/ABL at 0.294%.    Since June 2017, patient has been persistently tested negative for BCR/ABL by RT-PCR every 3 month period.     Patient reported worsening leg cramping in end of July 2018.  Sprycel was decreased to 50 mg daily.  Laboratory studies on 8/31/2018, on 1/30/2019 and on 3/29/2019 were negative for BCR-ABL by RT-PCR.    Sprycel was on hold during treatment for acute hepatitis C in early part of 2019.  It was resumed in July 2019.  She was weakly positive for BCR-ABL.  On 10/29/2019 BCR/ABL1 MAJOR (p210) and BCR/ABL1 MINOR (p190) were not detected.    BCR-ABL by RT-PCR with 0% on 1/24/2020 and also on 4/24/2020.  Patient was tested negative on 7/10/2020.  Negative on 10/9/2020.    Because of palpitation, Sprycel was on hold since 11/17/2020.     BCR-ABL by RT-PCR was complete negative as reported on 1/06/2021.  Sprycel was resumed on 2/9/2021 at 50 mg daily.  Continue BCR-ABL by RT-PCR in May 2021 in October 2021.          Laboratory results on 09/23/2015 showed normalization of hemoglobin 12.2, but still has macrocytosis, MCV 73.3. She has normal platelets and WBC at 9400. Serum ferritin < 5 ng/ml, iron sats 6.3%, iron 29,  TIBC 455 mcg/ml. Still has severe iron deficiency, needs to continue oral iron therapy. The patient restarted taking oral iron supplementation which was probably stopped in the end of November 2015.        Laboratory study on 03/22/2016 reported normal WBC 8450, neutrophils 6100, lymphs is 1400 and monocytes 550. Serum iron was 26, TIBC 469 on saturation 6% and ferritin less than 5 NG/ML. Chemistry lab reported normal renal function with a creatinine 0.69, normal liver function panel, total protein 7.5 and albumin 4.2, normal electrolytes. Patient was restarted back on oral on sedimentation twice a day with good tolerance.      Patient continues take Lortab as needed for left leg cramping. Urine drug test on 08/05/2016 was completely negative, and repeated study on August 26 was positive for opiate, negative for other recreational drugs.      Repeat laboratory study on 08/31/2016 reported iron 21, ferritin less than 5, iron saturation 5%, TIBC 462. Hemoglobin was 11.5 MCV 78.1 MCHC 30.4. Platelets 163,000. Total WBC 8870 including neutrophils 6400 and lymphocytes 1500. BCR/ABL was 0.294% by RT-PCR method.       Patient was given IV Feraheme treatment in September 2016, with 2 doses. Repeated laboratory study on 10/06/2016 reported significantly improved and normalized ferritin 269, iron 80, TIBC 365 and iron saturation 22%. Her hemoglobin was 12.2, and MCV 80.8.      Patient reported she was seen by Dr. Fam in 10/2016 and was started on half tablets of Topamax. I reviewed Dr. Fam’s clinic note and telephone conversation record. The patient reports she has no recurrence of migraine headache. However, she is very tearful today, reporting that she has thoughts of not taking any medications. She did assure me that she has been taking the medication up to this point. 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 The patient denies suicide ideation. When she was initially diagnosed of CML back in 2014, she had depression and I started the patient on Prozac. The patient reported initially it helped her, however, she no longer feels the effect of Prozac. She feels depressed. The patient reports she has no personal hobby. She goes to work and goes home, taking care of her kids. She does not enjoy life as she used to.      Laboratory study on December 29, 2016 reported at ferritin 19.9, iron 33, TIBC 347 iron saturation 10%.  Hemoglobin was 13, normal WBC and platelets.  Unremarkable CMP.  Patient was given 2 doses of Feraheme treatment in early January 2017.      Cytogenetic study on March 14, 2017 reported a BCR-ABL products at 0.098%, showed further improved residual disease.  There is also reported a ferritin 103.7, iron 79, TIBC 336 and iron saturation 24%.  Hemoglobin was 13.5, MCV 92.3, platelets 199,000 WBC 7000.  She had a completely normal CMP.       Repeated laboratory study on June 20, 2017 reported at nondetectable BCR-ABL product.  Ferritin was 45, iron 35 TIBC 333 and iron saturation 11%.  Had a normal CBC and CMP.    In the end of July 2018, patient called reporting worsening significant leg cramping, and getting worse recently and has been taking 6 tablets of Advil with no significant improvement.  We suspect it might be caused by Sprycel for her CML.  We discussed with patient, and decreased to half dose at 50 mg daily.  Patient reports that she is improved leg cramping since dose reduction.    Per medical records this patient had emergency room visit again on 8/18/2018.  Patient reports she had significant abdomen/pelvic pain at a time associate with nausea.  Laboratory study showed significantly elevated WBC at 25,900 including neutrophils 24,400, with elevated hemoglobin 15.8 and platelets 146,000.     She had a thorough investigation, including CT scan for abdomen pelvis which was unremarkable.   She then had ultrasound for the pelvis and it was also unremarkable.  She had ultrasound for the gallbladder examination on the same day and was unremarkable.  She had a normal CMP except of marginally elevated glucose at 112.  Her urinalysis showed only marginally increased WBC 3-5/HPF.  She was negative for yeast infection, negative for Chlamydia trichomonas and Neisseria gonorrhea.  Patient was prescribed Flagyl and doxycycline and discharged to home.      lab study on 2019 reported normal iron saturation 47% and elevated ferritin 507.1 ng/mL with free iron 184 and TIBC 388.  hemoglobin is normal at 13.7 and platelets 186,000. normal WBC at 5080 including ANC 3300, lymphocytes 930 and monocytes 630. Labs reported significantly elevated ALT at 655,  and alkaline phosphatase 234 but normal total bilirubin 0.9. She had normal renal function creatinine 0.78. Normal electrolytes.       On 2019, I searched Up-to-Date and suspected that this patient had drug-induced hepatitis from Diflucan or with combination of Diflucan with Sprycel. This patient had been on Sprycel for years and had no problem with abnormal liver panel previously. It seems Diflucan inhibits CY moderately, which likely interferes with the metabolism of Sprycel. I instructed the patient to hold her Sprycel for now.    Sprycel treatment resumed as of 2019 upon completion of MEVYRET for her hepatitis C.    Laboratory study performed on 2019 showed normal CBC and CMP. BCR/ABL by RT-PCR detected BCR/ABL1 Major. HCV RNA by PCR showed HCV not detected.    Laboratory studies 2019 report her hemoglobin is normal at 15.2 and platelets 146,000. normal WBC at 7650 including ANC 5190, lymphocytes 1600 and monocytes 630.    Recent laboratory studies confirmed to be no detectable virus on 2019.    U/S Liver performed on 10/24/2019 reported negative hepatic ultrasound exam with no focal liver lesion, negative gallbladder  examination with no cholelithiasis or bile duct dilation noted, however borderline splenomegaly was noted.     Laboratory study performed on 10/29/2019, reported a completely normal CBC, CMP. Normal iron saturation and still slightly elevated ferritin 325 ng/dL  BCR/ABL1 MAJOR (p210) and BCR/ABL1 MINOR (p190) were not detected.     Laboratory studies 1/24/2020, show hemoglobin 14.3 MCV 92.6 platelets 180,000 and WBC 6570 including neutrophils 4400.  She also has completely normal CMP.  Iron studies reported ferritin 273, iron saturation 11%, hemoglobin 14.3 WBC 6570 and platelets 180,000.  BCR-ABL by RT-PCR with 0%.     Patient presented today for 3-month follow-up and lab review.      Due to pandemic coronavirus infection, patient has been staying home with all 4 children.  Patient reports significant fatigue for the past month, similar to the time when she had iron deficiency.  Patient reports prior to that she was having regular exercise and with good energy level.    Patient reports compliant with Sprycel 50 mg daily.  She denies leg cramping.  She has no nausea no vomiting no abdominal pains.  She has good appetite and no diarrhea.  She denies headaches vision changes no dizziness or seizure activity.    Her laboratory study on 3/12/2020 reported marginal iron saturation 15% however had a good ferritin 212.5 ng/mL.  She had hemoglobin 13.1 and normal thyroid profile including TSH 1.20, free T4 at 1.37 ng/dL and a free T3 at 2.97 pg/mL     On 4/24/2020 her iron study showed ferritin 262 and iron saturation 21%.  Hemoglobin is 14.8.  She has normal WBC 5740 including ANC 3820, and normal platelets 161,000.   She was negative for BCR-ABL by RT-PCR on the same day.    Laboratory studies, 7/10/2020, show completely normal CBC and CMP.  Negative for BCR-ABL by RT-PCR.  Iron studies showed ferritin 185, iron saturation 13% free iron 37 TIBC 286.    Patient has completed normal CBC 10/9/2020.  Iron studies reported  ferritin 2070, free iron 13%.  She also has completed normal CMP.  She was tested negative for BCR-ABL by RT-PCR method.  Sprycel 50 mg daily was continued.     We saw her recently on 10/9/2020 for routine follow-up for her CML.  She was tolerating Sprycel.  Physical examination laboratory studies were unremarkable.  No detectable BCR-ABL by RT-PCR.  We continued her Sprycel at that time.     On 11/17/2020, patient called and reported chest tightness, palpitation and dyspnea new onset in November 2020.  Patient reports this happened recently.  She has chest tightness, and associated tachycardia/palpitation.  Patient reports this is unpredictable, can be on and off.  She noticed that one day she was cooking meal for her family, and noted sudden onset palpitation and chest tightness.  She reports unable to induce purposefully.  She also has exertional dyspnea.  Patient reports 11/17/2020, we asked patient to hold Sprycel.  We suspect the patient may have pleural effusion or cardiac dysfunction secondary to Sprycel, we requested chest CT, echocardiogram study and also laboratory study for evaluation.     Negative chest x-ray on 11/17/2020.     Laboratory study on 11/17/2020 reported stable chronic condition with a ferritin 261 and iron saturation 12%, normal hemoglobin 15.0, unable to explain her dyspnea.     Echocardiogram study on 11/18/2020 reported normal results.     Laboratory study on 1/6/2021 reported hemoglobin 15.9 hematocrit 47.1%, platelets 181,000 WBC 9360 including ANC 7130 lymphocytes 1350. Iron study reported ferritin 293 and iron saturation 14% with free iron 47 and a TIBC 326.  Chemistry lab reported unremarkable CMP including renal function and liver function panel.      Patient reports she was seen by cardiologist Dr. Flowers on 2/4/2021.  Patient had thorough cardiology evaluation and there was no apparent abnormalities.    We have been withholding her Sprycel in middle November 2020 because of  palpitation, and referred patient to cardiology service.        Fortunately, her peripheral blood BCR-ABL by RT-PCR was complete negative, as reported on 1/12/2021.     Sprycel was resumed on 2/9/2021.      Laboratory studies on 3/16/2021 reported normal CBC including hemoglobin 14.4 MCV 88.7, platelets 173,000, and WBC 6400 including ANC 3770 lymphocytes 1800.  Chemistry lab reported normal CMP.     Laboratory studies on 5/4/2021 reported completely normal CBC and CMP.  Iron study also normal with ferritin 219 and iron saturation 16%.  Negative study for BCR-ABL by RT-PCR.     Laboratory study on 10/5/2021 reported complete normal CBC and CMP.  Iron study reported normal ferritin 250 ng/mL and iron saturation 21% with free iron 63 TIBC 300.  She was also complete negative for BCR-ABL by RT-PCR.    The patient complains of leg pain and bone pain. She recently had an EGD done. Her stomach was inflamed due to taking ibuprofen. She was prescribed Carafate 4 times a day for 1 week. She was only taking it 2 to 3 times a day. It caused constipation.    Laboratory study on 4/12/2023 reports normal hemoglobin 14.3, WBC 6430 including neutrophils 4130, lymphocytes 1560, monocytes 480, and normal platelets 181,000.  Her laboratory study on 4/12/2023 reported negative for BCR-ABL by RT-PCR, for both the p210 and the p190 products.       The patient reports recently she went to [facility] because of pain in the neck. She was in the ER on 7/9/2023 and subsequently had an MRI for the cervical spine and the thoracic spine on 7/10/2023 for further evaluation. The study reported tiny central disc protrusion of the C6-7. Also, there was incidental finding of left thyroid nodule 1 cm. Radiologist recommended to have ultrasound examination. Otherwise, the MRI of the cervical spine has no significant abnormalities. The thoracic MRI examination reported normal results.    Laboratory studies on 8/9/2023 reports normal CBC with  total WBC 9,160, including neutrophils 6500, lymphocytes 1680, monocytes 680, and normal hemoglobin 14.1, platelets 211,000. Chemistry lab reported that unremarkable with CMP except a mildly decreased bicarbonate at 19.5 and elevated anion gap of 16.5.    Lab study on 2023 for BCR-ABL by RT-PCR was complete negative for the p210 and p190 products.    Laboratory studies today 2023 reported normal CBC including WBC 6010 neutrophils of 4890 lymphocytes 1090 monocytes 660 and basophil 40, hemoglobin 13.3 MCV 90.6, and platelets 180,000.  Chemistry lab reported unremarkable CMP.  Negative BCR-ABL study by RT-PCR on 2023.    Laboratory studies on 2024 reported normal CBC including hemoglobin 14.8, MCV 90.8, platelets 193,000, and WBC 8,470, including neutrophils 6,120, lymphocytes 1,520, monocytes 570, and basophil 50. Chemistry lab reported normal CMP with creatinine 0.73, normal electrolytes including calcium 9.5, normal liver function panel, and glucose 89.        MEDICATIONS: The current medication list was reviewed with the patient and updated in the EMR this date per the medical assistant. Medication dosages and frequencies were confirmed to be accurate.        Allergies   Allergen Reactions    Sulfa Antibiotics Unknown - Low Severity     Childhood reaction     SOCIAL HISTORY: . She smoked cigarettes previously, quit in 2006 with 8-pack-year history. Social drinker, maybe once a month. No illegal drug use. No risk for HIV except tattoos.  Hairstylist.       FAMILY HISTORY: Maternal grandmother had esophageal/stomach adenocarcinoma diagnosed at age of 72 and  of cancer at age of 73. The patient' s mother has hypertension but otherwise healthy.  No other family history of malignancy, especially no leukemia.          VITAL SIGNS:   Vitals:    25 1444   BP: 104/65   Pulse: 78   Resp: 17   Temp: 98.2 °F (36.8 °C)   TempSrc: Oral   SpO2: 98%   Weight: 80.6 kg (177 lb 11.2 oz)  "  Height: 172.7 cm (68\")   PainSc: 0-No pain       ECOG 0      Physical Exam  Vitals and nursing note reviewed.   Constitutional:       Appearance: Normal appearance. She is well-developed.   HENT:      Head: Normocephalic and atraumatic.   Eyes:      Conjunctiva/sclera: Conjunctivae normal.   Cardiovascular:      Rate and Rhythm: Normal rate and regular rhythm.      Heart sounds: Normal heart sounds. No murmur heard.  Pulmonary:      Effort: Pulmonary effort is normal. No respiratory distress.      Breath sounds: Normal breath sounds.   Abdominal:      General: Bowel sounds are normal.      Palpations: Abdomen is soft.      Tenderness: There is no abdominal tenderness.   Musculoskeletal:      Right lower leg: No edema.      Left lower leg: No edema.   Neurological:      Mental Status: She is alert. Mental status is at baseline.   Psychiatric:         Thought Content: Thought content normal.       IMAGING:      LABORATORY DATA:    Results from last 7 days   Lab Units 01/02/25  1437   WBC 10*3/mm3 7.15   NEUTROS ABS 10*3/mm3 5.11   HEMOGLOBIN g/dL 15.0   HEMATOCRIT % 45.6   PLATELETS 10*3/mm3 231     Results from last 7 days   Lab Units 01/02/25  1437   SODIUM mmol/L 138   POTASSIUM mmol/L 4.0   CHLORIDE mmol/L 103   CO2 mmol/L 24.5   BUN mg/dL 13   CREATININE mg/dL 1.02*   CALCIUM mg/dL 9.4   ALBUMIN g/dL 4.3   BILIRUBIN mg/dL 0.2   ALK PHOS U/L 72   ALT (SGPT) U/L 14   AST (SGOT) U/L 16   GLUCOSE mg/dL 78               ASSESSMENT:      1. Chronic myelogenous leukemia, chronic phase with excellent response to Sprycel and complete molecular response in August 2014. However she had interuption of treatment in early part of 2016, because of insurance issues and miscommunication, she stopped the medicine without telling us, and laboratory test on 03/22/2016 reported disease relapse with increased BCR/ABL product at 12.626%. subsequently she was restarted back on Sprycel, and responded well.  Since June 2017, she has " completed molecular response as tested every 3 months.  She has been compliant with treatment.   In the end of July 2018, she reported worsening significant cramping involving her legs, so we decreased her Sprycel to 50 mg daily starting early August.  She's been having less problem since that time.  She was tested negative for BCR-ABL on 8/31/2018.    Patient had a negative BCR-ABL by RT-PCR in end of January 2019 and also on 3/29/2019.   Patient was later found having significantly elevated liver function panel.  Sprycel was on hold and she was subsequently found having acute hepatitis C infection.    I discussed with Dr. Karyn Mendieta, we'll hold her Sprycel for now until she finishes hepatitis C treatment.   She was started on hepatitis C treatment on 4/18/2019 and finished an 8-week course.  On 07/02/2019 patient's labs showed BCR-ABL Major (p210) detected by RT-PCR at 0.0141%. BCR/ABL1 Minor (p190) was not detected. HCV was not detected.  Sprycel treatment was resumed as of 07/02/2019 upon completion of MEVYRET.  Laboratory study on 10/29/2019 reported normal CBC. BCR/ABL1 MAJOR (p210) and BCR/ABL1 MINOR (p190) were not detected.    BCR-ABL by RT-PCR on 1/24/2020 was negative.     Lab result for BCR ABL by RT-PCR was also negative on 4/24/2020. Patient will need to continue on sprycel treatment.  7/10/2020 patient has normal CBC and CMP.  Negative RT-PCR for BCR ABL.  Tolerating well.  Continue Sprycel at 50 mg daily.  On 10/9/2020, completely normal CBC with good tolerance.  BCR-ABL was tested negative by RT-PCR method.  Continue Sprycel.   Patient reports 11/17/2020 chest tightness in palpitation, and exertion dyspnea progressively getting worse in the past week.  We asked patient to hold Sprycel.   Chest x-ray examination on 11/17/2020 was unremarkable.  Echocardiogram study on 11/18/2020 was also benign.  Patient was seen by cardiologist for further evaluation.    Laboratory studies on 1/6/2021 reported normal  WBC 9360 including ANC 7130 lymphocytes 1350. BCR-ABL by RT-PCR was complete negative, as reported on 1/12/2021.   Patient had negative cardiac work-up.  She also has resolution of symptoms.  Discussed with patient on 2/9/2021, we recommended resumption of Sprycel 50 mg daily.  Patient is agreeable.  On 3/16/2021, patient reports tolerating Sprycel, no recurrent symptoms of dyspnea, chest tightness or palpitation.  Continue Sprycel 50 mg daily.  On 5/4/2021, patient has normal CBC and CMP.  Negative results for BCR-ABL by RT-PCR for P210 and P190 as reported on 5/12/2021.    On 10/5/2021 patient presented for reevaluation.  She reports tolerating Sprycel.  No specific side effects reported.  Maintains normal CBC and negative BCR-ABL.  We will continue treatment for now.  On 12/21/2021, patient reports good tolerance to Sprycel.  Normal CBC CMP.  Negative study by peripheral blood RT-PCR.  Continue Sprycel.   On 3/14/2022, patient reports excellent tolerance to Sprycel at 50 mg daily.  Maintains normal CBC and CMP.  6/8/2022 continues on Sprycel 50 mg daily tolerating well.  Patient was negative for BCR-ABL by RT-PCR.  On 10/7/2022 patient reports compliance with Sprycel and good tolerance.  We will continue treatment.  1/13/2023 continues on Sprycel 50 mg daily, tolerating well.  BCR-ABL was completely negative result for both the p210 and the p180 fusion products.  The patient presents for 3-month follow-up evaluation 4/12/2023. She continues on Sprycel 50 mg daily. She tolerated it well except having some pain in the lower extremities, which she stated has improved since decreased from 100 mg to 50 mg daily. She has been taking ibuprofen for that. Nevertheless, she recently had an EGD examination which led to the discovery of duodenitis. I discussed with the patient about switching to a different TKI, we searched Up-to-Date. We found that bosutinib, and dasatinib could all cause similar side effects with  arthralgia, muscle pain, limb pain, even the new medicine ponatinib could have caused the similar side effects at about the same kind of percentages. Discussed with patient if she is concerned about switching off the current Sprycel, which she really has excellent response.  Patient decided to continue Sprycel.  Study on 04/12/2023 was negative for BCR-ABL by RT-PCR.  On 8/9/2023 patient reports tolerating treatment.  She is at her baseline condition.  Patient has normal CBC.  We will continue Sprycel.  Patient was completely negative for BCR-ABL by RT-PCR.   Negative BCR-ABL study by RT-PCR on 11/6/2023.   The patient presented today on 01/29/2024. She reports good tolerance to low dose Sprycel 50 mg daily. She has normal CBC. study for BCR-ABL by RT-PCR was completed negative.  The patient presented for evaluation on 05/29/2024. Currently, the patient reports tolerating low-dose Sprycel 50 mg daily well, with no symptoms of diarrhea, chest pain, dyspnea, or lower extremity edema. However, she does report intermittent lower extremity cramping, which she believes has been well-tolerated. Laboratory studies revealed normal CBC and well-controlled WBC counts.  BCR-ABL by RT-PCR detected quantitative result below the limit of quantification (less than 0.01%)  8/16/2024 returns in follow-up doing well with no new symptoms.  BCR-ABL not detected.  Patient continuing on Sprycel 50 mg daily.  Patient seen in follow-up 1/2/2025 with ongoing excellent tolerance to Sprycel 50 mg daily.  CBC within normal limits, BCR-ABL pending.      2. Recurrent Iron deficiency anemia.    She was treated again with Feraheme October 2017.   Iron deficiency thought to be related to ulcer identified on EGD, being treated with Carafate.  She also had heavy menses.  She had recurrent iron deficiency again and was given Feraheme 2 doses in March 2018.   Subsequently recurrent iron deficiency in July 2018, she was switched to Venofer 3 doses total  900 mg.   Patient had simple hysterectomy in October 2018.  Laboratory study showed supratherapeutic ferritin 458 and iron saturation 40% on 4/30/2019.   Laboratory study performed 10/29/2019, showed normal iron saturation and ferritin 325 ng/dL.  Normal iron studies including ferritin 262 and iron saturation 21% on 4/24/2020.  No evidence of recurrent iron deficiency.   Lab study on 7/10/2020 reported ferritin 185, iron saturation 13% and normal hemoglobin 14.5.  She has deteriorating iron studies.   Continue to monitor in 3 months.  Labs on 10/9/2020 reported ferritin 270, iron saturation 13% and hemoglobin 14.9.  Lab studies on 1/6/2021 reported mild erythrocytosis.  Hemoglobin is 15.9 and hematocrit of 47.1%, with ferritin 293 and iron saturation 14%.    Normal hemoglobin 14.4 on 3/16/2021.    On 5/4/2021 lab study reported hemoglobin 13.7, ferritin 219 and iron saturation 16%.    On 10/5/2021, patient maintains normal hemoglobin.  Iron study reported further improved ferritin 250 ng/mL and iron saturation 21%.  Since her hysterectomy, patient has no recurrence of iron deficiency.  Discussed with patient today, there is no need for routine monitoring of iron study from now.    On 3/14/2022, normal hemoglobin 14.3.  6/8/2022 hemoglobin 13.6.  On 10/7/2022 patient has normal hemoglobin 14.1.  1/13/2023 Hgb 13.6  On 4/12/2023 patient has normal hemoglobin 14.3.  Patient has normal hemoglobin 14.1 on 08/09/2023.   Normal hemoglobin 13.3 on 11/6/2023.   on 01/29/2024, the patient has normal hemoglobin 14.8.  She denies evidence of bleeding.  No need to check iron studies.  1/2/2025 hemoglobin normal at 15.0     3.  Chest tightness, palpitation and dyspnea new onset in November 2020.  Patient reports this happened recently, and that usually unpredictable, she has chest tightness, and associated tachycardia/palpitation.  Patient reports this is unpredictable, can be on and off.  She noticed 1 day she was cooking meal  for her family, and noted several palpitation and chest tightness.  She reports unable to induce purposefully.  She also has exertional dyspnea.  Patient reports 11/17/2020, we asked patient to hold Sprycel.  We requested chest CT, echocardiogram study and also laboratory study for evaluation.  Negative chest x-ray on 11/17/2020.   Laboratory study on 11/17/2020 reported stable chronic condition with a ferritin 261 and iron saturation 12%, normal hemoglobin 15.0, unable to explain her dyspnea.   Echocardiogram study on 11/18/2020 reported normal results.   Patient is evaluated 11/20/2020, she reports somewhat improved symptoms, since she stopped Sprycel for the past 3 days.  She denies extremity edema.  She denies fever sweating or chills.  We recommend patient to continue to hold Sprycel for another 2 weeks.  We also recommended patient to be tested for COVID-19.    Reevaluation on 12/4/2020, patient reports that she did not get a Covid test.  She denies other illness such as fever sweating nausea vomiting, diarrhea, change of taste, cough etc.  Discussed with patient, will check a thyroid panel, which turned out to be normal on 12/4/2020.    Patient was referred to cardiologist for evaluation of hypertension.  She was seen by Dr. Flowers on 12/21/2020 and the case waiting for further evaluation.  Patient had a negative cardiac work-up.  Her symptom has resolved.  Patient was restarted on Sprycel 2/9/2021.  She has since had recurrent symptoms       4.  COVID-19 vaccination.  Patient reports receiving none 2 doses of the Moderna vaccine.    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   On 10/7/2022 patient reports she was not infected with COVID-19, despite recently her  and her children were infected.    5. Incidental finding of left thyroid nodule by cervical spine MRI examination for the assessment  of neck pain  Patient is asymptomatic.   Today's physical examination has no abnormal discovery in the thyroid area, no palpable nodule. I will request ultrasound for the thyroid for further evaluation as recommended by radiologist.  Patient had ultrasound of the thyroid 8/11/2023 and radiologist commented multiple small thyroid nodules fits with TI-RADS 1 and TI-RADS 2 nodules and are considered benign.  No additional follow-up recommended by radiologist.      PLAN:    Continue Sprycel 50 mg daily  Continue B complex  BCR-ABL by PCR currently pending  Follow-up in 4 months with Dr. Castle with repeat CBC, CMP, BCR-ABL by RT-PCR.  Call/ return sooner should the patient develop any new concerns or problems.    Patient is on a high risk medication requiring close monitoring for toxicity.    Kelly Kingsley, APRN  01/02/2025      CC:  Jennifer More DO Anna Hart, M.D.    Luis Alfredo Flowers M.D.

## 2025-01-02 NOTE — PROGRESS NOTES
Specialty Pharmacy Patient Management Program  Oncology Reassessment     Hu Houston was referred by an their provider to the Oncology Patient Management program offered by [facility] for CML. A follow-up outreach was conducted, including assessment of continued therapy appropriateness, medication adherence, and side effect incidence and management for Sprycel (dasatinib).    Changes to Insurance Coverage or Financial Support  None    Relevant Past Medical History and Comorbidities  Relevant medical history and concomitant health conditions were discussed with the patient. The patient's chart has been reviewed for relevant past medical history and comorbid health conditions and updated as necessary.   Past Medical History:   Diagnosis Date    Anemia in neoplastic disease     Asthma     childhood    Chiari I malformation     eval by Dr Moore, [place] 2016    Chronic myelogenous leukemia     remission, Dr Castle follows    Chronic pain     CML (chronic myelocytic leukemia)     Cystitis     Depression     GERD (gastroesophageal reflux disease)     ulcer    H/O Iron deficiency anemia     H/O Lower extremity pain     Resolved.    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    Pulmonary hypertension     Seizures     as child after head injry    Splenomegaly     Status post D&C     Vitamin D deficiency      Social History     Socioeconomic History    Marital status:    Tobacco Use    Smoking status: Former     Current packs/day: 0.00     Average packs/day: 1 pack/day for 8.0 years (8.0 ttl pk-yrs)     Types: Cigarettes     Start date: 1998     Quit date: 2006     Years since quittin.5     Passive exposure: Past    Smokeless tobacco: Never   Vaping Use    Vaping status: Never Used   Substance and Sexual Activity    Alcohol use: Yes     Comment: Social, maybe once  every 6 months/no caffeine use    Drug use: Not Currently     Types: Cocaine(coke)     Comment: prior to 2006    Sexual activity: Defer     Birth control/protection: Surgical     Comment: Tubal     Problem list reviewed by Joana Abdullahi RPH on 1/2/2025 at  4:04 PM    Hospitalizations and Urgent Care Since Last Assessment  ED Visits, Admissions, or Hospitalizations: None  Urgent Office Visits: None    Allergies  Known allergies and reactions were discussed with the patient. The patient's chart has been reviewed for allergy information and updated as necessary.   Allergies   Allergen Reactions    Sulfa Antibiotics Unknown - Low Severity     Childhood reaction     Allergies reviewed by Joana Abdullahi RPH on 1/2/2025 at  4:04 PM    Relevant Laboratory Values  Relevant laboratory values were discussed with the patient. The following specialty medication dose adjustment(s) are recommended: No dose adjustments are needed for the oral specialty medication(s) based on the labs.    Lab Results   Component Value Date    GLUCOSE 78 01/02/2025    CALCIUM 9.4 01/02/2025     01/02/2025    K 4.0 01/02/2025    CO2 24.5 01/02/2025     01/02/2025    BUN 13 01/02/2025    CREATININE 1.02 (H) 01/02/2025    EGFRIFNONA 86 12/21/2021    BCR 12.7 01/02/2025    ANIONGAP 10.5 01/02/2025     Lab Results   Component Value Date    WBC 7.15 01/02/2025    RBC 5.07 01/02/2025    HGB 15.0 01/02/2025    HCT 45.6 01/02/2025    MCV 89.9 01/02/2025    MCH 29.6 01/02/2025    MCHC 32.9 01/02/2025    RDW 12.6 01/02/2025    RDWSD 41.2 01/02/2025    MPV 10.0 01/02/2025     01/02/2025    NEUTRORELPCT 71.4 01/02/2025    LYMPHORELPCT 15.8 (L) 01/02/2025    MONORELPCT 9.4 01/02/2025    EOSRELPCT 2.1 01/02/2025    BASORELPCT 0.7 01/02/2025    AUTOIGPER 0.6 (H) 01/02/2025    NEUTROABS 5.11 01/02/2025    LYMPHSABS 1.13 01/02/2025    MONOSABS 0.67 01/02/2025    EOSABS 0.15 01/02/2025    BASOSABS 0.05 01/02/2025    AUTOIGNUM 0.04 01/02/2025     NRBC 0.0 01/02/2025       Current Medication List  This medication list has been reviewed with the patient and evaluated for any interactions or necessary modifications/recommendations, and updated to include all prescription medications, OTC medications, and supplements the patient is currently taking.  This list reflects what is contained in the patient's profile, which has also been marked as reviewed to communicate to other providers it is the most up to date version of the patient's current medication therapy.     Current Outpatient Medications:     albuterol sulfate  (90 Base) MCG/ACT inhaler, 2 puffs Every 4 (Four) Hours As Needed., Disp: , Rfl:     ascorbic acid (VITAMIN C) 500 MG tablet, Take 1 tablet by mouth Daily., Disp: , Rfl:     B Complex-C (SUPER B COMPLEX PO), Take 1 tablet by mouth Daily., Disp: , Rfl:     dasatinib (Sprycel) 50 MG chemo tablet, TAKE 1 TABLET BY MOUTH ONCE DAILY AT THE SAME TIME. MAY TAKE WITH OR WITHOUT FOOD. SWALLOW WHOLE. AVOID GRAPEFRUIT PRODUCTS., Disp: 30 tablet, Rfl: 5    Medicines reviewed by Joana Abdullahi RPH on 1/2/2025 at  4:04 PM    Drug Interactions  Assessed medication list for interactions, no significant drug interactions noted.   Advised patient to call the clinic if any new medications are started so we can assess for drug-drug interactions.  Drug-food interactions discussed:  None    Adverse Drug Reactions  Medication tolerability: Tolerating with no to minimal ADRs  Medication plan: Continue therapy with normal follow-up  Plan for ADR Management: None    Adherence, Self-Administration, and Current Therapy Problems  Adherence related to the patient's specialty therapy was discussed with the patient. The Adherence segment of this outreach has been reviewed and updated.     Adherence Questions  Linked Medication(s) Assessed: Dasatinib (SPRYCEL)  On average, how many doses/injections does the patient miss per month?: 0  What are the identified reasons for  non-adherence or missed doses? : no problems identified  What is the estimated medication adherence level?: %  Based on the patient/caregiver response and refill history, does this patient require an MTP to track adherence improvements?: no    Additional Barriers to Patient Self-Administration: None  Methods for Supporting Patient Self-Administration: None  Patient has had no issues obtaining medication from pharmacy, has had issues obtaining medication from pharmacy: Patient says she has not received new prescription since around May due to insurance change. Luckily, she has a back-up supply that she's been taking. Will follow-up with Preeti and get problem resolved .    Open Medication Therapy Problems  No medication therapy recommendations to display    Goals of Therapy  Goals related to the patient's specialty therapy were discussed with the patient. The Patient Goals segment of this outreach has been reviewed and updated.   Goals Addressed Today        Specialty Pharmacy General Goal      Progression free survival, undetectable BCR-ABL PCR  9/12/24 not detected              Quality of Life Assessment   Quality of Life related to the patient's enrollment in the patient management program and services provided was discussed with the patient. The QOL segment of this outreach has been reviewed and updated.  Quality of Life Improvement Scale: 6-A little better    Discussed aforementioned material with patient in person, face-to-face, in clinic.     Reassessment Plan & Follow-Up  1. Medication Therapy Changes: None  2. Related Plans, Therapy Recommendations, or Issues to Be Addressed: None  3. Pharmacist to perform regular assessments no more than (6) months from the previous assessment.   4. Care Coordinator to set up future refill outreaches, coordinate prescription delivery, and escalate clinical questions to pharmacist.    Attestation  Therapeutic appropriateness: Appropriate   I attest the patient was  actively involved in and has agreed to the above plan of care.  If the prescribed therapy is at any point deemed not appropriate based on the current or future assessments, a consultation will be initiated with the patient's specialty care provider to determine the best course of action. The revised plan of therapy will be documented along with any required assessments and/or additional patient education provided.     MELVIN Casillas, Pharmacy Intern  Clinical Specialty Pharmacist, Oncology  1/2/2025  16:04 EST

## 2025-01-03 ENCOUNTER — PRIOR AUTHORIZATION (OUTPATIENT)
Dept: ONCOLOGY | Facility: CLINIC | Age: 40
End: 2025-01-03
Payer: COMMERCIAL

## 2025-01-03 ENCOUNTER — SPECIALTY PHARMACY (OUTPATIENT)
Dept: PHARMACY | Facility: HOSPITAL | Age: 40
End: 2025-01-03
Payer: COMMERCIAL

## 2025-01-03 NOTE — PROGRESS NOTES
I received a request to investigate the barriers that the patient has experienced with receiving her Dasatinib from [facility].    Per Cipriano Elaine, our [facility] point of contact, Mrs. Houston's dasatinib delivery is ready to be scheduled and the co-pay will be $0.    I spoke with Mrs. Houston and informed her of all of the above. I provided the [facility] telephone# of [phone] and advised her to call to schedule her delivery.    She v/u.    Suha Whitt - Care Coordinator   1/3/2025  12:20 EST

## 2025-01-03 NOTE — PROGRESS NOTES
Specialty Pharmacy Note: Sprycel (dasatinib)    Hu Houston is a 39 y.o. female with CML was seen 1/2/24 by APRN. Per provider dictation, no changes to oral oncology regimen Sprycel (dasatinib) 50 mg po daily.  Labs Review: The CMP and CBC from 1/2/24 have been reviewed. No dose adjustments are needed for the oral specialty medication(s) based on the labs.    Specialty pharmacy will continue to follow patient.    Lilian Grady Rph, BCOP  1/3/2025  08:42 EST

## 2025-01-03 NOTE — TELEPHONE ENCOUNTER
LAB TEST CODES FOR BCR/ABL BY PCR, [id number], [id number] - PER [facility] PORTAL, AUTH IS PENDING REVIEW, OV NOTE ATTACHED, REF #[id number]  
no

## 2025-01-08 ENCOUNTER — TELEPHONE (OUTPATIENT)
Dept: ONCOLOGY | Facility: CLINIC | Age: 40
End: 2025-01-08

## 2025-01-08 NOTE — TELEPHONE ENCOUNTER
Caller: ABDI    Relationship: Other    Best call back number: [phone]    What is the best time to reach you: ANYTIME    Who are you requesting to speak with (clinical staff, provider,  specific staff member): CLINICAL - YANIV    What was the call regarding: ABDI WAS UNABLE TO OPEN FAX SENT FROM [facility], PLEASE REFAX INFO [phone].

## 2025-01-09 ENCOUNTER — SPECIALTY PHARMACY (OUTPATIENT)
Dept: PHARMACY | Facility: HOSPITAL | Age: 40
End: 2025-01-09
Payer: COMMERCIAL

## 2025-01-09 NOTE — PROGRESS NOTES
Specialty Pharmacy Patient Management Program  One-Time Clinical Outreach     Hu Houston is seen by an their provider for CML and enrolled in the Oncology Patient Management program offered by [facility].      Call placed to ascertain delivery of Sprycel from [facility].  No answer,   left  requesting call back to [phone].    Lilian Grady RPH, BCOP  Clinical Specialty Pharmacist, Oncology  1/9/2025  11:10 EST

## 2025-01-13 ENCOUNTER — SPECIALTY PHARMACY (OUTPATIENT)
Dept: PHARMACY | Facility: HOSPITAL | Age: 40
End: 2025-01-13
Payer: COMMERCIAL

## 2025-01-13 NOTE — PROGRESS NOTES
Specialty Pharmacy Patient Management Program  One-Time Clinical Outreach     Hu Houston is seen by an their provider for CML and enrolled in the Oncology Patient Management program offered by [facility].      Call placed again to ascertain delivery of Sprycel from [facility]. No answer, left  requesting call back to [phone].     Lilian Grady RPH, BCOP  Clinical Specialty Pharmacist, Oncology  1/13/2025  09:20 EST

## 2025-01-16 ENCOUNTER — SPECIALTY PHARMACY (OUTPATIENT)
Dept: PHARMACY | Facility: HOSPITAL | Age: 40
End: 2025-01-16
Payer: COMMERCIAL

## 2025-01-16 NOTE — PROGRESS NOTES
Per Cipriano martinez/[facility], Mrs. Houtson received her dasatinib on 1/9/2025.    Suha Whitt - Care Coordinator   1/16/2025  10:09 EST

## 2025-01-24 ENCOUNTER — TELEPHONE (OUTPATIENT)
Dept: ONCOLOGY | Facility: CLINIC | Age: 40
End: 2025-01-24
Payer: COMMERCIAL

## 2025-01-24 NOTE — TELEPHONE ENCOUNTER
Left patient a VM to schedule her for a lab draw. She needs BCR/ABL by PCR drawn. We were waiting on insurance approval, and that is why it was not done at her last OV. Left patient my direct phone # [phone].

## 2025-02-03 ENCOUNTER — LAB (OUTPATIENT)
Dept: LAB | Facility: HOSPITAL | Age: 40
End: 2025-02-03
Payer: COMMERCIAL

## 2025-02-03 ENCOUNTER — APPOINTMENT (OUTPATIENT)
Dept: LAB | Facility: HOSPITAL | Age: 40
End: 2025-02-03
Payer: COMMERCIAL

## 2025-02-03 DIAGNOSIS — C92.10 CML (CHRONIC MYELOID LEUKEMIA): Primary | ICD-10-CM

## 2025-02-03 LAB
BASOPHILS # BLD AUTO: 0.03 10*3/MM3 (ref 0–0.2)
BASOPHILS NFR BLD AUTO: 1 % (ref 0–1.5)
DEPRECATED RDW RBC AUTO: 41.7 FL (ref 37–54)
EOSINOPHIL # BLD AUTO: 0.14 10*3/MM3 (ref 0–0.4)
EOSINOPHIL NFR BLD AUTO: 4.7 % (ref 0.3–6.2)
ERYTHROCYTE [DISTWIDTH] IN BLOOD BY AUTOMATED COUNT: 12.9 % (ref 12.3–15.4)
HCT VFR BLD AUTO: 43.5 % (ref 34–46.6)
HGB BLD-MCNC: 14.6 G/DL (ref 12–15.9)
IMM GRANULOCYTES # BLD AUTO: 0.01 10*3/MM3 (ref 0–0.05)
IMM GRANULOCYTES NFR BLD AUTO: 0.3 % (ref 0–0.5)
LYMPHOCYTES # BLD AUTO: 1.19 10*3/MM3 (ref 0.7–3.1)
LYMPHOCYTES NFR BLD AUTO: 39.8 % (ref 19.6–45.3)
MCH RBC QN AUTO: 29.8 PG (ref 26.6–33)
MCHC RBC AUTO-ENTMCNC: 33.6 G/DL (ref 31.5–35.7)
MCV RBC AUTO: 88.8 FL (ref 79–97)
MONOCYTES # BLD AUTO: 0.6 10*3/MM3 (ref 0.1–0.9)
MONOCYTES NFR BLD AUTO: 20.1 % (ref 5–12)
NEUTROPHILS NFR BLD AUTO: 1.02 10*3/MM3 (ref 1.7–7)
NEUTROPHILS NFR BLD AUTO: 34.1 % (ref 42.7–76)
NRBC BLD AUTO-RTO: 0 /100 WBC (ref 0–0.2)
PLATELET # BLD AUTO: 161 10*3/MM3 (ref 140–450)
PMV BLD AUTO: 9.8 FL (ref 6–12)
RBC # BLD AUTO: 4.9 10*6/MM3 (ref 3.77–5.28)
WBC NRBC COR # BLD AUTO: 2.99 10*3/MM3 (ref 3.4–10.8)

## 2025-02-03 PROCEDURE — 36415 COLL VENOUS BLD VENIPUNCTURE: CPT

## 2025-02-03 PROCEDURE — 85025 COMPLETE CBC W/AUTO DIFF WBC: CPT

## 2025-02-10 ENCOUNTER — TELEPHONE (OUTPATIENT)
Dept: ONCOLOGY | Facility: CLINIC | Age: 40
End: 2025-02-10

## 2025-02-10 DIAGNOSIS — C92.10 CML (CHRONIC MYELOID LEUKEMIA): Primary | ICD-10-CM

## 2025-02-10 NOTE — TELEPHONE ENCOUNTER
Caller: Hu Houston    Relationship: Self    Best call back number: [phone]    What is the best time to reach you: ANYTIME    Who are you requesting to speak with (clinical staff, provider,  specific staff member): CLINICAL    What was the call regarding: PT IS REQUESTING A C/B WANTING TO KNOW WHY SHE IS SCHEDULED FOR A LAB ON 2-17 WHEN SHE JUST HAD ONE ON 2-3    PLEASE ADVISE

## 2025-02-17 LAB — REF LAB TEST METHOD: NORMAL

## 2025-02-24 ENCOUNTER — LAB (OUTPATIENT)
Dept: LAB | Facility: HOSPITAL | Age: 40
End: 2025-02-24
Payer: COMMERCIAL

## 2025-02-24 ENCOUNTER — CLINICAL SUPPORT (OUTPATIENT)
Dept: ONCOLOGY | Facility: HOSPITAL | Age: 40
End: 2025-02-24
Payer: COMMERCIAL

## 2025-02-24 DIAGNOSIS — C92.10 CML (CHRONIC MYELOID LEUKEMIA): ICD-10-CM

## 2025-02-24 LAB
BASOPHILS # BLD AUTO: 0.05 10*3/MM3 (ref 0–0.2)
BASOPHILS NFR BLD AUTO: 0.6 % (ref 0–1.5)
DEPRECATED RDW RBC AUTO: 40.9 FL (ref 37–54)
EOSINOPHIL # BLD AUTO: 0.3 10*3/MM3 (ref 0–0.4)
EOSINOPHIL NFR BLD AUTO: 3.7 % (ref 0.3–6.2)
ERYTHROCYTE [DISTWIDTH] IN BLOOD BY AUTOMATED COUNT: 12.9 % (ref 12.3–15.4)
HCT VFR BLD AUTO: 40.6 % (ref 34–46.6)
HGB BLD-MCNC: 13.3 G/DL (ref 12–15.9)
IMM GRANULOCYTES # BLD AUTO: 0.03 10*3/MM3 (ref 0–0.05)
IMM GRANULOCYTES NFR BLD AUTO: 0.4 % (ref 0–0.5)
LYMPHOCYTES # BLD AUTO: 1.5 10*3/MM3 (ref 0.7–3.1)
LYMPHOCYTES NFR BLD AUTO: 18.4 % (ref 19.6–45.3)
MCH RBC QN AUTO: 28.8 PG (ref 26.6–33)
MCHC RBC AUTO-ENTMCNC: 32.8 G/DL (ref 31.5–35.7)
MCV RBC AUTO: 87.9 FL (ref 79–97)
MONOCYTES # BLD AUTO: 0.86 10*3/MM3 (ref 0.1–0.9)
MONOCYTES NFR BLD AUTO: 10.6 % (ref 5–12)
NEUTROPHILS NFR BLD AUTO: 5.41 10*3/MM3 (ref 1.7–7)
NEUTROPHILS NFR BLD AUTO: 66.3 % (ref 42.7–76)
NRBC BLD AUTO-RTO: 0 /100 WBC (ref 0–0.2)
PLATELET # BLD AUTO: 207 10*3/MM3 (ref 140–450)
PMV BLD AUTO: 10.3 FL (ref 6–12)
RBC # BLD AUTO: 4.62 10*6/MM3 (ref 3.77–5.28)
WBC NRBC COR # BLD AUTO: 8.15 10*3/MM3 (ref 3.4–10.8)

## 2025-02-24 PROCEDURE — 36415 COLL VENOUS BLD VENIPUNCTURE: CPT

## 2025-02-24 PROCEDURE — 85025 COMPLETE CBC W/AUTO DIFF WBC: CPT

## 2025-02-24 NOTE — PROGRESS NOTES
Patient is here for lab review with RN.  CBC reviewed, WBC count is now normal from 2.99 to 8.15 g/dL; ANC is at 5410 from 1020. Patient has no complaints. Patient stated that she was having issues with her antibiotic last time she had lab draw. Pt declined copy of labs and appt. Follow up appointment reviewed. Patient is instructed to call the office with any concerns or new symptoms prior to next visit. Patient verbalized understanding and discharged in stable condition.     Lab Results   Component Value Date    WBC 8.15 02/24/2025    HGB 13.3 02/24/2025    HCT 40.6 02/24/2025    MCV 87.9 02/24/2025     02/24/2025

## 2025-03-31 ENCOUNTER — TELEPHONE (OUTPATIENT)
Dept: ONCOLOGY | Facility: CLINIC | Age: 40
End: 2025-03-31
Payer: COMMERCIAL

## 2025-03-31 ENCOUNTER — SPECIALTY PHARMACY (OUTPATIENT)
Dept: PHARMACY | Facility: HOSPITAL | Age: 40
End: 2025-03-31
Payer: COMMERCIAL

## 2025-03-31 NOTE — TELEPHONE ENCOUNTER
Provider: Dr. Castle  Caller: Cheryl martinez/[facility]   Relationship to Patient: other  Call Back Phone Number: [phone], anyone can assist  Reason for Call: New Pre Auth needed for pt's Dasatinib. Will  on 25

## 2025-04-01 NOTE — PROGRESS NOTES
I TRIED TO CALL [facility] BACK. NO ANSWER; LEFT VOICE MESSAGE.    REASONS FOR FOLLOWUP:    1. Chronic myelogenous leukemia with splenomegaly, chronic phase, positive [place] chromosome, no mutation at kinase domain diagnosed in November 2020.    · Patient was started on hydroxyurea temporarily on 10/23/2013 with significant drop of WBC counts.    · Patient started on Sprycel on 12/02/2013. Peripheral blood tested with 3.4% of cells positive for BCR-ABL translocation, tested 02/17/2014.    · The patient had CCyR 6 months into treatment as tested on 05/15/2014.    · Complete molecular response as tested 08/08/14 with negative product of BCR/ABL.   · There was interruption of her treatment due to insurance coverage and misunderstanding from patient's part, she had a relapse of disease with BCR/ABL product at 12.626% in March 2016, and she restarted back on Sprycel.   · Good response as tested on 08/31/2016 with BCR/ABL at 0.294%.    · Since June 2017, patient has been persistently tested negative for BCR/ABL by RT-PCR every 3 month period.     · Patient reported worsening leg cramping in end of July 2018.  Sprycel was decreased to 50 mg daily.  Laboratory studies on 8/31/2018, on 1/30/2019 and on 3/29/2019 were negative for BCR-ABL by RT-PCR.    · Sprycel was on hold during treatment for acute hepatitis C in early part of 2019.  It was resumed in July 2019.  She was weakly positive for BCR-ABL.  · On 10/29/2019 BCR/ABL1 MAJOR (p210) and BCR/ABL1 MINOR (p190) were not detected.    · BCR-ABL by RT-PCR with 0% on 1/24/2020 and also on 4/24/2020.  · Patient was tested negative on 7/10/2020.  Negative on 10/9/2020.    · Because of palpitation, Sprycel was on hold since 11/17/2020.     · BCR-ABL by RT-PCR was complete negative as reported on 1/06/2021.  · Sprycel was resumed on 2/9/2021 at 50 mg daily.  · Continue BCR-ABL by RT-PCR in May 2021 in October 2021.  2. Recurrent iron deficiency anemia secondary to menorrhagia, not responding to oral iron supplementation.  She also had  significant constipation associated with oral iron.   · Patient was given Feraheme treatment 2 doses in September 2016 and repeated in January 2017 and again in October 2017 and March 2018.   · IV Venofer total 900 mg in July 2018 due to recurrent iron deficiency.           HISTORY OF PRESENT ILLNESS: The patient is a 36 y.o.  female who presents today for 3-month reevaluation to assess her tolerance and response to Sprycel.      Patient reports she has good tolerance with Sprycel 50 mg daily.  She denies nausea vomiting.  No chest pain or dyspnea.  No leg cramping.  No significant fatigue.  Performance status ECOG 0.    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     Lab study today on 12/21/2021 reported complete normal CBC and CMP.  Pending results for BCR-ABL by RT-PCR.         Past Medical History:   Diagnosis Date   • Anemia in neoplastic disease    • Asthma     childhood   • Chiari I malformation (HCC)     eval by Dr Moore, NS 2016   • Chronic myelogenous leukemia (HCC)     remission, Dr Castle follows   • Chronic pain    • CML (chronic myelocytic leukemia) (HCC)    • Cystitis    • Depression    • GERD (gastroesophageal reflux disease)     ulcer   • H/O Iron deficiency anemia    • H/O Lower extremity pain     Resolved.   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      *  Hysterectomy in October 2018      *  Acute hepatitis C in early 2019, treated successfully by Dr. Karyn Mendieta.     OB/GYN HISTORY: Menarche age 9. G5, P4, 1 miscarriage of the third pregnancy. First pregnancy at age 17. Patient underwent partial hysterectomy in 2018.        HEMATOLOGIC/ONCOLOGIC HISTORY: History from previous dates can be found in the separate document.        Laboratory results on 09/23/2015 showed normalization of hemoglobin 12.2, but still has macrocytosis, MCV 73.3. She has normal platelets and WBC at 9400. Serum ferritin < 5 ng/ml, iron sats 6.3%, iron 29, TIBC 455 mcg/ml. Still has severe iron deficiency, needs to continue oral iron therapy. The patient restarted taking oral iron supplementation which was probably stopped in the end of November 2015.        Laboratory study on 03/22/2016 reported normal WBC 8450, neutrophils 6100, lymphs is 1400 and monocytes 550. Serum iron was 26, TIBC 469 on saturation 6% and ferritin less than 5 NG/ML. Chemistry lab reported normal renal function with a creatinine 0.69, normal liver function panel, total protein 7.5 and albumin 4.2, normal electrolytes. Patient was restarted back on oral on sedimentation twice a day with good tolerance.      Patient continues take Lortab as needed for left leg cramping. Urine drug test on 08/05/2016 was completely negative, and repeated study on August 26 was positive for opiate, negative for other recreational drugs.      Repeat laboratory study on 08/31/2016 reported iron 21, ferritin less than 5, iron saturation 5%, TIBC 462. Hemoglobin was 11.5 MCV 78.1 MCHC 30.4. Platelets 163,000. Total WBC 8870 including neutrophils 6400 and lymphocytes 1500. BCR/ABL was 0.294% by RT-PCR method.       Patient was given IV Feraheme treatment in September 2016, with 2 doses. Repeated laboratory study on 10/06/2016 reported significantly improved and normalized ferritin 269, iron 80, TIBC 365 and iron saturation 22%. Her hemoglobin was 12.2, and MCV 80.8.      Patient reported she was seen by Dr. Fam in 10/2016 and was started on half tablets of Topamax. I reviewed Dr. Fam’s clinic note and telephone conversation record. The patient reports she has no recurrence of migraine headache.  However, she is very tearful today, reporting that she has thoughts of not taking any medications. She did assure me that she has been taking the medication up to this point. 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 The patient denies suicide ideation. When she was initially diagnosed of CML back in 2014, she had depression and I started the patient on Prozac. The patient reported initially it helped her, however, she no longer feels the effect of Prozac. She feels depressed. The patient reports she has no personal hobby. She goes to work and goes home, taking care of her kids. She does not enjoy life as she used to.      Laboratory study on December 29, 2016 reported at ferritin 19.9, iron 33, TIBC 347 iron saturation 10%.  Hemoglobin was 13, normal WBC and platelets.  Unremarkable CMP.  Patient was given 2 doses of Feraheme treatment in early January 2017.      Cytogenetic study on March 14, 2017 reported a BCR-ABL products at 0.098%, showed further improved residual disease.  There is also reported a ferritin 103.7, iron 79, TIBC 336 and iron saturation 24%.  Hemoglobin was 13.5, MCV 92.3, platelets 199,000 WBC 7000.  She had a completely normal CMP.       Repeated laboratory study on June 20, 2017 reported at nondetectable BCR-ABL product.  Ferritin was 45, iron 35 TIBC 333 and iron saturation 11%.  Had a normal CBC and CMP.    In the end of July 2018, patient called reporting worsening significant leg cramping, and getting worse recently and has been taking 6 tablets of Advil with no significant improvement.  We suspect it might be caused by Sprycel for her CML.  We discussed with patient, and decreased to half dose at 50 mg daily.  Patient reports that she is improved leg cramping since dose reduction.    Per medical records this patient had emergency room visit again on 8/18/2018.  Patient reports she had  significant abdomen/pelvic pain at a time associate with nausea.  Laboratory study showed significantly elevated WBC at 25,900 including neutrophils 24,400, with elevated hemoglobin 15.8 and platelets 146,000.     She had a thorough investigation, including CT scan for abdomen pelvis which was unremarkable.  She then had ultrasound for the pelvis and it was also unremarkable.  She had ultrasound for the gallbladder examination on the same day and was unremarkable.  She had a normal CMP except of marginally elevated glucose at 112.  Her urinalysis showed only marginally increased WBC 3-5/HPF.  She was negative for yeast infection, negative for Chlamydia trichomonas and Neisseria gonorrhea.  Patient was prescribed Flagyl and doxycycline and discharged to home.      lab study on 2019 reported normal iron saturation 47% and elevated ferritin 507.1 ng/mL with free iron 184 and TIBC 388.  hemoglobin is normal at 13.7 and platelets 186,000. normal WBC at 5080 including ANC 3300, lymphocytes 930 and monocytes 630. Labs reported significantly elevated ALT at 655,  and alkaline phosphatase 234 but normal total bilirubin 0.9. She had normal renal function creatinine 0.78. Normal electrolytes.     On 2019, I searched Up-to-Date and suspected that this patient had drug-induced hepatitis from Diflucan or with combination of Diflucan with Sprycel. This patient had been on Sprycel for years and had no problem with abnormal liver panel previously. It seems Diflucan inhibits CY moderately, which likely interferes with the metabolism of Sprycel. I instructed the patient to hold her Sprycel for now.    Sprycel treatment resumed as of 2019 upon completion of MEVYRET for her hepatitis C.    Laboratory study performed on 2019 showed normal CBC and CMP. BCR/ABL by RT-PCR detected BCR/ABL1 Major. HCV RNA by PCR showed HCV not detected.    Laboratory studies 2019 report her hemoglobin is normal at 15.2  and platelets 146,000. normal WBC at 7650 including ANC 5190, lymphocytes 1600 and monocytes 630.    Recent laboratory studies confirmed to be no detectable virus on 9/11/2019.    U/S Liver performed on 10/24/2019 reported negative hepatic ultrasound exam with no focal liver lesion, negative gallbladder examination with no cholelithiasis or bile duct dilation noted, however borderline splenomegaly was noted.     Laboratory study performed on 10/29/2019, reported a completely normal CBC, CMP. Normal iron saturation and still slightly elevated ferritin 325 ng/dL  BCR/ABL1 MAJOR (p210) and BCR/ABL1 MINOR (p190) were not detected.     Laboratory studies 1/24/2020, show hemoglobin 14.3 MCV 92.6 platelets 180,000 and WBC 6570 including neutrophils 4400.  She also has completely normal CMP.  Iron studies reported ferritin 273, iron saturation 11%, hemoglobin 14.3 WBC 6570 and platelets 180,000.  BCR-ABL by RT-PCR with 0%.     Patient presented today for 3-month follow-up and lab review.      Due to pandemic coronavirus infection, patient has been staying home with all 4 children.  Patient reports significant fatigue for the past month, similar to the time when she had iron deficiency.  Patient reports prior to that she was having regular exercise and with good energy level.    Patient reports compliant with Sprycel 50 mg daily.  She denies leg cramping.  She has no nausea no vomiting no abdominal pains.  She has good appetite and no diarrhea.  She denies headaches vision changes no dizziness or seizure activity.    Her laboratory study on 3/12/2020 reported marginal iron saturation 15% however had a good ferritin 212.5 ng/mL.  She had hemoglobin 13.1 and normal thyroid profile including TSH 1.20, free T4 at 1.37 ng/dL and a free T3 at 2.97 pg/mL     On 4/24/2020 her iron study showed ferritin 262 and iron saturation 21%.  Hemoglobin is 14.8.  She has normal WBC 5740 including ANC 3820, and normal platelets 161,000.   She  was negative for BCR-ABL by RT-PCR on the same day.    Laboratory studies, 7/10/2020, show completely normal CBC and CMP.  Negative for BCR-ABL by RT-PCR.  Iron studies showed ferritin 185, iron saturation 13% free iron 37 TIBC 286.    Patient has completed normal CBC 10/9/2020.  Iron studies reported ferritin 2070, free iron 13%.  She also has completed normal CMP.  She was tested negative for BCR-ABL by RT-PCR method.  Sprycel 50 mg daily was continued.     We saw her recently on 10/9/2020 for routine follow-up for her CML.  She was tolerating Sprycel.  Physical examination laboratory studies were unremarkable.  No detectable BCR-ABL by RT-PCR.  We continued her Sprycel at that time.     On 11/17/2020, patient called and reported chest tightness, palpitation and dyspnea new onset in November 2020.  Patient reports this happened recently.  She has chest tightness, and associated tachycardia/palpitation.  Patient reports this is unpredictable, can be on and off.  She noticed that one day she was cooking meal for her family, and noted sudden onset palpitation and chest tightness.  She reports unable to induce purposefully.  She also has exertional dyspnea.  Patient reports 11/17/2020, we asked patient to hold Sprycel.  We suspect the patient may have pleural effusion or cardiac dysfunction secondary to Sprycel, we requested chest CT, echocardiogram study and also laboratory study for evaluation.     Negative chest x-ray on 11/17/2020.     Laboratory study on 11/17/2020 reported stable chronic condition with a ferritin 261 and iron saturation 12%, normal hemoglobin 15.0, unable to explain her dyspnea.     Echocardiogram study on 11/18/2020 reported normal results.     Laboratory study on 1/6/2021 reported hemoglobin 15.9 hematocrit 47.1%, platelets 181,000 WBC 9360 including ANC 7130 lymphocytes 1350. Iron study reported ferritin 293 and iron saturation 14% with free iron 47 and a TIBC 326.  Chemistry lab reported  unremarkable CMP including renal function and liver function panel.      Patient reports she was seen by cardiologist Dr. Flowers on 2021.  Patient had thorough cardiology evaluation and there was no apparent abnormalities.    We have been withholding her Sprycel in middle 2020 because of palpitation, and referred patient to cardiology service.        Fortunately, her peripheral blood BCR-ABL by RT-PCR was complete negative, as reported on 2021.     Sprycel was resumed on 2021.      Laboratory studies on 3/16/2021 reported normal CBC including hemoglobin 14.4 MCV 88.7, platelets 173,000, and WBC 6400 including ANC 3770 lymphocytes 1800.  Chemistry lab reported normal CMP.     Laboratory studies on 2021 reported completely normal CBC and CMP.  Iron study also normal with ferritin 219 and iron saturation 16%.  Negative study for BCR-ABL by RT-PCR.     Laboratory study on 10/5/2021 reported complete normal CBC and CMP.  Iron study reported normal ferritin 250 ng/mL and iron saturation 21% with free iron 63 TIBC 300.  She was also complete negative for BCR-ABL by RT-PCR.      MEDICATIONS: The current medication list was reviewed with the patient and updated in the EMR this date per the medical assistant. Medication dosages and frequencies were confirmed to be accurate.        Allergies   Allergen Reactions   • Sulfa Antibiotics Unknown - Low Severity     Childhood reaction     SOCIAL HISTORY: . She smoked cigarettes previously, quit in 2006 with 8-pack-year history. Social drinker, maybe once a month. No illegal drug use. No risk for HIV except tattoos.  Hairstylist.       FAMILY HISTORY: Maternal grandmother had esophageal/stomach adenocarcinoma diagnosed at age of 72 and  of cancer at age of 73. The patient' s mother has hypertension but otherwise healthy.  No other family history of malignancy, especially no leukemia.          VITAL SIGNS:   Vitals:    21 1223   BP:  "103/69   Pulse: 59   Resp: 14   Temp: 97.8 °F (36.6 °C)   TempSrc: Temporal   SpO2: 99%   Weight: 78 kg (172 lb)   Height: 172 cm (67.72\")   PainSc: 0-No pain   ECOG 0          PHYSICAL EXAMINATION:    GENERAL:  Well-developed, well-nourished female, in no acute distress.  Orientated to time place and the people.  SKIN:  Warm, dry without rashes, purpura or petechiae.  HEENT:  Normocephalic.  Wearing mask.   LYMPHATICS:  No cervical, supraclavicular adenopathy.  CHEST: Normal respiratory effort.  Lungs clear to auscultation. Good airflow.  CARDIAC:  Regular rate and rhythm without murmurs. Normal S1,S2.  ABDOMEN:  Soft, nontender with no organomegaly or masses.  Bowel sounds normal.  EXTREMITIES:  No clubbing, cyanosis or edema.  NEUROLOGICAL:  Grossly intact.    PSYCHIATRIC:  Normal affect and mood.        LABORATORY DATA:    Lab Results   Component Value Date    WBC 6.50 12/21/2021    HGB 13.9 12/21/2021    HCT 40.8 12/21/2021    MCV 88.3 12/21/2021     12/21/2021     Lab Results   Component Value Date    NEUTROABS 3.81 12/21/2021     Lab Results   Component Value Date    IRON 63 10/05/2021    TIBC 301 10/05/2021    FERRITIN 250.80 (H) 10/05/2021   Iron saturation 21% on 10/5/2021.         Glucose   Date Value Ref Range Status   12/21/2021 88 74 - 124 mg/dL Final     BUN   Date Value Ref Range Status   12/21/2021 8 6 - 20 mg/dL Final     Creatinine   Date Value Ref Range Status   12/21/2021 0.76 0.60 - 1.10 mg/dL Final     Sodium   Date Value Ref Range Status   12/21/2021 138 134 - 145 mmol/L Final     Potassium   Date Value Ref Range Status   12/21/2021 3.6 3.5 - 4.7 mmol/L Final     Chloride   Date Value Ref Range Status   12/21/2021 102 98 - 107 mmol/L Final     CO2   Date Value Ref Range Status   12/21/2021 25.3 22.0 - 29.0 mmol/L Final     Calcium   Date Value Ref Range Status   12/21/2021 9.8 8.5 - 10.2 mg/dL Final     Total Protein   Date Value Ref Range Status   12/21/2021 7.6 6.3 - 8.0 g/dL Final "     Albumin   Date Value Ref Range Status   12/21/2021 4.80 3.50 - 5.20 g/dL Final     ALT (SGPT)   Date Value Ref Range Status   12/21/2021 12 0 - 33 U/L Final     AST (SGOT)   Date Value Ref Range Status   12/21/2021 15 0 - 32 U/L Final     Alkaline Phosphatase   Date Value Ref Range Status   12/21/2021 72 38 - 116 U/L Final     Total Bilirubin   Date Value Ref Range Status   12/21/2021 0.2 0.2 - 1.2 mg/dL Final     eGFR Non  Amer   Date Value Ref Range Status   12/21/2021 86 >60 mL/min/1.73 Final     BUN/Creatinine Ratio   Date Value Ref Range Status   12/21/2021 10.5 7.3 - 30.0 Final     Anion Gap   Date Value Ref Range Status   12/21/2021 10.7 5.0 - 15.0 mmol/L Final       IMAGING STUDY:       ASSESSMENT:      1. Chronic myelogenous leukemia, chronic phase with excellent response to Sprycel and complete molecular response in August 2014. However she had interuption of treatment in early part of 2016, because of insurance issues and miscommunication, she stopped the medicine without telling us, and laboratory test on 03/22/2016 reported disease relapse with increased BCR/ABL product at 12.626%. subsequently she was restarted back on Sprycel, and responded well.  Since June 2017, she has completed molecular response as tested every 3 months.  She has been compliant with treatment.   · In the end of July 2018, she reported worsening significant cramping involving her legs, so we decreased her Sprycel to 50 mg daily starting early August.  She's been having less problem since that time.  She was tested negative for BCR-ABL on 8/31/2018.    · Patient had a negative BCR-ABL by RT-PCR in end of January 2019 and also on 3/29/2019.   · Patient was later found having significantly elevated liver function panel.  Sprycel was on hold and she was subsequently found having acute hepatitis C infection.    · I discussed with Dr. Karyn Mendieta, we'll hold her Sprycel for now until she finishes hepatitis C treatment.   · She  was started on hepatitis C treatment on 4/18/2019 and finished an 8-week course.  · On 07/02/2019 patient's labs showed BCR-ABL Major (p210) detected by RT-PCR at 0.0141%. BCR/ABL1 Minor (p190) was not detected. HCV was not detected.  · Sprycel treatment was resumed as of 07/02/2019 upon completion of MEVYRET.  · Laboratory study on 10/29/2019 reported normal CBC. BCR/ABL1 MAJOR (p210) and BCR/ABL1 MINOR (p190) were not detected.    · BCR-ABL by RT-PCR on 1/24/2020 was negative.     · Lab result for BCR ABL by RT-PCR was also negative on 4/24/2020. Patient will need to continue on sprycel treatment.    · 7/10/2020 patient has normal CBC and CMP.  Negative RT-PCR for BCR ABL.  Tolerating well.  Continue Sprycel at 50 mg daily.  · On 10/9/2020, completely normal CBC with good tolerance.  BCR-ABL was tested negative by RT-PCR method.  Continue Sprycel.   · Patient reports 11/17/2020 chest tightness in palpitation, and exertion dyspnea progressively getting worse in the past week.  We asked patient to hold Sprycel.   · Chest x-ray examination on 11/17/2020 was unremarkable.  Echocardiogram study on 11/18/2020 was also benign.  Patient was seen by cardiologist for further evaluation.    · Laboratory studies on 1/6/2021 reported normal WBC 9360 including ANC 7130 lymphocytes 1350. BCR-ABL by RT-PCR was complete negative, as reported on 1/12/2021.   · Patient had negative cardiac work-up.  She also has resolution of symptoms.  Discussed with patient on 2/9/2021, we recommended resumption of Sprycel 50 mg daily.  Patient is agreeable.  · On 3/16/2021, patient reports tolerating Sprycel, no recurrent symptoms of dyspnea, chest tightness or palpitation.  Continue Sprycel 50 mg daily.  · On 5/4/2021, patient has normal CBC and CMP.  Negative results for BCR-ABL by RT-PCR for P210 and P190 as reported on 5/12/2021.    · On 10/5/2021 patient presented for reevaluation.  She reports tolerating Sprycel.  No specific side effects  reported.  Maintains normal CBC and negative BCR-ABL.  We will continue treatment for now.  · On 12/21/2021, patient reports good tolerance to Sprycel.  Normal CBC CMP.  Continue Sprycel.       2. Recurrent Iron deficiency anemia.    · She was treated again with Feraheme October 2017.   Iron deficiency thought to be related to ulcer identified on EGD, being treated with Carafate.  She also had heavy menses.  · She had recurrent iron deficiency again and was given Feraheme 2 doses in March 2018.   · Subsequently recurrent iron deficiency in July 2018, she was switched to Venofer 3 doses total 900 mg.   · Patient had simple hysterectomy in October 2018.  · Laboratory study showed supratherapeutic ferritin 458 and iron saturation 40% on 4/30/2019.   · Laboratory study performed 10/29/2019, showed normal iron saturation and ferritin 325 ng/dL.  · Normal iron studies including ferritin 262 and iron saturation 21% on 4/24/2020.  No evidence of recurrent iron deficiency.   · Lab study on 7/10/2020 reported ferritin 185, iron saturation 13% and normal hemoglobin 14.5.  She has deteriorating iron studies.   Continue to monitor in 3 months.  · Labs on 10/9/2020 reported ferritin 270, iron saturation 13% and hemoglobin 14.9.  · Lab studies on 1/6/2021 reported mild erythrocytosis.  Hemoglobin is 15.9 and hematocrit of 47.1%, with ferritin 293 and iron saturation 14%.    · Normal hemoglobin 14.4 on 3/16/2021.    · On 5/4/2021 lab study reported hemoglobin 13.7, ferritin 219 and iron saturation 16%.    · On 10/5/2021, patient maintains normal hemoglobin.  Iron study reported further improved ferritin 250 ng/mL and iron saturation 21%.  Since her hysterectomy, patient has no recurrence of iron deficiency.  Discussed with patient today, there is no need for routine monitoring of iron study from now.         *Chest tightness, palpitation and dyspnea new onset in November 2020.  Patient reports this happened recently, and that  usually unpredictable, she has chest tightness, and associated tachycardia/palpitation.  Patient reports this is unpredictable, can be on and off.  She noticed 1 day she was cooking meal for her family, and noted several palpitation and chest tightness.  She reports unable to induce purposefully.  She also has exertional dyspnea.  Patient reports 11/17/2020, we asked patient to hold Sprycel.  We requested chest CT, echocardiogram study and also laboratory study for evaluation.  · Negative chest x-ray on 11/17/2020.   · Laboratory study on 11/17/2020 reported stable chronic condition with a ferritin 261 and iron saturation 12%, normal hemoglobin 15.0, unable to explain her dyspnea.   · Echocardiogram study on 11/18/2020 reported normal results.   · Patient is evaluated 11/20/2020, she reports somewhat improved symptoms, since she stopped Sprycel for the past 3 days.  She denies extremity edema.  She denies fever sweating or chills.  We recommend patient to continue to hold Sprycel for another 2 weeks.  We also recommended patient to be tested for COVID-19.    · Reevaluation on 12/4/2020, patient reports that she did not get a Covid test.  She denies other illness such as fever sweating nausea vomiting, diarrhea, change of taste, cough etc.  Discussed with patient, will check a thyroid panel, which turned out to be normal on 12/4/2020.    · Patient was referred to cardiologist for evaluation of hypertension.  She was seen by Dr. Flowers on 12/21/2020 and the case waiting for further evaluation.  · Patient had a negative cardiac work-up.  Her symptom has resolved.  · Patient was restarted on Sprycel 2/9/2021.  She reports no recurrent symptoms 3/16/2021.  · On 5/4/2021, patient reports no recurrent symptoms.  · On 10/5/2021 patient reports no dyspnea no chest pain or discomfort.   · On 12/21/2021, patient reports no recurrent symptoms.       *COVID-19 vaccination.  · Got 2 doses of the Moderna vaccine.    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         PLAN:    1. Continue Sprycel treatment 50 mg daily.   2. Pending results today for BCR-ABL by RT-PCR.   3. Continue oral vitamin B12 at 1000 mcg daily.   4. Obtain boost dose of COVID-19 vaccine.   5. She will come back to see me in 3 months with CBC, CMP, also BCR-ABL by RT-PCR.    6. I asked patient to call if she has recurrent symptoms with palpitations and dyspnea.  Patient voiced understanding.    This patient is on high risk medication and needs close monitoring.       BAO SLADE M.D., Ph.D.    12/21/2021          CC:  ISHAAN REGAN M.D.    Karyn Mendieta M.D.    Luis Alfredo Flowers M.D.

## 2025-04-10 ENCOUNTER — SPECIALTY PHARMACY (OUTPATIENT)
Dept: PHARMACY | Facility: HOSPITAL | Age: 40
End: 2025-04-10
Payer: COMMERCIAL

## 2025-04-10 ENCOUNTER — TELEPHONE (OUTPATIENT)
Dept: ONCOLOGY | Facility: CLINIC | Age: 40
End: 2025-04-10

## 2025-04-10 NOTE — PROGRESS NOTES
Specialty Pharmacy Patient Management Program       The following PATIENT CALLS message was forwarded to my attention:          I called [facility] and spoke w/Neli Quinteros clarified that the Dasatinib PA does not  until 25 and that the Dasatinib prescription has not been discontinued. She read a copy of the letter that Ms. Houston received and clarified that the process to obtain a PA renewal has been unsuccessful and THIS is the thing that has been discontinued NOT the prescription.     I explained that our office will obtain a PA on , after the current PA expires. Neli v/kev and noted this in Ms. Houston's file.     I returned the call to Ms. Houston and explained all of the above. Evelyn v/u.    Suha Whitt - Care Coordinator   4/10/2025  15:16 EDT    ADDENDUM    I received a phone call from Adam Mcintosh w/[facility]. Vadim was calling about the issue above.   I relayed my notes above to Vadim, evelyn v/kev.    Suha Whitt - Care Coordinator   2025  11:27 EDT

## 2025-04-10 NOTE — TELEPHONE ENCOUNTER
Caller: Hu Houston    Relationship: Self    Best call back number: [phone]    What was the call regarding: PATIENT RECEIVED LETTER FROM [facility] REGARDING HER DASATINIB MEDICATION. THEY STATED THEY HAVE   TRIED TO REACH OUT TO THE OFFICE AND NO ONE HAS CONTACTING THEM, SO THEY ARE DISCONTINUING THE SCRIP.T    PLEASE CALL [facility] [phone], TO DISCUSS FURTHER.    CALL PATIENT BACK TO LET HER KNOW YOU HAVE CONTACTED [facility]

## 2025-04-23 ENCOUNTER — SPECIALTY PHARMACY (OUTPATIENT)
Dept: PHARMACY | Facility: HOSPITAL | Age: 40
End: 2025-04-23
Payer: COMMERCIAL

## 2025-05-23 ENCOUNTER — PRIOR AUTHORIZATION (OUTPATIENT)
Dept: ONCOLOGY | Facility: CLINIC | Age: 40
End: 2025-05-23
Payer: COMMERCIAL

## 2025-05-23 NOTE — TELEPHONE ENCOUNTER
PA approved for BCR/ABL by PCR through [employer]. Auth # [id number]. Ok'd lab to send to IO at patient's 6/4/25 appointment.

## 2025-06-04 ENCOUNTER — LAB (OUTPATIENT)
Dept: LAB | Facility: HOSPITAL | Age: 40
End: 2025-06-04
Payer: COMMERCIAL

## 2025-06-04 ENCOUNTER — OFFICE VISIT (OUTPATIENT)
Dept: ONCOLOGY | Facility: CLINIC | Age: 40
End: 2025-06-04
Payer: COMMERCIAL

## 2025-06-04 VITALS
TEMPERATURE: 97.7 F | HEART RATE: 67 BPM | OXYGEN SATURATION: 96 % | DIASTOLIC BLOOD PRESSURE: 64 MMHG | BODY MASS INDEX: 24.32 KG/M2 | SYSTOLIC BLOOD PRESSURE: 109 MMHG | HEIGHT: 68 IN | WEIGHT: 160.5 LBS

## 2025-06-04 DIAGNOSIS — C92.10 CML (CHRONIC MYELOID LEUKEMIA): Primary | ICD-10-CM

## 2025-06-04 DIAGNOSIS — Z86.2 HISTORY OF IRON DEFICIENCY ANEMIA: ICD-10-CM

## 2025-06-04 LAB
ALBUMIN SERPL-MCNC: 4.7 G/DL (ref 3.5–5.2)
ALBUMIN/GLOB SERPL: 1.8 G/DL
ALP SERPL-CCNC: 74 U/L (ref 39–117)
ALT SERPL W P-5'-P-CCNC: 15 U/L (ref 1–33)
ANION GAP SERPL CALCULATED.3IONS-SCNC: 11 MMOL/L (ref 5–15)
AST SERPL-CCNC: 17 U/L (ref 1–32)
BASOPHILS # BLD AUTO: 0.04 10*3/MM3 (ref 0–0.2)
BASOPHILS NFR BLD AUTO: 0.5 % (ref 0–1.5)
BILIRUB SERPL-MCNC: 0.4 MG/DL (ref 0–1.2)
BUN SERPL-MCNC: 10.8 MG/DL (ref 6–20)
BUN/CREAT SERPL: 11.9 (ref 7–25)
CALCIUM SPEC-SCNC: 10 MG/DL (ref 8.6–10.5)
CHLORIDE SERPL-SCNC: 103 MMOL/L (ref 98–107)
CO2 SERPL-SCNC: 26 MMOL/L (ref 22–29)
CREAT SERPL-MCNC: 0.91 MG/DL (ref 0.57–1)
DEPRECATED RDW RBC AUTO: 42.7 FL (ref 37–54)
EGFRCR SERPLBLD CKD-EPI 2021: 81.4 ML/MIN/1.73
EOSINOPHIL # BLD AUTO: 0.05 10*3/MM3 (ref 0–0.4)
EOSINOPHIL NFR BLD AUTO: 0.6 % (ref 0.3–6.2)
ERYTHROCYTE [DISTWIDTH] IN BLOOD BY AUTOMATED COUNT: 13 % (ref 12.3–15.4)
GLOBULIN UR ELPH-MCNC: 2.6 GM/DL
GLUCOSE SERPL-MCNC: 98 MG/DL (ref 65–99)
HCT VFR BLD AUTO: 44.3 % (ref 34–46.6)
HGB BLD-MCNC: 14.6 G/DL (ref 12–15.9)
IMM GRANULOCYTES # BLD AUTO: 0.03 10*3/MM3 (ref 0–0.05)
IMM GRANULOCYTES NFR BLD AUTO: 0.4 % (ref 0–0.5)
LYMPHOCYTES # BLD AUTO: 1.24 10*3/MM3 (ref 0.7–3.1)
LYMPHOCYTES NFR BLD AUTO: 16 % (ref 19.6–45.3)
MCH RBC QN AUTO: 29.9 PG (ref 26.6–33)
MCHC RBC AUTO-ENTMCNC: 33 G/DL (ref 31.5–35.7)
MCV RBC AUTO: 90.8 FL (ref 79–97)
MONOCYTES # BLD AUTO: 0.55 10*3/MM3 (ref 0.1–0.9)
MONOCYTES NFR BLD AUTO: 7.1 % (ref 5–12)
NEUTROPHILS NFR BLD AUTO: 5.82 10*3/MM3 (ref 1.7–7)
NEUTROPHILS NFR BLD AUTO: 75.4 % (ref 42.7–76)
NRBC BLD AUTO-RTO: 0 /100 WBC (ref 0–0.2)
PLATELET # BLD AUTO: 215 10*3/MM3 (ref 140–450)
PMV BLD AUTO: 10.1 FL (ref 6–12)
POTASSIUM SERPL-SCNC: 3.7 MMOL/L (ref 3.5–5.2)
PROT SERPL-MCNC: 7.3 G/DL (ref 6–8.5)
RBC # BLD AUTO: 4.88 10*6/MM3 (ref 3.77–5.28)
SODIUM SERPL-SCNC: 140 MMOL/L (ref 136–145)
WBC NRBC COR # BLD AUTO: 7.73 10*3/MM3 (ref 3.4–10.8)

## 2025-06-04 PROCEDURE — 80053 COMPREHEN METABOLIC PANEL: CPT

## 2025-06-04 PROCEDURE — 99214 OFFICE O/P EST MOD 30 MIN: CPT | Performed by: INTERNAL MEDICINE

## 2025-06-04 PROCEDURE — 36415 COLL VENOUS BLD VENIPUNCTURE: CPT

## 2025-06-04 PROCEDURE — 85025 COMPLETE CBC W/AUTO DIFF WBC: CPT

## 2025-06-04 RX ORDER — METHYLPREDNISOLONE 4 MG/1
TABLET ORAL SEE ADMIN INSTRUCTIONS
COMMUNITY
Start: 2025-02-05

## 2025-06-04 NOTE — PROGRESS NOTES
REASONS FOR FOLLOWUP:    Chronic myelogenous leukemia with splenomegaly, chronic phase, positive Harnett chromosome, no mutation at kinase domain diagnosed in November 2013.   Patient is on dose reduced Sprycel 50 mg daily with undetectable disease by RT-PCR.        HISTORY OF PRESENT ILLNESS:    The patient is a 40 y.o. female with the above-mentioned history, returns to the office today for follow-up.      History of Present Illness  The patient is here today 6/4/2025 for a 5-month follow-up for her chronic myeloid leukemia (CML).    She reports no new health concerns. In 02/2025, she experienced an episode of illness and was on antibiotic therapy. During that time, she had brief leukocytopenia/neutropenia with an ANC of 1020 on 02/03/2025, which normalized by 02/24/2025 with neutrophils at 5410.     She has been adhering to her prescribed regimen of Sprycel 50 mg daily. She reports no symptoms of nausea, dyspnea, or leg swelling.    Results  Labs   - CBC: 06/04/2025, WBC 7730, neutrophils 5800, lymphocytes 1240, hemoglobin 14.6, platelets 215,000     - CBC: 02/03/2025, ANC 1020   - CBC: 02/24/2025, Neutrophils 5410    Diagnostic Testing 2/3/2025    - BCR-ABL2 test: Negative result for the p210, no results for the p190      Past Medical History:   Diagnosis Date    Anemia in neoplastic disease     Asthma     childhood    Chiari I malformation     eval by Dr Moore, [place] 2016    Chronic myelogenous leukemia     remission, Dr Castle follows    Chronic pain     CML (chronic myelocytic leukemia)     Cystitis     Depression     GERD (gastroesophageal reflux disease)     ulcer    H/O Iron deficiency anemia     H/O Lower extremity pain     Resolved.    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      *  Hysterectomy in October 2018      *  Acute hepatitis C in early 2019, treated successfully by Dr. Karyn Mendieta.     OB/GYN HISTORY: Menarche age 9. G5, P4, 1 miscarriage of the third pregnancy. First pregnancy at age 17. Patient underwent partial hysterectomy in 2018.       HEMATOLOGIC/ONCOLOGIC HISTORY:   * Chronic myelogenous leukemia with splenomegaly, chronic phase, positive Cochran chromosome, no mutation at kinase domain diagnosed in November 2013.   Patient was started on hydroxyurea temporarily on 10/23/2013 with significant drop of WBC counts.    Patient started on Sprycel on 12/02/2013.   Peripheral blood tested with 3.4% of cells positive for BCR-ABL translocation, tested 02/17/2014.    The patient had CCyR 6 months into treatment as tested on 05/15/2014.    Complete molecular response as tested 08/08/14 with negative product of BCR/ABL.   There was interruption of her treatment due to insurance coverage and misunderstanding from patient's part, she had a relapse of disease with BCR/ABL product at 12.626% in March 2016, and she restarted back on Sprycel.   Good response as tested on 08/31/2016 with BCR/ABL at 0.294%.    Since June 2017, patient has been persistently tested negative for BCR/ABL by RT-PCR every 3 month period.     Patient reported worsening leg cramping in end of July 2018.  Sprycel was decreased to 50 mg daily.  Laboratory studies on 8/31/2018, on 1/30/2019 and on 3/29/2019 were negative for BCR-ABL by RT-PCR.    Sprycel was on hold during treatment for acute hepatitis C in early part of 2019.  It was resumed in July 2019.  She was weakly positive for BCR-ABL.  On 10/29/2019 BCR/ABL1 MAJOR (p210) and BCR/ABL1 MINOR (p190) were not detected.    BCR-ABL by RT-PCR with 0% on 1/24/2020 and also on 4/24/2020.  Patient was tested negative on 7/10/2020.  Negative on 10/9/2020.    Because of palpitation, Sprycel was on  hold since 11/17/2020.     BCR-ABL by RT-PCR was complete negative as reported on 1/06/2021.  Sprycel was resumed on 2/9/2021 at 50 mg daily.  Continue BCR-ABL by RT-PCR in May 2021 in October 2021.          Laboratory results on 09/23/2015 showed normalization of hemoglobin 12.2, but still has macrocytosis, MCV 73.3. She has normal platelets and WBC at 9400. Serum ferritin < 5 ng/ml, iron sats 6.3%, iron 29, TIBC 455 mcg/ml. Still has severe iron deficiency, needs to continue oral iron therapy. The patient restarted taking oral iron supplementation which was probably stopped in the end of November 2015.        Laboratory study on 03/22/2016 reported normal WBC 8450, neutrophils 6100, lymphs is 1400 and monocytes 550. Serum iron was 26, TIBC 469 on saturation 6% and ferritin less than 5 NG/ML. Chemistry lab reported normal renal function with a creatinine 0.69, normal liver function panel, total protein 7.5 and albumin 4.2, normal electrolytes. Patient was restarted back on oral on sedimentation twice a day with good tolerance.      Patient continues take Lortab as needed for left leg cramping. Urine drug test on 08/05/2016 was completely negative, and repeated study on August 26 was positive for opiate, negative for other recreational drugs.      Repeat laboratory study on 08/31/2016 reported iron 21, ferritin less than 5, iron saturation 5%, TIBC 462. Hemoglobin was 11.5 MCV 78.1 MCHC 30.4. Platelets 163,000. Total WBC 8870 including neutrophils 6400 and lymphocytes 1500. BCR/ABL was 0.294% by RT-PCR method.       Patient was given IV Feraheme treatment in September 2016, with 2 doses. Repeated laboratory study on 10/06/2016 reported significantly improved and normalized ferritin 269, iron 80, TIBC 365 and iron saturation 22%. Her hemoglobin was 12.2, and MCV 80.8.      Patient reported she was seen by Dr. Fam in 10/2016 and was started on half tablets of Topamax. I reviewed Dr. Fam’s clinic note and  telephone conversation record. The patient reports she has no recurrence of migraine headache. However, she is very tearful today, reporting that she has thoughts of not taking any medications. She did assure me that she has been taking the medication up to this point. 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 The patient denies suicide ideation. When she was initially diagnosed of CML back in 2014, she had depression and I started the patient on Prozac. The patient reported initially it helped her, however, she no longer feels the effect of Prozac. She feels depressed. The patient reports she has no personal hobby. She goes to work and goes home, taking care of her kids. She does not enjoy life as she used to.      Laboratory study on December 29, 2016 reported at ferritin 19.9, iron 33, TIBC 347 iron saturation 10%.  Hemoglobin was 13, normal WBC and platelets.  Unremarkable CMP.  Patient was given 2 doses of Feraheme treatment in early January 2017.      Cytogenetic study on March 14, 2017 reported a BCR-ABL products at 0.098%, showed further improved residual disease.  There is also reported a ferritin 103.7, iron 79, TIBC 336 and iron saturation 24%.  Hemoglobin was 13.5, MCV 92.3, platelets 199,000 WBC 7000.  She had a completely normal CMP.       Repeated laboratory study on June 20, 2017 reported at nondetectable BCR-ABL product.  Ferritin was 45, iron 35 TIBC 333 and iron saturation 11%.  Had a normal CBC and CMP.    In the end of July 2018, patient called reporting worsening significant leg cramping, and getting worse recently and has been taking 6 tablets of Advil with no significant improvement.  We suspect it might be caused by Sprycel for her CML.  We discussed with patient, and decreased to half dose at 50 mg daily.  Patient reports that she is improved leg cramping since dose reduction.    Per  medical records this patient had emergency room visit again on 2018.  Patient reports she had significant abdomen/pelvic pain at a time associate with nausea.  Laboratory study showed significantly elevated WBC at 25,900 including neutrophils 24,400, with elevated hemoglobin 15.8 and platelets 146,000.     She had a thorough investigation, including CT scan for abdomen pelvis which was unremarkable.  She then had ultrasound for the pelvis and it was also unremarkable.  She had ultrasound for the gallbladder examination on the same day and was unremarkable.  She had a normal CMP except of marginally elevated glucose at 112.  Her urinalysis showed only marginally increased WBC 3-5/HPF.  She was negative for yeast infection, negative for Chlamydia trichomonas and Neisseria gonorrhea.  Patient was prescribed Flagyl and doxycycline and discharged to home.      lab study on 2019 reported normal iron saturation 47% and elevated ferritin 507.1 ng/mL with free iron 184 and TIBC 388.  hemoglobin is normal at 13.7 and platelets 186,000. normal WBC at 5080 including ANC 3300, lymphocytes 930 and monocytes 630. Labs reported significantly elevated ALT at 655,  and alkaline phosphatase 234 but normal total bilirubin 0.9. She had normal renal function creatinine 0.78. Normal electrolytes.       On 2019, I searched Up-to-Date and suspected that this patient had drug-induced hepatitis from Diflucan or with combination of Diflucan with Sprycel. This patient had been on Sprycel for years and had no problem with abnormal liver panel previously. It seems Diflucan inhibits CY moderately, which likely interferes with the metabolism of Sprycel. I instructed the patient to hold her Sprycel for now.    Sprycel treatment resumed as of 2019 upon completion of MEVYRET for her hepatitis C.    Laboratory study performed on 2019 showed normal CBC and CMP. BCR/ABL by RT-PCR detected BCR/ABL1 Major. HCV RNA by  PCR showed HCV not detected.    Laboratory studies 7/31/2019 report her hemoglobin is normal at 15.2 and platelets 146,000. normal WBC at 7650 including ANC 5190, lymphocytes 1600 and monocytes 630.    Recent laboratory studies confirmed to be no detectable virus on 9/11/2019.    U/S Liver performed on 10/24/2019 reported negative hepatic ultrasound exam with no focal liver lesion, negative gallbladder examination with no cholelithiasis or bile duct dilation noted, however borderline splenomegaly was noted.     Laboratory study performed on 10/29/2019, reported a completely normal CBC, CMP. Normal iron saturation and still slightly elevated ferritin 325 ng/dL  BCR/ABL1 MAJOR (p210) and BCR/ABL1 MINOR (p190) were not detected.     Laboratory studies 1/24/2020, show hemoglobin 14.3 MCV 92.6 platelets 180,000 and WBC 6570 including neutrophils 4400.  She also has completely normal CMP.  Iron studies reported ferritin 273, iron saturation 11%, hemoglobin 14.3 WBC 6570 and platelets 180,000.  BCR-ABL by RT-PCR with 0%.     Patient presented today for 3-month follow-up and lab review.      Due to pandemic coronavirus infection, patient has been staying home with all 4 children.  Patient reports significant fatigue for the past month, similar to the time when she had iron deficiency.  Patient reports prior to that she was having regular exercise and with good energy level.    Patient reports compliant with Sprycel 50 mg daily.  She denies leg cramping.  She has no nausea no vomiting no abdominal pains.  She has good appetite and no diarrhea.  She denies headaches vision changes no dizziness or seizure activity.    Her laboratory study on 3/12/2020 reported marginal iron saturation 15% however had a good ferritin 212.5 ng/mL.  She had hemoglobin 13.1 and normal thyroid profile including TSH 1.20, free T4 at 1.37 ng/dL and a free T3 at 2.97 pg/mL     On 4/24/2020 her iron study showed ferritin 262 and iron saturation 21%.   Hemoglobin is 14.8.  She has normal WBC 5740 including ANC 3820, and normal platelets 161,000.   She was negative for BCR-ABL by RT-PCR on the same day.    Laboratory studies, 7/10/2020, show completely normal CBC and CMP.  Negative for BCR-ABL by RT-PCR.  Iron studies showed ferritin 185, iron saturation 13% free iron 37 TIBC 286.    Patient has completed normal CBC 10/9/2020.  Iron studies reported ferritin 2070, free iron 13%.  She also has completed normal CMP.  She was tested negative for BCR-ABL by RT-PCR method.  Sprycel 50 mg daily was continued.     We saw her recently on 10/9/2020 for routine follow-up for her CML.  She was tolerating Sprycel.  Physical examination laboratory studies were unremarkable.  No detectable BCR-ABL by RT-PCR.  We continued her Sprycel at that time.     On 11/17/2020, patient called and reported chest tightness, palpitation and dyspnea new onset in November 2020.  Patient reports this happened recently.  She has chest tightness, and associated tachycardia/palpitation.  Patient reports this is unpredictable, can be on and off.  She noticed that one day she was cooking meal for her family, and noted sudden onset palpitation and chest tightness.  She reports unable to induce purposefully.  She also has exertional dyspnea.  Patient reports 11/17/2020, we asked patient to hold Sprycel.  We suspect the patient may have pleural effusion or cardiac dysfunction secondary to Sprycel, we requested chest CT, echocardiogram study and also laboratory study for evaluation.     Negative chest x-ray on 11/17/2020.     Laboratory study on 11/17/2020 reported stable chronic condition with a ferritin 261 and iron saturation 12%, normal hemoglobin 15.0, unable to explain her dyspnea.     Echocardiogram study on 11/18/2020 reported normal results.     Laboratory study on 1/6/2021 reported hemoglobin 15.9 hematocrit 47.1%, platelets 181,000 WBC 9360 including ANC 7130 lymphocytes 1350. Iron study  reported ferritin 293 and iron saturation 14% with free iron 47 and a TIBC 326.  Chemistry lab reported unremarkable CMP including renal function and liver function panel.      Patient reports she was seen by cardiologist Dr. Flowers on 2/4/2021.  Patient had thorough cardiology evaluation and there was no apparent abnormalities.    We have been withholding her Sprycel in middle November 2020 because of palpitation, and referred patient to cardiology service.        Fortunately, her peripheral blood BCR-ABL by RT-PCR was complete negative, as reported on 1/12/2021.     Sprycel was resumed on 2/9/2021.      Laboratory studies on 3/16/2021 reported normal CBC including hemoglobin 14.4 MCV 88.7, platelets 173,000, and WBC 6400 including ANC 3770 lymphocytes 1800.  Chemistry lab reported normal CMP.     Laboratory studies on 5/4/2021 reported completely normal CBC and CMP.  Iron study also normal with ferritin 219 and iron saturation 16%.  Negative study for BCR-ABL by RT-PCR.     Laboratory study on 10/5/2021 reported complete normal CBC and CMP.  Iron study reported normal ferritin 250 ng/mL and iron saturation 21% with free iron 63 TIBC 300.  She was also complete negative for BCR-ABL by RT-PCR.    The patient complains of leg pain and bone pain. She recently had an EGD done. Her stomach was inflamed due to taking ibuprofen. She was prescribed Carafate 4 times a day for 1 week. She was only taking it 2 to 3 times a day. It caused constipation.    Laboratory study on 4/12/2023 reports normal hemoglobin 14.3, WBC 6430 including neutrophils 4130, lymphocytes 1560, monocytes 480, and normal platelets 181,000.  Her laboratory study on 4/12/2023 reported negative for BCR-ABL by RT-PCR, for both the p210 and the p190 products.       The patient reports recently she went to [facility] because of pain in the neck. She was in the ER on 7/9/2023 and subsequently had an MRI for the cervical spine and the thoracic spine  on 7/10/2023 for further evaluation. The study reported tiny central disc protrusion of the C6-7. Also, there was incidental finding of left thyroid nodule 1 cm. Radiologist recommended to have ultrasound examination. Otherwise, the MRI of the cervical spine has no significant abnormalities. The thoracic MRI examination reported normal results.    Laboratory studies on 8/9/2023 reports normal CBC with total WBC 9,160, including neutrophils 6500, lymphocytes 1680, monocytes 680, and normal hemoglobin 14.1, platelets 211,000. Chemistry lab reported that unremarkable with CMP except a mildly decreased bicarbonate at 19.5 and elevated anion gap of 16.5.    Lab study on 8/9/2023 for BCR-ABL by RT-PCR was complete negative for the p210 and p190 products.    Laboratory studies today 11/6/2023 reported normal CBC including WBC 6010 neutrophils of 4890 lymphocytes 1090 monocytes 660 and basophil 40, hemoglobin 13.3 MCV 90.6, and platelets 180,000.  Chemistry lab reported unremarkable CMP.  Negative BCR-ABL study by RT-PCR on 11/6/2023.    Laboratory studies on 1/29/2024 reported normal CBC including hemoglobin 14.8, MCV 90.8, platelets 193,000, and WBC 8,470, including neutrophils 6,120, lymphocytes 1,520, monocytes 570, and basophil 50. Chemistry lab reported normal CMP with creatinine 0.73, normal electrolytes including calcium 9.5, normal liver function panel, and glucose 89.        MEDICATIONS: The current medication list was reviewed with the patient and updated in the EMR this date per the medical assistant. Medication dosages and frequencies were confirmed to be accurate.        Allergies   Allergen Reactions    Sulfa Antibiotics Unknown - Low Severity     Childhood reaction     SOCIAL HISTORY: . She smoked cigarettes previously, quit in January 2006 with 8-pack-year history. Social drinker, maybe once a month. No illegal drug use. No risk for HIV except tattoos.  Hairstylist.       FAMILY HISTORY: Maternal  "grandmother had esophageal/stomach adenocarcinoma diagnosed at age of 72 and  of cancer at age of 73. The patient' s mother has hypertension but otherwise healthy.  No other family history of malignancy, especially no leukemia.          VITAL SIGNS:   Vitals:    25 1247   BP: 109/64   Pulse: 67   Temp: 97.7 °F (36.5 °C)   TempSrc: Skin   SpO2: 96%   Weight: 72.8 kg (160 lb 8 oz)   Height: 172.7 cm (67.99\")   PainSc: 0-No pain   ECOG 0      Physical Exam  Vitals and nursing note reviewed.   Constitutional:       Appearance: Normal appearance. She is well-developed.   HENT:      Head: Normocephalic.   Eyes:      Conjunctiva/sclera: Conjunctivae normal.   Cardiovascular:      Rate and Rhythm: Normal rate and regular rhythm.      Heart sounds: Normal heart sounds. No murmur heard.  Pulmonary:      Effort: Pulmonary effort is normal.      Breath sounds: Normal breath sounds.   Abdominal:      General: Bowel sounds are normal.      Palpations: Abdomen is soft.   Musculoskeletal:      Right lower leg: No edema.      Left lower leg: No edema.   Neurological:      Mental Status: Mental status is at baseline.   Psychiatric:         Thought Content: Thought content normal.       IMAGING:      LABORATORY DATA:        Lab Results   Component Value Date    WBC 7.73 2025    HGB 14.6 2025    HCT 44.3 2025    MCV 90.8 2025     2025     Lab Results   Component Value Date    NEUTROABS 5.82 2025     Lab Results   Component Value Date    GLUCOSE 98 2025    BUN 10.8 2025    CREATININE 0.91 2025     2025    K 3.7 2025     2025    CALCIUM 10.0 2025    PROTEINTOT 7.3 2025    ALBUMIN 4.7 2025    ALT 15 2025    AST 17 2025    ALKPHOS 74 2025    BILITOT 0.4 2025    GLOB 2.6 2025    AGRATIO 1.8 2025    BCR 11.9 2025    ANIONGAP 11.0 2025    EGFR 81.4 2025             "     Assessment & Plan  1. Chronic Myeloid Leukemia (CML).  Blood counts are within normal range today, with a total white cell count of 7730, neutrophils at 5800, lymphocytes at 1240, hemoglobin at 14.6, and platelets at 215,000. A brief episode of leukocytopenia/neutropenia with an ANC of 1020 occurred on 02/03/2025, which normalized by 02/24/2025 with neutrophils at 5410. The BCR-ABL2 test showed a negative result for the heavier product, P210, but no results were obtained for the lighter product, P190. Previous tests had consistently shown negative results for both products. Currently taking Sprycel 50 mg daily.    Treatment Plan:  Continue the current medication regimen of Sprycel 50 mg daily. The goal of the treatment is to maintain normal blood counts and prevent disease progression. Potential side effects of Sprycel include nausea, dyspnea, and leg swelling, although none are currently reported. Supportive care measures include monitoring blood counts regularly and addressing any side effects promptly.    Risks, Benefits, and Alternatives:  The risks of continuing Sprycel include potential side effects such as nausea, dyspnea, and leg swelling. The benefits include maintaining normal blood counts and preventing disease progression. Alternatives to Sprycel were not discussed during the visit.    Follow-up:  A follow-up appointment is scheduled in 4 months to reassess blood counts and disease status.    ASSESSMENT:      1. Chronic myelogenous leukemia, chronic phase with excellent response to Sprycel and complete molecular response in August 2014. However she had interuption of treatment in early part of 2016, because of insurance issues and miscommunication, she stopped the medicine without telling us, and laboratory test on 03/22/2016 reported disease relapse with increased BCR/ABL product at 12.626%. subsequently she was restarted back on Sprycel, and responded well.  Since June 2017, she has completed  molecular response as tested every 3 months.  She has been compliant with treatment.   In the end of July 2018, she reported worsening significant cramping involving her legs, so we decreased her Sprycel to 50 mg daily starting early August.  She's been having less problem since that time.  She was tested negative for BCR-ABL on 8/31/2018.    Patient had a negative BCR-ABL by RT-PCR in end of January 2019 and also on 3/29/2019.   Patient was later found having significantly elevated liver function panel.  Sprycel was on hold and she was subsequently found having acute hepatitis C infection.    I discussed with Dr. Karyn Mendieta, we'll hold her Sprycel for now until she finishes hepatitis C treatment.   She was started on hepatitis C treatment on 4/18/2019 and finished an 8-week course.  On 07/02/2019 patient's labs showed BCR-ABL Major (p210) detected by RT-PCR at 0.0141%. BCR/ABL1 Minor (p190) was not detected. HCV was not detected.  Sprycel treatment was resumed as of 07/02/2019 upon completion of MEVYRET.  Laboratory study on 10/29/2019 reported normal CBC. BCR/ABL1 MAJOR (p210) and BCR/ABL1 MINOR (p190) were not detected.    BCR-ABL by RT-PCR on 1/24/2020 was negative.     Lab result for BCR ABL by RT-PCR was also negative on 4/24/2020. Patient will need to continue on sprycel treatment.  7/10/2020 patient has normal CBC and CMP.  Negative RT-PCR for BCR ABL.  Tolerating well.  Continue Sprycel at 50 mg daily.  On 10/9/2020, completely normal CBC with good tolerance.  BCR-ABL was tested negative by RT-PCR method.  Continue Sprycel.   Patient reports 11/17/2020 chest tightness in palpitation, and exertion dyspnea progressively getting worse in the past week.  We asked patient to hold Sprycel.   Chest x-ray examination on 11/17/2020 was unremarkable.  Echocardiogram study on 11/18/2020 was also benign.  Patient was seen by cardiologist for further evaluation.    Laboratory studies on 1/6/2021 reported normal WBC 9360  including ANC 7130 lymphocytes 1350. BCR-ABL by RT-PCR was complete negative, as reported on 1/12/2021.   Patient had negative cardiac work-up.  She also has resolution of symptoms.  Discussed with patient on 2/9/2021, we recommended resumption of Sprycel 50 mg daily.  Patient is agreeable.  On 3/16/2021, patient reports tolerating Sprycel, no recurrent symptoms of dyspnea, chest tightness or palpitation.  Continue Sprycel 50 mg daily.  On 5/4/2021, patient has normal CBC and CMP.  Negative results for BCR-ABL by RT-PCR for P210 and P190 as reported on 5/12/2021.    On 10/5/2021 patient presented for reevaluation.  She reports tolerating Sprycel.  No specific side effects reported.  Maintains normal CBC and negative BCR-ABL.  We will continue treatment for now.  On 12/21/2021, patient reports good tolerance to Sprycel.  Normal CBC CMP.  Negative study by peripheral blood RT-PCR.  Continue Sprycel.   On 3/14/2022, patient reports excellent tolerance to Sprycel at 50 mg daily.  Maintains normal CBC and CMP.  6/8/2022 continues on Sprycel 50 mg daily tolerating well.  Patient was negative for BCR-ABL by RT-PCR.  On 10/7/2022 patient reports compliance with Sprycel and good tolerance.  We will continue treatment.  1/13/2023 continues on Sprycel 50 mg daily, tolerating well.  BCR-ABL was completely negative result for both the p210 and the p180 fusion products.  The patient presents for 3-month follow-up evaluation 4/12/2023. She continues on Sprycel 50 mg daily. She tolerated it well except having some pain in the lower extremities, which she stated has improved since decreased from 100 mg to 50 mg daily. She has been taking ibuprofen for that. Nevertheless, she recently had an EGD examination which led to the discovery of duodenitis. I discussed with the patient about switching to a different TKI, we searched Up-to-Date. We found that bosutinib, and dasatinib could all cause similar side effects with arthralgia, muscle  pain, limb pain, even the new medicine ponatinib could have caused the similar side effects at about the same kind of percentages. Discussed with patient if she is concerned about switching off the current Sprycel, which she really has excellent response.  Patient decided to continue Sprycel.  Study on 04/12/2023 was negative for BCR-ABL by RT-PCR.  On 8/9/2023 patient reports tolerating treatment.  She is at her baseline condition.  Patient has normal CBC.  We will continue Sprycel.  Patient was completely negative for BCR-ABL by RT-PCR.   Negative BCR-ABL study by RT-PCR on 11/6/2023.   The patient presented today on 01/29/2024. She reports good tolerance to low dose Sprycel 50 mg daily. She has normal CBC. study for BCR-ABL by RT-PCR was completed negative.  The patient presented for evaluation on 05/29/2024. Currently, the patient reports tolerating low-dose Sprycel 50 mg daily well, with no symptoms of diarrhea, chest pain, dyspnea, or lower extremity edema. However, she does report intermittent lower extremity cramping, which she believes has been well-tolerated. Laboratory studies revealed normal CBC and well-controlled WBC counts.  BCR-ABL by RT-PCR detected quantitative result below the limit of quantification (less than 0.01%)  8/16/2024 returns in follow-up doing well with no new symptoms.  BCR-ABL not detected.  Patient continuing on Sprycel 50 mg daily.  Patient seen in follow-up 1/2/2025 with ongoing excellent tolerance to Sprycel 50 mg daily.  CBC within normal limits,   On 2/3/2025 BCR-ABL by RT-PCR reported negative for the p210 product.    A brief episode of neutropenia with an ANC of 1020 occurred on 02/03/2025, which normalized by 02/24/2025 with neutrophils at 5410.  Patient reports she was ill, and was prescribed antibiotics at that time.  Today 6/4/2025 patient reports tolerating Sprycel.  Blood counts are within normal range today, with total white cell count of 7730, neutrophils at 5800,  lymphocytes at 1240, hemoglobin at 14.6, and platelets at 215,000.  Currently taking Sprycel 50 mg daily.      2. Recurrent Iron deficiency anemia.    She was treated again with Feraheme October 2017.   Iron deficiency thought to be related to ulcer identified on EGD, being treated with Carafate.  She also had heavy menses.  She had recurrent iron deficiency again and was given Feraheme 2 doses in March 2018.   Subsequently recurrent iron deficiency in July 2018, she was switched to Venofer 3 doses total 900 mg.   Patient had simple hysterectomy in October 2018.    Laboratory study showed supratherapeutic ferritin 458 and iron saturation 40% on 4/30/2019.   Laboratory study performed 10/29/2019, showed normal iron saturation and ferritin 325 ng/dL.  Normal iron studies including ferritin 262 and iron saturation 21% on 4/24/2020.  No evidence of recurrent iron deficiency.   Lab study on 7/10/2020 reported ferritin 185, iron saturation 13% and normal hemoglobin 14.5.  She has deteriorating iron studies.   Continue to monitor in 3 months.  Labs on 10/9/2020 reported ferritin 270, iron saturation 13% and hemoglobin 14.9.  Lab studies on 1/6/2021 reported mild erythrocytosis.  Hemoglobin is 15.9 and hematocrit of 47.1%, with ferritin 293 and iron saturation 14%.    Normal hemoglobin 14.4 on 3/16/2021.    On 5/4/2021 lab study reported hemoglobin 13.7, ferritin 219 and iron saturation 16%.    On 10/5/2021, patient maintains normal hemoglobin.  Iron study reported further improved ferritin 250 ng/mL and iron saturation 21%.  Since her hysterectomy, patient has no recurrence of iron deficiency.  Discussed with patient today, there is no need for routine monitoring of iron study from now.    On 3/14/2022, normal hemoglobin 14.3.  6/8/2022 hemoglobin 13.6.  On 10/7/2022 patient has normal hemoglobin 14.1.  1/13/2023 Hgb 13.6  On 4/12/2023 patient has normal hemoglobin 14.3.  Patient has normal hemoglobin 14.1 on 08/09/2023.    Normal hemoglobin 13.3 on 11/6/2023.   on 01/29/2024, the patient has normal hemoglobin 14.8.  She denies evidence of bleeding.  No need to check iron studies.  1/2/2025 hemoglobin normal at 15.0  6/4/2025 normal hemoglobin 14.6.     3.  Chest tightness, palpitation and dyspnea new onset in November 2020.  Patient reports this happened recently, and that usually unpredictable, she has chest tightness, and associated tachycardia/palpitation.  Patient reports this is unpredictable, can be on and off.  She noticed 1 day she was cooking meal for her family, and noted several palpitation and chest tightness.  She reports unable to induce purposefully.  She also has exertional dyspnea.  Patient reports 11/17/2020, we asked patient to hold Sprycel.  We requested chest CT, echocardiogram study and also laboratory study for evaluation.  Negative chest x-ray on 11/17/2020.   Laboratory study on 11/17/2020 reported stable chronic condition with a ferritin 261 and iron saturation 12%, normal hemoglobin 15.0, unable to explain her dyspnea.   Echocardiogram study on 11/18/2020 reported normal results.   Patient is evaluated 11/20/2020, she reports somewhat improved symptoms, since she stopped Sprycel for the past 3 days.  She denies extremity edema.  She denies fever sweating or chills.  We recommend patient to continue to hold Sprycel for another 2 weeks.  We also recommended patient to be tested for COVID-19.    Reevaluation on 12/4/2020, patient reports that she did not get a Covid test.  She denies other illness such as fever sweating nausea vomiting, diarrhea, change of taste, cough etc.  Discussed with patient, will check a thyroid panel, which turned out to be normal on 12/4/2020.    Patient was referred to cardiologist for evaluation of hypertension.  She was seen by Dr. Flowers on 12/21/2020 and the case waiting for further evaluation.  Patient had a negative cardiac work-up.  Her symptom has resolved.  Patient was  restarted on Sprycel 2/9/2021.  She has since had recurrent symptoms  6/4/2025 patient reports no recurrent symptoms.        4. Incidental finding of left thyroid nodule by cervical spine MRI examination for the assessment of neck pain  Patient is asymptomatic.   Today's physical examination has no abnormal discovery in the thyroid area, no palpable nodule. I will request ultrasound for the thyroid for further evaluation as recommended by radiologist.  Patient had ultrasound of the thyroid 8/11/2023 and radiologist commented multiple small thyroid nodules fits with TI-RADS 1 and TI-RADS 2 nodules and are considered benign.  No additional follow-up recommended by radiologist.      PLAN:    Pending results for BCR-ABL by RT-PCR.  Continue Sprycel 50 mg daily.   Continue B complex  We will see patient in 4 months for reevaluation, will check CBC CMP, BCR-ABL by RT-PCR.  Call/ return sooner should the patient develop any new concerns or problems.    I discussed with the patient about laboratory results and further management plan.  Patient voiced understanding and agreeable.    This patient is on high risk medication and needs close monitoring.       Bao Slade MD PhD  06/04/2025      Addendum:  Peripheral blood study reported negative for BCR-ABL by RT-PCR for both the p210 and p190 products.      BAO SLADE M.D., Ph.D.          CC:  Jennifer More DO Anna Hart, M.D.    Luis Alfredo Flowers M.D.

## 2025-06-05 ENCOUNTER — SPECIALTY PHARMACY (OUTPATIENT)
Dept: PHARMACY | Facility: HOSPITAL | Age: 40
End: 2025-06-05
Payer: COMMERCIAL

## 2025-06-09 LAB — BCR ABL RESULT: NORMAL

## 2025-06-12 ENCOUNTER — SPECIALTY PHARMACY (OUTPATIENT)
Dept: PHARMACY | Facility: HOSPITAL | Age: 40
End: 2025-06-12
Payer: COMMERCIAL

## 2025-06-24 ENCOUNTER — SPECIALTY PHARMACY (OUTPATIENT)
Dept: PHARMACY | Facility: HOSPITAL | Age: 40
End: 2025-06-24
Payer: COMMERCIAL

## 2025-06-24 NOTE — PROGRESS NOTES
Specialty Pharmacy Patient Management Program  One-Time Clinical Outreach     Hu Houston is seen by an their provider for CML and enrolled in the Oncology Patient Management program offered by [facility].      Call placed for 6 month assessment on adherence and side effects of Sprycel.  No answer.  Left VM with request for return call to [phone].      Lilian Grady Rph, BCOP  Clinical Specialty Pharmacist, Oncology  6/24/2025  10:51 EDT

## 2025-06-26 ENCOUNTER — SPECIALTY PHARMACY (OUTPATIENT)
Dept: PHARMACY | Facility: HOSPITAL | Age: 40
End: 2025-06-26
Payer: COMMERCIAL

## 2025-06-26 NOTE — PROGRESS NOTES
Specialty Pharmacy Patient Management Program  One-Time Clinical Outreach     Hu Houston is seen by an their provider for CML and enrolled in the Oncology Patient Management program offered by [facility].      Call placed for 6 month adherence and side effects of Dasatinib.  No answer.  Left VM with request to return call to [phone].     Lilian Grady Rph, BCOP  Clinical Specialty Pharmacist, Oncology  6/26/2025  12:08 EDT

## 2025-06-30 ENCOUNTER — SPECIALTY PHARMACY (OUTPATIENT)
Dept: PHARMACY | Facility: HOSPITAL | Age: 40
End: 2025-06-30
Payer: COMMERCIAL

## 2025-06-30 NOTE — PROGRESS NOTES
Specialty Pharmacy Patient Management Program  One-Time Clinical Outreach     Hu Houston is seen by an their provider for CML and enrolled in the Oncology Patient Management program offered by [facility].      Call placed for 6 month assessment on adherence and side effects of Dasatinib.  No answer.  Left VM to return call to [phone].     Lilian Grady Rph, BCOP  Clinical Specialty Pharmacist, Oncology  6/30/2025  13:32 EDT

## 2025-07-07 ENCOUNTER — SPECIALTY PHARMACY (OUTPATIENT)
Dept: PHARMACY | Facility: HOSPITAL | Age: 40
End: 2025-07-07
Payer: COMMERCIAL

## (undated) DEVICE — IRRIGATOR BULB 60CC

## (undated) DEVICE — SUCTION CANISTER, 1000CC,SAFELINER: Brand: DEROYAL

## (undated) DEVICE — SYR LUER SLPTP 50ML

## (undated) DEVICE — OCCL COLPO PNEUMO  STRL

## (undated) DEVICE — GLV SURG SENSICARE MICRO PF LF 7.5 STRL

## (undated) DEVICE — GLV SURG SENSICARE W/ALOE PF LF 7.5 STRL

## (undated) DEVICE — CYSTO/BLADDER IRRIGATION SET, REGULATING CLAMP

## (undated) DEVICE — GOWN,PREVENTION PLUS,XXLARGE,STERILE: Brand: MEDLINE

## (undated) DEVICE — Device

## (undated) DEVICE — THE BITE BLOCK MAXI, LATEX FREE STRAP IS USED TO PROTECT THE ENDOSCOPE INSERTION TUBE FROM BEING BITTEN BY THE PATIENT.

## (undated) DEVICE — BW-412T DISP COMBO CLEANING BRUSH: Brand: SINGLE USE COMBINATION CLEANING BRUSH

## (undated) DEVICE — ENDOPATH PNEUMONEEDLE INSUFFLATION NEEDLES WITH LUER LOCK CONNECTORS 150MM: Brand: ENDOPATH

## (undated) DEVICE — GOWN ISOL W/THUMB UNIV BLU BX/15

## (undated) DEVICE — APPL CHLORAPREP W/TINT 26ML ORNG

## (undated) DEVICE — SYR LL 3CC

## (undated) DEVICE — PANTY KNIT MATERN L/XL

## (undated) DEVICE — PROB ABL ENDOMTRL NOVASURE/G1 W/SURESND BIP

## (undated) DEVICE — KT ORCA ORCAPOD DISP STRL

## (undated) DEVICE — SUT MNCRYL 4/0 PS2 18 IN

## (undated) DEVICE — GLV SURG SENSICARE LT W/ALOE PF LF 7 STRL

## (undated) DEVICE — FLEXIBLE ADHESIVE BANDAGE: Brand: CURITY

## (undated) DEVICE — GLV SURG SENSICARE MICRO PF LF 6 STRL

## (undated) DEVICE — VIAL FORMALIN CAP 10P 40ML

## (undated) DEVICE — STRAP STIRUP SLP RNG 19X3.5IN DISP

## (undated) DEVICE — CVR HNDL LT SURG ACCSSRY BLU STRL

## (undated) DEVICE — PK TLH 90

## (undated) DEVICE — LAB CORP AGAR SLANT UREA PK/10

## (undated) DEVICE — ENDOPATH PNEUMONEEDLE INSUFFLATION NEEDLES WITH LUER LOCK CONNECTORS 120MM: Brand: ENDOPATH

## (undated) DEVICE — PAD SANI MAXI W/ADHS SNG WRP 11IN

## (undated) DEVICE — LAG PERI GYN: Brand: MEDLINE INDUSTRIES, INC.

## (undated) DEVICE — SAFESECURE,SECUREMENT,FOLEY CATH,STERILE: Brand: MEDLINE

## (undated) DEVICE — SUCTION CANISTER, 3000CC,SAFELINER: Brand: DEROYAL

## (undated) DEVICE — FRCP BX RADJAW4 NDL 2.8 240CM LG OG BX40

## (undated) DEVICE — SPNG GZ WOVN 4X4IN 12PLY 10/BX STRL

## (undated) DEVICE — SUT VIC 2/0 CT1 36IN

## (undated) DEVICE — ENDOPATH XCEL BLADELESS TROCARS WITH STABILITY SLEEVES: Brand: ENDOPATH XCEL

## (undated) DEVICE — JACKT LAB KNIT COLR LG BLU

## (undated) DEVICE — SAFELINER SUCTION CANISTER 1000CC: Brand: DEROYAL

## (undated) DEVICE — LAG GYN LAPAROSCOPY: Brand: MEDLINE INDUSTRIES, INC.

## (undated) DEVICE — TBG INSUFL W FLTR STRL

## (undated) DEVICE — MANIP UTER RUMI TP 6.7MM 10CM GRN

## (undated) DEVICE — Device: Brand: DEFENDO AIR/WATER/SUCTION AND BIOPSY VALVE

## (undated) DEVICE — MASK,FACE,SHIELD,BLUE,ANTI FOG,TIES: Brand: MEDLINE

## (undated) DEVICE — BNDG ADHS SHEER 1X3IN

## (undated) DEVICE — SUT MNCRYL 4/0 SH 27IN Y415H

## (undated) DEVICE — TOWEL,OR,DSP,ST,BLUE,STD,4/PK,20PK/CS: Brand: MEDLINE

## (undated) DEVICE — ADAPT CLN BIOGUARD AIR/H2O DISP